# Patient Record
Sex: FEMALE | Race: ASIAN | NOT HISPANIC OR LATINO | Employment: OTHER | URBAN - METROPOLITAN AREA
[De-identification: names, ages, dates, MRNs, and addresses within clinical notes are randomized per-mention and may not be internally consistent; named-entity substitution may affect disease eponyms.]

---

## 2017-05-02 ENCOUNTER — HOSPITAL ENCOUNTER (OUTPATIENT)
Dept: LAB | Facility: MEDICAL CENTER | Age: 82
End: 2017-05-02
Attending: FAMILY MEDICINE
Payer: COMMERCIAL

## 2017-05-02 LAB
25(OH)D3 SERPL-MCNC: 20 NG/ML (ref 30–100)
ALBUMIN SERPL BCP-MCNC: 3.9 G/DL (ref 3.2–4.9)
ALBUMIN/GLOB SERPL: 1.3 G/DL
ALP SERPL-CCNC: 80 U/L (ref 30–99)
ALT SERPL-CCNC: 13 U/L (ref 2–50)
ANION GAP SERPL CALC-SCNC: 10 MMOL/L (ref 0–11.9)
AST SERPL-CCNC: 19 U/L (ref 12–45)
BASOPHILS # BLD AUTO: 1.4 % (ref 0–1.8)
BASOPHILS # BLD: 0.07 K/UL (ref 0–0.12)
BILIRUB SERPL-MCNC: 0.5 MG/DL (ref 0.1–1.5)
BUN SERPL-MCNC: 12 MG/DL (ref 8–22)
CALCIUM SERPL-MCNC: 9.1 MG/DL (ref 8.5–10.5)
CHLORIDE SERPL-SCNC: 108 MMOL/L (ref 96–112)
CHOLEST SERPL-MCNC: 206 MG/DL (ref 100–199)
CO2 SERPL-SCNC: 22 MMOL/L (ref 20–33)
CREAT SERPL-MCNC: 1.03 MG/DL (ref 0.5–1.4)
EOSINOPHIL # BLD AUTO: 0.31 K/UL (ref 0–0.51)
EOSINOPHIL NFR BLD: 6.2 % (ref 0–6.9)
ERYTHROCYTE [DISTWIDTH] IN BLOOD BY AUTOMATED COUNT: 42.9 FL (ref 35.9–50)
GFR SERPL CREATININE-BSD FRML MDRD: 51 ML/MIN/1.73 M 2
GLOBULIN SER CALC-MCNC: 3.1 G/DL (ref 1.9–3.5)
GLUCOSE SERPL-MCNC: 94 MG/DL (ref 65–99)
HCT VFR BLD AUTO: 41.7 % (ref 37–47)
HDLC SERPL-MCNC: 55 MG/DL
HGB BLD-MCNC: 13.4 G/DL (ref 12–16)
IMM GRANULOCYTES # BLD AUTO: 0.01 K/UL (ref 0–0.11)
IMM GRANULOCYTES NFR BLD AUTO: 0.2 % (ref 0–0.9)
LDLC SERPL CALC-MCNC: 119 MG/DL
LYMPHOCYTES # BLD AUTO: 1.56 K/UL (ref 1–4.8)
LYMPHOCYTES NFR BLD: 31.2 % (ref 22–41)
MCH RBC QN AUTO: 30.7 PG (ref 27–33)
MCHC RBC AUTO-ENTMCNC: 32.1 G/DL (ref 33.6–35)
MCV RBC AUTO: 95.4 FL (ref 81.4–97.8)
MONOCYTES # BLD AUTO: 0.35 K/UL (ref 0–0.85)
MONOCYTES NFR BLD AUTO: 7 % (ref 0–13.4)
NEUTROPHILS # BLD AUTO: 2.7 K/UL (ref 2–7.15)
NEUTROPHILS NFR BLD: 54 % (ref 44–72)
NRBC # BLD AUTO: 0 K/UL
NRBC BLD AUTO-RTO: 0 /100 WBC
PLATELET # BLD AUTO: 160 K/UL (ref 164–446)
PMV BLD AUTO: 11.8 FL (ref 9–12.9)
POTASSIUM SERPL-SCNC: 4.3 MMOL/L (ref 3.6–5.5)
PROT SERPL-MCNC: 7 G/DL (ref 6–8.2)
RBC # BLD AUTO: 4.37 M/UL (ref 4.2–5.4)
SODIUM SERPL-SCNC: 140 MMOL/L (ref 135–145)
TRIGL SERPL-MCNC: 159 MG/DL (ref 0–149)
TSH SERPL DL<=0.005 MIU/L-ACNC: 1.18 UIU/ML (ref 0.3–3.7)
WBC # BLD AUTO: 5 K/UL (ref 4.8–10.8)

## 2017-05-02 PROCEDURE — 82306 VITAMIN D 25 HYDROXY: CPT

## 2017-05-02 PROCEDURE — 85025 COMPLETE CBC W/AUTO DIFF WBC: CPT

## 2017-05-02 PROCEDURE — 84443 ASSAY THYROID STIM HORMONE: CPT

## 2017-05-02 PROCEDURE — 80061 LIPID PANEL: CPT

## 2017-05-02 PROCEDURE — 36415 COLL VENOUS BLD VENIPUNCTURE: CPT

## 2017-05-02 PROCEDURE — 80053 COMPREHEN METABOLIC PANEL: CPT

## 2017-05-04 ENCOUNTER — OFFICE VISIT (OUTPATIENT)
Dept: URGENT CARE | Facility: PHYSICIAN GROUP | Age: 82
End: 2017-05-04
Payer: COMMERCIAL

## 2017-05-04 ENCOUNTER — APPOINTMENT (OUTPATIENT)
Dept: RADIOLOGY | Facility: IMAGING CENTER | Age: 82
End: 2017-05-04
Attending: NURSE PRACTITIONER
Payer: COMMERCIAL

## 2017-05-04 VITALS
TEMPERATURE: 98.2 F | OXYGEN SATURATION: 97 % | DIASTOLIC BLOOD PRESSURE: 80 MMHG | HEIGHT: 62 IN | HEART RATE: 84 BPM | BODY MASS INDEX: 21.35 KG/M2 | SYSTOLIC BLOOD PRESSURE: 150 MMHG | WEIGHT: 116 LBS | RESPIRATION RATE: 16 BRPM

## 2017-05-04 DIAGNOSIS — M79.674 TOE PAIN, RIGHT: ICD-10-CM

## 2017-05-04 DIAGNOSIS — S92.501A: ICD-10-CM

## 2017-05-04 PROCEDURE — L3260 AMBULATORY SURGICAL BOOT EAC: HCPCS | Performed by: NURSE PRACTITIONER

## 2017-05-04 PROCEDURE — 1101F PT FALLS ASSESS-DOCD LE1/YR: CPT | Mod: 8P | Performed by: NURSE PRACTITIONER

## 2017-05-04 PROCEDURE — 73660 X-RAY EXAM OF TOE(S): CPT | Mod: TC,RT | Performed by: NURSE PRACTITIONER

## 2017-05-04 PROCEDURE — G8599 NO ASA/ANTIPLAT THER USE RNG: HCPCS | Performed by: NURSE PRACTITIONER

## 2017-05-04 PROCEDURE — G8432 DEP SCR NOT DOC, RNG: HCPCS | Performed by: NURSE PRACTITIONER

## 2017-05-04 PROCEDURE — 4040F PNEUMOC VAC/ADMIN/RCVD: CPT | Performed by: NURSE PRACTITIONER

## 2017-05-04 PROCEDURE — 99202 OFFICE O/P NEW SF 15 MIN: CPT | Performed by: NURSE PRACTITIONER

## 2017-05-04 PROCEDURE — 4004F PT TOBACCO SCREEN RCVD TLK: CPT | Mod: 8P | Performed by: NURSE PRACTITIONER

## 2017-05-04 PROCEDURE — G8420 CALC BMI NORM PARAMETERS: HCPCS | Performed by: NURSE PRACTITIONER

## 2017-05-04 RX ORDER — CEPHALEXIN 500 MG/1
500 CAPSULE ORAL 2 TIMES DAILY
Qty: 20 QUANTITY SUFFICIENT | Refills: 0 | Status: ON HOLD | OUTPATIENT
Start: 2017-05-04 | End: 2017-05-07

## 2017-05-04 ASSESSMENT — ENCOUNTER SYMPTOMS
TINGLING: 1
CHILLS: 0
ARTHRALGIAS: 1
FEVER: 0

## 2017-05-04 NOTE — MR AVS SNAPSHOT
"Sabrina Lobo   2017 6:15 PM   Office Visit   MRN: [id number]    Department:  [facility]   Dept Phone:  [phone]    Description:  Female : 1929   Provider:  CHERYLE Benavidez           Reason for Visit     Toe Injury R. foot middle toe pain, swelling, redness, bleeding X 3 days Pt. dropped a bottle of ensure on her foot.       Allergies as of 2017     No Known Allergies      You were diagnosed with     Toe pain, right   [298873]       Closed fracture of third toe of right foot, initial encounter   [447814]         Vital Signs     Blood Pressure Pulse Temperature Respirations Height Weight    150/80 mmHg 84 36.8 °C (98.2 °F) 16 1.575 m (5' 2\") 52.617 kg (116 lb)    Body Mass Index Oxygen Saturation Smoking Status             21.21 kg/m2 97% Never Smoker          Basic Information     Date Of Birth Sex Race Ethnicity Preferred Language    1929 Female  Non- English      Problem List              ICD-10-CM Priority Class Noted - Resolved    Lower urinary tract infectious disease N39.0   2012 - Present    UTI (urinary tract infection) N39.0 High  2012 - Present    Renal insufficiency N28.9 High  2012 - Present    HTN (hypertension) I10 Low  2012 - Present    Dyslipidemia E78.5 Low  2012 - Present    CAD (coronary artery disease) I25.10 Low  2012 - Present    Hypoxia R09.02 Medium  2012 - Present    Dizziness R42   2013 - Present    Near syncope R55   2013 - Present    UTI (urinary tract infection) N39.0   2013 - Present    Recurrent vertigo R42   2013 - Present    Vitamin D insufficiency E55.9   2013 - Present    Chest pain R07.9   3/9/2015 - Present    HTN (hypertension), malignant I10   3/9/2015 - Present    Obesity E66.9   3/9/2015 - Present    Vertigo R42   2015 - Present    Acute maxillary sinusitis J01.00   2015 - Present      Health Maintenance        Date Due Completion Dates   "    IMM DTaP/Tdap/Td Vaccine (1 - Tdap) 4/13/1948 ---    PAP SMEAR 4/13/1950 ---    MAMMOGRAM 4/13/1969 ---    COLONOSCOPY 4/13/1979 ---    IMM ZOSTER VACCINE 4/13/1989 ---    BONE DENSITY 4/13/1994 ---    IMM PNEUMOCOCCAL 65+ (ADULT) LOW/MEDIUM RISK SERIES (2 of 2 - PCV13) 9/1/2011 9/1/2010            Current Immunizations     Influenza TIV (IM) 10/13/2013, 11/16/2012    Pneumococcal polysaccharide vaccine (PPSV-23) 9/1/2010      Below and/or attached are the medications your provider expects you to take. Review all of your home medications and newly ordered medications with your provider and/or pharmacist. Follow medication instructions as directed by your provider and/or pharmacist. Please keep your medication list with you and share with your provider. Update the information when medications are discontinued, doses are changed, or new medications (including over-the-counter products) are added; and carry medication information at all times in the event of emergency situations     Allergies:  No Known Allergies          Medications  Valid as of: May 04, 2017 -  7:22 PM    Generic Name Brand Name Tablet Size Instructions for use    Ascorbic Acid   Take  by mouth.        Aspirin (Tab) aspirin 81 MG Take 1 Tab by mouth every morning.        Cephalexin (Cap) KEFLEX 500 MG Take 1 Cap by mouth 2 times a day.        Enalapril Maleate (Tab) VASOTEC 20 MG Take 20 mg by mouth every day.        Meclizine HCl (Tab) ANTIVERT 12.5 MG Take 1 Tab by mouth 3 times a day as needed for Dizziness.        Simvastatin (Tab) ZOCOR 20 MG Take 1 Tab by mouth every bedtime.        .                 Medicines prescribed today were sent to:     [facility] [address] [place] [address] [address] [address]    Phone: [phone] Fax: [phone]    Open 24 Hours?: No      Medication refill instructions:       If your prescription bottle indicates you have medication refills left, it is not necessary to  call your provider’s office. Please contact your pharmacy and they will refill your medication.    If your prescription bottle indicates you do not have any refills left, you may request refills at any time through one of the following ways: The online [employer] system (except Urgent Care), by calling your provider’s office, or by asking your pharmacy to contact your provider’s office with a refill request. Medication refills are processed only during regular business hours and may not be available until the next business day. Your provider may request additional information or to have a follow-up visit with you prior to refilling your medication.   *Please Note: Medication refills are assigned a new Rx number when refilled electronically. Your pharmacy may indicate that no refills were authorized even though a new prescription for the same medication is available at the pharmacy. Please request the medicine by name with the pharmacy before contacting your provider for a refill.        Your To Do List     Future Labs/Procedures Complete By Expires    DX-TOE(S) 2+ RIGHT  As directed 5/4/2018         [employer] Access Code: [email or web address]  Expires: 6/3/2017  7:22 PM    [employer]  A secure, online tool to manage your health information     [employer]’s [employer]® is a secure, online tool that connects you to your personalized health information from the privacy of your home -- day or night - making it very easy for you to manage your healthcare. Once the activation process is completed, you can even access your medical information using the [employer] matthew, which is available for free in the Apple Matthew store or Google Play store.     [employer] provides the following levels of access (as shown below):   My Chart Features   [facility] Primary [facility] Doctor [facility]  Specialists [facility]  Urgent  Care Non-[facility]  Primary Care  Doctor   Email your healthcare team securely and privately 24/7 X X X    Manage appointments: schedule your next  appointment; view details of past/upcoming appointments X      Request prescription refills. X      View recent personal medical records, including lab and immunizations X X X X   View health record, including health history, allergies, medications X X X X   Read reports about your outpatient visits, procedures, consult and ER notes X X X X   See your discharge summary, which is a recap of your hospital and/or ER visit that includes your diagnosis, lab results, and care plan. X X       How to register for [employer]:  1. Go to  [email or web address].  2. Click on the Sign Up Now box, which takes you to the New Member Sign Up page. You will need to provide the following information:  a. Enter your [employer] Access Code exactly as it appears at the top of this page. (You will not need to use this code after you’ve completed the sign-up process. If you do not sign up before the expiration date, you must request a new code.)   b. Enter your date of birth.   c. Enter your home email address.   d. Click Submit, and follow the next screen’s instructions.  3. Create a [employer] ID. This will be your [employer] login ID and cannot be changed, so think of one that is secure and easy to remember.  4. Create a [employer] password. You can change your password at any time.  5. Enter your Password Reset Question and Answer. This can be used at a later time if you forget your password.   6. Enter your e-mail address. This allows you to receive e-mail notifications when new information is available in [employer]Trek.  7. Click Sign Up. You can now view your health information.    For assistance activating your [employer] account, call [phone]

## 2017-05-05 NOTE — PROGRESS NOTES
"Subjective:      Sabrina Lobo is a 88 y.o. female who presents with Toe Injury            Toe Injury  This is a new problem. The current episode started in the past 7 days. The problem occurs constantly. The problem has been unchanged. Associated symptoms include arthralgias. Pertinent negatives include no chills or fever. Associated symptoms comments: Toe pain.. The symptoms are aggravated by bending. She has tried nothing for the symptoms.     Allergies, medications and history reviewed by me today    Review of Systems   Constitutional: Negative for fever and chills.   Musculoskeletal: Positive for joint pain and arthralgias.   Skin:        +abrasion   Neurological: Positive for tingling.          Objective:     /80 mmHg  Pulse 84  Temp(Src) 36.8 °C (98.2 °F)  Resp 16  Ht 1.575 m (5' 2\")  Wt 52.617 kg (116 lb)  BMI 21.21 kg/m2  SpO2 97%     Physical Exam   Constitutional: She is oriented to person, place, and time. She appears well-developed and well-nourished. No distress.   Musculoskeletal:        Right foot: There is tenderness, bony tenderness and swelling. There is no crepitus and no deformity.        Feet:    Neurological: She is oriented to person, place, and time.   Skin: Skin is warm and dry. There is erythema.   Abrasion to distal portion of toe               Assessment/Plan:     1. Toe pain, right  DX-TOE(S) 2+ RIGHT    cephALEXin (KEFLEX) 500 MG Cap   2. Closed fracture of third toe of right foot, initial encounter     mildly displaced fracture of tuft of third toe, right foot.  Tad taped and post op shoe to patient.  Keflex due to abrasion/fracture  Differential diagnosis, natural history, supportive care, and indications for immediate follow-up discussed at length.         "

## 2017-05-06 ENCOUNTER — APPOINTMENT (OUTPATIENT)
Dept: RADIOLOGY | Facility: MEDICAL CENTER | Age: 82
DRG: 305 | End: 2017-05-06
Attending: EMERGENCY MEDICINE
Payer: COMMERCIAL

## 2017-05-06 ENCOUNTER — RESOLUTE PROFESSIONAL BILLING HOSPITAL PROF FEE (OUTPATIENT)
Dept: HOSPITALIST | Facility: MEDICAL CENTER | Age: 82
End: 2017-05-06
Payer: COMMERCIAL

## 2017-05-06 ENCOUNTER — HOSPITAL ENCOUNTER (INPATIENT)
Facility: MEDICAL CENTER | Age: 82
LOS: 3 days | DRG: 305 | End: 2017-05-09
Attending: EMERGENCY MEDICINE | Admitting: HOSPITALIST
Payer: COMMERCIAL

## 2017-05-06 DIAGNOSIS — I10 ESSENTIAL HYPERTENSION: ICD-10-CM

## 2017-05-06 DIAGNOSIS — R07.2 PRECORDIAL PAIN: ICD-10-CM

## 2017-05-06 PROBLEM — I16.0 HYPERTENSIVE URGENCY: Status: ACTIVE | Noted: 2017-05-06

## 2017-05-06 LAB
ALBUMIN SERPL BCP-MCNC: 3.9 G/DL (ref 3.2–4.9)
ALBUMIN/GLOB SERPL: 1.3 G/DL
ALP SERPL-CCNC: 81 U/L (ref 30–99)
ALT SERPL-CCNC: 21 U/L (ref 2–50)
ANION GAP SERPL CALC-SCNC: 14 MMOL/L (ref 0–11.9)
APTT PPP: 23.4 SEC (ref 24.7–36)
AST SERPL-CCNC: 32 U/L (ref 12–45)
BASOPHILS # BLD AUTO: 0.7 % (ref 0–1.8)
BASOPHILS # BLD: 0.05 K/UL (ref 0–0.12)
BILIRUB SERPL-MCNC: 0.2 MG/DL (ref 0.1–1.5)
BNP SERPL-MCNC: 171 PG/ML (ref 0–100)
BUN SERPL-MCNC: 17 MG/DL (ref 8–22)
CALCIUM SERPL-MCNC: 9.1 MG/DL (ref 8.4–10.2)
CHLORIDE SERPL-SCNC: 105 MMOL/L (ref 96–112)
CO2 SERPL-SCNC: 21 MMOL/L (ref 20–33)
CREAT SERPL-MCNC: 0.93 MG/DL (ref 0.5–1.4)
EKG IMPRESSION: NORMAL
EOSINOPHIL # BLD AUTO: 0.46 K/UL (ref 0–0.51)
EOSINOPHIL NFR BLD: 6.2 % (ref 0–6.9)
ERYTHROCYTE [DISTWIDTH] IN BLOOD BY AUTOMATED COUNT: 39.5 FL (ref 35.9–50)
GFR SERPL CREATININE-BSD FRML MDRD: 57 ML/MIN/1.73 M 2
GLOBULIN SER CALC-MCNC: 2.9 G/DL (ref 1.9–3.5)
GLUCOSE SERPL-MCNC: 98 MG/DL (ref 65–99)
HCT VFR BLD AUTO: 40.2 % (ref 37–47)
HGB BLD-MCNC: 13.9 G/DL (ref 12–16)
IMM GRANULOCYTES # BLD AUTO: 0.01 K/UL (ref 0–0.11)
IMM GRANULOCYTES NFR BLD AUTO: 0.1 % (ref 0–0.9)
INR PPP: 0.87 (ref 0.87–1.13)
LIPASE SERPL-CCNC: 53 U/L (ref 7–58)
LYMPHOCYTES # BLD AUTO: 1.99 K/UL (ref 1–4.8)
LYMPHOCYTES NFR BLD: 26.7 % (ref 22–41)
MCH RBC QN AUTO: 31.2 PG (ref 27–33)
MCHC RBC AUTO-ENTMCNC: 34.6 G/DL (ref 33.6–35)
MCV RBC AUTO: 90.1 FL (ref 81.4–97.8)
MONOCYTES # BLD AUTO: 0.45 K/UL (ref 0–0.85)
MONOCYTES NFR BLD AUTO: 6 % (ref 0–13.4)
NEUTROPHILS # BLD AUTO: 4.48 K/UL (ref 2–7.15)
NEUTROPHILS NFR BLD: 60.3 % (ref 44–72)
NRBC # BLD AUTO: 0 K/UL
NRBC BLD AUTO-RTO: 0 /100 WBC
PLATELET # BLD AUTO: 203 K/UL (ref 164–446)
PMV BLD AUTO: 10.1 FL (ref 9–12.9)
POTASSIUM SERPL-SCNC: 3.7 MMOL/L (ref 3.6–5.5)
PROT SERPL-MCNC: 6.8 G/DL (ref 6–8.2)
PROTHROMBIN TIME: 11.6 SEC (ref 12–14.6)
RBC # BLD AUTO: 4.46 M/UL (ref 4.2–5.4)
SODIUM SERPL-SCNC: 140 MMOL/L (ref 135–145)
TROPONIN I SERPL-MCNC: 0.02 NG/ML (ref 0–0.04)
TROPONIN I SERPL-MCNC: <0.02 NG/ML (ref 0–0.04)
WBC # BLD AUTO: 7.4 K/UL (ref 4.8–10.8)

## 2017-05-06 PROCEDURE — 70450 CT HEAD/BRAIN W/O DYE: CPT

## 2017-05-06 PROCEDURE — 700105 HCHG RX REV CODE 258: Performed by: EMERGENCY MEDICINE

## 2017-05-06 PROCEDURE — 96360 HYDRATION IV INFUSION INIT: CPT

## 2017-05-06 PROCEDURE — 85610 PROTHROMBIN TIME: CPT

## 2017-05-06 PROCEDURE — 99291 CRITICAL CARE FIRST HOUR: CPT

## 2017-05-06 PROCEDURE — 700111 HCHG RX REV CODE 636 W/ 250 OVERRIDE (IP)

## 2017-05-06 PROCEDURE — 80053 COMPREHEN METABOLIC PANEL: CPT

## 2017-05-06 PROCEDURE — 36415 COLL VENOUS BLD VENIPUNCTURE: CPT

## 2017-05-06 PROCEDURE — 85025 COMPLETE CBC W/AUTO DIFF WBC: CPT

## 2017-05-06 PROCEDURE — 93005 ELECTROCARDIOGRAM TRACING: CPT | Performed by: EMERGENCY MEDICINE

## 2017-05-06 PROCEDURE — 84484 ASSAY OF TROPONIN QUANT: CPT

## 2017-05-06 PROCEDURE — 770022 HCHG ROOM/CARE - ICU (200)

## 2017-05-06 PROCEDURE — 85730 THROMBOPLASTIN TIME PARTIAL: CPT

## 2017-05-06 PROCEDURE — 83690 ASSAY OF LIPASE: CPT

## 2017-05-06 PROCEDURE — 700102 HCHG RX REV CODE 250 W/ 637 OVERRIDE(OP): Performed by: EMERGENCY MEDICINE

## 2017-05-06 PROCEDURE — A9270 NON-COVERED ITEM OR SERVICE: HCPCS | Performed by: EMERGENCY MEDICINE

## 2017-05-06 PROCEDURE — 99291 CRITICAL CARE FIRST HOUR: CPT | Mod: AI | Performed by: HOSPITALIST

## 2017-05-06 PROCEDURE — 83880 ASSAY OF NATRIURETIC PEPTIDE: CPT

## 2017-05-06 PROCEDURE — 700105 HCHG RX REV CODE 258: Performed by: HOSPITALIST

## 2017-05-06 PROCEDURE — 71010 DX-CHEST-PORTABLE (1 VIEW): CPT

## 2017-05-06 RX ORDER — BISACODYL 10 MG
10 SUPPOSITORY, RECTAL RECTAL
Status: DISCONTINUED | OUTPATIENT
Start: 2017-05-06 | End: 2017-05-09 | Stop reason: HOSPADM

## 2017-05-06 RX ORDER — ENALAPRILAT 1.25 MG/ML
1.25 INJECTION INTRAVENOUS EVERY 6 HOURS PRN
Status: DISCONTINUED | OUTPATIENT
Start: 2017-05-06 | End: 2017-05-07

## 2017-05-06 RX ORDER — ESMOLOL HYDROCHLORIDE 10 MG/ML
INJECTION, SOLUTION INTRAVENOUS
Status: COMPLETED
Start: 2017-05-06 | End: 2017-05-06

## 2017-05-06 RX ORDER — ONDANSETRON 2 MG/ML
4 INJECTION INTRAMUSCULAR; INTRAVENOUS EVERY 4 HOURS PRN
Status: DISCONTINUED | OUTPATIENT
Start: 2017-05-06 | End: 2017-05-09 | Stop reason: HOSPADM

## 2017-05-06 RX ORDER — SIMVASTATIN 20 MG
20 TABLET ORAL
Status: DISCONTINUED | OUTPATIENT
Start: 2017-05-06 | End: 2017-05-09 | Stop reason: HOSPADM

## 2017-05-06 RX ORDER — SODIUM CHLORIDE 9 MG/ML
INJECTION, SOLUTION INTRAVENOUS CONTINUOUS
Status: DISCONTINUED | OUTPATIENT
Start: 2017-05-06 | End: 2017-05-07

## 2017-05-06 RX ORDER — SODIUM CHLORIDE 9 MG/ML
INJECTION, SOLUTION INTRAVENOUS CONTINUOUS
Status: DISCONTINUED | OUTPATIENT
Start: 2017-05-06 | End: 2017-05-06

## 2017-05-06 RX ORDER — ENALAPRIL MALEATE 5 MG/1
20 TABLET ORAL DAILY
Status: DISCONTINUED | OUTPATIENT
Start: 2017-05-07 | End: 2017-05-09 | Stop reason: HOSPADM

## 2017-05-06 RX ORDER — POLYETHYLENE GLYCOL 3350 17 G/17G
1 POWDER, FOR SOLUTION ORAL
Status: DISCONTINUED | OUTPATIENT
Start: 2017-05-06 | End: 2017-05-09 | Stop reason: HOSPADM

## 2017-05-06 RX ORDER — ESMOLOL HYDROCHLORIDE 10 MG/ML
INJECTION, SOLUTION INTRAVENOUS CONTINUOUS
Status: DISCONTINUED | OUTPATIENT
Start: 2017-05-06 | End: 2017-05-08

## 2017-05-06 RX ORDER — AMOXICILLIN 250 MG
2 CAPSULE ORAL 2 TIMES DAILY
Status: DISCONTINUED | OUTPATIENT
Start: 2017-05-07 | End: 2017-05-09 | Stop reason: HOSPADM

## 2017-05-06 RX ORDER — ASPIRIN 81 MG/1
81 TABLET, CHEWABLE ORAL EVERY MORNING
Status: DISCONTINUED | OUTPATIENT
Start: 2017-05-07 | End: 2017-05-09 | Stop reason: HOSPADM

## 2017-05-06 RX ORDER — HEPARIN SODIUM 5000 [USP'U]/ML
5000 INJECTION, SOLUTION INTRAVENOUS; SUBCUTANEOUS EVERY 8 HOURS
Status: DISCONTINUED | OUTPATIENT
Start: 2017-05-07 | End: 2017-05-09 | Stop reason: HOSPADM

## 2017-05-06 RX ORDER — ONDANSETRON 4 MG/1
4 TABLET, ORALLY DISINTEGRATING ORAL EVERY 4 HOURS PRN
Status: DISCONTINUED | OUTPATIENT
Start: 2017-05-06 | End: 2017-05-09 | Stop reason: HOSPADM

## 2017-05-06 RX ORDER — ACETAMINOPHEN 325 MG/1
650 TABLET ORAL EVERY 6 HOURS PRN
Status: DISCONTINUED | OUTPATIENT
Start: 2017-05-06 | End: 2017-05-09 | Stop reason: HOSPADM

## 2017-05-06 RX ORDER — LABETALOL HYDROCHLORIDE 5 MG/ML
10 INJECTION, SOLUTION INTRAVENOUS EVERY 4 HOURS PRN
Status: DISCONTINUED | OUTPATIENT
Start: 2017-05-06 | End: 2017-05-07

## 2017-05-06 RX ADMIN — ESMOLOL HYDROCHLORIDE 50 MCG/KG/MIN: 10 INJECTION INTRAVENOUS at 23:02

## 2017-05-06 RX ADMIN — SODIUM CHLORIDE: 9 INJECTION, SOLUTION INTRAVENOUS at 21:10

## 2017-05-06 RX ADMIN — NITROGLYCERIN 0.5 INCH: 20 OINTMENT TOPICAL at 21:10

## 2017-05-06 RX ADMIN — NITROGLYCERIN 1.5 INCH: 20 OINTMENT TOPICAL at 21:45

## 2017-05-06 RX ADMIN — SODIUM CHLORIDE 1000 ML: 9 INJECTION, SOLUTION INTRAVENOUS at 23:02

## 2017-05-06 ASSESSMENT — PAIN SCALES - GENERAL
PAINLEVEL_OUTOF10: 0

## 2017-05-06 ASSESSMENT — LIFESTYLE VARIABLES
ALCOHOL_USE: NO
EVER_SMOKED: YES

## 2017-05-06 NOTE — IP AVS SNAPSHOT
5/9/2017    Sabrina Lobo  [address] [place] [address]    Dear Sabrina:    [facility] wants to ensure your discharge home is safe and you or your loved ones have had all of your questions answered regarding your care after you leave the hospital.    Below is a list of resources and contact information should you have any questions regarding your hospital stay, follow-up instructions, or active medical symptoms.    Questions or Concerns Regarding… Contact   Medical Questions Related to Your Discharge  (7 days a week, 8am-5pm) Contact a Nurse Care Coordinator   [phone]   Medical Questions Not Related to Your Discharge  (24 hours a day / 7 days a week)  Contact the Nurse Health Line   [phone]    Medications or Discharge Instructions Refer to your discharge packet   or contact your [facility] Primary Care Provider   [phone]   Follow-up Appointment(s) Schedule your appointment via [email or web address]   or contact Scheduling [phone]   Billing Review your statement via [email or web address]  or contact Billing [phone]   Medical Records Review your records via [email or web address]   or contact Medical Records [phone]     You may receive a telephone call within two days of discharge. This call is to make certain you understand your discharge instructions and have the opportunity to have any questions answered. You can also easily access your medical information, test results and upcoming appointments via the [email or web address] free online health management tool. You can learn more and sign up at [email or web address]. For assistance setting up your [email or web address] account, please call [phone].    Once again, we want to ensure your discharge home is safe and that you have a clear understanding of any next steps in your care. If you have any questions or concerns, please do not hesitate to contact us, we are here for you. Thank you for choosing [facility] for your healthcare needs.    Sincerely,    Your [facility] Healthcare Team

## 2017-05-06 NOTE — IP AVS SNAPSHOT
" Home Care Instructions                                                                                                                  Name:Sabrina Lobo  Medical Record Number:[id number]  CSN: [id number]    YOB: 1929   Age: 88 y.o.  Sex: female  HT:1.575 m (5' 2\") WT: 52 kg (114 lb 10.2 oz)          Admit Date: 5/6/2017     Discharge Date:   Today's Date: 5/9/2017  Attending Doctor:  Javier Reinoso*                  Allergies:  Review of patient's allergies indicates no known allergies.            Discharge Instructions       Discharge Instructions    Discharged to home by car with relative. Discharged via wheelchair, hospital escort: Yes.  Special equipment needed: Pt's own cane    Be sure to schedule a follow-up appointment with your primary care doctor or any specialists as instructed.     Discharge Plan:   Diet Plan: Discussed  Activity Level: Discussed  Confirmed Follow up Appointment: Patient to Call and Schedule Appointment  Confirmed Symptoms Management: Discussed  Medication Reconciliation Updated: Yes  Influenza Vaccine Indication: Not indicated: Previously immunized this influenza season and > 8 years of age    I understand that a diet low in cholesterol, fat, and sodium is recommended for good health. Unless I have been given specific instructions below for another diet, I accept this instruction as my diet prescription.   Other diet: Heart Healthy, low sodium    Special Instructions: None    · Is patient discharged on Warfarin / Coumadin?   No     · Is patient Post Blood Transfusion?  No    Depression / Suicide Risk    As you are discharged from this [facility] facility, it is important to learn how to keep safe from harming yourself.    Recognize the warning signs:  · Abrupt changes in personality, positive or negative- including increase in energy   · Giving away possessions  · Change in eating patterns- significant weight changes-  positive or negative  · Change in sleeping " patterns- unable to sleep or sleeping all the time   · Unwillingness or inability to communicate  · Depression  · Unusual sadness, discouragement and loneliness  · Talk of wanting to die  · Neglect of personal appearance   · Rebelliousness- reckless behavior  · Withdrawal from people/activities they love  · Confusion- inability to concentrate     If you or a loved one observes any of these behaviors or has concerns about self-harm, here's what you can do:  · Talk about it- your feelings and reasons for harming yourself  · Remove any means that you might use to hurt yourself (examples: pills, rope, extension cords, firearm)  · Get professional help from the community (Mental Health, Substance Abuse, psychological counseling)  · Do not be alone:Call your Safe Contact- someone whom you trust who will be there for you.  · Call your local CRISIS HOTLINE 025-8152 or 073-550-1281  · Call your local Children's Mobile Crisis Response Team [place] (748) 145-5089 or www.stickapps  · Call the toll free National Suicide Prevention Hotlines   · National Suicide Prevention Lifeline 319-146-HRYZ (5527)  · National Hope Line Network 800-SUICIDE (170-5794)           Discharge Medication Instructions:    Below are the medications your physician expects you to take upon discharge:    Review all your home medications and newly ordered medications with your doctor and/or pharmacist. Follow medication instructions as directed by your doctor and/or pharmacist.    Please keep your medication list with you and share with your physician.               Medication List      START taking these medications        Instructions    Morning Afternoon Evening Bedtime    amlodipine 5 MG Tabs   Last time this was given:  5 mg on 5/9/2017  8:44 AM   Commonly known as:  NORVASC        Take 1 Tab by mouth 2 Times a Day.   Dose:  5 mg                          CONTINUE taking these medications        Instructions    Morning Afternoon Evening  Bedtime    aspirin 81 MG tablet        Take 1 Tab by mouth every morning.   Dose:  81 mg                        simvastatin 20 MG Tabs   Last time this was given:  20 mg on 5/9/2017  8:44 AM   Commonly known as:  ZOCOR        Take 20 mg by mouth every day at 6 PM.   Dose:  20 mg                        VASOTEC 20 MG tablet   Last time this was given:  20 mg on 5/9/2017  8:44 AM   Generic drug:  enalapril        Take 20 mg by mouth every day.   Dose:  20 mg                        VITAMIN C PO        Take 1 Tab by mouth every day.   Dose:  1 Tab                          STOP taking these medications     cephALEXin 500 MG Caps   Commonly known as:  KEFLEX                    Where to Get Your Medications      You can get these medications from any pharmacy     Bring a paper prescription for each of these medications    - amlodipine 5 MG Tabs            Instructions           Diet / Nutrition:    Follow any diet instructions given to you by your doctor or the dietician, including how much salt (sodium) you are allowed each day.    If you are overweight, talk to your doctor about a weight reduction plan.    Activity:    Remain physically active following your doctor's instructions about exercise and activity.    Rest often.     Any time you become even a little tired or short of breath, SIT DOWN and rest.    Worsening Symptoms:    Report any of the following signs and symptoms to the doctor's office immediately:    *Pain of jaw, arm, or neck  *Chest pain not relieved by medication                               *Dizziness or loss of consciousness  *Difficulty breathing even when at rest   *More tired than usual                                       *Bleeding drainage or swelling of surgical site  *Swelling of feet, ankles, legs or stomach                 *Fever (>100ºF)  *Pink or blood tinged sputum  *Weight gain (3lbs/day or 5lbs /week)           *Shock from internal defibrillator (if applicable)  *Palpitations or  irregular heartbeats                *Cool and/or numb extremities    Stroke Awareness    Common Risk Factors for Stroke include:    Age  Atrial Fibrillation  Carotid Artery Stenosis  Diabetes Mellitus  Excessive alcohol consumption  High blood pressure  Overweight   Physical inactivity  Smoking    Warning signs and symptoms of a stroke include:    *Sudden numbness or weakness of the face, arm or leg (especially on one side of the body).  *Sudden confusion, trouble speaking or understanding.  *Sudden trouble seeing in one or both eyes.  *Sudden trouble walking, dizziness, loss of balance or coordination.Sudden severe headache with no known cause.    It is very important to get treatment quickly when a stroke occurs. If you experience any of the above warning signs, call 911 immediately.                   Disclaimer         Quit Smoking / Tobacco Use:    I understand the use of any tobacco products increases my chance of suffering from future heart disease or stroke and could cause other illnesses which may shorten my life. Quitting the use of tobacco products is the single most important thing I can do to improve my health. For further information on smoking / tobacco cessation call a Toll Free Quit Line at 1-737.913.5043 (*National Cancer [place]) or 1-313.331.7661 (American Lung Association) or you can access the web based program at www.lungusa.org.    [place] Tobacco Users Help Line:  (931) 363-9381       Toll Free: 1-210.753.6236  Quit Tobacco Program [facility] (591)394-5847    Crisis Hotline:    [place] Crisis Hotline:  5-204-XUFINUT or 1-600.793.7100    [place] Crisis Hotline:    1-759.817.8156 or 582-539-6897    Discharge Survey:   Thank you for choosing [facility]. We hope we did everything we could to make your hospital stay a pleasant one. You may be receiving a phone survey and we would appreciate your time and participation in answering the questions. Your input is very  valuable to us in our efforts to improve our service to our patients and their families.        My signature on this form indicates that:    1. I have reviewed and understand the above information.  2. My questions regarding this information have been answered to my satisfaction.  3. I have formulated a plan with my discharge nurse to obtain my prescribed medications for home.                  Disclaimer         __________________________________                     __________       ________                       Patient Signature                                                 Date                    Time

## 2017-05-06 NOTE — IP AVS SNAPSHOT
[employer] Access Code: [email or web address]  Expires: 6/3/2017  7:22 PM    [employer]  A secure, online tool to manage your health information     [employer]® is a secure, online tool that connects you to your personalized health information from the privacy of your home -- day or night - making it very easy for you to manage your healthcare. Once the activation process is completed, you can even access your medical information using the [employer] matthew, which is available for free in the Apple Matthew store or Google Play store.     [employer] provides the following levels of access (as shown below):   My Chart Features   [facility] Primary Care Doctor [facility]  Specialists [facility]  Urgent  Care Non-[facility]  Primary Care  Doctor   Email your healthcare team securely and privately 24/7 X X X X   Manage appointments: schedule your next appointment; view details of past/upcoming appointments X      Request prescription refills. X      View recent personal medical records, including lab and immunizations X X X X   View health record, including health history, allergies, medications X X X X   Read reports about your outpatient visits, procedures, consult and ER notes X X X X   See your discharge summary, which is a recap of your hospital and/or ER visit that includes your diagnosis, lab results, and care plan. X X       How to register for [employer]:  1. Go to  [email or web address].  2. Click on the Sign Up Now box, which takes you to the New Member Sign Up page. You will need to provide the following information:  a. Enter your [employer] Access Code exactly as it appears at the top of this page. (You will not need to use this code after you’ve completed the sign-up process. If you do not sign up before the expiration date, you must request a new code.)   b. Enter your date of birth.   c. Enter your home email address.   d. Click Submit, and follow the next screen’s instructions.  3. Create a [employer] ID. This will be your [employer]  login ID and cannot be changed, so think of one that is secure and easy to remember.  4. Create a Within3 password. You can change your password at any time.  5. Enter your Password Reset Question and Answer. This can be used at a later time if you forget your password.   6. Enter your e-mail address. This allows you to receive e-mail notifications when new information is available in Within[employer].  7. Click Sign Up. You can now view your health information.    For assistance activating your [employer] account, call [phone]

## 2017-05-06 NOTE — IP AVS SNAPSHOT
" <p align=\"LEFT\"><IMG SRC=\"//[email or web address]$/[email or web address]\" alt=\"Image\" WIDTH=\"50%\" HEIGHT=\"200\" BORDER=\"\"></p>                   Name:Sabrina Lobo  Medical Record Number:[id number]  CSN: [id number]    YOB: 1929   Age: 88 y.o.  Sex: female  HT:1.575 m (5' 2\") WT: 52 kg (114 lb 10.2 oz)          Admit Date: 5/6/2017     Discharge Date:   Today's Date: 5/9/2017  Attending Doctor:  Javier Reinoso*                  Allergies:  Review of patient's allergies indicates no known allergies.             Medication List      Take these Medications        Instructions    amlodipine 5 MG Tabs   Commonly known as:  NORVASC    Take 1 Tab by mouth 2 Times a Day.   Dose:  5 mg       aspirin 81 MG tablet    Take 1 Tab by mouth every morning.   Dose:  81 mg       simvastatin 20 MG Tabs   Commonly known as:  ZOCOR    Take 20 mg by mouth every day at 6 PM.   Dose:  20 mg       VASOTEC 20 MG tablet   Generic drug:  enalapril    Take 20 mg by mouth every day.   Dose:  20 mg       VITAMIN C PO    Take 1 Tab by mouth every day.   Dose:  1 Tab         "

## 2017-05-07 PROBLEM — S91.309A WOUND, OPEN, FOOT: Status: ACTIVE | Noted: 2017-05-07

## 2017-05-07 PROCEDURE — 93005 ELECTROCARDIOGRAM TRACING: CPT | Performed by: HOSPITALIST

## 2017-05-07 PROCEDURE — 700111 HCHG RX REV CODE 636 W/ 250 OVERRIDE (IP): Performed by: HOSPITALIST

## 2017-05-07 PROCEDURE — 700101 HCHG RX REV CODE 250: Performed by: HOSPITALIST

## 2017-05-07 PROCEDURE — 700111 HCHG RX REV CODE 636 W/ 250 OVERRIDE (IP)

## 2017-05-07 PROCEDURE — 700102 HCHG RX REV CODE 250 W/ 637 OVERRIDE(OP): Performed by: HOSPITALIST

## 2017-05-07 PROCEDURE — 93010 ELECTROCARDIOGRAM REPORT: CPT | Performed by: INTERNAL MEDICINE

## 2017-05-07 PROCEDURE — A9270 NON-COVERED ITEM OR SERVICE: HCPCS | Performed by: HOSPITALIST

## 2017-05-07 PROCEDURE — 99291 CRITICAL CARE FIRST HOUR: CPT | Performed by: HOSPITALIST

## 2017-05-07 PROCEDURE — 770022 HCHG ROOM/CARE - ICU (200)

## 2017-05-07 RX ORDER — BACITRACIN ZINC 500 [USP'U]/G
OINTMENT TOPICAL 2 TIMES DAILY
Status: DISCONTINUED | OUTPATIENT
Start: 2017-05-07 | End: 2017-05-07

## 2017-05-07 RX ORDER — LABETALOL HYDROCHLORIDE 5 MG/ML
INJECTION, SOLUTION INTRAVENOUS
Status: DISCONTINUED
Start: 2017-05-07 | End: 2017-05-07

## 2017-05-07 RX ORDER — AMLODIPINE BESYLATE 5 MG/1
5 TABLET ORAL
Status: DISCONTINUED | OUTPATIENT
Start: 2017-05-07 | End: 2017-05-08

## 2017-05-07 RX ORDER — HYDRALAZINE HYDROCHLORIDE 20 MG/ML
20 INJECTION INTRAMUSCULAR; INTRAVENOUS EVERY 4 HOURS PRN
Status: DISCONTINUED | OUTPATIENT
Start: 2017-05-07 | End: 2017-05-09 | Stop reason: HOSPADM

## 2017-05-07 RX ORDER — CEPHALEXIN 500 MG/1
500 CAPSULE ORAL 2 TIMES DAILY
Status: ON HOLD | COMMUNITY
Start: 2017-05-04 | End: 2017-05-09

## 2017-05-07 RX ORDER — ENALAPRILAT 1.25 MG/ML
2.5 INJECTION INTRAVENOUS EVERY 6 HOURS PRN
Status: DISCONTINUED | OUTPATIENT
Start: 2017-05-07 | End: 2017-05-09 | Stop reason: HOSPADM

## 2017-05-07 RX ORDER — SIMVASTATIN 20 MG
20 TABLET ORAL EVERY MORNING
COMMUNITY
End: 2018-04-26 | Stop reason: SDUPTHER

## 2017-05-07 RX ORDER — BACITRACIN ZINC 500 [USP'U]/G
OINTMENT TOPICAL DAILY
Status: DISCONTINUED | OUTPATIENT
Start: 2017-05-08 | End: 2017-05-09 | Stop reason: HOSPADM

## 2017-05-07 RX ADMIN — SENNOSIDES AND DOCUSATE SODIUM 2 TABLET: 8.6; 5 TABLET ORAL at 20:38

## 2017-05-07 RX ADMIN — SENNOSIDES AND DOCUSATE SODIUM 2 TABLET: 8.6; 5 TABLET ORAL at 08:01

## 2017-05-07 RX ADMIN — HEPARIN SODIUM 5000 UNITS: 5000 INJECTION, SOLUTION INTRAVENOUS; SUBCUTANEOUS at 22:00

## 2017-05-07 RX ADMIN — LABETALOL HYDROCHLORIDE 10 MG: 5 INJECTION, SOLUTION INTRAVENOUS at 17:47

## 2017-05-07 RX ADMIN — HYDRALAZINE HYDROCHLORIDE 20 MG: 20 INJECTION INTRAMUSCULAR; INTRAVENOUS at 22:43

## 2017-05-07 RX ADMIN — ASPIRIN 81 MG CHEWABLE TABLET 81 MG: 81 TABLET CHEWABLE at 08:01

## 2017-05-07 RX ADMIN — HEPARIN SODIUM 5000 UNITS: 5000 INJECTION, SOLUTION INTRAVENOUS; SUBCUTANEOUS at 13:33

## 2017-05-07 RX ADMIN — ESMOLOL HYDROCHLORIDE 150 G: 10 INJECTION INTRAVENOUS at 09:56

## 2017-05-07 RX ADMIN — ENALAPRIL MALEATE 20 MG: 5 TABLET ORAL at 08:01

## 2017-05-07 RX ADMIN — ESMOLOL HYDROCHLORIDE 200 MCG/KG/MIN: 10 INJECTION INTRAVENOUS at 06:09

## 2017-05-07 RX ADMIN — ONDANSETRON 4 MG: 2 INJECTION INTRAMUSCULAR; INTRAVENOUS at 23:25

## 2017-05-07 RX ADMIN — SIMVASTATIN 20 MG: 20 TABLET, FILM COATED ORAL at 08:01

## 2017-05-07 RX ADMIN — ESMOLOL HYDROCHLORIDE 200 MCG/KG/MIN: 10 INJECTION INTRAVENOUS at 13:32

## 2017-05-07 RX ADMIN — BACITRACIN ZINC: 500 OINTMENT TOPICAL at 09:00

## 2017-05-07 RX ADMIN — HEPARIN SODIUM 5000 UNITS: 5000 INJECTION, SOLUTION INTRAVENOUS; SUBCUTANEOUS at 06:09

## 2017-05-07 RX ADMIN — ENALAPRILAT 1.25 MG: 2.5 INJECTION INTRAVENOUS at 21:05

## 2017-05-07 RX ADMIN — AMLODIPINE BESYLATE 5 MG: 5 TABLET ORAL at 09:56

## 2017-05-07 ASSESSMENT — ENCOUNTER SYMPTOMS
SPUTUM PRODUCTION: 0
BLURRED VISION: 0
MEMORY LOSS: 0
DIZZINESS: 1
ABDOMINAL PAIN: 0
FEVER: 0
EYE REDNESS: 0
WEAKNESS: 0
FALLS: 0
SEIZURES: 0
LOSS OF CONSCIOUSNESS: 0
NAUSEA: 0
DIARRHEA: 0
STRIDOR: 0
NERVOUS/ANXIOUS: 0
EYE PAIN: 0
HALLUCINATIONS: 0
SHORTNESS OF BREATH: 0
COUGH: 0
ORTHOPNEA: 0
BLOOD IN STOOL: 0
NECK PAIN: 0
HEMOPTYSIS: 0
SPEECH CHANGE: 0
BACK PAIN: 0
VOMITING: 0
PALPITATIONS: 0
FOCAL WEAKNESS: 0
EYE DISCHARGE: 0
HEADACHES: 0
FLANK PAIN: 0

## 2017-05-07 ASSESSMENT — PAIN SCALES - GENERAL
PAINLEVEL_OUTOF10: 0

## 2017-05-07 ASSESSMENT — LIFESTYLE VARIABLES
DO YOU DRINK ALCOHOL: NO
SUBSTANCE_ABUSE: 0

## 2017-05-07 NOTE — ED PROVIDER NOTES
ED Provider Note    CHIEF COMPLAINT  Chief Complaint   Patient presents with   • Hypertension   • Chest Pressure   • Near Syncopal       HPI  Sabrina Lobo is a 88 y.o. female who presents to the Emergency Department with chest pain.  It is located anterior chest and feels like heaviness and has an intensity of 1 with some radiation to the arm.   There is no shortness of breath, no weakness, no nausea, no abdominal pain, no back pain, no jaw pain, no diaphoresis, no other associated symptoms other than dizziness which seems to be less intense than vertigo she has had in the past. She denies any headache. They became more concerned when her home blood pressure was elevated. She did take her enalapril this morning and then took an extra dose at 6:30 tonight..    REVIEW OF SYSTEMS   As above all other systems are negative.    PAST MEDICAL HISTORY   has a past medical history of Hypertension; Vertigo; and H/O bladder repair surgery.    FAMILY HISTORY  No family history on file.     SOCIAL HISTORY  Social History     Social History Main Topics   • Smoking status: Never Smoker    • Smokeless tobacco: Not on file   • Alcohol Use: No   • Drug Use: No   • Sexual Activity: Not on file       SURGICAL HISTORY  patient denies any surgical history    CURRENT MEDICATIONS  Reviewed.  See Encounter Summary.  Include   Current facility-administered medications:   •  [START ON 5/7/2017] aspirin (ASA) chewable tab 81 mg, 81 mg, Oral, QAM, Raphael Fernandez M.D.  •  [START ON 5/7/2017] enalapril (VASOTEC) tablet 20 mg, 20 mg, Oral, DAILY, Raphael Fernandez M.D.  •  simvastatin (ZOCOR) tablet 20 mg, 20 mg, Oral, QHS, Raphael Fernandez M.D.  •  labetalol (NORMODYNE,TRANDATE) injection 10 mg, 10 mg, Intravenous, Q4HRS PRN, Raphael Fernandez M.D.  •  ondansetron (ZOFRAN) syringe/vial injection 4 mg, 4 mg, Intravenous, Q4HRS PRN, Raphael Fernandez M.D.  •  ondansetron (ZOFRAN ODT) dispertab 4 mg, 4 mg, Oral, Q4HRS PRN, Raphael Fernandez  "M.D.  •  [START ON 5/7/2017] senna-docusate (PERICOLACE or SENOKOT S) 8.6-50 MG per tablet 2 Tab, 2 Tab, Oral, BID **AND** polyethylene glycol/lytes (MIRALAX) PACKET 1 Packet, 1 Packet, Oral, QDAY PRN **AND** magnesium hydroxide (MILK OF MAGNESIA) suspension 30 mL, 30 mL, Oral, QDAY PRN **AND** bisacodyl (DULCOLAX) suppository 10 mg, 10 mg, Rectal, QDAY PRN, Raphael Fernandez M.D.  •  acetaminophen (TYLENOL) tablet 650 mg, 650 mg, Oral, Q6HRS PRN, Raphael Fernandez M.D.  •  NS infusion, , Intravenous, Continuous, Raphael Fernandez M.D., Last Rate: 125 mL/hr at 05/06/17 2302, 1,000 mL at 05/06/17 2302  •  [START ON 5/7/2017] heparin injection 5,000 Units, 5,000 Units, Subcutaneous, Q8HRS, Raphael Fernandez M.D.  •  enalaprilat (VASOTEC) injection 1.25 mg, 1.25 mg, Intravenous, Q6HRS PRN, Raphael Fernandez M.D.  •  esmolol (BREVIBLOC) 2.5 g/250 mL NS infusion (PREMIX), , Intravenous, Continuous, Raphael Fernandez M.D., Last Rate: 16 mL/hr at 05/06/17 2302, 50 mcg/kg/min at 05/06/17 2302        ALLERGIES  No Known Allergies    PHYSICAL EXAM  VITAL SIGNS: BP   Pulse 67  Temp(Src) 36.5 °C (97.7 °F)  Resp 16  Ht 1.575 m (5' 2\")  Wt 52 kg (114 lb 10.2 oz)  BMI 20.96 kg/m2  SpO2 98%  Constitutional: Alert , able to answer questions  HENT: Nose is normal in appearance, external ears are normal,  moist mucous membranes  Eyes: Anicteric,  pupils are equal round and reactive, there is no conjunctival drainage or pallor   Neck: The trachea is midline, there is no obvious mass or meningeal signs  Cardiovascular: Good perfusion,  regular rate and rhythm without murmurs gallops or rubs  Thorax & Lungs: Respiratory rate and effort are normal. There is normal chest excursion with respiration.  No wheezes rhonchi or rales noted.  Abdomen: Abdomen is normal in appearance, no gross peritoneal signs  normal bowel sounds, no pain with cough  :   No CVA tenderness to palpation  Musculoskeletal: No deformities noted in all 4 " extremities.   Skin: Visualized skin is warm without rash.  Neurologic:  Cranial nerves II through XII are intact there is no focal abnormality noted.  Psychiatric: Normal mood and mentation    RADIOLOGY/PROCEDURES  Imaging Studies:    CT-HEAD W/O   Final Result      No acute intracranial abnormality. No change.               INTERPRETING LOCATION:  [address] [place] [address], MARLENE M[place]ENZ[place], [id number]      DX-CHEST-PORTABLE (1 VIEW)   Final Result      No acute cardiopulmonary abnormality.            Pertinent Labs   Results for orders placed or performed during the hospital encounter of 05/06/17   CBC with Differential   Result Value Ref Range    WBC 7.4 4.8 - 10.8 K/uL    RBC 4.46 4.20 - 5.40 M/uL    Hemoglobin 13.9 12.0 - 16.0 g/dL    Hematocrit 40.2 37.0 - 47.0 %    MCV 90.1 81.4 - 97.8 fL    MCH 31.2 27.0 - 33.0 pg    MCHC 34.6 33.6 - 35.0 g/dL    RDW 39.5 35.9 - 50.0 fL    Platelet Count 203 164 - 446 K/uL    MPV 10.1 9.0 - 12.9 fL    Neutrophils-Polys 60.30 44.00 - 72.00 %    Lymphocytes 26.70 22.00 - 41.00 %    Monocytes 6.00 0.00 - 13.40 %    Eosinophils 6.20 0.00 - 6.90 %    Basophils 0.70 0.00 - 1.80 %    Immature Granulocytes 0.10 0.00 - 0.90 %    Nucleated RBC 0.00 /100 WBC    Neutrophils (Absolute) 4.48 2.00 - 7.15 K/uL    Lymphs (Absolute) 1.99 1.00 - 4.80 K/uL    Monos (Absolute) 0.45 0.00 - 0.85 K/uL    Eos (Absolute) 0.46 0.00 - 0.51 K/uL    Baso (Absolute) 0.05 0.00 - 0.12 K/uL    Immature Granulocytes (abs) 0.01 0.00 - 0.11 K/uL    NRBC (Absolute) 0.00 K/uL   Complete Metabolic Panel (CMP)   Result Value Ref Range    Sodium 140 135 - 145 mmol/L    Potassium 3.7 3.6 - 5.5 mmol/L    Chloride 105 96 - 112 mmol/L    Co2 21 20 - 33 mmol/L    Anion Gap 14.0 (H) 0.0 - 11.9    Glucose 98 65 - 99 mg/dL    Bun 17 8 - 22 mg/dL    Creatinine 0.93 0.50 - 1.40 mg/dL    Calcium 9.1 8.4 - 10.2 mg/dL    AST(SGOT) 32 12 - 45 U/L    ALT(SGPT) 21 2 - 50 U/L    Alkaline Phosphatase 81 30 - 99 U/L    Total Bilirubin 0.2 0.1 - 1.5 mg/dL     Albumin 3.9 3.2 - 4.9 g/dL    Total Protein 6.8 6.0 - 8.2 g/dL    Globulin 2.9 1.9 - 3.5 g/dL    A-G Ratio 1.3 g/dL   Btype Natriuretic Peptide (BNP)   Result Value Ref Range    B Natriuretic Peptide 171 (H) 0 - 100 pg/mL   Prothrombin Time (PT/INR)   Result Value Ref Range    PT 11.6 (L) 12.0 - 14.6 sec    INR 0.87 0.87 - 1.13   APTT   Result Value Ref Range    APTT 23.4 (L) 24.7 - 36.0 sec   Lipase   Result Value Ref Range    Lipase 53 7 - 58 U/L   Troponin STAT   Result Value Ref Range    Troponin I <0.02 0.00 - 0.04 ng/mL   ESTIMATED GFR   Result Value Ref Range    GFR If African American >60 >60 mL/min/1.73 m 2    GFR If Non  57 (A) >60 mL/min/1.73 m 2   EKG (NOW)   Result Value Ref Range    Report       [facility] Emergency Dept.    Test Date:  2017  Pt Name:    EMILY HONG                 Department[facility]  MRN:        [id number]                      [facility]   Gender:     F                            Technician: LUIS EDUARDO  :        [id number]                   Requested By:ER TRIAGE PROTOCOL  Order #:    [id number]                    Reading MD:    Measurements  Intervals                                Axis  Rate:       76                           P:          36  FL:         235                          QRS:        25  QRSD:       83                           T:          22  QT:         384  QTc:        432    Interpretive Statements  Sinus rhythm  Prolonged FL interval  Probable left atrial enlargement  Compared to ECG 2015 09:54:42  Right-axis deviation no longer present         I interpreted this EKG myself.  This is a 12-lead study.  The rhythm issinus with a rate of 76.  There are no ST segment nor T wave abnormalities.  Interpretation: No ST segment elevation myocardial infarction.          COURSE & MEDICAL DECISION MAKING  Nursing notes and vital signs were reviewed. (See chart for details)  The patients nursing records were reviewed, history  was obtained from the patient and ;     The patient presents with chest pain, and the differential diagnosis includes but is not limited to  acute coronary syndrome, anxiety disorder, aortic dissection, esophageal rupture or spasm, gastroesophageal reflux disease, musculoskeletal chest pain, with her vertigo and marked hypertension this could also represent intracranial hemorrhage.        Initial orders in the Emergency Department included CBC, CMP, troponin BNP, PCXR, nitroglycerin for blood pressure management and chest pain CT head    ED testing reveals no intracranial hemorrhage, negative troponin, she is still markedly hypertensive despite 2 doses of pain as April and 2 inches of nitroglycerin paste. At this time she will need to be monitored in the hospital for rule out of acute coronary syndrome and hypertensive emergency. This has been discussed with the hospitalist would alternate her        FINAL IMPRESSION  1. Hypertensive emergency  2. Dizziness  DISPOSITION  Admit        Electronically signed by: Rosalia Espinoza, 5/6/2017 11:19 PM

## 2017-05-07 NOTE — DISCHARGE PLANNING
Patient discussed in morning rounds. Patient reportedly lives at home with an adult child and her discharge plan is to return home when medically able. No current SS needs noted.

## 2017-05-07 NOTE — CARE PLAN
Problem: Safety  Goal: Will remain free from injury  Intervention: Provide assistance with mobility  C/O dizzy, 1 person assist to BSC,       Problem: Infection  Goal: Will remain free from infection  Intervention: Assess signs and symptoms of infection  Third right toe, has a wound, dressing removed, scant drainage noted.

## 2017-05-07 NOTE — PROGRESS NOTES
Report received from NOC RN, POC discussed, walking rounds completed, pt awake in room, all orders, medications, and lines verified, no s/s distress, call light within reach and will continue to monitor.  Family at bedside.     0800:  Rounds with Dr Bennett.  Assisted to BSC.  Medicated with scheduled meds, breakfast tray provided.      1000:  Resting in bed.      1200:  Assessment completed.  Lunch tray provided, family at bedside.      1400:  Resting in bed.      1600:  assessment complete.  Family at bedside.     1800:  Dinner tray provided, Dr Davida Alarcon telephone order to wean off Esmolol and use PRN to decrease BP as needed.

## 2017-05-07 NOTE — PROGRESS NOTES
Patient admitted to room 318. Alert and oriented, family at BS. Denies pain, sob or nausea at this time. Oriented to room and use of call light, verbalized understanding. IVF started. POC discussed, verbalized understanding. Lines, monitor alarm, and drip verified and properly set up. Bed low and locked and call light within reach. Fall precaution in effect. Will monitor closely.

## 2017-05-07 NOTE — ED NOTES
Pt BIB family, c/o hypertension t/o day. Home reading 198/78, pt took dose of Enalapril 20 mg at 1830. Pt denies vision changes, headache. Pt c/o chest pressure. On cardiac monitor and VS monitoring, awaiting EP eval. ED tech at BS performing EKG. Call light within reach.

## 2017-05-07 NOTE — PROGRESS NOTES
Med rec updated and complete  Allergies reviewed  Called Bernadette (daughter) @ [phone], to verify all prescription medications.

## 2017-05-07 NOTE — PROGRESS NOTES
Hospital Medicine Progress Note, Adult, Complex               Author: Kurt Alarcon Date & Time created: 5/7/2017  8:30 AM     Interval History:  Pt admitted with HTN urgency    Seen in the ICU on esmolol drip    Review of Systems:  Review of Systems   Constitutional: Negative for fever and malaise/fatigue.   HENT: Negative for congestion, ear discharge and nosebleeds.    Eyes: Negative for blurred vision, pain, discharge and redness.   Respiratory: Negative for cough, hemoptysis, sputum production, shortness of breath and stridor.    Cardiovascular: Negative for chest pain, palpitations, orthopnea and leg swelling.   Gastrointestinal: Negative for nausea, vomiting, abdominal pain, diarrhea, blood in stool and melena.   Genitourinary: Negative for dysuria, hematuria and flank pain.   Musculoskeletal: Negative for back pain, joint pain, falls and neck pain.   Skin:        Wound rt 3rd  toe    Neurological: Positive for dizziness. Negative for speech change, focal weakness, seizures, loss of consciousness, weakness and headaches.   Psychiatric/Behavioral: Negative for hallucinations, memory loss and substance abuse. The patient is not nervous/anxious.    All other systems reviewed and are negative.      Physical Exam:  Physical Exam   Constitutional: She is oriented to person, place, and time. She appears well-developed and well-nourished.   HENT:   Head: Normocephalic and atraumatic.   Right Ear: External ear normal.   Left Ear: External ear normal.   Mouth/Throat: No oropharyngeal exudate.   Eyes: Conjunctivae are normal. Right eye exhibits no discharge. Left eye exhibits no discharge. No scleral icterus.   Neck: Neck supple. No JVD present. No tracheal deviation present.   Cardiovascular: Normal rate and regular rhythm.  Exam reveals no gallop and no friction rub.    No murmur heard.  Pulmonary/Chest: Effort normal and breath sounds normal. No stridor. No respiratory distress. She has no wheezes. She has no  rales. She exhibits no tenderness.   Abdominal: Soft. Bowel sounds are normal. She exhibits no distension and no mass. There is no tenderness. There is no rebound and no guarding.   Musculoskeletal: She exhibits no edema or tenderness.   Neurological: She is alert and oriented to person, place, and time. No cranial nerve deficit. She exhibits normal muscle tone.   Skin: Skin is warm and dry. She is not diaphoretic. No cyanosis. Nails show no clubbing.   Rt 3rd toe wound no surrounding erythema or drainage noted   Psychiatric: She has a normal mood and affect. Her behavior is normal. Thought content normal.   Nursing note and vitals reviewed.      Labs:        Invalid input(s): [id number]  Recent Labs      17   TROPONINI  <0.02  0.02   BNPBTYPENAT  171*   --      Recent Labs      17   SODIUM  140   POTASSIUM  3.7   CHLORIDE  105   CO2  21   BUN  17   CREATININE  0.93   CALCIUM  9.1     Recent Labs      17   ALTSGPT  21   ASTSGOT  32   ALKPHOSPHAT  81   TBILIRUBIN  0.2   LIPASE  53   GLUCOSE  98     Recent Labs      17   RBC  4.46   HEMOGLOBIN  13.9   HEMATOCRIT  40.2   PLATELETCT  203   PROTHROMBTM  11.6*   APTT  23.4*   INR  0.87     Recent Labs      17   WBC  7.4   NEUTSPOLYS  60.30   LYMPHOCYTES  26.70   MONOCYTES  6.00   EOSINOPHILS  6.20   BASOPHILS  0.70   ASTSGOT  32   ALTSGPT  21   ALKPHOSPHAT  81   TBILIRUBIN  0.2           Hemodynamics:  Temp (24hrs), Av.6 °C (97.9 °F), Min:36.5 °C (97.7 °F), Max:36.7 °C (98.1 °F)  Temperature: 36.7 °C (98.1 °F)  Pulse  Av.9  Min: 28  Max: 90Heart Rate (Monitored): 69  Blood Pressure :  (Unable to complete.  Possible instrument malfunction), NIBP: 158/64 mmHg     Respiratory:    Respiration: 18, Pulse Oximetry: 97 %           Fluids:    Intake/Output Summary (Last 24 hours) at 17 0830  Last data filed at 17 0600   Gross per 24 hour   Intake 775.97 ml   Output    550 ml    Net 225.97 ml     Weight: 52 kg (114 lb 10.2 oz)  GI/Nutrition:  Orders Placed This Encounter   Procedures   • Diet Order     Standing Status: Standing      Number of Occurrences: 1      Standing Expiration Date:      Order Specific Question:  Diet:     Answer:  2 Gram Sodium [7]     Medical Decision Making, by Problem:  Active Hospital Problems    Diagnosis   • *Hypertensive urgency [I16.0]  -continue esmolol dip wean off as tolerated  -continue enalapril, start amlodipine monitor Bp and adjust   • Dyslipidemia [E78.5]  Continue simvastatin   • Wound, open, foot [S91.309A]  Topical bacitracin wound care    Continue ICU monitoring until off drip    >Patient is critically ill.   >The patient is having :HTN urgency  >The vital organ system that is effected is the:CV  >If untreated there is a high chance of deterioration into: stroke death  >The critical care that I am providing today is: esmolol drip and BP med management  >The critical care that has been undertaken is medically complex.   >There has been no overlap in critical care time.   Critical care time not including procedures, no overlap: 32 minutes           Core Measures

## 2017-05-07 NOTE — WOUND TEAM
"[facility]  Inpatient Services  Initial Wound & Skin Care Evaluation    Admission Date:  5/6/2017   HPI, PMH, SH: Reviewed  Unit where seen by Wound Team: 3318/00    WOUND CONSULT RELATED TO:R 3rd toe    SUBJECTIVE:  \"Okay.\"    Self Report / Pain Level: no c/o pain      OBJECTIVE: on AMANDA bed, family @ bs  WOUND TYPE, LOCATION, CHARACTERISTICS (Pressure ulcers: location, stage, POA or date identified)  Wound Laceration Digit 3 Right Foot  Periwound Skin: Pink  Drainage : None       Tissue Type and %:    100% red  Wound Edges:    open    Odor:     None   Exposed structure(s):   No   Signs and Symptoms of Infection:none    Measurements: taken 5/7/17  Length (cm): 0  Width (cm): 0.8  Depth (cm): 1.3     Tracts/undermining:   None     INTERVENTIONS BY WOUND TEAM:cleaned wound with NS, dried. Applied bacitracin to wound, covered with small piece of non-adhesive foam, secured with hypafix  Dressing Options:  (bacitracin with non-adhesive foam and hypafix)    Interdisciplinary consultation:   With Nursing;With Patient / Family     EVALUATION: pt has small partial thickness wound to R 3rd toe from dropping a bottle on it. Bacitracin has been ordered by MD, cover drsg to keep toe from sticking to socks    Factors affecting wound healing: HTN  Goals:decreased wound size 1% each week    NURSING PLAN OF CARE ORDERS (X):    Dressing changes: See Dressing Maintenance orders:   Skin care: See Skin Care orders:   Rectal tube care: See Rectal Tube Care orders:   Other orders:    RSKIN: CURRENT (X) ORDERED (O)  Q shift Fermín:  X  Q shift pressure point assessments:  X  Pressure redistribution mattress        AMANDA    x  Bariatric AMANDA      Bariatric foam        Heel float boots       Heels floated on pillows      Barrier wipes      Barrier Cream      Barrier paste      Sacral silicone dressing      Padded O2 tubing      Anchorfast      Trach with Optifoam split foam       Waffle cushion      Rectal tube or BMS    "   Antifungal tx    Turn q 2 hours x  Up to chair  Ambulate   PT/OT     Dietician      PO  x   TF   TPN     PVN    NPO   # days   Other       WOUND TEAM PLAN OF CARE (X):   NPWT change 3 x week:        Dressing changes by wound team:       Follow up as needed:  x     Other (explain):    Anticipated discharge plans (X):  SNF:           Home Care:           Outpatient Wound Center:            Self Care:            Other:   tbd

## 2017-05-07 NOTE — ED NOTES
Pt reports chest pain has decreased. NOw 1/10. Slightly dizzy at this time. Remains hypertensive. ERP made aware.

## 2017-05-07 NOTE — ED NOTES
Brevibloc not available in ED. Per house supervisor pt to be transported to floor and it will be given then.

## 2017-05-07 NOTE — CARE PLAN
Problem: Safety  Goal: Will remain free from falls  Intervention: Implement fall precautions  Room/floor clear. Non skid socks on. Proper signs up. Bed alarm on, bed low and locked. Call light and belonging within reach. Hourly rounding in place to make sure needs are met.        Problem: Infection  Goal: Will remain free from infection  Intervention: Implement standard precautions and perform hand washing before and after patient contact  Room/floor clear. Non skid socks on. Proper signs up. Bed alarm on, bed low and locked. Call light and belonging within reach. Hourly rounding in place to make sure needs are met.        Problem: Venous Thromboembolism (VTW)/Deep Vein Thrombosis (DVT) Prevention:  Goal: Patient will participate in Venous Thrombosis (VTE)/Deep Vein Thrombosis (DVT)Prevention Measures  Intervention: Encourage patient to perform ankle flex, foot rotation, and knee flex exercises in addition to other prophylatic measures every hour while awake  Encouraged to perform flexion of the feet while in bed and awake, verbalized understanding.

## 2017-05-07 NOTE — H&P
"PRIMARY CARE:  Kristian Menard DO.    CODE STATUS:  Full, confirmed with the patient's daughter at the bedside.    CHIEF COMPLAINT:  \"Her blood pressure is going up, not going down.\"    HISTORY OF PRESENTING ILLNESS:  This is an 88-year-old [place] female who   comes in with elevated blood pressure.  She normally takes 20 mg of enalapril   daily.  Today, she took a second dose this evening around 6:30.  At home, her   blood pressure was 198/78 and this was concerning, so they decided to come to   the emergency room.  Nitro paste was started by the emergency room physician   and she had no improvement in her blood pressure actually going up to 257/100.    Despite this, she has not had any real complaints except for a little bit of   chest tightness without any adrienne chest pain.  She also has vertigo, but she   always has vertigo and is unchanged from normal.  The emergency room physician   was unable to get her blood pressures down so the hospitalist was consulted for admission.    REVIEW OF SYSTEMS:  Negative, the remainder is except as noted above.    PAST MEDICAL HISTORY:  Hypertension, hypercholesterolemia.    HOME MEDICATIONS:  Enalapril, simvastatin, aspirin, ascorbic acid and Keflex.    ALLERGIES:  No known drug allergies.    SOCIAL HISTORY:  No tobacco or alcohol.  She is accompanied by her daughter.    FAMILY HISTORY:  No significant major medical problems in the family that they   are aware of.    PHYSICAL EXAMINATION:  VITAL SIGNS:  Temperature 36.5, heart rate 79, respiratory rate 18, blood   pressure 275/100, oxygenation 97% on room air.  Weight is 52 kg.  GENERAL:  This is an elderly [place]ese female resting comfortably in bed in no   apparent hemodynamic distress.  HINT:  Pupils are equal, round and reactive to light.  There is no scleral   pallor or icterus.  Extraocular muscles are intact.  Mucous membranes are   moist.  NECK:  There is no cervical or supraclavicular lymphadenopathy.  Neck is   supple " without thyromegaly.  CHEST:  Lungs are clear to auscultation throughout without any crackles or   wheezes.  There is no crackles or wheezes.  Good effort and good air entry.  CARDIOVASCULAR:  She is regular rate and rhythm without any murmurs, rubs or   gallops.  Her heart sounds are somewhat hyper dynamic, however.  ABDOMEN:  Her belly is soft, contender and no distended.  Positive bowel   sounds.  No appreciable hepatospalenomegaly or mass.  EXTREMITIES:  3+ bounding pulses.  No cyanosis or clubbing or edema.  Range of   motion is intact.  Strength is 5/5 in lower extremities.  NEUROLOGICAL:  She is alert and oriented ax without any focal neurological   deficits.  Sensorium is grossly intact.  Cranial nerves II-XII are intact.    LABORATORY AND RADIOLOGY DATA:  White count 7.4, hemoglobin 13.9, platelet   count 203.  Sodium 140, potassium 3.7, chloride 105, bicarbonate 21, BUN is   17, creatinine 0.93.  The remainder of the chemistry is within normal limits.    Troponin is less than 0.01.  BOP is 171.  IN is 0.87.  Chest x-ray, I   personally reviewed her chest film, it shows no acute cardiopulmonary   processes, no widening of the mediastinum, aorta is notably visible with some   atherosclerosis and not widened.  There is a little bit of pulmonary   congestion at the hilum.  An electrocardiography, I personally reviewed her   EKG, it shows normal sinus rhythm with a ventricular rate of 76 and a   prolonged WV interval, good progression of the R waves through the   anterolateral leads with J-point elevation in VA, but no acute ST-T segment   changes.    MEDICAL DECISION MAKING:  This is an 88-year-old [place] female who comes in   with hypertensive urgency, worsening despite therapy.  An 88-year-old   critically ill female who will be admitted to the intensive care unit for   vasoactive hypertension, blood pressure control, who anticipate high level of   medical therapy.    ASSESSMENT AND PLAN:  1.  Hypertensive  urgency.  There is no demonstrable emergency yet; however,   her blood pressure has continued to worsen with most recently in 257/100.    Will start an esmolol drip for its rapid onset of action and rapid down-titration ability.  I   will follow her closely in the ICU setting.  Goal systolic will be less than   180 or so with careful not to down titrate too rapidly.  2.  Malignant essential hypertension, as above.  3.  Hypercholesterolemia, we will continue her statin.  4.  Prophylaxis, per ACT guidelines.  5.  Code Status:  Full, this is per her daughter at bedside today, I   personally confirmed this.    Critical care time exceeds 40 minutes, this includes no overlap with other   providers or separately billable procedures.       ____________________________________     MD RASHARD Cerda / KEL    DD:  05/06/2017 22:12:23  DT:  05/06/2017 23:09:56    D#:  [id number]  Job#:  [id number]

## 2017-05-08 LAB
ANION GAP SERPL CALC-SCNC: 8 MMOL/L (ref 0–11.9)
BUN SERPL-MCNC: 11 MG/DL (ref 8–22)
CALCIUM SERPL-MCNC: 8.8 MG/DL (ref 8.4–10.2)
CHLORIDE SERPL-SCNC: 107 MMOL/L (ref 96–112)
CO2 SERPL-SCNC: 21 MMOL/L (ref 20–33)
CREAT SERPL-MCNC: 0.95 MG/DL (ref 0.5–1.4)
EKG IMPRESSION: NORMAL
GFR SERPL CREATININE-BSD FRML MDRD: 56 ML/MIN/1.73 M 2
GLUCOSE SERPL-MCNC: 127 MG/DL (ref 65–99)
POTASSIUM SERPL-SCNC: 4 MMOL/L (ref 3.6–5.5)
SODIUM SERPL-SCNC: 136 MMOL/L (ref 135–145)
TROPONIN I SERPL-MCNC: 0.04 NG/ML (ref 0–0.04)

## 2017-05-08 PROCEDURE — A9270 NON-COVERED ITEM OR SERVICE: HCPCS | Performed by: HOSPITALIST

## 2017-05-08 PROCEDURE — 700111 HCHG RX REV CODE 636 W/ 250 OVERRIDE (IP): Performed by: HOSPITALIST

## 2017-05-08 PROCEDURE — 84484 ASSAY OF TROPONIN QUANT: CPT

## 2017-05-08 PROCEDURE — 700102 HCHG RX REV CODE 250 W/ 637 OVERRIDE(OP): Performed by: HOSPITALIST

## 2017-05-08 PROCEDURE — 99232 SBSQ HOSP IP/OBS MODERATE 35: CPT | Performed by: HOSPITALIST

## 2017-05-08 PROCEDURE — 770020 HCHG ROOM/CARE - TELE (206)

## 2017-05-08 PROCEDURE — 93005 ELECTROCARDIOGRAM TRACING: CPT | Performed by: HOSPITALIST

## 2017-05-08 PROCEDURE — 80048 BASIC METABOLIC PNL TOTAL CA: CPT

## 2017-05-08 PROCEDURE — 93010 ELECTROCARDIOGRAM REPORT: CPT | Performed by: INTERNAL MEDICINE

## 2017-05-08 RX ORDER — AMLODIPINE BESYLATE 5 MG/1
5 TABLET ORAL 2 TIMES DAILY
Status: DISCONTINUED | OUTPATIENT
Start: 2017-05-08 | End: 2017-05-09 | Stop reason: HOSPADM

## 2017-05-08 RX ORDER — OMEPRAZOLE 20 MG/1
20 CAPSULE, DELAYED RELEASE ORAL DAILY
Status: DISCONTINUED | OUTPATIENT
Start: 2017-05-08 | End: 2017-05-09 | Stop reason: HOSPADM

## 2017-05-08 RX ORDER — CALCIUM CARBONATE 500 MG/1
1000 TABLET, CHEWABLE ORAL 4 TIMES DAILY PRN
Status: DISCONTINUED | OUTPATIENT
Start: 2017-05-08 | End: 2017-05-09 | Stop reason: HOSPADM

## 2017-05-08 RX ADMIN — ASPIRIN 81 MG CHEWABLE TABLET 81 MG: 81 TABLET CHEWABLE at 08:21

## 2017-05-08 RX ADMIN — SENNOSIDES AND DOCUSATE SODIUM 2 TABLET: 8.6; 5 TABLET ORAL at 08:22

## 2017-05-08 RX ADMIN — HEPARIN SODIUM 5000 UNITS: 5000 INJECTION, SOLUTION INTRAVENOUS; SUBCUTANEOUS at 21:27

## 2017-05-08 RX ADMIN — AMLODIPINE BESYLATE 5 MG: 5 TABLET ORAL at 08:22

## 2017-05-08 RX ADMIN — HEPARIN SODIUM 5000 UNITS: 5000 INJECTION, SOLUTION INTRAVENOUS; SUBCUTANEOUS at 14:07

## 2017-05-08 RX ADMIN — SIMVASTATIN 20 MG: 20 TABLET, FILM COATED ORAL at 08:22

## 2017-05-08 RX ADMIN — OMEPRAZOLE 20 MG: 20 CAPSULE, DELAYED RELEASE ORAL at 20:00

## 2017-05-08 RX ADMIN — HEPARIN SODIUM 5000 UNITS: 5000 INJECTION, SOLUTION INTRAVENOUS; SUBCUTANEOUS at 05:28

## 2017-05-08 RX ADMIN — CALCIUM CARBONATE (ANTACID) CHEW TAB 500 MG 1000 MG: 500 CHEW TAB at 19:43

## 2017-05-08 RX ADMIN — HYDRALAZINE HYDROCHLORIDE 20 MG: 20 INJECTION INTRAMUSCULAR; INTRAVENOUS at 05:28

## 2017-05-08 RX ADMIN — HYDRALAZINE HYDROCHLORIDE 20 MG: 20 INJECTION INTRAMUSCULAR; INTRAVENOUS at 21:30

## 2017-05-08 RX ADMIN — ENALAPRIL MALEATE 20 MG: 5 TABLET ORAL at 08:22

## 2017-05-08 RX ADMIN — ONDANSETRON 4 MG: 2 INJECTION INTRAMUSCULAR; INTRAVENOUS at 06:42

## 2017-05-08 RX ADMIN — BACITRACIN ZINC: 500 OINTMENT TOPICAL at 08:29

## 2017-05-08 RX ADMIN — AMLODIPINE BESYLATE 5 MG: 5 TABLET ORAL at 20:00

## 2017-05-08 ASSESSMENT — PAIN SCALES - GENERAL
PAINLEVEL_OUTOF10: 0
PAINLEVEL_OUTOF10: 3
PAINLEVEL_OUTOF10: 0

## 2017-05-08 ASSESSMENT — ENCOUNTER SYMPTOMS
FOCAL WEAKNESS: 0
CHILLS: 0
ABDOMINAL PAIN: 0
NERVOUS/ANXIOUS: 1
NAUSEA: 0
EYE PAIN: 0
BLURRED VISION: 0
TREMORS: 1
SPUTUM PRODUCTION: 0
HEMOPTYSIS: 0
HALLUCINATIONS: 0
DIARRHEA: 0
FEVER: 0
BLOOD IN STOOL: 0
DIZZINESS: 1
COUGH: 0
SPEECH CHANGE: 0
EYE DISCHARGE: 0
FLANK PAIN: 0
NECK PAIN: 0
BACK PAIN: 0
WEAKNESS: 0
VOMITING: 0
FALLS: 0
HEADACHES: 0
MEMORY LOSS: 0
SEIZURES: 0
EYE REDNESS: 0
LOSS OF CONSCIOUSNESS: 0
SHORTNESS OF BREATH: 0
ORTHOPNEA: 0

## 2017-05-08 ASSESSMENT — LIFESTYLE VARIABLES: SUBSTANCE_ABUSE: 0

## 2017-05-08 NOTE — PROGRESS NOTES
0715 Report received from [facility], Pt assessed, repositioned in bed with assist for breakfast, son at bedside, POC reviewed. Pt seen by Dr Davida Peña, orders reviewed, Pt now TOF Pt, Pt has mild confusion, A/O X 3, denies pain.    0800 Lab at bedside to draw troponin and BMP per order Dr Davida Peña.

## 2017-05-08 NOTE — PROGRESS NOTES
Pt's /80, family at bedside, involved with Pt care and meals. Pt awake and alert, denies pain. Bed in low position, call light within reach.

## 2017-05-08 NOTE — PROGRESS NOTES
Hospital Medicine Progress Note, Adult, Complex               Author: Kurt Alarcon Date & Time created: 5/8/2017  3:39 PM     Interval History:  Pt admitted with HTN urgency    Weaned off esmolol drip, feels anxious received IV hydralazine  This morning for elevated BP  Had nausea last evening improved with zofran trop negative    Review of Systems:  Review of Systems   Constitutional: Negative for fever, chills and malaise/fatigue.   HENT: Negative for ear discharge and nosebleeds.    Eyes: Negative for blurred vision, pain, discharge and redness.   Respiratory: Negative for cough, hemoptysis, sputum production and shortness of breath.    Cardiovascular: Negative for chest pain, orthopnea and leg swelling.   Gastrointestinal: Negative for nausea, vomiting, abdominal pain, diarrhea, blood in stool and melena.   Genitourinary: Negative for dysuria, hematuria and flank pain.   Musculoskeletal: Negative for back pain, joint pain, falls and neck pain.   Skin:        Wound rt 3rd  toe    Neurological: Positive for dizziness and tremors. Negative for speech change, focal weakness, seizures, loss of consciousness, weakness and headaches.   Psychiatric/Behavioral: Negative for hallucinations, memory loss and substance abuse. The patient is nervous/anxious.    All other systems reviewed and are negative.      Physical Exam:  Physical Exam   Constitutional: She is oriented to person, place, and time. She appears well-developed and well-nourished.   HENT:   Head: Normocephalic and atraumatic.   Mouth/Throat: No oropharyngeal exudate.   Eyes: Right eye exhibits no discharge. Left eye exhibits no discharge. No scleral icterus.   Neck: Neck supple. No JVD present. No tracheal deviation present.   Cardiovascular: Normal rate and regular rhythm.  Exam reveals no gallop and no friction rub.    No murmur heard.  Pulmonary/Chest: Effort normal and breath sounds normal. No respiratory distress. She has no wheezes. She has no  rales. She exhibits no tenderness.   Abdominal: Soft. Bowel sounds are normal. She exhibits no distension. There is no tenderness.   Musculoskeletal: She exhibits no edema or tenderness.   Neurological: She is alert and oriented to person, place, and time. No cranial nerve deficit. She exhibits normal muscle tone.   Skin: Skin is warm and dry. She is not diaphoretic. No cyanosis. Nails show no clubbing.   Rt 3rd toe wound no surrounding erythema or drainage noted   Psychiatric: Her behavior is normal.   anxious   Nursing note and vitals reviewed.      Labs:        Invalid input(s): [id number]  Recent Labs      17   0811   TROPONINI  <0.02  0.02  0.04   BNPBTYPENAT  171*   --    --      Recent Labs      17   0811   SODIUM  140  136   POTASSIUM  3.7  4.0   CHLORIDE  105  107   CO2  21  21   BUN  17  11   CREATININE  0.93  0.95   CALCIUM  9.1  8.8     Recent Labs      17   0811   ALTSGPT  21   --    ASTSGOT  32   --    ALKPHOSPHAT  81   --    TBILIRUBIN  0.2   --    LIPASE  53   --    GLUCOSE  98  127*     Recent Labs      17   RBC  4.46   HEMOGLOBIN  13.9   HEMATOCRIT  40.2   PLATELETCT  203   PROTHROMBTM  11.6*   APTT  23.4*   INR  0.87     Recent Labs      17   WBC  7.4   NEUTSPOLYS  60.30   LYMPHOCYTES  26.70   MONOCYTES  6.00   EOSINOPHILS  6.20   BASOPHILS  0.70   ASTSGOT  32   ALTSGPT  21   ALKPHOSPHAT  81   TBILIRUBIN  0.2           Hemodynamics:  Temp (24hrs), Av.7 °C (98 °F), Min:36.2 °C (97.2 °F), Max:37.1 °C (98.7 °F)  Temperature: 36.8 °C (98.3 °F)  Pulse  Av.6  Min: 28  Max: 99Heart Rate (Monitored): 94  NIBP: 128/80 mmHg     Respiratory:    Respiration: (!) 26, Pulse Oximetry: 93 %        RLL Breath Sounds: Diminished, LLL Breath Sounds: Diminished  Fluids:    Intake/Output Summary (Last 24 hours) at 17 1539  Last data filed at 17 1500   Gross per 24 hour   Intake  851.13 ml   Output   1100 ml   Net -248.87 ml        GI/Nutrition:  Orders Placed This Encounter   Procedures   • Diet Order     Standing Status: Standing      Number of Occurrences: 1      Standing Expiration Date:      Order Specific Question:  Diet:     Answer:  2 Gram Sodium [7]     Medical Decision Making, by Problem:  Active Hospital Problems    Diagnosis   • *Hypertensive urgency [I16.0]  -weaned off esmolol drip  -continue enalapril, increase  amlodipine   -monitor Bp and adjust   • Dyslipidemia [E78.5]  Continue simvastatin   • Wound, open, foot [S91.309A]  Topical bacitracin wound care    Transfer out of ICU, if BP stable on po regimen likely d/c home  Plan of care reviewed with patient and son at bedside and  discussed with nursing staff           Labs reviewed, Medications reviewed and Radiology images reviewed  Tsang catheter: No Tsang      DVT Prophylaxis: Heparin

## 2017-05-08 NOTE — CARE PLAN
Problem: Safety  Goal: Will remain free from falls  Outcome: PROGRESSING AS EXPECTED  Call light within reach, family at bedside, bed alarm on, personal belongings within reach, treaded slipper socks on pt, family involved in care.    Problem: Knowledge Deficit  Goal: Knowledge of disease process/condition, treatment plan, diagnostic tests, and medications will improve  Outcome: PROGRESSING AS EXPECTED  Family and Pt updated on POC, diagnosis, treatment and medications.     Problem: Skin Integrity  Goal: Risk for impaired skin integrity will decrease  Intervention: Assess and monitor skin integrity, appearance and/or temperature  Fermín assessment Q shift, skin intact, dressing maintenance to wound on 3rd right toe

## 2017-05-09 VITALS
OXYGEN SATURATION: 96 % | WEIGHT: 114.64 LBS | RESPIRATION RATE: 16 BRPM | HEART RATE: 74 BPM | TEMPERATURE: 98.6 F | HEIGHT: 62 IN | BODY MASS INDEX: 21.1 KG/M2

## 2017-05-09 LAB — EKG IMPRESSION: NORMAL

## 2017-05-09 PROCEDURE — 700102 HCHG RX REV CODE 250 W/ 637 OVERRIDE(OP): Performed by: HOSPITALIST

## 2017-05-09 PROCEDURE — A9270 NON-COVERED ITEM OR SERVICE: HCPCS | Performed by: HOSPITALIST

## 2017-05-09 PROCEDURE — 700111 HCHG RX REV CODE 636 W/ 250 OVERRIDE (IP): Performed by: HOSPITALIST

## 2017-05-09 PROCEDURE — 99239 HOSP IP/OBS DSCHRG MGMT >30: CPT | Performed by: HOSPITALIST

## 2017-05-09 RX ORDER — AMLODIPINE BESYLATE 5 MG/1
5 TABLET ORAL 2 TIMES DAILY
Qty: 30 TAB | Refills: 0 | Status: SHIPPED | OUTPATIENT
Start: 2017-05-09 | End: 2017-05-09

## 2017-05-09 RX ORDER — AMLODIPINE BESYLATE 5 MG/1
5 TABLET ORAL 2 TIMES DAILY
Qty: 30 TAB | Refills: 0 | Status: SHIPPED | OUTPATIENT
Start: 2017-05-09 | End: 2017-07-02

## 2017-05-09 RX ADMIN — HEPARIN SODIUM 5000 UNITS: 5000 INJECTION, SOLUTION INTRAVENOUS; SUBCUTANEOUS at 06:07

## 2017-05-09 RX ADMIN — SIMVASTATIN 20 MG: 20 TABLET, FILM COATED ORAL at 08:44

## 2017-05-09 RX ADMIN — ENALAPRIL MALEATE 20 MG: 5 TABLET ORAL at 08:44

## 2017-05-09 RX ADMIN — OMEPRAZOLE 20 MG: 20 CAPSULE, DELAYED RELEASE ORAL at 08:44

## 2017-05-09 RX ADMIN — AMLODIPINE BESYLATE 5 MG: 5 TABLET ORAL at 08:44

## 2017-05-09 RX ADMIN — ASPIRIN 81 MG CHEWABLE TABLET 81 MG: 81 TABLET CHEWABLE at 08:44

## 2017-05-09 RX ADMIN — BACITRACIN ZINC: 500 OINTMENT TOPICAL at 08:52

## 2017-05-09 ASSESSMENT — PAIN SCALES - GENERAL
PAINLEVEL_OUTOF10: 2
PAINLEVEL_OUTOF10: 0

## 2017-05-09 NOTE — CARE PLAN
Problem: Communication  Goal: The ability to communicate needs accurately and effectively will improve  Intervention: Educate patient and significant other/support system about the plan of care, procedures, treatments, medications and allow for questions  Pt VU of POC, using call light appropriately, family at bedside.       Problem: Discharge Barriers/Planning  Goal: Patient’s continuum of care needs will be met  Intervention: Assess potential discharge barriers on admission and throughout hospital stay  Discussed possible discharge with Pt and Grandson. Reviewed Pt will be weak and requiring monitoring and assistance to transition to home. Pt lives with daughter and family members.

## 2017-05-09 NOTE — PROGRESS NOTES
Pt discharged to home with son and Grandson, Pt and family VU of all discharge instructions including medications, diet and s/s to return to the ER. Pt assisted out with WC , all personal belongings and scrip with family.

## 2017-05-09 NOTE — DISCHARGE INSTRUCTIONS
Discharge Instructions    Discharged to home by car with relative. Discharged via wheelchair, hospital escort: Yes.  Special equipment needed: Pt's own cane    Be sure to schedule a follow-up appointment with your primary care doctor or any specialists as instructed.     Discharge Plan:   Diet Plan: Discussed  Activity Level: Discussed  Confirmed Follow up Appointment: Patient to Call and Schedule Appointment  Confirmed Symptoms Management: Discussed  Medication Reconciliation Updated: Yes  Influenza Vaccine Indication: Not indicated: Previously immunized this influenza season and > 8 years of age    I understand that a diet low in cholesterol, fat, and sodium is recommended for good health. Unless I have been given specific instructions below for another diet, I accept this instruction as my diet prescription.   Other diet: Heart Healthy, low sodium    Special Instructions: None    · Is patient discharged on Warfarin / Coumadin?   No     · Is patient Post Blood Transfusion?  No    Depression / Suicide Risk    As you are discharged from this [facility] facility, it is important to learn how to keep safe from harming yourself.    Recognize the warning signs:  · Abrupt changes in personality, positive or negative- including increase in energy   · Giving away possessions  · Change in eating patterns- significant weight changes-  positive or negative  · Change in sleeping patterns- unable to sleep or sleeping all the time   · Unwillingness or inability to communicate  · Depression  · Unusual sadness, discouragement and loneliness  · Talk of wanting to die  · Neglect of personal appearance   · Rebelliousness- reckless behavior  · Withdrawal from people/activities they love  · Confusion- inability to concentrate     If you or a loved one observes any of these behaviors or has concerns about self-harm, here's what you can do:  · Talk about it- your feelings and reasons for harming yourself  · Remove any means that you  might use to hurt yourself (examples: pills, rope, extension cords, firearm)  · Get professional help from the community (Mental Health, Substance Abuse, psychological counseling)  · Do not be alone:Call your Safe Contact- someone whom you trust who will be there for you.  · Call your local CRISIS HOTLINE 154-3257 or 946-429-8440  · Call your local Children's Mobile Crisis Response Team [place] (193) 866-5249 or www.Dogster  · Call the toll free National Suicide Prevention Hotlines   · National Suicide Prevention Lifeline 644-740-TOHB (5174)  · National Hope Line Network 800-SUICIDE (390-6571)

## 2017-05-09 NOTE — PROGRESS NOTES
Patient continues to complain of a left sided chest and back pain despite administration of Tums. Patient has an increase in ectopy. STAT EKG ordered, no ST segment elevation or depression noted. RN will continue to monitor closely.

## 2017-05-09 NOTE — PROGRESS NOTES
0700 Report received from NOC, orders and medications reviewed. Pt resting calmly in no distress. RR E/U.   0800 Full nursing assessment per chart, BP stable. Pt awake and alert eating breakfast, tolerating well, call light within reach.  0845 Medications completed.Will continue to monitor.

## 2017-05-09 NOTE — DISCHARGE PLANNING
Medical Social Work    Referral: Pt discussed at IDT rounds this AM.    Intervention: Per flowsheet, pt lives with adult daughter and expects to d.c home.  Pt probable d.c home today.  No SS needs identified nor any requests for CARRIE Lopez during rounds.      Plan: CARRIE will provide pt with IMM paperwork.      Care Transition Team Assessment    Information Source  Orientation : Disoriented to Time  Information Given By: Other (Comments) (Bernadette-daughter )    Readmission Evaluation  Is this a readmission?: No    Elopement Risk  Legal Hold: No  Ambulatory or Self Mobile in Wheelchair: Yes  Disoriented: No  Psychiatric Symptoms: None  History of Wandering: No  Elopement this Admit: No  Vocalizing Wanting to Leave: No  Displays Behaviors, Body Language Wanting to Leave: No-Not at Risk for Elopement  Elopement Risk: Not at Risk for Elopement    Interdisciplinary Discharge Planning  Does Admitting Nurse Feel This Could be a Complex Discharge?:  (unknown at this time)  Primary Care Physician: Dr. Barahona  Lives with - Patient's Self Care Capacity: Adult Children  Patient or legal guardian wants to designate a caregiver (see row info): Yes  Caregiver name: Bernadette  Caregiver relationship to patient: daughter   Caregiver contact info: [phone]  Support Systems: Family Member(s), Friends / Neighbors  Housing / Facility: 2 Story House  Do You Take your Prescribed Medications Regularly: Yes  Able to Return to Previous ADL's: Yes  Mobility Issues: No  Prior Services: None  Patient Expects to be Discharged to:: Home  Assistance Needed: Unknown at this Time  Durable Medical Equipment: Other - Specify (cane, motorized scooter )    Discharge Preparedness  What is your plan after discharge?: Home with help  What are your discharge supports?: Child              Vision / Hearing Impairment  Vision Impairment : Yes  Right Eye Vision: Wears Glasses  Left Eye Vision: Wears Glasses  Hearing Impairment : Yes  Hearing Impairment: Both Ears, Hearing  Device Not Available    Values / Beliefs / Concerns  Values / Beliefs Concerns : No  Special Hospitalization Concerns: none    Advance Directive  Advance Directive?: None    Domestic Abuse  Have you ever been the victim of abuse or violence?: No    Psychological Assessment  History of Substance Abuse: None         Anticipated Discharge Information  Anticipated discharge disposition: Home

## 2017-05-09 NOTE — PROGRESS NOTES
Bedside report received. Assumed care of patient. Patient complaining of pain in her chest related to heart burn, page out to MD for orders. Patient is resting in bed with no other complaints at this time. All needs are currently met. All safety precautions in place. Will continue to monitor.

## 2017-05-09 NOTE — PROGRESS NOTES
Pt ambulated with FWW to sink, Pt fatigues easily, denies dizziness or SOB. Sitting up in chair now. Grandson at bedside. VU Pt to be discharged home.

## 2017-05-10 NOTE — DISCHARGE SUMMARY
DATE OF ADMISSION:  05/06/2017.    DATE OF DISCHARGE:  05/09/2017.    DISCHARGE DIAGNOSES:  1.  Hypertensive urgency.  2.  Chronic hypertension.  3.  Dyslipidemia.  4.  Open wound on the foot.    IMAGING STUDIES:  1.  CT of the head on 05/06/2017, findings, no acute intracranial   abnormalities.  2.  Portable chest x-ray on 05/06/2017, findings, no acute cardiopulmonary   abnormalities.    LABORATORY DATA:  Lab on discharge, white count 7.4, hemoglobin 13.9, and   platelet count 203.  Sodium 136, potassium 4.0, BUN is 11, and creatinine   0.95.  LFTs are within normal limits.  Lipase is 53.  Troponin I is less than   0.02 x2 occasions, 0.04 on one occasion, BNP is 171.  INR is 0.87.    HOSPITAL COURSE:  Briefly, this is an 88-year-old female who presents for   evaluation to the emergency room on 05/06/2017.  At that time, her complaint   was that her blood pressure was high.  She is normally maintained on 20 mg of   enalapril daily.  She had taken a second dose on the day of presentation as   her blood pressure was 198/78.    In the emergency room, the patient was found to have a peak blood pressure of   257/100.  She was started on a nitro paste; however, this was ineffective, and   she was subsequently started on an esmolol drip and admitted to the ICU.    In the ICU, the patient was started on amlodipine 5 mg p.o. b.i.d. and   continued on her enalapril.  On this regimen, we were able to titrate her off   of the esmolol drip and she maintained pressures systolically with high in the   160s, to low _____ 120s.  She remained asymptomatic.  She was observed for a   period of 48 hours.    At this point in time, we will be discharging the patient home on her   enalapril 20 mg and amlodipine 5 b.i.d.  She is to follow with her primary   care physician in the next week.    DISCHARGE MEDICATIONS:  1.  Amlodipine 5 mg p.o. b.i.d.  2.  Aspirin 81 mg p.o. daily.  3.  Simvastatin 20 mg p.o. at bedtime.  4.  Enalapril 20 mg  p.o. daily.  5.  Bacitracin.  6.  Vitamin C supplement.    FOLLOWUP:  As noted, patient needs to follow with her primary care physician   in the next 1-2 weeks.  I have asked the family to keep a blood pressure log   so that Dr. Gil may have that available to him and hopefully he may have   sufficient information to titrate her medications if needed.  She is to come   back to the emergency room if her pressures are over 200 or if she develops   any symptoms, I have reviewed this in detail with her on the day of discharge.    DISCHARGE TIME:  35 minutes, the majority of that time was spent with the   patient on the day of discharge, reviewing discharge instructions.       ____________________________________     DO MEENA Holland / KEL    DD:  05/09/2017 14:28:01  DT:  05/10/2017 06:43:12    D#:  [id number]  Job#:  [id number]    cc: JHON GIL DO

## 2017-07-02 ENCOUNTER — APPOINTMENT (OUTPATIENT)
Dept: RADIOLOGY | Facility: MEDICAL CENTER | Age: 82
DRG: 683 | End: 2017-07-02
Attending: EMERGENCY MEDICINE
Payer: COMMERCIAL

## 2017-07-02 ENCOUNTER — HOSPITAL ENCOUNTER (INPATIENT)
Facility: MEDICAL CENTER | Age: 82
LOS: 1 days | DRG: 683 | End: 2017-07-03
Attending: EMERGENCY MEDICINE | Admitting: INTERNAL MEDICINE
Payer: COMMERCIAL

## 2017-07-02 ENCOUNTER — RESOLUTE PROFESSIONAL BILLING HOSPITAL PROF FEE (OUTPATIENT)
Dept: HOSPITALIST | Facility: MEDICAL CENTER | Age: 82
End: 2017-07-02
Payer: COMMERCIAL

## 2017-07-02 ENCOUNTER — APPOINTMENT (OUTPATIENT)
Dept: RADIOLOGY | Facility: MEDICAL CENTER | Age: 82
DRG: 683 | End: 2017-07-02
Attending: INTERNAL MEDICINE
Payer: COMMERCIAL

## 2017-07-02 DIAGNOSIS — R53.1 WEAKNESS: ICD-10-CM

## 2017-07-02 DIAGNOSIS — R00.8 VENTRICULAR BIGEMINY SEEN ON CARDIAC MONITOR: ICD-10-CM

## 2017-07-02 DIAGNOSIS — R55 SYNCOPE, UNSPECIFIED SYNCOPE TYPE: ICD-10-CM

## 2017-07-02 LAB
ALBUMIN SERPL BCP-MCNC: 3.5 G/DL (ref 3.2–4.9)
ALBUMIN/GLOB SERPL: 1.4 G/DL
ALP SERPL-CCNC: 69 U/L (ref 30–99)
ALT SERPL-CCNC: 17 U/L (ref 2–50)
ANION GAP SERPL CALC-SCNC: 9 MMOL/L (ref 0–11.9)
APPEARANCE UR: CLEAR
APTT PPP: 22.8 SEC (ref 24.7–36)
AST SERPL-CCNC: 25 U/L (ref 12–45)
BASOPHILS # BLD AUTO: 1.1 % (ref 0–1.8)
BASOPHILS # BLD: 0.1 K/UL (ref 0–0.12)
BILIRUB SERPL-MCNC: 0.5 MG/DL (ref 0.1–1.5)
BILIRUB UR QL STRIP.AUTO: NEGATIVE
BNP SERPL-MCNC: 194 PG/ML (ref 0–100)
BUN SERPL-MCNC: 20 MG/DL (ref 8–22)
CALCIUM SERPL-MCNC: 8.9 MG/DL (ref 8.4–10.2)
CHLORIDE SERPL-SCNC: 107 MMOL/L (ref 96–112)
CO2 SERPL-SCNC: 24 MMOL/L (ref 20–33)
COLOR UR: YELLOW
CREAT SERPL-MCNC: 1.22 MG/DL (ref 0.5–1.4)
CULTURE IF INDICATED INDCX: NO UA CULTURE
EOSINOPHIL # BLD AUTO: 0.39 K/UL (ref 0–0.51)
EOSINOPHIL NFR BLD: 4.2 % (ref 0–6.9)
ERYTHROCYTE [DISTWIDTH] IN BLOOD BY AUTOMATED COUNT: 41.6 FL (ref 35.9–50)
GFR SERPL CREATININE-BSD FRML MDRD: 42 ML/MIN/1.73 M 2
GLOBULIN SER CALC-MCNC: 2.5 G/DL (ref 1.9–3.5)
GLUCOSE BLD-MCNC: 94 MG/DL (ref 65–99)
GLUCOSE SERPL-MCNC: 100 MG/DL (ref 65–99)
GLUCOSE UR STRIP.AUTO-MCNC: NEGATIVE MG/DL
HCT VFR BLD AUTO: 38.5 % (ref 37–47)
HGB BLD-MCNC: 13 G/DL (ref 12–16)
IMM GRANULOCYTES # BLD AUTO: 0.04 K/UL (ref 0–0.11)
IMM GRANULOCYTES NFR BLD AUTO: 0.4 % (ref 0–0.9)
INR PPP: 0.96 (ref 0.87–1.13)
KETONES UR STRIP.AUTO-MCNC: NEGATIVE MG/DL
LACTATE BLD-SCNC: 2.64 MMOL/L (ref 0.5–2)
LACTATE BLD-SCNC: 2.64 MMOL/L (ref 0.5–2)
LEUKOCYTE ESTERASE UR QL STRIP.AUTO: NEGATIVE
LIPASE SERPL-CCNC: 45 U/L (ref 7–58)
LYMPHOCYTES # BLD AUTO: 3.89 K/UL (ref 1–4.8)
LYMPHOCYTES NFR BLD: 41.6 % (ref 22–41)
MAGNESIUM SERPL-MCNC: 2.3 MG/DL (ref 1.5–2.5)
MCH RBC QN AUTO: 31.3 PG (ref 27–33)
MCHC RBC AUTO-ENTMCNC: 33.8 G/DL (ref 33.6–35)
MCV RBC AUTO: 92.8 FL (ref 81.4–97.8)
MICRO URNS: NORMAL
MONOCYTES # BLD AUTO: 0.66 K/UL (ref 0–0.85)
MONOCYTES NFR BLD AUTO: 7.1 % (ref 0–13.4)
NEUTROPHILS # BLD AUTO: 4.27 K/UL (ref 2–7.15)
NEUTROPHILS NFR BLD: 45.6 % (ref 44–72)
NITRITE UR QL STRIP.AUTO: NEGATIVE
NRBC # BLD AUTO: 0 K/UL
NRBC BLD AUTO-RTO: 0 /100 WBC
PH UR STRIP.AUTO: 7 [PH]
PHOSPHATE SERPL-MCNC: 3.7 MG/DL (ref 2.5–4.5)
PLATELET # BLD AUTO: 156 K/UL (ref 164–446)
PMV BLD AUTO: 11.3 FL (ref 9–12.9)
POTASSIUM SERPL-SCNC: 3.8 MMOL/L (ref 3.6–5.5)
PROT SERPL-MCNC: 6 G/DL (ref 6–8.2)
PROT UR QL STRIP: NEGATIVE MG/DL
PROTHROMBIN TIME: 12.6 SEC (ref 12–14.6)
RBC # BLD AUTO: 4.15 M/UL (ref 4.2–5.4)
RBC UR QL AUTO: NEGATIVE
SODIUM SERPL-SCNC: 140 MMOL/L (ref 135–145)
SP GR UR STRIP.AUTO: 1.01
SPECIMEN DRAWN FROM PATIENT: ABNORMAL
SPECIMEN DRAWN FROM PATIENT: ABNORMAL
T4 FREE SERPL-MCNC: 0.98 NG/DL (ref 0.58–1.64)
TROPONIN I SERPL-MCNC: <0.02 NG/ML (ref 0–0.04)
TROPONIN I SERPL-MCNC: <0.02 NG/ML (ref 0–0.04)
TSH SERPL DL<=0.005 MIU/L-ACNC: 3.03 UIU/ML (ref 0.35–5.5)
WBC # BLD AUTO: 9.4 K/UL (ref 4.8–10.8)

## 2017-07-02 PROCEDURE — 700101 HCHG RX REV CODE 250: Performed by: INTERNAL MEDICINE

## 2017-07-02 PROCEDURE — 85025 COMPLETE CBC W/AUTO DIFF WBC: CPT

## 2017-07-02 PROCEDURE — A9270 NON-COVERED ITEM OR SERVICE: HCPCS | Performed by: INTERNAL MEDICINE

## 2017-07-02 PROCEDURE — 700111 HCHG RX REV CODE 636 W/ 250 OVERRIDE (IP): Performed by: INTERNAL MEDICINE

## 2017-07-02 PROCEDURE — 36415 COLL VENOUS BLD VENIPUNCTURE: CPT

## 2017-07-02 PROCEDURE — 83735 ASSAY OF MAGNESIUM: CPT

## 2017-07-02 PROCEDURE — 81003 URINALYSIS AUTO W/O SCOPE: CPT

## 2017-07-02 PROCEDURE — 94760 N-INVAS EAR/PLS OXIMETRY 1: CPT

## 2017-07-02 PROCEDURE — 93005 ELECTROCARDIOGRAM TRACING: CPT | Performed by: EMERGENCY MEDICINE

## 2017-07-02 PROCEDURE — 99223 1ST HOSP IP/OBS HIGH 75: CPT | Performed by: INTERNAL MEDICINE

## 2017-07-02 PROCEDURE — 770020 HCHG ROOM/CARE - TELE (206)

## 2017-07-02 PROCEDURE — 83880 ASSAY OF NATRIURETIC PEPTIDE: CPT

## 2017-07-02 PROCEDURE — 85610 PROTHROMBIN TIME: CPT

## 2017-07-02 PROCEDURE — 80053 COMPREHEN METABOLIC PANEL: CPT

## 2017-07-02 PROCEDURE — 84443 ASSAY THYROID STIM HORMONE: CPT

## 2017-07-02 PROCEDURE — 99285 EMERGENCY DEPT VISIT HI MDM: CPT

## 2017-07-02 PROCEDURE — 84484 ASSAY OF TROPONIN QUANT: CPT | Mod: 91

## 2017-07-02 PROCEDURE — 84439 ASSAY OF FREE THYROXINE: CPT

## 2017-07-02 PROCEDURE — 700102 HCHG RX REV CODE 250 W/ 637 OVERRIDE(OP): Performed by: INTERNAL MEDICINE

## 2017-07-02 PROCEDURE — 82962 GLUCOSE BLOOD TEST: CPT

## 2017-07-02 PROCEDURE — 83605 ASSAY OF LACTIC ACID: CPT

## 2017-07-02 PROCEDURE — 85730 THROMBOPLASTIN TIME PARTIAL: CPT

## 2017-07-02 PROCEDURE — 70450 CT HEAD/BRAIN W/O DYE: CPT

## 2017-07-02 PROCEDURE — 71010 DX-CHEST-PORTABLE (1 VIEW): CPT

## 2017-07-02 PROCEDURE — 70551 MRI BRAIN STEM W/O DYE: CPT

## 2017-07-02 PROCEDURE — 87040 BLOOD CULTURE FOR BACTERIA: CPT

## 2017-07-02 PROCEDURE — 700105 HCHG RX REV CODE 258: Performed by: EMERGENCY MEDICINE

## 2017-07-02 PROCEDURE — 84100 ASSAY OF PHOSPHORUS: CPT

## 2017-07-02 PROCEDURE — 83690 ASSAY OF LIPASE: CPT

## 2017-07-02 RX ORDER — AMOXICILLIN 250 MG
2 CAPSULE ORAL 2 TIMES DAILY
Status: DISCONTINUED | OUTPATIENT
Start: 2017-07-02 | End: 2017-07-03 | Stop reason: HOSPADM

## 2017-07-02 RX ORDER — ENALAPRIL MALEATE 5 MG/1
20 TABLET ORAL EVERY MORNING
Status: DISCONTINUED | OUTPATIENT
Start: 2017-07-02 | End: 2017-07-02

## 2017-07-02 RX ORDER — POLYETHYLENE GLYCOL 3350 17 G/17G
1 POWDER, FOR SOLUTION ORAL
Status: DISCONTINUED | OUTPATIENT
Start: 2017-07-02 | End: 2017-07-03 | Stop reason: HOSPADM

## 2017-07-02 RX ORDER — BISACODYL 10 MG
10 SUPPOSITORY, RECTAL RECTAL
Status: DISCONTINUED | OUTPATIENT
Start: 2017-07-02 | End: 2017-07-03 | Stop reason: HOSPADM

## 2017-07-02 RX ORDER — HEPARIN SODIUM 5000 [USP'U]/ML
5000 INJECTION, SOLUTION INTRAVENOUS; SUBCUTANEOUS EVERY 8 HOURS
Status: DISCONTINUED | OUTPATIENT
Start: 2017-07-02 | End: 2017-07-03 | Stop reason: HOSPADM

## 2017-07-02 RX ORDER — SIMVASTATIN 20 MG
20 TABLET ORAL EVERY MORNING
Status: DISCONTINUED | OUTPATIENT
Start: 2017-07-03 | End: 2017-07-03 | Stop reason: HOSPADM

## 2017-07-02 RX ORDER — HYDRALAZINE HYDROCHLORIDE 25 MG/1
25 TABLET, FILM COATED ORAL EVERY 8 HOURS
Status: DISCONTINUED | OUTPATIENT
Start: 2017-07-02 | End: 2017-07-03

## 2017-07-02 RX ORDER — SODIUM CHLORIDE 9 MG/ML
INJECTION, SOLUTION INTRAVENOUS CONTINUOUS
Status: DISCONTINUED | OUTPATIENT
Start: 2017-07-02 | End: 2017-07-02

## 2017-07-02 RX ORDER — ACETAMINOPHEN 325 MG/1
650 TABLET ORAL EVERY 6 HOURS PRN
Status: DISCONTINUED | OUTPATIENT
Start: 2017-07-02 | End: 2017-07-03 | Stop reason: HOSPADM

## 2017-07-02 RX ORDER — LABETALOL HYDROCHLORIDE 5 MG/ML
20 INJECTION, SOLUTION INTRAVENOUS EVERY 4 HOURS PRN
Status: DISCONTINUED | OUTPATIENT
Start: 2017-07-02 | End: 2017-07-03 | Stop reason: HOSPADM

## 2017-07-02 RX ORDER — SODIUM CHLORIDE AND POTASSIUM CHLORIDE 150; 900 MG/100ML; MG/100ML
INJECTION, SOLUTION INTRAVENOUS CONTINUOUS
Status: DISCONTINUED | OUTPATIENT
Start: 2017-07-02 | End: 2017-07-03 | Stop reason: HOSPADM

## 2017-07-02 RX ADMIN — SODIUM CHLORIDE 1000 ML: 9 INJECTION, SOLUTION INTRAVENOUS at 14:28

## 2017-07-02 RX ADMIN — HEPARIN SODIUM 5000 UNITS: 5000 INJECTION, SOLUTION INTRAVENOUS; SUBCUTANEOUS at 22:31

## 2017-07-02 RX ADMIN — HYDRALAZINE HYDROCHLORIDE 25 MG: 25 TABLET ORAL at 22:31

## 2017-07-02 RX ADMIN — HYDRALAZINE HYDROCHLORIDE 25 MG: 25 TABLET ORAL at 18:06

## 2017-07-02 RX ADMIN — POTASSIUM CHLORIDE AND SODIUM CHLORIDE: 900; 150 INJECTION, SOLUTION INTRAVENOUS at 18:06

## 2017-07-02 ASSESSMENT — LIFESTYLE VARIABLES
ALCOHOL_USE: NO
EVER_SMOKED: NEVER

## 2017-07-02 ASSESSMENT — PAIN SCALES - GENERAL: PAINLEVEL_OUTOF10: 0

## 2017-07-02 NOTE — IP AVS SNAPSHOT
" Home Care Instructions                                                                                                                  Name:Sabrina Lobo  Medical Record Number:[id number]  CSN: [id number]    YOB: 1929   Age: 88 y.o.  Sex: female  HT:1.575 m (5' 2\") WT: 54 kg (119 lb 0.8 oz)          Admit Date: 7/2/2017     Discharge Date:   Today's Date: 7/3/2017  Attending Doctor:  Vance Laurent M.D.                  Allergies:  Review of patient's allergies indicates no known allergies.            Discharge Instructions       Discharge Instructions    Discharged to home by car with relative. Discharged via wheelchair, hospital escort: Yes.  Special equipment needed: Not Applicable    Be sure to schedule a follow-up appointment with your primary care doctor or any specialists as instructed.     Discharge Plan:   Diet Plan: Discussed  Activity Level: Discussed  Confirmed Follow up Appointment: Patient to Call and Schedule Appointment  Confirmed Symptoms Management: Discussed  Medication Reconciliation Updated: Yes  Influenza Vaccine Indication: Not indicated: Previously immunized this influenza season and > 8 years of age    I understand that a diet low in cholesterol, fat, and sodium is recommended for good health. Unless I have been given specific instructions below for another diet, I accept this instruction as my diet prescription.   Other diet: None    Special Instructions: None    · Is patient discharged on Warfarin / Coumadin?   No     · Is patient Post Blood Transfusion?  No    Depression / Suicide Risk    As you are discharged from this [facility] facility, it is important to learn how to keep safe from harming yourself.    Recognize the warning signs:  · Abrupt changes in personality, positive or negative- including increase in energy   · Giving away possessions  · Change in eating patterns- significant weight changes-  positive or negative  · Change in sleeping patterns- unable to sleep or " sleeping all the time   · Unwillingness or inability to communicate  · Depression  · Unusual sadness, discouragement and loneliness  · Talk of wanting to die  · Neglect of personal appearance   · Rebelliousness- reckless behavior  · Withdrawal from people/activities they love  · Confusion- inability to concentrate     If you or a loved one observes any of these behaviors or has concerns about self-harm, here's what you can do:  · Talk about it- your feelings and reasons for harming yourself  · Remove any means that you might use to hurt yourself (examples: pills, rope, extension cords, firearm)  · Get professional help from the community (Mental Health, Substance Abuse, psychological counseling)  · Do not be alone:Call your Safe Contact- someone whom you trust who will be there for you.  · Call your local CRISIS HOTLINE 739-3135 or 370-760-3181  · Call your local Children's Mobile Crisis Response Team [place] (736) 905-0132 or www.MommyCoach  · Call the toll free National Suicide Prevention Hotlines   · National Suicide Prevention Lifeline 005-979-DEVK (7315)  · [employer] Hope Line Network 800-SUICIDE (894-7025)    Syncope  Syncope is a medical term for fainting or passing out. This means you lose consciousness and drop to the ground. People are generally unconscious for less than 5 minutes. You may have some muscle twitches for up to 15 seconds before waking up and returning to normal. Syncope occurs more often in older adults, but it can happen to anyone. While most causes of syncope are not dangerous, syncope can be a sign of a serious medical problem. It is important to seek medical care.   CAUSES   Syncope is caused by a sudden drop in blood flow to the brain. The specific cause is often not determined. Factors that can bring on syncope include:  · Taking medicines that lower blood pressure.  · Sudden changes in posture, such as standing up quickly.  · Taking more medicine than  prescribed.  · Standing in one place for too long.  · Seizure disorders.  · Dehydration and excessive exposure to heat.  · Low blood sugar (hypoglycemia).  · Straining to have a bowel movement.  · Heart disease, irregular heartbeat, or other circulatory problems.  · Fear, emotional distress, seeing blood, or severe pain.  SYMPTOMS   Right before fainting, you may:  · Feel dizzy or light-headed.  · Feel nauseous.  · See all white or all black in your field of vision.  · Have cold, clammy skin.  DIAGNOSIS   Your health care provider will ask about your symptoms, perform a physical exam, and perform an electrocardiogram (ECG) to record the electrical activity of your heart. Your health care provider may also perform other heart or blood tests to determine the cause of your syncope which may include:  · Transthoracic echocardiogram (TTE). During echocardiography, sound waves are used to evaluate how blood flows through your heart.  · Transesophageal echocardiogram (KEV).  · Cardiac monitoring. This allows your health care provider to monitor your heart rate and rhythm in real time.  · Holter monitor. This is a portable device that records your heartbeat and can help diagnose heart arrhythmias. It allows your health care provider to track your heart activity for several days, if needed.  · Stress tests by exercise or by giving medicine that makes the heart beat faster.  TREATMENT   In most cases, no treatment is needed. Depending on the cause of your syncope, your health care provider may recommend changing or stopping some of your medicines.  HOME CARE INSTRUCTIONS  · Have someone stay with you until you feel stable.  · Do not drive, use machinery, or play sports until your health care provider says it is okay.  · Keep all follow-up appointments as directed by your health care provider.  · Lie down right away if you start feeling like you might faint. Breathe deeply and steadily. Wait until all the symptoms have  passed.  · Drink enough fluids to keep your urine clear or pale yellow.  · If you are taking blood pressure or heart medicine, get up slowly and take several minutes to sit and then stand. This can reduce dizziness.  SEEK IMMEDIATE MEDICAL CARE IF:   · You have a severe headache.  · You have unusual pain in the chest, abdomen, or back.  · You are bleeding from your mouth or rectum, or you have black or tarry stool.  · You have an irregular or very fast heartbeat.  · You have pain with breathing.  · You have repeated fainting or seizure-like jerking during an episode.  · You faint when sitting or lying down.  · You have confusion.  · You have trouble walking.  · You have severe weakness.  · You have vision problems.  If you fainted, call your local emergency services (911 in [place] Do not drive yourself to the hospital.      This information is not intended to replace advice given to you by your health care provider. Make sure you discuss any questions you have with your health care provider.     Document Released: 12/18/2006 Document Revised: 05/03/2016 Document Reviewed: 02/15/2013  [employer] Interactive Patient Education ©2016 [employer]    Hypertension  Hypertension, commonly called high blood pressure, is when the force of blood pumping through your arteries is too strong. Your arteries are the blood vessels that carry blood from your heart throughout your body. A blood pressure reading consists of a higher number over a lower number, such as 110/72. The higher number (systolic) is the pressure inside your arteries when your heart pumps. The lower number (diastolic) is the pressure inside your arteries when your heart relaxes. Ideally you want your blood pressure below 120/80.  Hypertension forces your heart to work harder to pump blood. Your arteries may become narrow or stiff. Having untreated or uncontrolled hypertension can cause heart attack, stroke, kidney disease, and other problems.  RISK FACTORS  Some  risk factors for high blood pressure are controllable. Others are not.   Risk factors you cannot control include:   · Race. You may be at higher risk if you are .  · Age. Risk increases with age.  · Gender. Men are at higher risk than women before age 45 years. After age 65, women are at higher risk than men.  Risk factors you can control include:  · Not getting enough exercise or physical activity.  · Being overweight.  · Getting too much fat, sugar, calories, or salt in your diet.  · Drinking too much alcohol.  SIGNS AND SYMPTOMS  Hypertension does not usually cause signs or symptoms. Extremely high blood pressure (hypertensive crisis) may cause headache, anxiety, shortness of breath, and nosebleed.  DIAGNOSIS  To check if you have hypertension, your health care provider will measure your blood pressure while you are seated, with your arm held at the level of your heart. It should be measured at least twice using the same arm. Certain conditions can cause a difference in blood pressure between your right and left arms. A blood pressure reading that is higher than normal on one occasion does not mean that you need treatment. If it is not clear whether you have high blood pressure, you may be asked to return on a different day to have your blood pressure checked again. Or, you may be asked to monitor your blood pressure at home for 1 or more weeks.  TREATMENT  Treating high blood pressure includes making lifestyle changes and possibly taking medicine. Living a healthy lifestyle can help lower high blood pressure. You may need to change some of your habits.  Lifestyle changes may include:  · Following the DASH diet. This diet is high in fruits, vegetables, and whole grains. It is low in salt, red meat, and added sugars.  · Keep your sodium intake below 2,300 mg per day.  · Getting at least 30-45 minutes of aerobic exercise at least 4 times per week.  · Losing weight if necessary.  · Not  smoking.  · Limiting alcoholic beverages.  · Learning ways to reduce stress.  Your health care provider may prescribe medicine if lifestyle changes are not enough to get your blood pressure under control, and if one of the following is true:  · You are 18-59 years of age and your systolic blood pressure is above 140.  · You are 60 years of age or older, and your systolic blood pressure is above 150.  · Your diastolic blood pressure is above 90.  · You have diabetes, and your systolic blood pressure is over 140 or your diastolic blood pressure is over 90.  · You have kidney disease and your blood pressure is above 140/90.  · You have heart disease and your blood pressure is above 140/90.  Your personal target blood pressure may vary depending on your medical conditions, your age, and other factors.  HOME CARE INSTRUCTIONS  · Have your blood pressure rechecked as directed by your health care provider.    · Take medicines only as directed by your health care provider. Follow the directions carefully. Blood pressure medicines must be taken as prescribed. The medicine does not work as well when you skip doses. Skipping doses also puts you at risk for problems.  · Do not smoke.    · Monitor your blood pressure at home as directed by your health care provider.   SEEK MEDICAL CARE IF:   · You think you are having a reaction to medicines taken.  · You have recurrent headaches or feel dizzy.  · You have swelling in your ankles.  · You have trouble with your vision.  SEEK IMMEDIATE MEDICAL CARE IF:  · You develop a severe headache or confusion.  · You have unusual weakness, numbness, or feel faint.  · You have severe chest or abdominal pain.  · You vomit repeatedly.  · You have trouble breathing.  MAKE SURE YOU:   · Understand these instructions.  · Will watch your condition.  · Will get help right away if you are not doing well or get worse.     This information is not intended to replace advice given to you by your health  care provider. Make sure you discuss any questions you have with your health care provider.     Document Released: 12/18/2006 Document Revised: 05/03/2016 Document Reviewed: 10/10/2014  "[employer] Interactive Patient Education ©2016 "[employer] Inc.      Follow-up Information     1. Follow up with Kristian Barahona D.O.. Schedule an appointment as soon as possible for a visit in 1 week.    Specialty:  Family Medicine    Why:  JOHN  HAS LEFT A MESSAGE WITH YOUR PCP REGARDING THE NEED FOR A FOLLOW UP APPOINTMENT. PLEASE CONTACT THEM DIRECTLY IF YOU DO NOT HEAR FROM THEM WITH IN 1 DAY. THANK YOU    Contact information    [address]  [address]  [place] [address]  [phone]           Discharge Medication Instructions:    Below are the medications your physician expects you to take upon discharge:    Review all your home medications and newly ordered medications with your doctor and/or pharmacist. Follow medication instructions as directed by your doctor and/or pharmacist.    Please keep your medication list with you and share with your physician.               Medication List      START taking these medications        Instructions    Morning Afternoon Evening Bedtime    amlodipine 10 MG Tabs   Last time this was given:  10 mg on 7/3/2017 11:39 AM   Commonly known as:  NORVASC        Take 1 Tab by mouth every day.   Dose:  10 mg                          CONTINUE taking these medications        Instructions    Morning Afternoon Evening Bedtime    aspirin 81 MG tablet        Take 1 Tab by mouth every morning.   Dose:  81 mg                        simvastatin 20 MG Tabs   Commonly known as:  ZOCOR        Take 20 mg by mouth every morning.   Dose:  20 mg                        VASOTEC 20 MG tablet   Generic drug:  enalapril        Take 20 mg by mouth every morning.   Dose:  20 mg                             Where to Get Your Medications      These medications were sent to [facility] [place] [address] [place] [address]     Phone:  [phone]    - amlodipine 10 MG Tabs            Instructions           Diet / Nutrition:    Follow any diet instructions given to you by your doctor or the dietician, including how much salt (sodium) you are allowed each day.    If you are overweight, talk to your doctor about a weight reduction plan.    Activity:    Remain physically active following your doctor's instructions about exercise and activity.    Rest often.     Any time you become even a little tired or short of breath, SIT DOWN and rest.    Worsening Symptoms:    Report any of the following signs and symptoms to the doctor's office immediately:    *Pain of jaw, arm, or neck  *Chest pain not relieved by medication                               *Dizziness or loss of consciousness  *Difficulty breathing even when at rest   *More tired than usual                                       *Bleeding drainage or swelling of surgical site  *Swelling of feet, ankles, legs or stomach                 *Fever (>100ºF)  *Pink or blood tinged sputum  *Weight gain (3lbs/day or 5lbs /week)           *Shock from internal defibrillator (if applicable)  *Palpitations or irregular heartbeats                *Cool and/or numb extremities    Stroke Awareness    Common Risk Factors for Stroke include:    Age  Atrial Fibrillation  Carotid Artery Stenosis  Diabetes Mellitus  Excessive alcohol consumption  High blood pressure  Overweight   Physical inactivity  Smoking    Warning signs and symptoms of a stroke include:    *Sudden numbness or weakness of the face, arm or leg (especially on one side of the body).  *Sudden confusion, trouble speaking or understanding.  *Sudden trouble seeing in one or both eyes.  *Sudden trouble walking, dizziness, loss of balance or coordination.Sudden severe headache with no known cause.    It is very important to get treatment quickly when a stroke occurs. If you experience any of  the above warning signs, call 911 immediately.                   Disclaimer         Quit Smoking / Tobacco Use:    I understand the use of any tobacco products increases my chance of suffering from future heart disease or stroke and could cause other illnesses which may shorten my life. Quitting the use of tobacco products is the single most important thing I can do to improve my health. For further information on smoking / tobacco cessation call a Toll Free Quit Line at 1-683.470.5965 (*National [employer] [place]) or 1-925.935.3196 (American Lung Association) or you can access the web based program at www.lungusa.org.    [place] Tobacco Users Help Line:  (939) 972-6048       Toll Free: 1-820.910.9793  Quit Tobacco Program [facility] (023)121-2789    Crisis Hotline:    [place] Crisis Hotline:  8-926-QHHZCQE or 1-725.657.6324    [place] Crisis Hotline:    1-351.262.5416 or 910-135-1932    Discharge Survey:   Thank you for choosing [facility]. We hope we did everything we could to make your hospital stay a pleasant one. You may be receiving a phone survey and we would appreciate your time and participation in answering the questions. Your input is very valuable to us in our efforts to improve our service to our patients and their families.        My signature on this form indicates that:    1. I have reviewed and understand the above information.  2. My questions regarding this information have been answered to my satisfaction.  3. I have formulated a plan with my discharge nurse to obtain my prescribed medications for home.                  Disclaimer         __________________________________                     __________       ________                       Patient Signature                                                 Date                    Time

## 2017-07-02 NOTE — ED PROVIDER NOTES
ED Provider Note    Addendum: The patient's had bigeminy by the ambulance. She's also had ventricular trigeminy here on the cardiac monitors.

## 2017-07-02 NOTE — ED PROVIDER NOTES
ED Provider Note    CHIEF COMPLAINT  Chief Complaint   Patient presents with   • Loss of Consciousness       HPI  Sabrina Lobo is a 88 y.o. female who presents acid out. History comes from the patient but also her son. The son states that the family was gardening all morning. Including the patient. She was doing fine and he went to [facility] and it was very hot in the plant section. They were outside. She was standing up on the checkout line when she told her son that she felt like she is going pass out. She then passed out. He was able to catch her later on the ground. She vomited when she got to the ground. States that she was unconscious for a minute or less. Then slowly woke up. She came in by ambulance. Initially she was confused and very weak in general. I was called away from other patients to see her. However she was able to answer simple questions and move her arms and legs for me. She denied other complaints at all to stay like she was given a past out just before she did. She does not complain of any pain.  She had ventricular bigeminy initially by [facility]. But was not hypotensive.    REVIEW OF SYSTEMS  CONSTITUTIONAL:  Denies fever, chills, positive generalized weakness and passing out today  EYES:  Denies photophobia or discharge   ENT:  Denies sore throat, nose or ear pain  CARDIOVASCULAR:  Denies chest pain, palpitations or swelling  RESPIRATORY:  Denies cough or shortness of breath or difficulty breathing  GI:  Denies abdominal pain, or diarrhea. Vomited once after passing out per son  MUSCULOSKELETAL: Positive generalized weakness but no joint swelling or back pain  SKIN:  No rash  ALLERGIC: No itchy rashes.  NEUROLOGIC:  Denies headache, focal weakness or numbness  ENDOCRINE:  Denies polyuria or polydypsia  PSYCHIATRIC:  Denies depression    PAST MEDICAL HISTORY  Past Medical History   Diagnosis Date   • Hypertension    • Vertigo    • H/O bladder repair surgery      not sure per patient daughter    •  H/O: hysterectomy        FAMILY HISTORY  No family history on file.    SOCIAL HISTORY   reports that she has never smoked. She does not have any smokeless tobacco history on file. She reports that she does not drink alcohol or use illicit drugs.    SURGICAL HISTORY  No past surgical history on file.    CURRENT MEDICATIONS  Home Medications     Reviewed by Jenifer Mckeon (Pharmacy Tech) on 07/02/17 at 1436  Med List Status: Complete    Medication Last Dose Status    aspirin 81 MG tablet 7/2/2017 Active    enalapril (VASOTEC) 20 MG tablet 7/2/2017 Active    simvastatin (ZOCOR) 20 MG Tab 7/2/2017 Active                ALLERGIES  No Known Allergies    PHYSICAL EXAM  VITAL SIGNS:    /60 mmHg  Pulse 83  Temp(Src) 36.6 °C (97.8 °F)  Resp 25  SpO2 96%      Constitutional: Patient is awake but sleepy. Answers questions appropriately but slowly. Ill-looking female   HENT: Normocephalic, Atraumatic,  Bilateral external ears normal, Oropharynx pink dry with no exudates, Nose patent.   Eyes: PERRLA, EOMI, Sclera and conjunctiva clear, No discharge.   Neck:  Supple no nuchal rigidity, no thyromegaly or mass. Non-tender  Lymphatic: No supraclavicular,  Cardiovascular: Heart is regular rate and rhythm. Positive murmur  Thorax & Lungs: Chest is symmetrical, with good breath sounds. No wheezing or crackles. No respiratory distress,   Abdomen:  Soft, No tenderness no hepatosplenomegaly there is no guarding or rebound, No masses, No pulsatile masses.   Skin: Warm, Dry, no petechia, purpura or rash.   Back: Non tender with palpation, No CVA tenderness.   Extremities: No edema.  Non tender.   Musculoskeletal: Good range of motion wrists, elbows, shoulders, hips, knees, ankles pulses 2+ radially and femorally. No gross deformities noted.   Neurologic: Alert & oriented to person, place, and time.  Strength is 4 over 5 , bicep triceps, quadriceps, plantar flexion and extension. She has weak but symmetrical. She can hold  her arms up against gravity. Finger to nose is intact. She'll lift her legs up symmetrically but very weakly. Sensory is intact to light touch face, arms and legs. DTRs are symmetrical biceps brachioradialis, patella   Psychiatric: Cooperative friendly. Slow to answer questions appropriately    EKG  1412, normal sinus rhythm, rate 70, multiple ventricular premature complexes, first-degree AV block, , axis QRS 50. Otherwise very similar to an EKG of 5/80/2017.    LABS:  Lab Results   Component Value Date    WBC 9.4 07/02/2017    HEMOGLOBIN 13.0 07/02/2017    HEMATOCRIT 38.5 07/02/2017    PLATELETCT 156* 07/02/2017    SODIUM 140 07/02/2017    POTASSIUM 3.8 07/02/2017    CHLORIDE 107 07/02/2017    CO2 24 07/02/2017    BUN 20 07/02/2017    GLUCOSE 100* 07/02/2017    CHOLSTRLTOT 206* 05/02/2017    CHOLSTRLTOT 185 05/24/2011    LDLCHOL 96 05/24/2011    * 05/02/2017    ALTSGPT 17 07/02/2017    ASTSGOT 25 07/02/2017    INR 0.96 07/02/2017    HBA1C 5.8* 03/09/2015           RADIOLOGY/PROCEDURES  CT-HEAD W/O   Final Result      1.  No acute intracranial process.      2.  Atrophy and small vessel ischemic change.      DX-CHEST-PORTABLE (1 VIEW)   Final Result      No acute cardiac or pulmonary abnormality is noted.            COURSE & MEDICAL DECISION MAKING  Pertinent Labs & Imaging studies reviewed. (See chart for details)  Frontal diagnosis includes but not limited to myocardial infarction, stroke, heat stroke, anemia, likely imbalance, medication side effects, dehydration, neurologic abnormalities, infectious causes including urinary tract infections or pneumonia.    1615. Recheck. The patient is resting much more comfortably.    She was in a good state of health until today she started doing a lot of outdoor yard work. He then went as a family to a local nursery and was standing in line at the nursery she had a sudden syncopal episode. Fortunately the son caught her before she hit the ground. She was  unconscious for a minute or less and at a very slow transition back to being awake and alert. She vomited once. Paramedics picked her up and she had bigeminy initially. When she arrived here she was slow to answer questions but appropriate. She was globally weak but no unilateral lateralizing signs. I do not feel that she had a neurologic stroke specifically but certainly could not rule out cardiac or other abnormalities. We've worked her up including blood work CAT scan of her brain showed no bleed and chest x-ray was normal. More likely this is going to be some type of heat illness but this is still somewhat unclear and elderly lady on high blood pressure medications. [facility] the hospital for further care and evaluation.    Critical care time 35 minutes. Including initial evaluation. As called away from other patients to see the patient. Discussed the case once the family arrived with the son and then reexamined the patient multiple times. Also [facility] Reviewed labs and CAT scans x-rays. Patient has a real risk of decompensation or death.    FINAL IMPRESSION  1. Syncope  2. Weakness  3. Heat illness   4. Critical care time there to 5 minutes.  PLAN  1. Admission [facility]  2. Continue evaluation. We will also continue IV fluids.    Electronically signed by: Gabo Jose, 7/2/2017 4:11 PM

## 2017-07-02 NOTE — IP AVS SNAPSHOT
" <p align=\"LEFT\"><IMG SRC=\"//EMRWB/blob$/[email or web address]\" alt=\"Image\" WIDTH=\"50%\" HEIGHT=\"200\" BORDER=\"\"></p>                   Name:Sabrina Lobo  Medical Record Number:[id number]  CSN: [id number]    YOB: 1929   Age: 88 y.o.  Sex: female  HT:1.575 m (5' 2\") WT: 54 kg (119 lb 0.8 oz)          Admit Date: 7/2/2017     Discharge Date:   Today's Date: 7/3/2017  Attending Doctor:  Vance Laurent M.D.                  Allergies:  Review of patient's allergies indicates no known allergies.          Follow-up Information     1. Follow up with Kristian Barahona D.O.. Schedule an appointment as soon as possible for a visit in 1 week.    Specialty:  Family Medicine    Why:  JOHN  HAS LEFT A MESSAGE WITH YOUR PCP REGARDING THE NEED FOR A FOLLOW UP APPOINTMENT. PLEASE CONTACT THEM DIRECTLY IF YOU DO NOT HEAR FROM THEM WITH IN 1 DAY. THANK YOU    Contact information    [address]  [address]  [place] [address]  [phone]           Medication List      Take these Medications        Instructions    amlodipine 10 MG Tabs   Commonly known as:  NORVASC    Take 1 Tab by mouth every day.   Dose:  10 mg       aspirin 81 MG tablet    Take 1 Tab by mouth every morning.   Dose:  81 mg       simvastatin 20 MG Tabs   Commonly known as:  ZOCOR    Take 20 mg by mouth every morning.   Dose:  20 mg       VASOTEC 20 MG tablet   Generic drug:  enalapril    Take 20 mg by mouth every morning.   Dose:  20 mg         "

## 2017-07-02 NOTE — IP AVS SNAPSHOT
Magink display technologies Access Code: [email or web address]  Expires: 8/2/2017 12:49 PM    Magink display technologies  A secure, online tool to manage your health information     [employer]’s Magink display technologies® is a secure, online tool that connects you to your personalized health information from the privacy of your home -- day or night - making it very easy for you to manage your healthcare. Once the activation process is completed, you can even access your medical information using the Magink display technologies matthew, which is available for free in the Apple Matthew store or Google Play store.     Magink display technologies provides the following levels of access (as shown below):   My Chart Features   [facility] Primary Care Doctor [facility]  Specialists [facility]  Urgent  Care Non-[facility]  Primary Care  Doctor   Email your healthcare team securely and privately 24/7 X X X X   Manage appointments: schedule your next appointment; view details of past/upcoming appointments X      Request prescription refills. X      View recent personal medical records, including lab and immunizations X X X X   View health record, including health history, allergies, medications X X X X   Read reports about your outpatient visits, procedures, consult and ER notes X X X X   See your discharge summary, which is a recap of your hospital and/or ER visit that includes your diagnosis, lab results, and care plan. X X       How to register for Magink display technologies:  1. Go to  [email or web address].  2. Click on the Sign Up Now box, which takes you to the New Member Sign Up page. You will need to provide the following information:  a. Enter your Magink display technologies Access Code exactly as it appears at the top of this page. (You will not need to use this code after you’ve completed the sign-up process. If you do not sign up before the expiration date, you must request a new code.)   b. Enter your date of birth.   c. Enter your home email address.   d. Click Submit, and follow the next screen’s instructions.  3. Create a Magink display technologies ID. This will be your Magink display technologies  login ID and cannot be changed, so think of one that is secure and easy to remember.  4. Create a [employer]et password. You can change your password at any time.  5. Enter your Password Reset Question and Answer. This can be used at a later time if you forget your password.   6. Enter your e-mail address. This allows you to receive e-mail notifications when new information is available in [employer].  7. Click Sign Up. You can now view your health information.    For assistance activating your [employer] account, call [phone]

## 2017-07-02 NOTE — IP AVS SNAPSHOT
7/3/2017    Sabrina Lobo  [address]  [place] [address]    Dear Sabrina:    [facility] wants to ensure your discharge home is safe and you or your loved ones have had all of your questions answered regarding your care after you leave the hospital.    Below is a list of resources and contact information should you have any questions regarding your hospital stay, follow-up instructions, or active medical symptoms.    Questions or Concerns Regarding… Contact   Medical Questions Related to Your Discharge  (7 days a week, 8am-5pm) Contact a Nurse Care Coordinator   [phone]   Medical Questions Not Related to Your Discharge  (24 hours a day / 7 days a week)  Contact the Nurse Health Line   [phone]    Medications or Discharge Instructions Refer to your discharge packet   or contact your [facility] Primary Care Provider   [phone]   Follow-up Appointment(s) Schedule your appointment via INMAN   or contact Scheduling [phone]   Billing Review your statement via INMAN  or contact Billing [phone]   Medical Records Review your records via INMAN   or contact Medical Records [phone]     You may receive a telephone call within two days of discharge. This call is to make certain you understand your discharge instructions and have the opportunity to have any questions answered. You can also easily access your medical information, test results and upcoming appointments via the INMAN free online health management tool. You can learn more and sign up at [employer]/INMAN. For assistance setting up your INMAN account, please call [phone].    Once again, we want to ensure your discharge home is safe and that you have a clear understanding of any next steps in your care. If you have any questions or concerns, please do not hesitate to contact us, we are here for you. Thank you for choosing [facility] for your healthcare needs.    Sincerely,    Your [facility] Healthcare Team

## 2017-07-02 NOTE — ED NOTES
[facility] patient seems lethargic, follows some commands states she does not understand. Falls back to sleep as soon as she is not stimulated.

## 2017-07-03 ENCOUNTER — APPOINTMENT (OUTPATIENT)
Dept: RADIOLOGY | Facility: MEDICAL CENTER | Age: 82
DRG: 683 | End: 2017-07-03
Attending: INTERNAL MEDICINE
Payer: COMMERCIAL

## 2017-07-03 ENCOUNTER — PATIENT OUTREACH (OUTPATIENT)
Dept: HEALTH INFORMATION MANAGEMENT | Facility: OTHER | Age: 82
End: 2017-07-03

## 2017-07-03 VITALS
RESPIRATION RATE: 18 BRPM | SYSTOLIC BLOOD PRESSURE: 174 MMHG | OXYGEN SATURATION: 96 % | WEIGHT: 119.05 LBS | TEMPERATURE: 98.1 F | DIASTOLIC BLOOD PRESSURE: 66 MMHG | BODY MASS INDEX: 21.91 KG/M2 | HEART RATE: 91 BPM | HEIGHT: 62 IN

## 2017-07-03 PROBLEM — R52 GENERALIZED PAIN: Status: ACTIVE | Noted: 2017-07-03

## 2017-07-03 PROBLEM — E87.20 LACTIC ACIDOSIS: Status: ACTIVE | Noted: 2017-07-03

## 2017-07-03 PROBLEM — N17.9 AKI (ACUTE KIDNEY INJURY) (HCC): Status: ACTIVE | Noted: 2017-07-03

## 2017-07-03 LAB
ALBUMIN SERPL BCP-MCNC: 3.3 G/DL (ref 3.2–4.9)
ALBUMIN/GLOB SERPL: 1.2 G/DL
ALP SERPL-CCNC: 72 U/L (ref 30–99)
ALT SERPL-CCNC: 16 U/L (ref 2–50)
ANION GAP SERPL CALC-SCNC: 8 MMOL/L (ref 0–11.9)
ANION GAP SERPL CALC-SCNC: 9 MMOL/L (ref 0–11.9)
AST SERPL-CCNC: 24 U/L (ref 12–45)
BILIRUB SERPL-MCNC: 0.6 MG/DL (ref 0.1–1.5)
BUN SERPL-MCNC: 12 MG/DL (ref 8–22)
BUN SERPL-MCNC: 16 MG/DL (ref 8–22)
CALCIUM SERPL-MCNC: 8.6 MG/DL (ref 8.4–10.2)
CALCIUM SERPL-MCNC: 8.9 MG/DL (ref 8.4–10.2)
CHLORIDE SERPL-SCNC: 110 MMOL/L (ref 96–112)
CHLORIDE SERPL-SCNC: 111 MMOL/L (ref 96–112)
CK SERPL-CCNC: 64 U/L (ref 0–154)
CO2 SERPL-SCNC: 20 MMOL/L (ref 20–33)
CO2 SERPL-SCNC: 23 MMOL/L (ref 20–33)
CREAT SERPL-MCNC: 1.07 MG/DL (ref 0.5–1.4)
CREAT SERPL-MCNC: 1.09 MG/DL (ref 0.5–1.4)
EKG IMPRESSION: NORMAL
GFR SERPL CREATININE-BSD FRML MDRD: 47 ML/MIN/1.73 M 2
GFR SERPL CREATININE-BSD FRML MDRD: 48 ML/MIN/1.73 M 2
GLOBULIN SER CALC-MCNC: 2.8 G/DL (ref 1.9–3.5)
GLUCOSE SERPL-MCNC: 121 MG/DL (ref 65–99)
GLUCOSE SERPL-MCNC: 145 MG/DL (ref 65–99)
LACTATE BLD-SCNC: 1.58 MMOL/L (ref 0.5–2)
LACTATE BLD-SCNC: 2.98 MMOL/L (ref 0.5–2)
LV EJECT FRACT  99904: 60
LV EJECT FRACT MOD 2C 99903: 50
LV EJECT FRACT MOD 4C 99902: 74.5
LV EJECT FRACT MOD BP 99901: 64.2
POTASSIUM SERPL-SCNC: 4.1 MMOL/L (ref 3.6–5.5)
POTASSIUM SERPL-SCNC: 4.3 MMOL/L (ref 3.6–5.5)
PROT SERPL-MCNC: 6.1 G/DL (ref 6–8.2)
SODIUM SERPL-SCNC: 140 MMOL/L (ref 135–145)
SODIUM SERPL-SCNC: 141 MMOL/L (ref 135–145)
SPECIMEN DRAWN FROM PATIENT: ABNORMAL
SPECIMEN DRAWN FROM PATIENT: NORMAL
TROPONIN I SERPL-MCNC: <0.02 NG/ML (ref 0–0.04)

## 2017-07-03 PROCEDURE — 99239 HOSP IP/OBS DSCHRG MGMT >30: CPT | Performed by: INTERNAL MEDICINE

## 2017-07-03 PROCEDURE — 700102 HCHG RX REV CODE 250 W/ 637 OVERRIDE(OP): Performed by: INTERNAL MEDICINE

## 2017-07-03 PROCEDURE — 700111 HCHG RX REV CODE 636 W/ 250 OVERRIDE (IP): Performed by: INTERNAL MEDICINE

## 2017-07-03 PROCEDURE — 700101 HCHG RX REV CODE 250: Performed by: INTERNAL MEDICINE

## 2017-07-03 PROCEDURE — 80053 COMPREHEN METABOLIC PANEL: CPT

## 2017-07-03 PROCEDURE — 80048 BASIC METABOLIC PNL TOTAL CA: CPT

## 2017-07-03 PROCEDURE — 93306 TTE W/DOPPLER COMPLETE: CPT

## 2017-07-03 PROCEDURE — A9270 NON-COVERED ITEM OR SERVICE: HCPCS | Performed by: INTERNAL MEDICINE

## 2017-07-03 PROCEDURE — 84484 ASSAY OF TROPONIN QUANT: CPT

## 2017-07-03 PROCEDURE — 82550 ASSAY OF CK (CPK): CPT

## 2017-07-03 PROCEDURE — A9502 TC99M TETROFOSMIN: HCPCS

## 2017-07-03 PROCEDURE — 93306 TTE W/DOPPLER COMPLETE: CPT | Mod: 26 | Performed by: INTERNAL MEDICINE

## 2017-07-03 PROCEDURE — 700111 HCHG RX REV CODE 636 W/ 250 OVERRIDE (IP)

## 2017-07-03 PROCEDURE — 83605 ASSAY OF LACTIC ACID: CPT

## 2017-07-03 PROCEDURE — 93880 EXTRACRANIAL BILAT STUDY: CPT

## 2017-07-03 RX ORDER — AMLODIPINE BESYLATE 5 MG/1
10 TABLET ORAL
Status: DISCONTINUED | OUTPATIENT
Start: 2017-07-03 | End: 2017-07-03 | Stop reason: HOSPADM

## 2017-07-03 RX ORDER — REGADENOSON 0.08 MG/ML
INJECTION, SOLUTION INTRAVENOUS
Status: COMPLETED
Start: 2017-07-03 | End: 2017-07-03

## 2017-07-03 RX ORDER — AMLODIPINE BESYLATE 10 MG/1
10 TABLET ORAL DAILY
Qty: 30 TAB | Refills: 0 | Status: SHIPPED | OUTPATIENT
Start: 2017-07-03 | End: 2018-04-26

## 2017-07-03 RX ORDER — CYCLOBENZAPRINE HCL 10 MG
10 TABLET ORAL 3 TIMES DAILY PRN
Status: DISCONTINUED | OUTPATIENT
Start: 2017-07-03 | End: 2017-07-03 | Stop reason: HOSPADM

## 2017-07-03 RX ADMIN — REGADENOSON 0.4 MG: 0.08 INJECTION, SOLUTION INTRAVENOUS at 09:26

## 2017-07-03 RX ADMIN — ASPIRIN 81 MG: 81 TABLET, COATED ORAL at 11:39

## 2017-07-03 RX ADMIN — HEPARIN SODIUM 5000 UNITS: 5000 INJECTION, SOLUTION INTRAVENOUS; SUBCUTANEOUS at 05:18

## 2017-07-03 RX ADMIN — HYDRALAZINE HYDROCHLORIDE 25 MG: 25 TABLET ORAL at 05:18

## 2017-07-03 RX ADMIN — POTASSIUM CHLORIDE AND SODIUM CHLORIDE: 900; 150 INJECTION, SOLUTION INTRAVENOUS at 03:43

## 2017-07-03 RX ADMIN — AMLODIPINE BESYLATE 10 MG: 5 TABLET ORAL at 11:39

## 2017-07-03 ASSESSMENT — PAIN SCALES - GENERAL: PAINLEVEL_OUTOF10: 0

## 2017-07-03 ASSESSMENT — LIFESTYLE VARIABLES: EVER_SMOKED: NEVER

## 2017-07-03 NOTE — PROGRESS NOTES
Gave bedside report to NOC nurse.  Discussed POC.  NOC shift RN/CNA to recheck BP after hydralazine administration at 1806.  NOC RN to f/up w/pneumonia vaccine.  Pt resting in bed, semi-fowlers, no s/s of acute distress, safety precautions in place.

## 2017-07-03 NOTE — PROGRESS NOTES
Tele Note  SR   HR 60s-80s  (.20/.08/.40)  Occasional couplets and bigeminy, frequent PVCs and trigeminy

## 2017-07-03 NOTE — PROGRESS NOTES
Report received. Plan of care reviewed with pt and family member. Pt did report some dizziness while being assisted to the [facility]. Assessment completed and medications administered. Family member (son) informed that he would not be able to stay the night in pt's room. Dr. Rodriguez made aware of elevated LA. Pt currently sitting up in bed watching TV and eating, no signs of distress.

## 2017-07-03 NOTE — PROGRESS NOTES
Medicated per MAR. Declines needs. Lab in room with pt family at bedside. Aware of pending discharge.

## 2017-07-03 NOTE — PROGRESS NOTES
Pt's tele summary: sinus rhythm/1st degree HB w/no ectopy.      HR 83      LA interval 0.22  QRS complex 0.08  QT interval 0.38

## 2017-07-03 NOTE — DISCHARGE INSTRUCTIONS
Discharge Instructions    Discharged to home by car with relative. Discharged via wheelchair, hospital escort: Yes.  Special equipment needed: Not Applicable    Be sure to schedule a follow-up appointment with your primary care doctor or any specialists as instructed.     Discharge Plan:   Diet Plan: Discussed  Activity Level: Discussed  Confirmed Follow up Appointment: Patient to Call and Schedule Appointment  Confirmed Symptoms Management: Discussed  Medication Reconciliation Updated: Yes  Influenza Vaccine Indication: Not indicated: Previously immunized this influenza season and > 8 years of age    I understand that a diet low in cholesterol, fat, and sodium is recommended for good health. Unless I have been given specific instructions below for another diet, I accept this instruction as my diet prescription.   Other diet: None    Special Instructions: None    · Is patient discharged on Warfarin / Coumadin?   No     · Is patient Post Blood Transfusion?  No    Depression / Suicide Risk    As you are discharged from this [facility] facility, it is important to learn how to keep safe from harming yourself.    Recognize the warning signs:  · Abrupt changes in personality, positive or negative- including increase in energy   · Giving away possessions  · Change in eating patterns- significant weight changes-  positive or negative  · Change in sleeping patterns- unable to sleep or sleeping all the time   · Unwillingness or inability to communicate  · Depression  · Unusual sadness, discouragement and loneliness  · Talk of wanting to die  · Neglect of personal appearance   · Rebelliousness- reckless behavior  · Withdrawal from people/activities they love  · Confusion- inability to concentrate     If you or a loved one observes any of these behaviors or has concerns about self-harm, here's what you can do:  · Talk about it- your feelings and reasons for harming yourself  · Remove any means that you might use to hurt  yourself (examples: pills, rope, extension cords, firearm)  · Get professional help from the community (Mental Health, Substance Abuse, psychological counseling)  · Do not be alone:Call your Safe Contact- someone whom you trust who will be there for you.  · Call your local CRISIS HOTLINE 258-6020 or 660-968-0177  · Call your local Children's Mobile Crisis Response Team [place] (945) 830-2070 or www.Bellabox  · Call the toll free National Suicide Prevention Hotlines   · National Suicide Prevention Lifeline 310-498-EDME (2897)  · [employer] Line Network 800-SUICIDE (792-2307)    Syncope  Syncope is a medical term for fainting or passing out. This means you lose consciousness and drop to the ground. People are generally unconscious for less than 5 minutes. You may have some muscle twitches for up to 15 seconds before waking up and returning to normal. Syncope occurs more often in older adults, but it can happen to anyone. While most causes of syncope are not dangerous, syncope can be a sign of a serious medical problem. It is important to seek medical care.   CAUSES   Syncope is caused by a sudden drop in blood flow to the brain. The specific cause is often not determined. Factors that can bring on syncope include:  · Taking medicines that lower blood pressure.  · Sudden changes in posture, such as standing up quickly.  · Taking more medicine than prescribed.  · Standing in one place for too long.  · Seizure disorders.  · Dehydration and excessive exposure to heat.  · Low blood sugar (hypoglycemia).  · Straining to have a bowel movement.  · Heart disease, irregular heartbeat, or other circulatory problems.  · Fear, emotional distress, seeing blood, or severe pain.  SYMPTOMS   Right before fainting, you may:  · Feel dizzy or light-headed.  · Feel nauseous.  · See all white or all black in your field of vision.  · Have cold, clammy skin.  DIAGNOSIS   Your health care provider will ask about your  symptoms, perform a physical exam, and perform an electrocardiogram (ECG) to record the electrical activity of your heart. Your health care provider may also perform other heart or blood tests to determine the cause of your syncope which may include:  · Transthoracic echocardiogram (TTE). During echocardiography, sound waves are used to evaluate how blood flows through your heart.  · Transesophageal echocardiogram (KEV).  · Cardiac monitoring. This allows your health care provider to monitor your heart rate and rhythm in real time.  · Holter monitor. This is a portable device that records your heartbeat and can help diagnose heart arrhythmias. It allows your health care provider to track your heart activity for several days, if needed.  · Stress tests by exercise or by giving medicine that makes the heart beat faster.  TREATMENT   In most cases, no treatment is needed. Depending on the cause of your syncope, your health care provider may recommend changing or stopping some of your medicines.  HOME CARE INSTRUCTIONS  · Have someone stay with you until you feel stable.  · Do not drive, use machinery, or play sports until your health care provider says it is okay.  · Keep all follow-up appointments as directed by your health care provider.  · Lie down right away if you start feeling like you might faint. Breathe deeply and steadily. Wait until all the symptoms have passed.  · Drink enough fluids to keep your urine clear or pale yellow.  · If you are taking blood pressure or heart medicine, get up slowly and take several minutes to sit and then stand. This can reduce dizziness.  SEEK IMMEDIATE MEDICAL CARE IF:   · You have a severe headache.  · You have unusual pain in the chest, abdomen, or back.  · You are bleeding from your mouth or rectum, or you have black or tarry stool.  · You have an irregular or very fast heartbeat.  · You have pain with breathing.  · You have repeated fainting or seizure-like jerking during an  episode.  · You faint when sitting or lying down.  · You have confusion.  · You have trouble walking.  · You have severe weakness.  · You have vision problems.  If you fainted, call your local emergency services (911 in [place] Do not drive yourself to the hospital.      This information is not intended to replace advice given to you by your health care provider. Make sure you discuss any questions you have with your health care provider.     Document Released: 12/18/2006 Document Revised: 05/03/2016 Document Reviewed: 02/15/2013  [employer] Interactive Patient Education ©2016 [employer]    Hypertension  Hypertension, commonly called high blood pressure, is when the force of blood pumping through your arteries is too strong. Your arteries are the blood vessels that carry blood from your heart throughout your body. A blood pressure reading consists of a higher number over a lower number, such as 110/72. The higher number (systolic) is the pressure inside your arteries when your heart pumps. The lower number (diastolic) is the pressure inside your arteries when your heart relaxes. Ideally you want your blood pressure below 120/80.  Hypertension forces your heart to work harder to pump blood. Your arteries may become narrow or stiff. Having untreated or uncontrolled hypertension can cause heart attack, stroke, kidney disease, and other problems.  RISK FACTORS  Some risk factors for high blood pressure are controllable. Others are not.   Risk factors you cannot control include:   · Race. You may be at higher risk if you are .  · Age. Risk increases with age.  · Gender. Men are at higher risk than women before age 45 years. After age 65, women are at higher risk than men.  Risk factors you can control include:  · Not getting enough exercise or physical activity.  · Being overweight.  · Getting too much fat, sugar, calories, or salt in your diet.  · Drinking too much alcohol.  SIGNS AND  SYMPTOMS  Hypertension does not usually cause signs or symptoms. Extremely high blood pressure (hypertensive crisis) may cause headache, anxiety, shortness of breath, and nosebleed.  DIAGNOSIS  To check if you have hypertension, your health care provider will measure your blood pressure while you are seated, with your arm held at the level of your heart. It should be measured at least twice using the same arm. Certain conditions can cause a difference in blood pressure between your right and left arms. A blood pressure reading that is higher than normal on one occasion does not mean that you need treatment. If it is not clear whether you have high blood pressure, you may be asked to return on a different day to have your blood pressure checked again. Or, you may be asked to monitor your blood pressure at home for 1 or more weeks.  TREATMENT  Treating high blood pressure includes making lifestyle changes and possibly taking medicine. Living a healthy lifestyle can help lower high blood pressure. You may need to change some of your habits.  Lifestyle changes may include:  · Following the DASH diet. This diet is high in fruits, vegetables, and whole grains. It is low in salt, red meat, and added sugars.  · Keep your sodium intake below 2,300 mg per day.  · Getting at least 30-45 minutes of aerobic exercise at least 4 times per week.  · Losing weight if necessary.  · Not smoking.  · Limiting alcoholic beverages.  · Learning ways to reduce stress.  Your health care provider may prescribe medicine if lifestyle changes are not enough to get your blood pressure under control, and if one of the following is true:  · You are 18-59 years of age and your systolic blood pressure is above 140.  · You are 60 years of age or older, and your systolic blood pressure is above 150.  · Your diastolic blood pressure is above 90.  · You have diabetes, and your systolic blood pressure is over 140 or your diastolic blood pressure is over  90.  · You have kidney disease and your blood pressure is above 140/90.  · You have heart disease and your blood pressure is above 140/90.  Your personal target blood pressure may vary depending on your medical conditions, your age, and other factors.  HOME CARE INSTRUCTIONS  · Have your blood pressure rechecked as directed by your health care provider.    · Take medicines only as directed by your health care provider. Follow the directions carefully. Blood pressure medicines must be taken as prescribed. The medicine does not work as well when you skip doses. Skipping doses also puts you at risk for problems.  · Do not smoke.    · Monitor your blood pressure at home as directed by your health care provider.   SEEK MEDICAL CARE IF:   · You think you are having a reaction to medicines taken.  · You have recurrent headaches or feel dizzy.  · You have swelling in your ankles.  · You have trouble with your vision.  SEEK IMMEDIATE MEDICAL CARE IF:  · You develop a severe headache or confusion.  · You have unusual weakness, numbness, or feel faint.  · You have severe chest or abdominal pain.  · You vomit repeatedly.  · You have trouble breathing.  MAKE SURE YOU:   · Understand these instructions.  · Will watch your condition.  · Will get help right away if you are not doing well or get worse.     This information is not intended to replace advice given to you by your health care provider. Make sure you discuss any questions you have with your health care provider.     Document Released: 12/18/2006 Document Revised: 05/03/2016 Document Reviewed: 10/10/2014  [employer] Interactive Patient Education ©2016 [employer]

## 2017-07-03 NOTE — PROGRESS NOTES
Nursing care plan includes knowledge deficit, potential for discomfort, potential for compromised cardiac output. POC includes teaching, comfort measures and reassurance, and access to code cart, cardiology stand by and availability of rapid response team. Pt verbalizes good understanding of benefits and risks of pharmacological cardiac stress test. Informed consent obtained. Lexiscan given, pt developed the following symptoms SOB. VS stable, major symptoms resolved. To waiting room, caffeinated fluids and/or snacks given, awaiting second scan. Nursing goals met.

## 2017-07-03 NOTE — ASSESSMENT & PLAN NOTE
From dehydration? Vs vasovagal  Monitor on tele for possible arrhythmia related  Pending MRI, carotid, echo

## 2017-07-03 NOTE — PROGRESS NOTES
Received bedside report from Dalia ALLISON. Assumed care of pt who is resting in bed, RR even/unlabored. Fall protocol in place. CLIP. Will continue to monitor.

## 2017-07-03 NOTE — PROGRESS NOTES
Pharmacy was incorrect, spoke with [facility] on [facility] and had them switch prescription to their store. Verified information with [facility].

## 2017-07-03 NOTE — PROGRESS NOTES
Tele Note    Rhythm sinus rhythm  Rate 60-80  IN 0.22  QRS 0.06  QT 0.36  Ectopy frequent pvc, trig

## 2017-07-03 NOTE — CARE PLAN
Problem: Safety  Goal: Will remain free from injury  Outcome: PROGRESSING AS EXPECTED  Pt experiencing some dizziness, admitted for syncope. Pt has been instructed to call for assistance and demonstrates ability to do so. Bed alarm is on and pt is regularly rounded on to assess for needs.    Problem: Knowledge Deficit  Goal: Knowledge of disease process/condition, treatment plan, diagnostic tests, and medications will improve  Outcome: PROGRESSING AS EXPECTED  Pt verbalizes understanding that they will undergo a CST to evaluate cardiac functioning. They are to abstain from caffeine and will be NPO status after midnight as preparation. Pt understands that labs will be drawn to monitor for an increase in troponin.

## 2017-07-03 NOTE — PROGRESS NOTES
Discharge instructions provided, education on new medication. Son verbalized understanding. Corrected pharmacy mix up. PIV removed. NA escorted pt down to car with all belongings.

## 2017-07-03 NOTE — H&P
CHIEF COMPLAINT: (Syncope)    HPI: This is a very pleasant 80-year-old [place] female with past medical history of hypertension and dyslipidemia who was working in her garden this morning she then went with her son to pick out some plants from [employer] and while in the gardening section while waiting in line to check out she had a syncopal episode she told him that she felt dizzy just before it happened and then she collapsed into his arms and was lowered onto the ground. She had an ensure this morning before going to the store, she denies being recently ill. She's had no recent fever or chills or dysuria. She denies any chest pain or shortness of breath. She denies current dizziness she denies headache. Following her episode she was very lethargic and mentally slow for a while however now she appears to be back to her baseline she has no focal weakness. Denies palpitations    PMH: HTN, Hypercholesterolemia< ? CHF vs arrhythmia    MEDS: 81 mg aspirin 20 mg and enalapril, 20 mg simvastatin    PSH: Remote ovarian surgery    FMH: Mother had heart disease, father lived to [facility]    SOCIAL HISTORY: Lives with her daughter has 3 other children in [place]. Also one in [place] one in [place] and one in [place]. She had one child who is . She does not smoke, she does not drink    CODE STATUS: Full code    ROS: No diarrhea no constipation,  otherwise 12 point review of systems are covered and pertinent positives and negatives are noted in the HPI    EXAM:     VITALS: Afebrile pulse 82 respiratory rate 22 blood pressure 192/62 saturating 95% on room air    GENERAL: Petite elderly  female alert, oriented years, nontoxic, in no distress    HEENT: Normocephalic atraumatic, pupils small but reactive, extraocular movements intact, oropharynx clear and moist    NECK: Supple, unable to auscultate carotids as she will not follow my command to hold her breath. No JVD    LUNGS: Normal effort and excursion, excellent breath  sounds in all fields, mild kyphosis    HEART: Regularly irregular (monitor shows trigeminy) no appreciable murmur , rate okay    ABDOMEN: Somewhat firm but nontender positive bowel sounds    EXTREMITIES: No clubbing cyanosis or edema her skin tents on her hands and feet    NEURO: Cranial nerves II through XII grossly intact she seems hard of hearing no focal weakness    PSYCH: Normal mood and affect    LABS/ RAD:  CT shows atrophy, chest x-ray is clear    A/P:    1. Syncope probably secondary to dehydration it seems she has a chronic history of arrhythmia but we will monitor on telemetry to make sure there is nothing more significant. We'll rule out stroke and acute coronary syndrome.    2. Hypertensive urgency we will treat with scheduled hydralazine for now I'm going to hold the Vasotec due to some mild renal insufficiency she can also have when necessary labetalol.    3. Probable dehydration we will hydrate cautiously    Patient is admitted w/ dehydration, JEOVANNY, altered mentation and inadequate history to rule out other causes, she requires IVF and medical workup to rule out other causes of her episode, her evaluation and treatment is expected to take more than 2 midnights so she is appropriate for inpatient stay.

## 2017-07-07 LAB
BACTERIA BLD CULT: NORMAL
BACTERIA BLD CULT: NORMAL
SIGNIFICANT IND 70042: NORMAL
SIGNIFICANT IND 70042: NORMAL
SITE SITE: NORMAL
SITE SITE: NORMAL
SOURCE SOURCE: NORMAL
SOURCE SOURCE: NORMAL

## 2018-04-26 ENCOUNTER — OFFICE VISIT (OUTPATIENT)
Dept: MEDICAL GROUP | Facility: MEDICAL CENTER | Age: 83
End: 2018-04-26
Payer: MEDICARE

## 2018-04-26 VITALS
SYSTOLIC BLOOD PRESSURE: 138 MMHG | BODY MASS INDEX: 21.71 KG/M2 | HEIGHT: 62 IN | HEART RATE: 74 BPM | WEIGHT: 118 LBS | DIASTOLIC BLOOD PRESSURE: 82 MMHG | TEMPERATURE: 98 F | OXYGEN SATURATION: 94 %

## 2018-04-26 DIAGNOSIS — Z13.820 OSTEOPOROSIS SCREENING: ICD-10-CM

## 2018-04-26 DIAGNOSIS — E78.5 DYSLIPIDEMIA: ICD-10-CM

## 2018-04-26 DIAGNOSIS — Z00.00 HEALTH CARE MAINTENANCE: ICD-10-CM

## 2018-04-26 DIAGNOSIS — I10 ESSENTIAL HYPERTENSION: ICD-10-CM

## 2018-04-26 DIAGNOSIS — E55.9 HYPOVITAMINOSIS D: ICD-10-CM

## 2018-04-26 DIAGNOSIS — I25.10 CORONARY ARTERY DISEASE INVOLVING NATIVE CORONARY ARTERY OF NATIVE HEART WITHOUT ANGINA PECTORIS: ICD-10-CM

## 2018-04-26 DIAGNOSIS — Z76.89 ENCOUNTER TO ESTABLISH CARE: ICD-10-CM

## 2018-04-26 DIAGNOSIS — Z78.0 ASYMPTOMATIC POSTMENOPAUSAL STATE: ICD-10-CM

## 2018-04-26 PROCEDURE — 99204 OFFICE O/P NEW MOD 45 MIN: CPT | Performed by: INTERNAL MEDICINE

## 2018-04-26 RX ORDER — SIMVASTATIN 20 MG
20 TABLET ORAL EVERY MORNING
Qty: 90 TAB | Refills: 1 | Status: SHIPPED | OUTPATIENT
Start: 2018-04-26 | End: 2018-06-05 | Stop reason: SDUPTHER

## 2018-04-26 RX ORDER — AMLODIPINE BESYLATE 10 MG/1
10 TABLET ORAL DAILY
Qty: 90 TAB | Refills: 0 | Status: SHIPPED | OUTPATIENT
Start: 2018-04-26 | End: 2018-06-05 | Stop reason: SDUPTHER

## 2018-04-26 RX ORDER — ENALAPRIL MALEATE 20 MG/1
20 TABLET ORAL EVERY MORNING
Qty: 90 TAB | Refills: 0 | Status: SHIPPED | OUTPATIENT
Start: 2018-04-26 | End: 2018-06-05 | Stop reason: SDUPTHER

## 2018-04-26 ASSESSMENT — PATIENT HEALTH QUESTIONNAIRE - PHQ9: CLINICAL INTERPRETATION OF PHQ2 SCORE: 0

## 2018-04-26 NOTE — PROGRESS NOTES
CHIEF COMPLAINT  Chief Complaint   Patient presents with   • Establish Care     NP   • Hip Pain     x 1 week   • Allergic Rhinitis     sensonal Allergies     HPI  Patient is a 89 y.o. female patient who presents today for the following     HYPERTENSION, CAD  Meds: Enalapril, 20 mg daily, amlodipine 10 mg daily,  ASA 81 mg QD;taking as prescribed.   Measuring BP at home: yes, has been < 150/90.  Denies:  -  headaches, vision problems, tinnitus.                 -  chest pain/pressure, palpitations, irregular heart beats, exertional, dyspnea, peripheral edema.      - medication side effect: unusual fatigue, slow heartbeat, foot/leg swelling, cough.  Low salt diet: Y  Diet: Healthy  Exercise: limited  BMI: Body mass index is 21.58 kg/m².  FH of HTN:  Unknown    DYSLIPIDEMIA  The patient is on simvastatin 20 mg, taking daily, as prescribed. No myalgias, muscle cramps or pain.   Diet /Exercise:  As above  BMI: 21  FH: unknown    Hypovitaminosis D  The patient had low vitamin D level.  Vitamin D supplement: multivitamins, OTC.    Reviewed PMH, PSH, FH, SH, ALL, HCM/IMM  Reviewed MEDS    Patient Active Problem List    Diagnosis Date Noted   • Essential hypertension 11/20/2012     Priority: Low   • Dyslipidemia 11/20/2012     Priority: Low   • Coronary artery disease involving native coronary artery of native heart without angina pectoris 11/20/2012     Priority: Low   • Health care maintenance 04/26/2018   • JEOVANNY (acute kidney injury) (CMS-[facility]) 07/03/2017   • Lactic acidosis 07/03/2017   • Generalized pain 07/03/2017   • Syncope 07/02/2017   • Wound, open, foot 05/07/2017   • Hypertensive urgency 05/06/2017   • Vitamin D insufficiency 12/20/2013     CURRENT MEDICATIONS  Current Outpatient Prescriptions   Medication Sig Dispense Refill   • Multiple Vitamins-Minerals (CENTRUM ADULTS PO) Take  by mouth.     • simvastatin (ZOCOR) 20 MG Tab Take 1 Tab by mouth every morning. 90 Tab 1   • enalapril (VASOTEC) 20 MG tablet Take 1  "Tab by mouth every morning. 90 Tab 0   • amLODIPine (NORVASC) 10 MG Tab Take 1 Tab by mouth every day. 90 Tab 0   • aspirin 81 MG tablet Take 1 Tab by mouth every morning. 100 Each 0     No current facility-administered medications for this visit.      ALLERGIES  Allergies: Patient has no known allergies.  PAST MEDICAL HISTORY  Past Medical History:   Diagnosis Date   • H/O bladder repair surgery     not sure per patient daughter    • H/O: hysterectomy    • Hypertension    • Vertigo      SURGICAL HISTORY  She  has a past surgical history that includes hysterectomy radical.  SOCIAL HISTORY  Social History   Substance Use Topics   • Smoking status: Never Smoker   • Smokeless tobacco: Never Used   • Alcohol use No     Social History     Social History Narrative   • No narrative on file     FAMILY HISTORY  History reviewed. No pertinent family history.  Family Status   Relation Status   • Mother    • Father    • Sister    • Brother      ROS   Constitutional: Negative for fever, chills.  HENT: Negative for congestion, sore throat.  Eyes: Negative for blurred vision.   Respiratory: Negative for cough, shortness of breath.  Cardiovascular: Negative for chest pain, palpitations. And per HPI.  Gastrointestinal: Negative for heartburn, nausea, abdominal pain.   Genitourinary: Negative for dysuria.  Musculoskeletal: Negative for significant myalgias, back pain and joint pain.   Skin: Negative for rash and itching.   Neuro: Negative for dizziness, weakness and headaches.   Endo/Heme/Allergies: Does not bruise/bleed easily.   Psychiatric/Behavioral: Negative for depression, anxiety    PHYSICAL EXAM   Blood pressure (!) 180/70, pulse 74, temperature 36.7 °C (98 °F), height 1.575 m (5' 2\"), weight 53.5 kg (118 lb), SpO2 94 %. Body mass index is 21.58 kg/m².   At d/c: 138/82  General:  NAD, well appearing  HEENT:   NC/AT, PERRLA, EOMI, TMs are clear. Oropharyngeal mucosa is pink,  without lesions;  " no cervical / supraclavicular  lymphadenopathy, no thyromegaly.    Cardiovascular: RRR.   No m/r/g. No carotid bruits .      Lungs:   CTAB, no w/r/r, no respiratory distress.  Abdomen: Soft, NT/ND + BS; no suprapubic tenderness; no masses or hepatosplenomegaly.  Extremities:  2+ DP and radial pulses bilaterally.  No c/c/e.   Skin:  Warm, dry.  No erythema. No rash.   Neurologic: Alert & oriented x 3. CN II-XII grossly intact. Brachioradialis / knee DTR are 2/4, symmetric. Strength and sensation grossly intact.  No focal deficits.  Psychiatric:  Affect normal, mood normal, judgment normal.    LABS     Labs are reviewed and discussed with a patient  Lab Results   Component Value Date/Time    CHOLSTRLTOT 206 (H) 05/02/2017 08:19 AM     (H) 05/02/2017 08:19 AM    HDL 55 05/02/2017 08:19 AM    TRIGLYCERIDE 159 (H) 05/02/2017 08:19 AM       Lab Results   Component Value Date/Time    SODIUM 141 07/03/2017 11:43 AM    POTASSIUM 4.3 07/03/2017 11:43 AM    CHLORIDE 110 07/03/2017 11:43 AM    CO2 23 07/03/2017 11:43 AM    GLUCOSE 121 (H) 07/03/2017 11:43 AM    BUN 12 07/03/2017 11:43 AM    CREATININE 1.07 07/03/2017 11:43 AM    CREATININE 0.9 01/15/2009 06:34 AM     Lab Results   Component Value Date/Time    ALKPHOSPHAT 72 07/03/2017 11:43 AM    ASTSGOT 24 07/03/2017 11:43 AM    ALTSGPT 16 07/03/2017 11:43 AM    TBILIRUBIN 0.6 07/03/2017 11:43 AM      Lab Results   Component Value Date/Time    HBA1C 5.8 (H) 03/09/2015 04:00 AM    HBA1C 5.6 02/15/2007 09:27 AM     No results found for: TSH  Lab Results   Component Value Date/Time    FREET4 0.98 07/02/2017 02:16 PM    FREET4 1.01 12/13/2013 09:20 AM       Lab Results   Component Value Date/Time    WBC 9.4 07/02/2017 02:16 PM    RBC 4.15 (L) 07/02/2017 02:16 PM    HEMOGLOBIN 13.0 07/02/2017 02:16 PM    HEMATOCRIT 38.5 07/02/2017 02:16 PM    MCV 92.8 07/02/2017 02:16 PM    MCH 31.3 07/02/2017 02:16 PM    MCHC 33.8 07/02/2017 02:16 PM    MPV 11.3 07/02/2017 02:16 PM     NEUTSPOLYS 45.60 07/02/2017 02:16 PM    LYMPHOCYTES 41.60 (H) 07/02/2017 02:16 PM    MONOCYTES 7.10 07/02/2017 02:16 PM    EOSINOPHILS 4.20 07/02/2017 02:16 PM    BASOPHILS 1.10 07/02/2017 02:16 PM      Ref. Range 5/2/2017    25-Hydroxy   Vitamin D 25 : 30 - 100 ng/mL 20 (L)     IMAGING     None    ASSESMENT AND PLAN        1. Essential hypertension  Blood pressure at arrival to the office was high, the patient did not take medication home.   - After she arrived she took enalapril, and blood pressure after some time, went down    - enalapril (VASOTEC) 20 MG tablet; Take 1 Tab by mouth every morning.  Dispense: 90 Tab; Refill: 0  - amLODIPine (NORVASC) 10 MG Tab; Take 1 Tab by mouth every day.  Dispense: 90 Tab; Refill: 0  - COMP METABOLIC PANEL; Future    2. Coronary artery disease involving native coronary artery of native heart without angina pectoris  Asymptomatic, continue current treatment  - COMP METABOLIC PANEL; Future    3. Dyslipidemia  Pending labs, continue current treatment  - simvastatin (ZOCOR) 20 MG Tab; Take 1 Tab by mouth every morning.  Dispense: 90 Tab; Refill: 1  - COMP METABOLIC PANEL; Future  - LIPID PROFILE; Future    4. Hypovitaminosis D  Pending labs, continue current supplement  - VITAMIN D,25 HYDROXY; Future    5. Health care maintenance  Pending records    6. Encounter to establish care  Reviewed PMH, PSH, FH, SH, ALL, MEDS, HCM/IMM.   Advised healthy habits, diet, exercise.  Release form for old records: signed    Counseling:   - Smoking:  Nonsmoker    Followup: Return in about 4 weeks (around 5/24/2018) for Short.    All questions are answered.    Please note that this dictation was created using voice recognition software, and that there might be errors of dayna and possibly content.

## 2018-05-30 ENCOUNTER — HOSPITAL ENCOUNTER (OUTPATIENT)
Dept: LAB | Facility: MEDICAL CENTER | Age: 83
End: 2018-05-30
Attending: INTERNAL MEDICINE
Payer: MEDICARE

## 2018-05-30 DIAGNOSIS — I25.10 CORONARY ARTERY DISEASE INVOLVING NATIVE CORONARY ARTERY OF NATIVE HEART WITHOUT ANGINA PECTORIS: ICD-10-CM

## 2018-05-30 DIAGNOSIS — E55.9 HYPOVITAMINOSIS D: ICD-10-CM

## 2018-05-30 DIAGNOSIS — E78.5 DYSLIPIDEMIA: ICD-10-CM

## 2018-05-30 DIAGNOSIS — I10 ESSENTIAL HYPERTENSION: ICD-10-CM

## 2018-05-30 LAB
25(OH)D3 SERPL-MCNC: 26 NG/ML (ref 30–100)
ALBUMIN SERPL BCP-MCNC: 4.2 G/DL (ref 3.2–4.9)
ALBUMIN/GLOB SERPL: 1.4 G/DL
ALP SERPL-CCNC: 75 U/L (ref 30–99)
ALT SERPL-CCNC: 19 U/L (ref 2–50)
ANION GAP SERPL CALC-SCNC: 9 MMOL/L (ref 0–11.9)
AST SERPL-CCNC: 24 U/L (ref 12–45)
BILIRUB SERPL-MCNC: 0.5 MG/DL (ref 0.1–1.5)
BUN SERPL-MCNC: 17 MG/DL (ref 8–22)
CALCIUM SERPL-MCNC: 9.1 MG/DL (ref 8.5–10.5)
CHLORIDE SERPL-SCNC: 106 MMOL/L (ref 96–112)
CHOLEST SERPL-MCNC: 218 MG/DL (ref 100–199)
CO2 SERPL-SCNC: 25 MMOL/L (ref 20–33)
CREAT SERPL-MCNC: 0.96 MG/DL (ref 0.5–1.4)
GLOBULIN SER CALC-MCNC: 3.1 G/DL (ref 1.9–3.5)
GLUCOSE SERPL-MCNC: 84 MG/DL (ref 65–99)
HDLC SERPL-MCNC: 70 MG/DL
LDLC SERPL CALC-MCNC: 122 MG/DL
POTASSIUM SERPL-SCNC: 4.2 MMOL/L (ref 3.6–5.5)
PROT SERPL-MCNC: 7.3 G/DL (ref 6–8.2)
SODIUM SERPL-SCNC: 140 MMOL/L (ref 135–145)
TRIGL SERPL-MCNC: 131 MG/DL (ref 0–149)

## 2018-05-30 PROCEDURE — 82306 VITAMIN D 25 HYDROXY: CPT

## 2018-05-30 PROCEDURE — 36415 COLL VENOUS BLD VENIPUNCTURE: CPT

## 2018-05-30 PROCEDURE — 80053 COMPREHEN METABOLIC PANEL: CPT

## 2018-05-30 PROCEDURE — 80061 LIPID PANEL: CPT

## 2018-06-01 ENCOUNTER — HOSPITAL ENCOUNTER (OUTPATIENT)
Dept: RADIOLOGY | Facility: MEDICAL CENTER | Age: 83
End: 2018-06-01
Attending: INTERNAL MEDICINE
Payer: MEDICARE

## 2018-06-01 DIAGNOSIS — Z13.820 OSTEOPOROSIS SCREENING: ICD-10-CM

## 2018-06-01 DIAGNOSIS — Z78.0 ASYMPTOMATIC POSTMENOPAUSAL STATE: ICD-10-CM

## 2018-06-01 PROCEDURE — 77080 DXA BONE DENSITY AXIAL: CPT

## 2018-06-05 ENCOUNTER — OFFICE VISIT (OUTPATIENT)
Dept: MEDICAL GROUP | Facility: MEDICAL CENTER | Age: 83
End: 2018-06-05
Payer: MEDICARE

## 2018-06-05 VITALS
SYSTOLIC BLOOD PRESSURE: 160 MMHG | DIASTOLIC BLOOD PRESSURE: 60 MMHG | BODY MASS INDEX: 21.3 KG/M2 | OXYGEN SATURATION: 98 % | TEMPERATURE: 98.3 F | HEIGHT: 62 IN | WEIGHT: 115.74 LBS | HEART RATE: 70 BPM

## 2018-06-05 DIAGNOSIS — I25.10 CORONARY ARTERY DISEASE INVOLVING NATIVE CORONARY ARTERY OF NATIVE HEART WITHOUT ANGINA PECTORIS: ICD-10-CM

## 2018-06-05 DIAGNOSIS — I10 ESSENTIAL HYPERTENSION: ICD-10-CM

## 2018-06-05 DIAGNOSIS — E55.9 HYPOVITAMINOSIS D: ICD-10-CM

## 2018-06-05 DIAGNOSIS — N18.30 CKD (CHRONIC KIDNEY DISEASE), STAGE III (HCC): ICD-10-CM

## 2018-06-05 DIAGNOSIS — E78.5 DYSLIPIDEMIA: ICD-10-CM

## 2018-06-05 DIAGNOSIS — Z00.00 HEALTH CARE MAINTENANCE: ICD-10-CM

## 2018-06-05 DIAGNOSIS — M81.8 OTHER OSTEOPOROSIS, UNSPECIFIED PATHOLOGICAL FRACTURE PRESENCE: ICD-10-CM

## 2018-06-05 DIAGNOSIS — J30.1 SEASONAL ALLERGIC RHINITIS DUE TO POLLEN: ICD-10-CM

## 2018-06-05 PROBLEM — R52 GENERALIZED PAIN: Status: RESOLVED | Noted: 2017-07-03 | Resolved: 2018-06-05

## 2018-06-05 PROBLEM — I16.0 HYPERTENSIVE URGENCY: Status: RESOLVED | Noted: 2017-05-06 | Resolved: 2018-06-05

## 2018-06-05 PROBLEM — E87.20 LACTIC ACIDOSIS: Status: RESOLVED | Noted: 2017-07-03 | Resolved: 2018-06-05

## 2018-06-05 PROBLEM — N17.9 AKI (ACUTE KIDNEY INJURY) (HCC): Status: RESOLVED | Noted: 2017-07-03 | Resolved: 2018-06-05

## 2018-06-05 PROBLEM — R55 SYNCOPE: Status: RESOLVED | Noted: 2017-07-02 | Resolved: 2018-06-05

## 2018-06-05 PROBLEM — S91.309A WOUND, OPEN, FOOT: Status: RESOLVED | Noted: 2017-05-07 | Resolved: 2018-06-05

## 2018-06-05 PROCEDURE — 99214 OFFICE O/P EST MOD 30 MIN: CPT | Performed by: INTERNAL MEDICINE

## 2018-06-05 RX ORDER — ERGOCALCIFEROL 1.25 MG/1
50000 CAPSULE ORAL
Qty: 12 CAP | Refills: 0 | Status: SHIPPED | OUTPATIENT
Start: 2018-06-05 | End: 2018-10-10 | Stop reason: SDUPTHER

## 2018-06-05 RX ORDER — FLUTICASONE PROPIONATE 50 MCG
1 SPRAY, SUSPENSION (ML) NASAL DAILY
Qty: 16 G | Refills: 5 | Status: SHIPPED | OUTPATIENT
Start: 2018-06-05 | End: 2020-05-19

## 2018-06-05 RX ORDER — FEXOFENADINE HCL 180 MG/1
180 TABLET ORAL DAILY
Qty: 90 TAB | Refills: 3 | Status: SHIPPED | OUTPATIENT
Start: 2018-06-05 | End: 2019-08-07

## 2018-06-05 RX ORDER — ENALAPRIL MALEATE 20 MG/1
20 TABLET ORAL EVERY MORNING
Qty: 90 TAB | Refills: 3 | Status: SHIPPED | OUTPATIENT
Start: 2018-06-05 | End: 2018-10-10 | Stop reason: SDUPTHER

## 2018-06-05 RX ORDER — ALENDRONATE SODIUM 35 MG/1
35 TABLET ORAL
Qty: 12 TAB | Refills: 1 | Status: SHIPPED | OUTPATIENT
Start: 2018-06-05 | End: 2018-10-10 | Stop reason: SDUPTHER

## 2018-06-05 RX ORDER — AMLODIPINE BESYLATE 10 MG/1
10 TABLET ORAL DAILY
Qty: 90 TAB | Refills: 3 | Status: SHIPPED | OUTPATIENT
Start: 2018-06-05 | End: 2018-10-10 | Stop reason: SDUPTHER

## 2018-06-05 RX ORDER — SIMVASTATIN 20 MG
20 TABLET ORAL EVERY MORNING
Qty: 90 TAB | Refills: 3 | Status: SHIPPED | OUTPATIENT
Start: 2018-06-05 | End: 2018-10-10 | Stop reason: SDUPTHER

## 2018-06-05 NOTE — PROGRESS NOTES
CHIEF COMPLAINT  Chief Complaint   Patient presents with   • Follow-Up     labs, bone scan     HPI  Patient is a 89 y.o. female patient who presents today for the following     HYPERTENSION, CAD, CKD  Stable and controlled.   No change in medications.    Meds: Enalapril, 20 mg daily, amlodipine 10 mg daily,  ASA 81 mg QD;taking as prescribed.   Measuring BP at home: yes, has been < 150/90.  Denies:  -  headaches, vision problems, tinnitus.                 -  chest pain/pressure, palpitations, irregular heart beats, exertional, dyspnea, peripheral edema.                 - medication side effect: unusual fatigue, slow heartbeat, foot/leg swelling, cough.  Low salt diet: Y  Diet: Healthy  Exercise: limited  BMI: Body mass index is 21 kg/m².  Renal panel showed stable CKD stage III, with normal Cr.  FH of HTN:  Unknown     DYSLIPIDEMIA  Stable and controlled.     The patient is on simvastatin 20 mg, taking daily, as prescribed. No myalgias, muscle cramps or pain.   Diet /Exercise:  As above  BMI: 21  FH: unknown     Hypovitaminosis D  The patient had low vitamin D level.  Vitamin D supplement: multivitamins, OTC.    Seasonal allergies  The patient has history of seasonal allergies, used Allegra and Flonase, needs refill stable and controlled.    Osteoporosis  Found on screening DEXA scan from 6/1/2018.   She has low vitamin D level.   Exercise is limited.   Non-smoker.   BMI 21.   Family history of osteoporosis: Unknown.    Reviewed PMH, PSH, FH, SH, ALL, HCM/IMM, no changes  Reviewed MEDS, no changes    Patient Active Problem List    Diagnosis Date Noted   • Essential hypertension 11/20/2012     Priority: Low   • Dyslipidemia 11/20/2012     Priority: Low   • Coronary artery disease involving native coronary artery of native heart without angina pectoris 11/20/2012     Priority: Low   • Health care maintenance 04/26/2018   • Vitamin D insufficiency 12/20/2013     CURRENT MEDICATIONS  Current Outpatient Prescriptions    Medication Sig Dispense Refill   • vitamin D, Ergocalciferol, (DRISDOL) 18698 units Cap capsule Take 1 Cap by mouth every 7 days. 12 Cap 0   • enalapril (VASOTEC) 20 MG tablet Take 1 Tab by mouth every morning. 90 Tab 3   • fexofenadine (ALLEGRA) 180 MG tablet Take 1 Tab by mouth every day. 90 Tab 3   • fluticasone (FLONASE) 50 MCG/ACT nasal spray Spray 1 Spray in nose every day. 16 g 5   • amLODIPine (NORVASC) 10 MG Tab Take 1 Tab by mouth every day. 90 Tab 3   • simvastatin (ZOCOR) 20 MG Tab Take 1 Tab by mouth every morning. 90 Tab 3   • alendronate (FOSAMAX) 35 MG tablet Take 1 Tab by mouth every 7 days. 12 Tab 1   • Multiple Vitamins-Minerals (CENTRUM ADULTS PO) Take  by mouth.     • aspirin 81 MG tablet Take 1 Tab by mouth every morning. 100 Each 0     No current facility-administered medications for this visit.      ALLERGIES  Allergies: Patient has no known allergies.  PAST MEDICAL HISTORY  Past Medical History:   Diagnosis Date   • H/O bladder repair surgery     not sure per patient daughter    • H/O: hysterectomy    • Hypertension    • Vertigo      SURGICAL HISTORY  She  has a past surgical history that includes hysterectomy radical.  SOCIAL HISTORY  Social History   Substance Use Topics   • Smoking status: Never Smoker   • Smokeless tobacco: Never Used   • Alcohol use No     Social History     Social History Narrative   • No narrative on file     FAMILY HISTORY  History reviewed. No pertinent family history.  Family Status   Relation Status   • Mother    • Father    • Sister    • Brother      ROS   Constitutional: Negative for fever, chills.  HENT: Negative for congestion, sore throat.  Eyes: Negative for blurred vision.   Respiratory: Negative for cough, shortness of breath.  Cardiovascular: Negative for chest pain, palpitations. And per HPI.  Gastrointestinal: Negative for heartburn, nausea, abdominal pain.   Genitourinary: Negative for dysuria. And per  "HPI.  Musculoskeletal: Negative for significant myalgias, back pain and joint pain.   Skin: Negative for rash and itching.   Neuro: Negative for dizziness, weakness and headaches.   Endo/Heme/Allergies: Does not bruise/bleed easily.   Psychiatric/Behavioral: Negative for depression, anxiety    PHYSICAL EXAM   Blood pressure 160/60, pulse 70, temperature 36.8 °C (98.3 °F), height 1.575 m (5' 2\"), weight 52.5 kg (115 lb 11.9 oz), SpO2 98 %. Body mass index is 21.17 kg/m².  General:  NAD, elderly lady.  HEENT:   NC/AT, PERRLA, EOMI, TMs are clear. Oropharyngeal mucosa is pink,  without lesions;  no cervical / supraclavicular  lymphadenopathy, no thyromegaly.    Cardiovascular: RRR.   No m/r/g. No carotid bruits .      Lungs:   CTAB, no w/r/r, no respiratory distress.  Abdomen: Soft, NT/ND + BS; no suprapubic tenderness; no masses or hepatosplenomegaly.  Extremities:  2+ DP and radial pulses bilaterally.  No c/c/e.   Skin:  Warm, dry.  No erythema. No rash.   Neurologic: Alert & oriented x 3. No focal deficits.  Psychiatric:  Affect normal, mood normal, judgment normal.    LABS     Labs are reviewed and discussed with a patient  Lab Results   Component Value Date/Time    CHOLSTRLTOT 218 (H) 05/30/2018 07:46 AM     (H) 05/30/2018 07:46 AM    HDL 70 05/30/2018 07:46 AM    TRIGLYCERIDE 131 05/30/2018 07:46 AM       Lab Results   Component Value Date/Time    SODIUM 140 05/30/2018 07:46 AM    POTASSIUM 4.2 05/30/2018 07:46 AM    CHLORIDE 106 05/30/2018 07:46 AM    CO2 25 05/30/2018 07:46 AM    GLUCOSE 84 05/30/2018 07:46 AM    BUN 17 05/30/2018 07:46 AM    CREATININE 0.96 05/30/2018 07:46 AM    CREATININE 0.9 01/15/2009 06:34 AM     Lab Results   Component Value Date/Time    ALKPHOSPHAT 75 05/30/2018 07:46 AM    ASTSGOT 24 05/30/2018 07:46 AM    ALTSGPT 19 05/30/2018 07:46 AM    TBILIRUBIN 0.5 05/30/2018 07:46 AM      Lab Results   Component Value Date/Time    HBA1C 5.8 (H) 03/09/2015 04:00 AM    HBA1C 5.6 02/15/2007 " 09:27 AM     No results found for: TSH  Lab Results   Component Value Date/Time    FREET4 0.98 07/02/2017 02:16 PM    FREET4 1.01 12/13/2013 09:20 AM     Lab Results   Component Value Date/Time    WBC 9.4 07/02/2017 02:16 PM    RBC 4.15 (L) 07/02/2017 02:16 PM    HEMOGLOBIN 13.0 07/02/2017 02:16 PM    HEMATOCRIT 38.5 07/02/2017 02:16 PM    MCV 92.8 07/02/2017 02:16 PM    MCH 31.3 07/02/2017 02:16 PM    MCHC 33.8 07/02/2017 02:16 PM    MPV 11.3 07/02/2017 02:16 PM    NEUTSPOLYS 45.60 07/02/2017 02:16 PM    LYMPHOCYTES 41.60 (H) 07/02/2017 02:16 PM    MONOCYTES 7.10 07/02/2017 02:16 PM    EOSINOPHILS 4.20 07/02/2017 02:16 PM    BASOPHILS 1.10 07/02/2017 02:16 PM      IMAGING     None    ASSESMENT AND PLAN        1. Essential hypertension  Controlled, continue current treatment and monitoring blood pressure at home  - COMP METABOLIC PANEL; Future  - enalapril (VASOTEC) 20 MG tablet; Take 1 Tab by mouth every morning.  Dispense: 90 Tab; Refill: 3  - amLODIPine (NORVASC) 10 MG Tab; Take 1 Tab by mouth every day.  Dispense: 90 Tab; Refill: 3    2. Coronary artery disease involving native coronary artery of native heart without angina pectoris  Asymptomatic, continue current treatment    3. CKD (chronic kidney disease), stage III  Stable, advised to continue good fluid intake and avoid NSAIDs.  - COMP METABOLIC PANEL; Future    4. Dyslipidemia  Controlled, continue current treatment  - simvastatin (ZOCOR) 20 MG Tab; Take 1 Tab by mouth every morning.  Dispense: 90 Tab; Refill: 3    5. Hypovitaminosis D  She is given prescription dose of vitamin D  - VITAMIN D,25 HYDROXY; Future  - vitamin D, Ergocalciferol, (DRISDOL) 71563 units Cap capsule; Take 1 Cap by mouth every 7 days.  Dispense: 12 Cap; Refill: 0    6. Seasonal allergic rhinitis due to pollen  Stable and controlled, continue current treatment  - fexofenadine (ALLEGRA) 180 MG tablet; Take 1 Tab by mouth every day.  Dispense: 90 Tab; Refill: 3  - fluticasone (FLONASE)  50 MCG/ACT nasal spray; Spray 1 Spray in nose every day.  Dispense: 16 g; Refill: 5    7. Other osteoporosis, unspecified pathological fracture presence  Start:  - alendronate (FOSAMAX) 35 MG tablet; Take 1 Tab by mouth every 7 days.  Dispense: 12 Tab; Refill: 1  Advised:  Take the medicine once per week.  Take first thing in the morning with a large glass of water.   Remain upright, and wait 30 - 45 minutes before consuming any other food, drink, medication or supplements.   You may sit or stand or walk but should not recline during this time.     8. Health care maintenance  Discussed immunizations    Counseling:   - Smoking:  Nonsmoker    Followup: Return in about 3 months (around 9/5/2018) for Short.    All questions are answered.    Please note that this dictation was created using voice recognition software, and that there might be errors of dayna and possibly content.

## 2018-10-03 ENCOUNTER — HOSPITAL ENCOUNTER (OUTPATIENT)
Dept: LAB | Facility: MEDICAL CENTER | Age: 83
End: 2018-10-03
Attending: INTERNAL MEDICINE
Payer: MEDICARE

## 2018-10-03 DIAGNOSIS — N18.30 CKD (CHRONIC KIDNEY DISEASE), STAGE III (HCC): ICD-10-CM

## 2018-10-03 DIAGNOSIS — I10 ESSENTIAL HYPERTENSION: ICD-10-CM

## 2018-10-03 DIAGNOSIS — E55.9 HYPOVITAMINOSIS D: ICD-10-CM

## 2018-10-03 LAB
25(OH)D3 SERPL-MCNC: 55 NG/ML (ref 30–100)
ALBUMIN SERPL BCP-MCNC: 3.9 G/DL (ref 3.2–4.9)
ALBUMIN/GLOB SERPL: 1.1 G/DL
ALP SERPL-CCNC: 70 U/L (ref 30–99)
ALT SERPL-CCNC: 16 U/L (ref 2–50)
ANION GAP SERPL CALC-SCNC: 8 MMOL/L (ref 0–11.9)
AST SERPL-CCNC: 27 U/L (ref 12–45)
BILIRUB SERPL-MCNC: 0.7 MG/DL (ref 0.1–1.5)
BUN SERPL-MCNC: 22 MG/DL (ref 8–22)
CALCIUM SERPL-MCNC: 9.6 MG/DL (ref 8.5–10.5)
CHLORIDE SERPL-SCNC: 107 MMOL/L (ref 96–112)
CO2 SERPL-SCNC: 26 MMOL/L (ref 20–33)
CREAT SERPL-MCNC: 1.08 MG/DL (ref 0.5–1.4)
FASTING STATUS PATIENT QL REPORTED: NORMAL
GLOBULIN SER CALC-MCNC: 3.4 G/DL (ref 1.9–3.5)
GLUCOSE SERPL-MCNC: 99 MG/DL (ref 65–99)
POTASSIUM SERPL-SCNC: 4.3 MMOL/L (ref 3.6–5.5)
PROT SERPL-MCNC: 7.3 G/DL (ref 6–8.2)
SODIUM SERPL-SCNC: 141 MMOL/L (ref 135–145)

## 2018-10-03 PROCEDURE — 82306 VITAMIN D 25 HYDROXY: CPT

## 2018-10-03 PROCEDURE — 36415 COLL VENOUS BLD VENIPUNCTURE: CPT

## 2018-10-03 PROCEDURE — 80053 COMPREHEN METABOLIC PANEL: CPT

## 2018-10-09 ENCOUNTER — OFFICE VISIT (OUTPATIENT)
Dept: MEDICAL GROUP | Facility: MEDICAL CENTER | Age: 83
End: 2018-10-09
Payer: MEDICARE

## 2018-10-09 VITALS
HEIGHT: 62 IN | BODY MASS INDEX: 21.62 KG/M2 | SYSTOLIC BLOOD PRESSURE: 124 MMHG | TEMPERATURE: 98.8 F | OXYGEN SATURATION: 97 % | WEIGHT: 117.5 LBS | DIASTOLIC BLOOD PRESSURE: 60 MMHG | HEART RATE: 78 BPM

## 2018-10-09 DIAGNOSIS — H81.10 BENIGN PAROXYSMAL POSITIONAL VERTIGO, UNSPECIFIED LATERALITY: ICD-10-CM

## 2018-10-09 DIAGNOSIS — I10 ESSENTIAL HYPERTENSION: ICD-10-CM

## 2018-10-09 DIAGNOSIS — Z00.00 HEALTH CARE MAINTENANCE: ICD-10-CM

## 2018-10-09 DIAGNOSIS — M81.8 OTHER OSTEOPOROSIS WITHOUT CURRENT PATHOLOGICAL FRACTURE: ICD-10-CM

## 2018-10-09 DIAGNOSIS — Z00.00 MEDICARE ANNUAL WELLNESS VISIT, INITIAL: ICD-10-CM

## 2018-10-09 DIAGNOSIS — I25.10 CORONARY ARTERY DISEASE INVOLVING NATIVE CORONARY ARTERY OF NATIVE HEART WITHOUT ANGINA PECTORIS: ICD-10-CM

## 2018-10-09 DIAGNOSIS — J30.2 SEASONAL ALLERGIES: ICD-10-CM

## 2018-10-09 DIAGNOSIS — Z23 NEED FOR INFLUENZA VACCINATION: ICD-10-CM

## 2018-10-09 DIAGNOSIS — E78.5 DYSLIPIDEMIA: ICD-10-CM

## 2018-10-09 DIAGNOSIS — N18.30 CKD (CHRONIC KIDNEY DISEASE), STAGE III (HCC): ICD-10-CM

## 2018-10-09 PROCEDURE — G0008 ADMIN INFLUENZA VIRUS VAC: HCPCS | Performed by: INTERNAL MEDICINE

## 2018-10-09 PROCEDURE — 90662 IIV NO PRSV INCREASED AG IM: CPT | Performed by: INTERNAL MEDICINE

## 2018-10-09 PROCEDURE — G0439 PPPS, SUBSEQ VISIT: HCPCS | Performed by: INTERNAL MEDICINE

## 2018-10-09 PROCEDURE — 99214 OFFICE O/P EST MOD 30 MIN: CPT | Mod: 25 | Performed by: INTERNAL MEDICINE

## 2018-10-09 ASSESSMENT — PATIENT HEALTH QUESTIONNAIRE - PHQ9: CLINICAL INTERPRETATION OF PHQ2 SCORE: 0

## 2018-10-09 ASSESSMENT — ACTIVITIES OF DAILY LIVING (ADL): BATHING_REQUIRES_ASSISTANCE: 0

## 2018-10-09 NOTE — PROGRESS NOTES
CC:   had concerns including Results and Medication Refill (allegra) and Health Risk Assessment Exam    HPI:  Sabrina is a 89 y.o. here for Health Risk Assessment and Results and Medication Refill (allegra)    HYPERTENSION, CAD, CKD  Stable and controlled.   No change in medications.     Meds: Enalapril, 20 mg daily, amlodipine 10 mg daily,  ASA 81 mg QD;taking as prescribed.   Measuring BP at home: yes, has been < 150/90.  Denies:  -  headaches, vision problems, tinnitus.                 -  chest pain/pressure, palpitations, irregular heart beats, exertional, dyspnea, peripheral edema.                 - medication side effect: unusual fatigue, slow heartbeat, foot/leg swelling, cough.  Low salt diet: Y  Diet: Healthy  Exercise: limited  BMI: 21  Renal panel showed stable CKD stage III, with normal Cr.  FH of HTN: Unknown  Panel showed stable CKD stage III.     DYSLIPIDEMIA  Stable and controlled.      The patient is on simvastatin 20 mg, taking daily, as prescribed. No myalgias, muscle cramps or pain.   Diet /Exercise/BMI:  As above  FH: unknown     Hypovitaminosis D  The patient had low vitamin D level.  Vitamin D supplement: multivitamins, OTC.     Seasonal allergies  The patient has history of seasonal allergies, used Allegra and Flonase, needs refill stable and controlled.     Osteoporosis  Found on screening DEXA scan from 6/1/2018.   She has low vitamin D level.   Exercise is limited.   Non-smoker.   BMI 21.   Family history of osteoporosis: Unknown.  On alendronate, 35 mg every week.    Dizziness  Complains of intermittent dizziness in the last 3-4 weeks, when turning head.  No falls.     Denies:  -Hearing/balance problems  -Headaches  -Numbness, weakness, tingling.  PCP: Ash Whiting M.D.  Other Care Team Members: PCP  Patient Care Team:  Ash Whiting M.D. as PCP - General (Family Medicine)    Patient Active Problem List    Diagnosis Date Noted   • Essential hypertension 11/20/2012      Priority: Low   • Dyslipidemia 11/20/2012     Priority: Low   • Coronary artery disease involving native coronary artery of native heart without angina pectoris 11/20/2012     Priority: Low   • Seasonal allergies 10/09/2018   • Other osteoporosis without current pathological fracture 10/09/2018   • CKD (chronic kidney disease), stage III ([facility]) 10/09/2018   • Health care maintenance 04/26/2018     Current Outpatient Prescriptions   Medication Sig Dispense Refill   • vitamin D, Ergocalciferol, (DRISDOL) 65562 units Cap capsule Take 1 Cap by mouth every 7 days. 12 Cap 0   • enalapril (VASOTEC) 20 MG tablet Take 1 Tab by mouth every morning. 90 Tab 3   • fexofenadine (ALLEGRA) 180 MG tablet Take 1 Tab by mouth every day. 90 Tab 3   • fluticasone (FLONASE) 50 MCG/ACT nasal spray Spray 1 Spray in nose every day. 16 g 5   • amLODIPine (NORVASC) 10 MG Tab Take 1 Tab by mouth every day. 90 Tab 3   • simvastatin (ZOCOR) 20 MG Tab Take 1 Tab by mouth every morning. 90 Tab 3   • alendronate (FOSAMAX) 35 MG tablet Take 1 Tab by mouth every 7 days. 12 Tab 1   • Multiple Vitamins-Minerals (CENTRUM ADULTS PO) Take  by mouth.     • aspirin 81 MG tablet Take 1 Tab by mouth every morning. 100 Each 0     No current facility-administered medications for this visit.       Current supplements: as above.  Chronic narcotic pain medicines: no  Allergies: Patient has no known allergies.    Screening:  Depression Screening  Little interest or pleasure in doing things?  0 - not at all  Feeling down, depressed , or hopeless? 0 - not at all  Patient Health Questionnaire Score: 0     Screening for Cognitive Impairment  Three Minute Recall (leader, season, table) 3/3    Jero clock face with all 12 numbers and set the hands to show 10 past 11.       Cognitive concerns identified deferred for follow up unless specifically addressed in assessment and plan.    Fall Risk Assessment  Has the patient had two or more falls in the last year or any fall with  injury in the last year?  No    Safety Assessment  Throw rugs on floor.  Yes  Handrails on all stairs.  Yes  Good lighting in all hallways.  Yes  Difficulty hearing.  Yes  Patient counseled about all safety risks that were identified.    Functional Assessment ADLs  Are there any barriers preventing you from cooking for yourself or meeting nutritional needs?  No.    Are there any barriers preventing you from driving safely or obtaining transportation?  Yes.    Are there any barriers preventing you from using a telephone or calling for help?  No.    Are there any barriers preventing you from shopping?  No.    Are there any barriers preventing you from taking care of your own finances?  No.    Are there any barriers preventing you from managing your medications?  No.    Are there any barriers preventing you from showering, bathing or dressing yourself?  No.    Are you currently engaging in any exercise or physical activity?  No.     What is your perception of your health?  Fair.    Health Maintenance Summary                IMM DTaP/Tdap/Td Vaccine Overdue 4/13/1948     IMM ZOSTER VACCINES Overdue 4/13/1979     IMM PNEUMOCOCCAL 65+ (ADULT) LOW/MEDIUM RISK SERIES Overdue 9/1/2011      Done 9/1/2010 Imm Admin: Pneumococcal polysaccharide vaccine (PPSV-23)    BONE DENSITY Next Due 6/1/2023      Done 6/1/2018 DS-BONE DENSITY STUDY (DEXA)        Patient Care Team:  Ash Whiting M.D. as PCP - General (Family Medicine)    Allergies: Patient has no known allergies.  Current Outpatient Prescriptions Ordered in [facility]   Medication Sig Dispense Refill   • vitamin D, Ergocalciferol, (DRISDOL) 28467 units Cap capsule Take 1 Cap by mouth every 7 days. 12 Cap 0   • enalapril (VASOTEC) 20 MG tablet Take 1 Tab by mouth every morning. 90 Tab 3   • fexofenadine (ALLEGRA) 180 MG tablet Take 1 Tab by mouth every day. 90 Tab 3   • fluticasone (FLONASE) 50 MCG/ACT nasal spray Spray 1 Spray in nose every day. 16 g 5   • amLODIPine  "(NORVASC) 10 MG Tab Take 1 Tab by mouth every day. 90 Tab 3   • simvastatin (ZOCOR) 20 MG Tab Take 1 Tab by mouth every morning. 90 Tab 3   • alendronate (FOSAMAX) 35 MG tablet Take 1 Tab by mouth every 7 days. 12 Tab 1   • Multiple Vitamins-Minerals (CENTRUM ADULTS PO) Take  by mouth.     • aspirin 81 MG tablet Take 1 Tab by mouth every morning. 100 Each 0     No current [facility]-ordered facility-administered medications on file.      Past Medical History:   Diagnosis Date   • H/O bladder repair surgery     not sure per patient daughter    • H/O: hysterectomy    • Hypertension    • Vertigo      Past Surgical History:   Procedure Laterality Date   • HYSTERECTOMY RADICAL       Social History   Substance Use Topics   • Smoking status: Never Smoker   • Smokeless tobacco: Never Used   • Alcohol use No     Family History   Problem Relation Age of Onset   • Heart Disease Neg Hx    • Hypertension Neg Hx    • Hyperlipidemia Neg Hx    • Psychiatry Neg Hx    • Diabetes Neg Hx      ROS:    Ostomy or other tubes or amputations: no  Chronic oxygen use no  : Denies incontinence.  Gait: Uses no assistive device   Hearing fair.    Dentition fair  Constitutional: Negative for fever, chills/sweats   Respiratory: Negative for shortness of breath, RICHARD  Cardiovascular: Negative for chest pain or pressure  GI/: Negative for diarrhea/constipation / urinary difficulty  All other systems reviewed and are negative.     Exam: Blood pressure 124/60, pulse 78, temperature 37.1 °C (98.8 °F), temperature source Temporal, height 1.575 m (5' 2\"), weight 53.3 kg (117 lb 8.1 oz), SpO2 97 %, not currently breastfeeding. Body mass index is 21.49 kg/m².  Alert, oriented in no acute distress.  Eye contact is good, speech goal directed, affect calm  HEENT: conjunctiva non-injected, sclera non-icteric.  Pinna normal. No concerning lesions.   Oral mucous membranes pink and moist with no lesions.  Neck supple with no cervical lymphadenopathy  Lungs: clear to " auscultation bilaterally with good excursion.  CV: regular rate and rhythm.  Abdomen No CVAT  Ext: no edema.     Labs  Reviewed and discussed  Lab Results   Component Value Date/Time    CHOLSTRLTOT 218 (H) 05/30/2018 07:46 AM     (H) 05/30/2018 07:46 AM    HDL 70 05/30/2018 07:46 AM    TRIGLYCERIDE 131 05/30/2018 07:46 AM       Lab Results   Component Value Date/Time    SODIUM 141 10/03/2018 09:12 AM    POTASSIUM 4.3 10/03/2018 09:12 AM    CHLORIDE 107 10/03/2018 09:12 AM    CO2 26 10/03/2018 09:12 AM    GLUCOSE 99 10/03/2018 09:12 AM    BUN 22 10/03/2018 09:12 AM    CREATININE 1.08 10/03/2018 09:12 AM    CREATININE 0.9 01/15/2009 06:34 AM     Lab Results   Component Value Date/Time    ALKPHOSPHAT 70 10/03/2018 09:12 AM    ASTSGOT 27 10/03/2018 09:12 AM    ALTSGPT 16 10/03/2018 09:12 AM    TBILIRUBIN 0.7 10/03/2018 09:12 AM      Lab Results   Component Value Date/Time    HBA1C 5.8 (H) 03/09/2015 04:00 AM    HBA1C 5.6 02/15/2007 09:27 AM     No results found for: TSH  Lab Results   Component Value Date/Time    FREET4 0.98 07/02/2017 02:16 PM    FREET4 1.01 12/13/2013 09:20 AM     Lab Results   Component Value Date/Time    WBC 9.4 07/02/2017 02:16 PM    RBC 4.15 (L) 07/02/2017 02:16 PM    HEMOGLOBIN 13.0 07/02/2017 02:16 PM    HEMATOCRIT 38.5 07/02/2017 02:16 PM    MCV 92.8 07/02/2017 02:16 PM    MCH 31.3 07/02/2017 02:16 PM    MCHC 33.8 07/02/2017 02:16 PM    MPV 11.3 07/02/2017 02:16 PM    NEUTSPOLYS 45.60 07/02/2017 02:16 PM    LYMPHOCYTES 41.60 (H) 07/02/2017 02:16 PM    MONOCYTES 7.10 07/02/2017 02:16 PM    EOSINOPHILS 4.20 07/02/2017 02:16 PM    BASOPHILS 1.10 07/02/2017 02:16 PM      Assessment and Plan    1. Medicare annual wellness visit, initial  - Initial Wellness Visit - Includes PPPS ()    2. Health care maintenance  Advised immunizations X2.  - Initial Wellness Visit - Includes PPPS ()    3.  Need for influenza vaccination  - INFLUENZA VACCINE, HIGH DOSE (65+ ONLY)  - Initial Wellness  Visit - Includes PPPS ()  Information was provided to the patient regarding the vaccine, including side effects. Vaccine was given by my medical assistant under my supervision.    4. Essential hypertension  Controlled, continue current treatment and monitoring blood pressure at home  - COMP METABOLIC PANEL; Future  - Initial Wellness Visit - Includes PPPS ()    5. Coronary artery disease involving native coronary artery of native heart without angina pectoris  Asymptomatic, continue current treatment  - Initial Wellness Visit - Includes PPPS ()    6. CKD (chronic kidney disease), stage III (HCC)  Stable, advised to avoid NSAIDs, have good fluid intake.  - COMP METABOLIC PANEL; Future  - Initial Wellness Visit - Includes PPPS ()    7. Dyslipidemia  Controlled, continue current treatment  - COMP METABOLIC PANEL; Future  - LIPID PROFILE; Future  - Initial Wellness Visit - Includes PPPS ()    8. Seasonal allergies  Stable, controlled, continue current treatment  - Initial Wellness Visit - Includes PPPS ()    9. Other osteoporosis without current pathological fracture  Reviewed the DEXA scan, continue current treatment  - Initial Wellness Visit - Includes PPPS ()    10. Benign paroxysmal positional vertigo, unspecified laterality  Discussed about safety.    - REFERRAL TO PHYSICAL THERAPY Reason for Therapy: Eval/Treat/Report  - Initial Wellness Visit - Includes PPPS ()    Health Care Screening recommendations reviewed with patient today:    - Depression: no issues   - ADL/IADL: independent   - Feeding/nutrition: advised healthy diet.    - Hearing: No significant issues    Referrals for PT/OT/Nutrition counseling/Behavioral Health/Smoking cessation as per orders.  Discussion today about general wellness and lifestyle habits:    · Prevent falls and reduce trip hazards;   · Have a working fire alarm and carbon monoxide detector;   · Engage in regular physical activity and social  activities.    Next office visit is due in 3 months

## 2018-10-10 DIAGNOSIS — E78.5 DYSLIPIDEMIA: ICD-10-CM

## 2018-10-10 DIAGNOSIS — M81.8 OTHER OSTEOPOROSIS, UNSPECIFIED PATHOLOGICAL FRACTURE PRESENCE: ICD-10-CM

## 2018-10-10 DIAGNOSIS — I10 ESSENTIAL HYPERTENSION: ICD-10-CM

## 2018-10-10 DIAGNOSIS — E55.9 HYPOVITAMINOSIS D: ICD-10-CM

## 2018-10-10 RX ORDER — AMLODIPINE BESYLATE 10 MG/1
10 TABLET ORAL DAILY
Qty: 90 TAB | Refills: 3 | Status: SHIPPED | OUTPATIENT
Start: 2018-10-10 | End: 2019-04-01 | Stop reason: SDUPTHER

## 2018-10-10 RX ORDER — ALENDRONATE SODIUM 35 MG/1
35 TABLET ORAL
Qty: 12 TAB | Refills: 1 | Status: SHIPPED | OUTPATIENT
Start: 2018-10-10 | End: 2019-04-01 | Stop reason: SDUPTHER

## 2018-10-10 RX ORDER — SIMVASTATIN 20 MG
20 TABLET ORAL EVERY MORNING
Qty: 90 TAB | Refills: 3 | Status: SHIPPED | OUTPATIENT
Start: 2018-10-10 | End: 2019-04-01 | Stop reason: SDUPTHER

## 2018-10-10 RX ORDER — ERGOCALCIFEROL 1.25 MG/1
50000 CAPSULE ORAL
Qty: 12 CAP | Refills: 0 | Status: SHIPPED | OUTPATIENT
Start: 2018-10-10 | End: 2019-04-01 | Stop reason: SDUPTHER

## 2018-10-10 RX ORDER — ENALAPRIL MALEATE 20 MG/1
20 TABLET ORAL EVERY MORNING
Qty: 90 TAB | Refills: 3 | Status: SHIPPED | OUTPATIENT
Start: 2018-10-10 | End: 2019-04-01 | Stop reason: SDUPTHER

## 2018-10-10 NOTE — TELEPHONE ENCOUNTER
Was the patient seen in the last year in this department? Yes    Does patient have an active prescription for medications requested? No     Received Request Via: Pharmacy         Patient is switch pharmacy to [facility]. Please send new prescriptions.     Thank you    Jose Walsh  Medical Assistant

## 2018-10-28 ENCOUNTER — APPOINTMENT (OUTPATIENT)
Dept: RADIOLOGY | Facility: MEDICAL CENTER | Age: 83
End: 2018-10-28
Attending: EMERGENCY MEDICINE
Payer: MEDICARE

## 2018-10-28 ENCOUNTER — HOSPITAL ENCOUNTER (OUTPATIENT)
Facility: MEDICAL CENTER | Age: 83
End: 2018-10-29
Attending: EMERGENCY MEDICINE | Admitting: HOSPITALIST
Payer: MEDICARE

## 2018-10-28 LAB
BASOPHILS # BLD AUTO: 1 % (ref 0–1.8)
BASOPHILS # BLD: 0.09 K/UL (ref 0–0.12)
EKG IMPRESSION: NORMAL
EOSINOPHIL # BLD AUTO: 0.24 K/UL (ref 0–0.51)
EOSINOPHIL NFR BLD: 2.7 % (ref 0–6.9)
ERYTHROCYTE [DISTWIDTH] IN BLOOD BY AUTOMATED COUNT: 43.5 FL (ref 35.9–50)
HCT VFR BLD AUTO: 46.7 % (ref 37–47)
HGB BLD-MCNC: 15.6 G/DL (ref 12–16)
IMM GRANULOCYTES # BLD AUTO: 0.03 K/UL (ref 0–0.11)
IMM GRANULOCYTES NFR BLD AUTO: 0.3 % (ref 0–0.9)
LYMPHOCYTES # BLD AUTO: 2.64 K/UL (ref 1–4.8)
LYMPHOCYTES NFR BLD: 30.1 % (ref 22–41)
MCH RBC QN AUTO: 31.6 PG (ref 27–33)
MCHC RBC AUTO-ENTMCNC: 33.4 G/DL (ref 33.6–35)
MCV RBC AUTO: 94.7 FL (ref 81.4–97.8)
MONOCYTES # BLD AUTO: 0.53 K/UL (ref 0–0.85)
MONOCYTES NFR BLD AUTO: 6.1 % (ref 0–13.4)
NEUTROPHILS # BLD AUTO: 5.23 K/UL (ref 2–7.15)
NEUTROPHILS NFR BLD: 59.8 % (ref 44–72)
NRBC # BLD AUTO: 0 K/UL
NRBC BLD-RTO: 0 /100 WBC
PLATELET # BLD AUTO: 216 K/UL (ref 164–446)
PMV BLD AUTO: 10 FL (ref 9–12.9)
RBC # BLD AUTO: 4.93 M/UL (ref 4.2–5.4)
WBC # BLD AUTO: 8.8 K/UL (ref 4.8–10.8)

## 2018-10-28 PROCEDURE — 84484 ASSAY OF TROPONIN QUANT: CPT

## 2018-10-28 PROCEDURE — 83880 ASSAY OF NATRIURETIC PEPTIDE: CPT

## 2018-10-28 PROCEDURE — 93005 ELECTROCARDIOGRAM TRACING: CPT

## 2018-10-28 PROCEDURE — 85610 PROTHROMBIN TIME: CPT

## 2018-10-28 PROCEDURE — 80053 COMPREHEN METABOLIC PANEL: CPT

## 2018-10-28 PROCEDURE — 85730 THROMBOPLASTIN TIME PARTIAL: CPT

## 2018-10-28 PROCEDURE — 93005 ELECTROCARDIOGRAM TRACING: CPT | Performed by: EMERGENCY MEDICINE

## 2018-10-28 PROCEDURE — 83690 ASSAY OF LIPASE: CPT

## 2018-10-28 PROCEDURE — 85025 COMPLETE CBC W/AUTO DIFF WBC: CPT

## 2018-10-28 PROCEDURE — 84443 ASSAY THYROID STIM HORMONE: CPT

## 2018-10-28 PROCEDURE — 99285 EMERGENCY DEPT VISIT HI MDM: CPT

## 2018-10-28 PROCEDURE — 71045 X-RAY EXAM CHEST 1 VIEW: CPT

## 2018-10-28 ASSESSMENT — LIFESTYLE VARIABLES: DO YOU DRINK ALCOHOL: NO

## 2018-10-29 ENCOUNTER — PATIENT OUTREACH (OUTPATIENT)
Dept: HEALTH INFORMATION MANAGEMENT | Facility: OTHER | Age: 83
End: 2018-10-29

## 2018-10-29 ENCOUNTER — APPOINTMENT (OUTPATIENT)
Dept: CARDIOLOGY | Facility: MEDICAL CENTER | Age: 83
End: 2018-10-29
Attending: HOSPITALIST
Payer: MEDICARE

## 2018-10-29 ENCOUNTER — APPOINTMENT (OUTPATIENT)
Dept: RADIOLOGY | Facility: MEDICAL CENTER | Age: 83
End: 2018-10-29
Attending: HOSPITALIST
Payer: MEDICARE

## 2018-10-29 VITALS
WEIGHT: 130.51 LBS | HEART RATE: 74 BPM | RESPIRATION RATE: 16 BRPM | DIASTOLIC BLOOD PRESSURE: 67 MMHG | HEIGHT: 62 IN | TEMPERATURE: 97.8 F | BODY MASS INDEX: 24.02 KG/M2 | OXYGEN SATURATION: 95 % | SYSTOLIC BLOOD PRESSURE: 150 MMHG

## 2018-10-29 PROBLEM — R79.9 ELEVATED BUN: Status: RESOLVED | Noted: 2018-10-29 | Resolved: 2018-10-29

## 2018-10-29 PROBLEM — R79.9 ELEVATED BUN: Status: ACTIVE | Noted: 2018-10-29

## 2018-10-29 PROBLEM — R55 SYNCOPE: Status: RESOLVED | Noted: 2018-10-29 | Resolved: 2018-10-29

## 2018-10-29 PROBLEM — R55 SYNCOPE: Status: ACTIVE | Noted: 2018-10-29

## 2018-10-29 LAB
ALBUMIN SERPL BCP-MCNC: 4 G/DL (ref 3.2–4.9)
ALBUMIN SERPL BCP-MCNC: 4.5 G/DL (ref 3.2–4.9)
ALBUMIN/GLOB SERPL: 1.3 G/DL
ALBUMIN/GLOB SERPL: 1.3 G/DL
ALP SERPL-CCNC: 68 U/L (ref 30–99)
ALP SERPL-CCNC: 73 U/L (ref 30–99)
ALT SERPL-CCNC: 24 U/L (ref 2–50)
ALT SERPL-CCNC: 31 U/L (ref 2–50)
ANION GAP SERPL CALC-SCNC: 12 MMOL/L (ref 0–11.9)
ANION GAP SERPL CALC-SCNC: 9 MMOL/L (ref 0–11.9)
APPEARANCE UR: CLEAR
APTT PPP: 20 SEC (ref 24.7–36)
AST SERPL-CCNC: 27 U/L (ref 12–45)
AST SERPL-CCNC: 42 U/L (ref 12–45)
BASOPHILS # BLD AUTO: 1.2 % (ref 0–1.8)
BASOPHILS # BLD: 0.09 K/UL (ref 0–0.12)
BILIRUB SERPL-MCNC: 0.6 MG/DL (ref 0.1–1.5)
BILIRUB SERPL-MCNC: 0.7 MG/DL (ref 0.1–1.5)
BILIRUB UR QL STRIP.AUTO: NEGATIVE
BNP SERPL-MCNC: 38 PG/ML (ref 0–100)
BNP SERPL-MCNC: 53 PG/ML (ref 0–100)
BUN SERPL-MCNC: 18 MG/DL (ref 8–22)
BUN SERPL-MCNC: 24 MG/DL (ref 8–22)
CALCIUM SERPL-MCNC: 10 MG/DL (ref 8.5–10.5)
CALCIUM SERPL-MCNC: 9.4 MG/DL (ref 8.5–10.5)
CHLORIDE SERPL-SCNC: 104 MMOL/L (ref 96–112)
CHLORIDE SERPL-SCNC: 110 MMOL/L (ref 96–112)
CHOLEST SERPL-MCNC: 198 MG/DL (ref 100–199)
CO2 SERPL-SCNC: 21 MMOL/L (ref 20–33)
CO2 SERPL-SCNC: 22 MMOL/L (ref 20–33)
COLOR UR: YELLOW
CREAT SERPL-MCNC: 0.94 MG/DL (ref 0.5–1.4)
CREAT SERPL-MCNC: 1.4 MG/DL (ref 0.5–1.4)
EOSINOPHIL # BLD AUTO: 0.32 K/UL (ref 0–0.51)
EOSINOPHIL NFR BLD: 4.4 % (ref 0–6.9)
ERYTHROCYTE [DISTWIDTH] IN BLOOD BY AUTOMATED COUNT: 43.4 FL (ref 35.9–50)
EST. AVERAGE GLUCOSE BLD GHB EST-MCNC: 117 MG/DL
GLOBULIN SER CALC-MCNC: 3 G/DL (ref 1.9–3.5)
GLOBULIN SER CALC-MCNC: 3.5 G/DL (ref 1.9–3.5)
GLUCOSE SERPL-MCNC: 119 MG/DL (ref 65–99)
GLUCOSE SERPL-MCNC: 97 MG/DL (ref 65–99)
GLUCOSE UR STRIP.AUTO-MCNC: NEGATIVE MG/DL
HBA1C MFR BLD: 5.7 % (ref 0–5.6)
HCT VFR BLD AUTO: 40.9 % (ref 37–47)
HDLC SERPL-MCNC: 51 MG/DL
HGB BLD-MCNC: 13.6 G/DL (ref 12–16)
IMM GRANULOCYTES # BLD AUTO: 0.04 K/UL (ref 0–0.11)
IMM GRANULOCYTES NFR BLD AUTO: 0.5 % (ref 0–0.9)
INR PPP: 0.97 (ref 0.87–1.13)
KETONES UR STRIP.AUTO-MCNC: NEGATIVE MG/DL
LDLC SERPL CALC-MCNC: 121 MG/DL
LEUKOCYTE ESTERASE UR QL STRIP.AUTO: NEGATIVE
LIPASE SERPL-CCNC: 40 U/L (ref 11–82)
LV EJECT FRACT  99904: 65
LV EJECT FRACT MOD 2C 99903: 72.51
LV EJECT FRACT MOD 4C 99902: 71.21
LV EJECT FRACT MOD BP 99901: 72.63
LYMPHOCYTES # BLD AUTO: 2.18 K/UL (ref 1–4.8)
LYMPHOCYTES NFR BLD: 29.7 % (ref 22–41)
MCH RBC QN AUTO: 31.4 PG (ref 27–33)
MCHC RBC AUTO-ENTMCNC: 33.3 G/DL (ref 33.6–35)
MCV RBC AUTO: 94.5 FL (ref 81.4–97.8)
MICRO URNS: NORMAL
MONOCYTES # BLD AUTO: 0.38 K/UL (ref 0–0.85)
MONOCYTES NFR BLD AUTO: 5.2 % (ref 0–13.4)
NEUTROPHILS # BLD AUTO: 4.32 K/UL (ref 2–7.15)
NEUTROPHILS NFR BLD: 59 % (ref 44–72)
NITRITE UR QL STRIP.AUTO: NEGATIVE
NRBC # BLD AUTO: 0 K/UL
NRBC BLD-RTO: 0 /100 WBC
PH UR STRIP.AUTO: 7.5 [PH]
PLATELET # BLD AUTO: 183 K/UL (ref 164–446)
PMV BLD AUTO: 9.7 FL (ref 9–12.9)
POTASSIUM SERPL-SCNC: 4.1 MMOL/L (ref 3.6–5.5)
POTASSIUM SERPL-SCNC: 5 MMOL/L (ref 3.6–5.5)
PROT SERPL-MCNC: 7 G/DL (ref 6–8.2)
PROT SERPL-MCNC: 8 G/DL (ref 6–8.2)
PROT UR QL STRIP: NEGATIVE MG/DL
PROTHROMBIN TIME: 12.9 SEC (ref 12–14.6)
RBC # BLD AUTO: 4.33 M/UL (ref 4.2–5.4)
RBC UR QL AUTO: NEGATIVE
SODIUM SERPL-SCNC: 138 MMOL/L (ref 135–145)
SODIUM SERPL-SCNC: 140 MMOL/L (ref 135–145)
SP GR UR STRIP.AUTO: 1.01
TRIGL SERPL-MCNC: 129 MG/DL (ref 0–149)
TROPONIN I SERPL-MCNC: <0.01 NG/ML (ref 0–0.04)
TROPONIN I SERPL-MCNC: <0.01 NG/ML (ref 0–0.04)
TSH SERPL DL<=0.005 MIU/L-ACNC: 5.23 UIU/ML (ref 0.38–5.33)
UROBILINOGEN UR STRIP.AUTO-MCNC: 0.2 MG/DL
WBC # BLD AUTO: 7.3 K/UL (ref 4.8–10.8)

## 2018-10-29 PROCEDURE — 700111 HCHG RX REV CODE 636 W/ 250 OVERRIDE (IP): Performed by: HOSPITALIST

## 2018-10-29 PROCEDURE — 80061 LIPID PANEL: CPT

## 2018-10-29 PROCEDURE — 700105 HCHG RX REV CODE 258: Performed by: EMERGENCY MEDICINE

## 2018-10-29 PROCEDURE — 80053 COMPREHEN METABOLIC PANEL: CPT

## 2018-10-29 PROCEDURE — 93306 TTE W/DOPPLER COMPLETE: CPT | Mod: 26 | Performed by: INTERNAL MEDICINE

## 2018-10-29 PROCEDURE — 700105 HCHG RX REV CODE 258: Performed by: HOSPITALIST

## 2018-10-29 PROCEDURE — G0378 HOSPITAL OBSERVATION PER HR: HCPCS

## 2018-10-29 PROCEDURE — 99220 PR INITIAL OBSERVATION CARE,LEVL III: CPT | Performed by: HOSPITALIST

## 2018-10-29 PROCEDURE — A9270 NON-COVERED ITEM OR SERVICE: HCPCS | Performed by: HOSPITALIST

## 2018-10-29 PROCEDURE — 97165 OT EVAL LOW COMPLEX 30 MIN: CPT

## 2018-10-29 PROCEDURE — 93306 TTE W/DOPPLER COMPLETE: CPT

## 2018-10-29 PROCEDURE — 85025 COMPLETE CBC W/AUTO DIFF WBC: CPT

## 2018-10-29 PROCEDURE — 83880 ASSAY OF NATRIURETIC PEPTIDE: CPT

## 2018-10-29 PROCEDURE — 70450 CT HEAD/BRAIN W/O DYE: CPT

## 2018-10-29 PROCEDURE — 84484 ASSAY OF TROPONIN QUANT: CPT

## 2018-10-29 PROCEDURE — G8979 MOBILITY GOAL STATUS: HCPCS | Mod: CI

## 2018-10-29 PROCEDURE — 83036 HEMOGLOBIN GLYCOSYLATED A1C: CPT

## 2018-10-29 PROCEDURE — 700102 HCHG RX REV CODE 250 W/ 637 OVERRIDE(OP): Performed by: HOSPITALIST

## 2018-10-29 PROCEDURE — 96372 THER/PROPH/DIAG INJ SC/IM: CPT

## 2018-10-29 PROCEDURE — G8978 MOBILITY CURRENT STATUS: HCPCS | Mod: CJ

## 2018-10-29 PROCEDURE — G8988 SELF CARE GOAL STATUS: HCPCS | Mod: CI

## 2018-10-29 PROCEDURE — G8987 SELF CARE CURRENT STATUS: HCPCS | Mod: CJ

## 2018-10-29 PROCEDURE — 99236 HOSP IP/OBS SAME DATE HI 85: CPT | Performed by: HOSPITALIST

## 2018-10-29 PROCEDURE — 81003 URINALYSIS AUTO W/O SCOPE: CPT

## 2018-10-29 PROCEDURE — 97161 PT EVAL LOW COMPLEX 20 MIN: CPT

## 2018-10-29 PROCEDURE — 93880 EXTRACRANIAL BILAT STUDY: CPT

## 2018-10-29 RX ORDER — ALENDRONATE SODIUM 10 MG/1
35 TABLET ORAL
Status: DISCONTINUED | OUTPATIENT
Start: 2018-11-04 | End: 2018-10-29 | Stop reason: HOSPADM

## 2018-10-29 RX ORDER — ENALAPRIL MALEATE 10 MG/1
20 TABLET ORAL EVERY MORNING
Status: DISCONTINUED | OUTPATIENT
Start: 2018-10-29 | End: 2018-10-29 | Stop reason: HOSPADM

## 2018-10-29 RX ORDER — AMOXICILLIN 250 MG
2 CAPSULE ORAL 2 TIMES DAILY
Status: DISCONTINUED | OUTPATIENT
Start: 2018-10-29 | End: 2018-10-29 | Stop reason: HOSPADM

## 2018-10-29 RX ORDER — BISACODYL 10 MG
10 SUPPOSITORY, RECTAL RECTAL
Status: DISCONTINUED | OUTPATIENT
Start: 2018-10-29 | End: 2018-10-29 | Stop reason: HOSPADM

## 2018-10-29 RX ORDER — AMLODIPINE BESYLATE 10 MG/1
10 TABLET ORAL DAILY
Status: DISCONTINUED | OUTPATIENT
Start: 2018-10-29 | End: 2018-10-29 | Stop reason: HOSPADM

## 2018-10-29 RX ORDER — ONDANSETRON 2 MG/ML
4 INJECTION INTRAMUSCULAR; INTRAVENOUS EVERY 4 HOURS PRN
Status: DISCONTINUED | OUTPATIENT
Start: 2018-10-29 | End: 2018-10-29 | Stop reason: HOSPADM

## 2018-10-29 RX ORDER — HEPARIN SODIUM 5000 [USP'U]/ML
5000 INJECTION, SOLUTION INTRAVENOUS; SUBCUTANEOUS EVERY 8 HOURS
Status: DISCONTINUED | OUTPATIENT
Start: 2018-10-29 | End: 2018-10-29 | Stop reason: HOSPADM

## 2018-10-29 RX ORDER — SODIUM CHLORIDE 9 MG/ML
500 INJECTION, SOLUTION INTRAVENOUS ONCE
Status: COMPLETED | OUTPATIENT
Start: 2018-10-29 | End: 2018-10-29

## 2018-10-29 RX ORDER — ONDANSETRON 4 MG/1
4 TABLET, ORALLY DISINTEGRATING ORAL EVERY 4 HOURS PRN
Status: DISCONTINUED | OUTPATIENT
Start: 2018-10-29 | End: 2018-10-29 | Stop reason: HOSPADM

## 2018-10-29 RX ORDER — POLYETHYLENE GLYCOL 3350 17 G/17G
1 POWDER, FOR SOLUTION ORAL
Status: DISCONTINUED | OUTPATIENT
Start: 2018-10-29 | End: 2018-10-29 | Stop reason: HOSPADM

## 2018-10-29 RX ORDER — SIMVASTATIN 20 MG
20 TABLET ORAL EVERY MORNING
Status: DISCONTINUED | OUTPATIENT
Start: 2018-10-29 | End: 2018-10-29 | Stop reason: HOSPADM

## 2018-10-29 RX ORDER — SODIUM CHLORIDE 9 MG/ML
INJECTION, SOLUTION INTRAVENOUS CONTINUOUS
Status: DISCONTINUED | OUTPATIENT
Start: 2018-10-29 | End: 2018-10-29

## 2018-10-29 RX ADMIN — ASPIRIN 81 MG: 81 TABLET, DELAYED RELEASE ORAL at 05:56

## 2018-10-29 RX ADMIN — SODIUM CHLORIDE: 9 INJECTION, SOLUTION INTRAVENOUS at 04:13

## 2018-10-29 RX ADMIN — ENALAPRIL MALEATE 20 MG: 10 TABLET ORAL at 08:40

## 2018-10-29 RX ADMIN — SIMVASTATIN 20 MG: 20 TABLET, FILM COATED ORAL at 05:58

## 2018-10-29 RX ADMIN — STANDARDIZED SENNA CONCENTRATE AND DOCUSATE SODIUM 2 TABLET: 8.6; 5 TABLET, FILM COATED ORAL at 05:58

## 2018-10-29 RX ADMIN — SODIUM CHLORIDE 500 ML: 9 INJECTION, SOLUTION INTRAVENOUS at 00:44

## 2018-10-29 RX ADMIN — HEPARIN SODIUM 5000 UNITS: 5000 INJECTION, SOLUTION INTRAVENOUS; SUBCUTANEOUS at 05:57

## 2018-10-29 RX ADMIN — AMLODIPINE BESYLATE 10 MG: 10 TABLET ORAL at 08:39

## 2018-10-29 ASSESSMENT — COGNITIVE AND FUNCTIONAL STATUS - GENERAL
MOVING FROM LYING ON BACK TO SITTING ON SIDE OF FLAT BED: A LITTLE
TOILETING: A LITTLE
SUGGESTED CMS G CODE MODIFIER MOBILITY: CK
DRESSING REGULAR LOWER BODY CLOTHING: A LOT
STANDING UP FROM CHAIR USING ARMS: A LITTLE
HELP NEEDED FOR BATHING: A LITTLE
DAILY ACTIVITIY SCORE: 20
SUGGESTED CMS G CODE MODIFIER DAILY ACTIVITY: CJ
CLIMB 3 TO 5 STEPS WITH RAILING: A LOT
MOVING TO AND FROM BED TO CHAIR: A LITTLE
MOBILITY SCORE: 17
WALKING IN HOSPITAL ROOM: A LITTLE
TURNING FROM BACK TO SIDE WHILE IN FLAT BAD: A LITTLE

## 2018-10-29 ASSESSMENT — GAIT ASSESSMENTS
DEVIATION: BRADYKINETIC;SHUFFLED GAIT
DISTANCE (FEET): 10
GAIT LEVEL OF ASSIST: CONTACT GUARD ASSIST
ASSISTIVE DEVICE: FRONT WHEEL WALKER

## 2018-10-29 ASSESSMENT — PATIENT HEALTH QUESTIONNAIRE - PHQ9
SUM OF ALL RESPONSES TO PHQ9 QUESTIONS 1 AND 2: 0
1. LITTLE INTEREST OR PLEASURE IN DOING THINGS: NOT AT ALL
2. FEELING DOWN, DEPRESSED, IRRITABLE, OR HOPELESS: NOT AT ALL

## 2018-10-29 ASSESSMENT — ENCOUNTER SYMPTOMS
FOCAL WEAKNESS: 0
WEAKNESS: 0
COUGH: 0
FEVER: 0
DIZZINESS: 0
HEMOPTYSIS: 0
DEPRESSION: 0
FLANK PAIN: 0
HEARTBURN: 0
SHORTNESS OF BREATH: 0
SPEECH CHANGE: 0
HALLUCINATIONS: 0
SENSORY CHANGE: 0
CHILLS: 0
VOMITING: 0
PALPITATIONS: 1
DOUBLE VISION: 0
BLURRED VISION: 0
MYALGIAS: 0
BRUISES/BLEEDS EASILY: 0
ABDOMINAL PAIN: 0
LOSS OF CONSCIOUSNESS: 1
NAUSEA: 0
EYE DISCHARGE: 0

## 2018-10-29 ASSESSMENT — COPD QUESTIONNAIRES
DURING THE PAST 4 WEEKS HOW MUCH DID YOU FEEL SHORT OF BREATH: NONE/LITTLE OF THE TIME
DO YOU EVER COUGH UP ANY MUCUS OR PHLEGM?: NO/ONLY WITH OCCASIONAL COLDS OR INFECTIONS
HAVE YOU SMOKED AT LEAST 100 CIGARETTES IN YOUR ENTIRE LIFE: NO/DON'T KNOW
COPD SCREENING SCORE: 2
IN THE PAST 12 MONTHS DO YOU DO LESS THAN YOU USED TO BECAUSE OF YOUR BREATHING PROBLEMS: DISAGREE/UNSURE

## 2018-10-29 ASSESSMENT — PAIN SCALES - GENERAL: PAINLEVEL_OUTOF10: 0

## 2018-10-29 ASSESSMENT — ACTIVITIES OF DAILY LIVING (ADL): TOILETING: INDEPENDENT

## 2018-10-29 ASSESSMENT — LIFESTYLE VARIABLES
SUBSTANCE_ABUSE: 0
ALCOHOL_USE: NO
EVER_SMOKED: NEVER

## 2018-10-29 NOTE — THERAPY
"Physical Therapy Evaluation completed.   Bed Mobility:  Supine to Sit: Contact Guard Assist  Transfers: Sit to Stand: Contact Guard Assist  Gait: Level Of Assist: Contact Guard Assist with Front-Wheel Walker       Plan of Care: Will benefit from Physical Therapy 2 times per week and Plan to complete next treatment by Friday 11/2  Discharge Recommendations: Equipment: Will Continue to Assess for Equipment Needs. Post-acute therapy Discharge to home with outpatient or home health for additional skilled therapy services.    Pt is an 89 year old female admitted to the hospital for syncopal episode, chest pain and weakness. Pt and pt's daughter provided the following history. Pt was independent with household distance ambulation with SPC. Pt used scooter for community distance mobility. Pt does live with family but they are not there 24/7 tot assist with care. At time of initial evaluation, pt required CGA to minimal assist for all mobility tasks. Did attempt to MMT,however, pt unable to follow multi step cues due to possible language barrier. Strength does appear WNL's for mobility tasks. Pt performed supine to sit and sit to stand with CGA. Extra time and efforts required. Pt only agreeable to ambulate short distance in room with FWW. Pt with extremely slow jey, shuffled gait pattern. Pt with short step and stride length, shuffled gait pattern. Per daughter, pt with low endurance at baseline. Pt would benefit from skilled PT intervention while in the acute care setting to address the listed deficits and improve mobility prior to DC home. If family able to assist with care upon DC, pt is clear for DC home. Discussed use of 4WW at home and having home health therapies work with pt to improve balance, gait and overall endurance    See \"Rehab Therapy-Acute\" Patient Summary Report for complete documentation.     "

## 2018-10-29 NOTE — THERAPY
"Occupational Therapy Evaluation completed.   Functional Status:  Min A for supine>sit; CGA for sit>Stand; CGA for toilet transfer; SPV for toileting; CGA for standing grooming; SBA For sit>supine  Plan of Care: Will benefit from Occupational Therapy 2 times per week  Discharge Recommendations:  Equipment: Will Continue to Assess for Equipment Needs. Post-acute therapy Discharge to home with outpatient or home health for additional skilled therapy services.    See \"Rehab Therapy-Acute\" Patient Summary Report for complete documentation.    OT eval completed on 88 YO F admitted with syncope. Pt limited due to poor safety awareness, decreased activity tolerance, standing tolerance and insight into current deficits. Pt required constant VC's for safety with FWW during func amb to bathroom. Pt lives with adult children and is alone for part of the day as they work. Pt reports they live in a 2SA however pt primarily stays on first floor. Pt reports I with BADLs at [facility]. Pt would benefit from continued acute OT services for strengthening and increasing independence with BADLs. Pt would benefit from HH/outpatient therapy services upon d/c for continued strengthening.   "

## 2018-10-29 NOTE — H&P
Hospital Medicine History & Physical Note    Date of Service  10/29/2018    Primary Care Physician  Ash Whiting M.D.    Consultants  none    Code Status  full    Chief Complaint  syncope    History of Presenting Illness  89 y.o. female who presented 10/28/2018 with syncopal episode.  [place] them play poker.  She states she started to feel dizzy and then she developed loss of consciousness.  She did not have any head trauma.  She was unconscious for about a minute.  There was no shaking no bowel or bladder incontinence.  She states that she has been dealing with chest pain and shortness of breath with associated palpitations yesterday.  She does take medications for hypertension high cholesterol.  She has no known alleviating or exacerbating factors.    Review of Systems  Review of Systems   Constitutional: Negative for chills and fever.   HENT: Negative for congestion, hearing loss and tinnitus.    Eyes: Negative for blurred vision, double vision and discharge.   Respiratory: Negative for cough, hemoptysis and shortness of breath.    Cardiovascular: Positive for chest pain and palpitations. Negative for leg swelling.   Gastrointestinal: Negative for abdominal pain, heartburn, nausea and vomiting.   Genitourinary: Negative for dysuria and flank pain.   Musculoskeletal: Negative for joint pain and myalgias.   Skin: Negative for rash.   Neurological: Positive for loss of consciousness. Negative for dizziness, sensory change, speech change, focal weakness and weakness.   Endo/Heme/Allergies: Negative for environmental allergies. Does not bruise/bleed easily.   Psychiatric/Behavioral: Negative for depression, hallucinations and substance abuse.       Past Medical History   has a past medical history of H/O bladder repair surgery; H/O: hysterectomy; Hypertension; and Vertigo.    Surgical History   has a past surgical history that includes hysterectomy  radical.     Family History  Reviewed and noncontributory     Social History   reports that she has never smoked. She has never used smokeless tobacco. She reports that she does not drink alcohol or use drugs.    Allergies  No Known Allergies    Medications  Prior to Admission Medications   Prescriptions Last Dose Informant Patient Reported? Taking?   Multiple Vitamins-Minerals (CENTRUM ADULTS PO) Taking  Yes No   Sig: Take  by mouth.   alendronate (FOSAMAX) 35 MG tablet   No No   Sig: Take 1 Tab by mouth every 7 days.   amLODIPine (NORVASC) 10 MG Tab   No No   Sig: Take 1 Tab by mouth every day.   aspirin 81 MG tablet Taking at am Patient No No   Sig: Take 1 Tab by mouth every morning.   enalapril (VASOTEC) 20 MG tablet   No No   Sig: Take 1 Tab by mouth every morning.   fexofenadine (ALLEGRA) 180 MG tablet Taking  No No   Sig: Take 1 Tab by mouth every day.   fluticasone (FLONASE) 50 MCG/ACT nasal spray Taking  No No   Sig: Spray 1 Spray in nose every day.   simvastatin (ZOCOR) 20 MG Tab   No No   Sig: Take 1 Tab by mouth every morning.   vitamin D, Ergocalciferol, (DRISDOL) 77912 units Cap capsule   No No   Sig: Take 1 Cap by mouth every 7 days.      Facility-Administered Medications: None       Physical Exam  Temp:  [36.3 °C (97.3 °F)] 36.3 °C (97.3 °F)  Pulse:  [70-77] 70  Resp:  [18-30] 19  BP: (147)/(59) 147/59    Physical Exam   Constitutional: She is oriented to person, place, and time. She appears well-developed and well-nourished. No distress.   HENT:   Head: Normocephalic and atraumatic.   Dry oral mucosa   Eyes: Pupils are equal, round, and reactive to light. Conjunctivae and EOM are normal.   Neck: Normal range of motion. Neck supple. No JVD present.   Cardiovascular: Normal rate, regular rhythm, normal heart sounds and intact distal pulses.    No murmur heard.  Pulmonary/Chest: Effort normal and breath sounds normal. No respiratory distress. She has no wheezes.   Abdominal: Soft. Bowel sounds are  normal. She exhibits no distension. There is no tenderness.   Musculoskeletal: Normal range of motion. She exhibits no edema.   Neurological: She is alert and oriented to person, place, and time. She exhibits normal muscle tone.   Skin: Skin is warm and dry.   Psychiatric: She has a normal mood and affect. Her behavior is normal. Judgment and thought content normal.   Nursing note and vitals reviewed.      Laboratory:  Recent Labs      10/28/18   2336   WBC  8.8   RBC  4.93   HEMOGLOBIN  15.6   HEMATOCRIT  46.7   MCV  94.7   MCH  31.6   MCHC  33.4*   RDW  43.5   PLATELETCT  216   MPV  10.0     Recent Labs      10/28/18   2336   SODIUM  138   POTASSIUM  5.0   CHLORIDE  104   CO2  22   GLUCOSE  119*   BUN  24*   CREATININE  1.40   CALCIUM  10.0     Recent Labs      10/28/18   2336   ALTSGPT  31   ASTSGOT  42   ALKPHOSPHAT  73   TBILIRUBIN  0.6   LIPASE  40   GLUCOSE  119*     Recent Labs      10/28/18   2336   APTT  20.0*   INR  0.97     Recent Labs      10/28/18   2336   BNPBTYPENAT  38         Recent Labs      10/28/18   2336   TROPONINI  <0.01       Urinalysis:    No results found     Imaging:  CT-HEAD W/O   Final Result         1.  No acute intracranial abnormality is identified, there are nonspecific white matter changes, commonly associated with small vessel ischemic disease.  Associated mild cerebral atrophy is noted.   2.  Atherosclerosis.      DX-CHEST-LIMITED (1 VIEW)   Final Result         1.  No acute cardiopulmonary disease.   2.  Left basilar atelectasis   3.  Atherosclerosis      EC-ECHOCARDIOGRAM COMPLETE W/O CONT    (Results Pending)   US-CAROTID DOPPLER BILAT    (Results Pending)         Assessment/Plan:  I anticipate this patient is appropriate for observation status at this time.    * Syncope   Assessment & Plan    Patient with syncopal episode, seems more orthostatic vs cardiac in etiology  Will continue cardiac monitoring   Check orthostatics; will give fluids as patient seems dehydrated  Check  echocardiogram, carotids   No evidence of seizure like activity           Elevated BUN   Assessment & Plan    IVF and repeat labs        Coronary artery disease involving native coronary artery of native heart without angina pectoris- (present on admission)   Assessment & Plan    Hx of, on ASA consider BB therapy        Dyslipidemia- (present on admission)   Assessment & Plan    Resume statin  Recheck lipid panel        Essential hypertension- (present on admission)   Assessment & Plan    Resume home anti hypertensives            VTE prophylaxis: heparin

## 2018-10-29 NOTE — PROGRESS NOTES
Received report from ESTEFANY Mcneill in ER. Patient transported to CDU by W/C on telemetry. Patient put in room 206. VS taken and stable. Patient denies pain.

## 2018-10-29 NOTE — ASSESSMENT & PLAN NOTE
Patient with syncopal episode, seems more orthostatic vs cardiac in etiology  Will continue cardiac monitoring   Check orthostatics; will give fluids as patient seems dehydrated  Check echocardiogram, carotids   No evidence of seizure like activity

## 2018-10-29 NOTE — ED TRIAGE NOTES
Chief Complaint   Patient presents with   • Syncope   • Chest Pain     Brought in by [facility].  Per ems, pt had [place].  Pt states that she was standing and then blacked out waking up on the floor.  States has mild chest pain.  Denies sob, n/v.  Pt here for above complaint.  Connected to cardiac monitor, BP cuff, and continuous pulse ox upon arrival.  Pt in gown, call light in reach, bed in lowest position.  Chart up for ERP.

## 2018-10-29 NOTE — PROGRESS NOTES
Walk patient with a walker and did good. Urine sample send to Lab. Tolerates her breakfast. Family member at bedside.

## 2018-10-29 NOTE — ED NOTES
Orthostatic vital, full assist  Supine 141 48 hr 73   Sitting 140 56 hr 70  Standing (full assist) 125 68 hr 91

## 2018-10-29 NOTE — ED PROVIDER NOTES
ED Provider Note    CHIEF COMPLAINT  Chief Complaint   Patient presents with   • Syncope   • Chest Pain       HPI  Sabrina Lobo is a 89 y.o. female who presents to the emergency department via EMS for syncopal event.  According to the family and EMS [place] she states she was watching them play poker and according to family she said that she started to feel weak and dizzy they try to help her to a chair and on the way down she lost consciousness she did not fall or have any trauma and they slowly lower to the ground.  Son states patient was unconscious for about a minute.  EMS received a call where there within 5 minutes and the patient was awake.  She tells me she cannot remember anything prior to the syncopal event just that [place] and was otherwise in her normal state of health before this happened.  Currently she denies any chest pain shortness of breath single-sided weakness numbness or tingling any new or worsening vision changes or speech difficulty.  She is awake and oriented.  She takes medication for blood pressure hypercholesterolemia and baby aspirin daily.  She states her biggest complaint right now is that she is thirsty and generally feeling weak    REVIEW OF SYSTEMS  Positives as above. Pertinent negatives include chest pain shortness of breath abdominal pain nausea vomiting fevers chills speech difficulty vision changes one-sided weakness  All other review of systems are negative    PAST MEDICAL HISTORY   has a past medical history of H/O bladder repair surgery; H/O: hysterectomy; Hypertension; and Vertigo.    SOCIAL HISTORY  Social History     Social History Main Topics   • Smoking status: Never Smoker   • Smokeless tobacco: Never Used   • Alcohol use No   • Drug use: No   • Sexual activity: Not on file       SURGICAL HISTORY   has a past surgical history that includes hysterectomy radical.    CURRENT MEDICATIONS  Home Medications   "  **Home medications have not yet been reviewed for this encounter**         ALLERGIES  No Known Allergies    PHYSICAL EXAM  VITAL SIGNS: /59   Pulse 77   Temp 36.3 °C (97.3 °F)   Resp 18   Ht 1.575 m (5' 2\")   Wt 53.1 kg (117 lb)   SpO2 98%   BMI 21.40 kg/m²    Pulse ox interpretation: I interpret this pulse ox as normal.  Constitutional: Alert in no apparent distress.  HENT: Normocephalic atraumatic, mildly dry MM  Eyes: PER, Conjunctiva normal, Non-icteric.   Neck: Normal range of motion, No tenderness, Supple, No stridor.    Cardiovascular: Regular rate and rhythm, no murmurs.   Thorax & Lungs: Normal breath sounds, No respiratory distress, No wheezing, No chest tenderness.   Abdomen: Bowel sounds normal, Soft, No tenderness, No pulsatile masses. No peritoneal signs.  Skin: Warm, Dry, No erythema, No rash.   Back: No bony tenderness, No CVA tenderness.   Extremities: Intact distal pulses, No edema, No tenderness, No cyanosis  Musculoskeletal: Good range of motion in all major joints. No tenderness to palpation or major deformities noted.   Neurologic: Alert and oriented x3, Symmetric smile, eyes shut tight bilaterally, forehead wrinkles bilaterally, sensation intact to light touch bilateral face, tongue midline, head turn and shoulder shrug with full strength. Hearing intact grossly bilaterally. 5/5  strength bilaterally, 5/5 tricep and bicep strength bilaterally. Sensation intact to light touch r, m, u, axillary nerves bilaterally. 5/5 strength quadricep, plantarflexion/dorsiflexion/extensor hallicus longus bilaterally. Sensation intact to light touch bilateral lower extremities in all nerve distributions.        DIFFERENTIAL DIAGNOSIS AND WORK UP PLAN    This is a 89 y.o. female who presents with syncopal event for approximately a minute preceded by dizziness and fatigue, she has a normal neurologic exam and this time does appear mildly dehydrated, her EKG is showing sinus rhythm EMS report " had a questionable A. fib on their arrival on the monitor but there are 12-lead at the bedside is only showing sinus rhythm I have no rhythm strip that indicates an arrhythmia.  Possible arrhythmia leading to syncope although she is in sinus rhythm now versus electrolyte abnormality versus infection, she has a nonfocal neurologic examination but a TIA is still on the differential.  Will perform laboratory analysis CT scan of the head chest x-ray urinalysis and reassess while the patient is observed on the cardiac monitor    DIAGNOSTIC STUDIES / PROCEDURES    EKG  12- Lead EKG; interpreted by myself - Ever  Normal sinus rhythm with a rate of 70 bpm.   Normal axis.  First degree AVB  No ST elevation or depression, or abnormal T wave inversion   No widening of QRS complex   Good R wave progression   No diagnostic Q waves.   Unchanged from prior EKG   Clinical Impression: Normal sinus rhythm rate of 70 with a first-degree AV block unchanged from prior      LABS  Pertinent Lab Findings  CBC within normal limits, CMP with mildly elevated BUN concerning for mild dehydration otherwise normal coags troponin BNP lipase all negative  Still pending urinalysis      RADIOLOGY  CT-HEAD W/O   Final Result         1.  No acute intracranial abnormality is identified, there are nonspecific white matter changes, commonly associated with small vessel ischemic disease.  Associated mild cerebral atrophy is noted.   2.  Atherosclerosis.      DX-CHEST-LIMITED (1 VIEW)   Final Result         1.  No acute cardiopulmonary disease.   2.  Left basilar atelectasis   3.  Atherosclerosis        The radiologist's interpretation of all radiological studies have been reviewed by me.      COURSE & MEDICAL DECISION MAKING  Pertinent Labs & Imaging studies reviewed. (See chart for details)    12:42 AM  Orthostatic blood pressure and heart rate are positive indicative of possible dehydration as the cause of her syncope, when she went from sitting to  "standing her heart rate jumped from 70-91 in light of her orthostatic syncope should be given 500 cc of fluids and likely admitted for observations due to her age    12:55 AM  Spoke with the family at the bedside the patient states she is starting to feel little better after she is receiving IV fluids and requesting oral fluids.  With her elevated BUN and positive orthostatics concerning for dehydration leading to her syncope.  She will be admitted for observation overnight and rehydration    1:11 AM  Spoke w Dr Gómez for admission for syncope and orthostatic hypotension in the setting of her advanced age he is excepted the patient    /54 Pulse 68   Temp 36.3 °C (97.3 °F)   Resp 19   Ht 1.575 m (5' 2\")   Wt 53.1 kg (117 lb)   SpO2 96%   BMI 21.40 kg/m²       DISPOSITION:  Patient will be admitted to Dr Gómez in stable condition.        FINAL IMPRESSION  1. Syncope  2. Orthostatic syncope  3. JEOVANNY         Electronically signed by: Lien Curtis, 10/28/2018 11:51 PM    This dictation has been created using voice recognition software and/or scribes. The accuracy of the dictation is limited by the abilities of the software and the expertise of the scribes. I expect there may be some errors of grammar and possibly content. I made every attempt to manually correct the errors within my dictation. However, errors related to voice recognition software and/or scribes may still exist and should be interpreted within the appropriate context.    "

## 2018-10-29 NOTE — DISCHARGE INSTRUCTIONS
Discharge Instructions    Discharged to home by car with relative. Discharged via wheelchair, hospital escort: Yes.  Special equipment needed: Not Applicable    Be sure to schedule a follow-up appointment with your primary care doctor or any specialists as instructed.     Discharge Plan:   Diet Plan: Discussed  Activity Level: Discussed  Confirmed Follow up Appointment: Patient to Call and Schedule Appointment  Confirmed Symptoms Management: Discussed  Medication Reconciliation Updated: Yes  Pneumococcal Vaccine Administered/Refused: Not given - Patient refused pneumococcal vaccine  Influenza Vaccine Indication: Not indicated: Previously immunized this influenza season and > 8 years of age    I understand that a diet low in cholesterol, fat, and sodium is recommended for good health. Unless I have been given specific instructions below for another diet, I accept this instruction as my diet prescription.   Other diet: Regular Diet    Special Instructions: None    · Is patient discharged on Warfarin / Coumadin?   No     Depression / Suicide Risk    As you are discharged from this [facility] facility, it is important to learn how to keep safe from harming yourself.    Recognize the warning signs:  · Abrupt changes in personality, positive or negative- including increase in energy   · Giving away possessions  · Change in eating patterns- significant weight changes-  positive or negative  · Change in sleeping patterns- unable to sleep or sleeping all the time   · Unwillingness or inability to communicate  · Depression  · Unusual sadness, discouragement and loneliness  · Talk of wanting to die  · Neglect of personal appearance   · Rebelliousness- reckless behavior  · Withdrawal from people/activities they love  · Confusion- inability to concentrate     If you or a loved one observes any of these behaviors or has concerns about self-harm, here's what you can do:  · Talk about it- your feelings and reasons for harming  yourself  · Remove any means that you might use to hurt yourself (examples: pills, rope, extension cords, firearm)  · Get professional help from the community (Mental Health, Substance Abuse, psychological counseling)  · Do not be alone:Call your Safe Contact- someone whom you trust who will be there for you.  · Call your local CRISIS HOTLINE 675-5602 or 035-980-0338  · Call your local Children's Mobile Crisis Response Team [place] (095) 531-0408 or www.Health: [place]  · Call the toll free National Suicide Prevention Hotlines   · National Suicide Prevention Lifeline 713-249-KSVH (1218)  · National Hope Line Network 800-SUICIDE (526-6864)

## 2018-10-29 NOTE — DISCHARGE SUMMARY
Hospital Medicine Discharge Note     Patient ID:  Sabrina Lobo  [id number]  89 y.o.female  4/13/1929    Admit Date:  10/28/2018       Discharge Date:   10/29/2018    Primary Care Provider: Ash Whiting M.D.    Admitting Physician: Alla Gómez M.D.  Discharging Physician: Tim Quintero M.D.    Chief Complaint: Syncope     Discharge Diagnoses:   Principal Problem:    Syncope-no further episodes of syncope, likely vasovagal event or dehydration  Active Problems:    Essential hypertension-controlled follow-up with PCP    Dyslipidemia-continue outpatient management follow-up with PCP    Coronary artery disease involving native coronary artery of native heart without angina [id number] with PCP and cardiology as needed    Elevated BUN-improved with IV fluids      Chronic Medical Problems:  Past Medical History:   Diagnosis Date   • H/O bladder repair surgery     not sure per patient daughter    • H/O: hysterectomy    • Hypertension    • Vertigo        Code Status: Full Code    Hospital Summary:       Please refer to H&P by Alla Gómez M.D. on 10/28/2018 for full details.      In brief, Sabrina Lobo is a 89 y.o. female who was admitted 10/28/2018 for syncope.  Patient has known history of bladder repair surgery, hysterectomy, hypertension and previous vertigo.  Patient was at [facility] with her friends and family watching poker when she started to feel dizzy and developed loss of consciousness.  Patient was transported to [facility] for further workup.  Patient was admitted to CDU for further workup and monitoring.  Patient was placed on telemetry monitoring with no acute cardiac events noted.  EKG on admission shows sinus rhythm with first-degree AV block however no acute ST changes or T wave abnormalities are noted.  Patient was provided with antiemetics and analgesics as needed.  Patient was noted to be slightly dehydrated and gentle IV fluid rehydration was initiated.  Chest x-ray was  obtained which shows no acute cardiopulmonary disease.  CBC was essentially benign and noncontributory.  CMP showed slightly elevated glucose and elevated BUN which did resolve with IV fluid hydration.  BNP was within normal limits.  Lipid panel was obtained.  Troponins remain negative x2.  TSH within normal limits.  Urinalysis negative for infection.  CT head completed for syncopal episode with loss of consciousness which showed no acute intracranial abnormality.  Echocardiogram was completed which showed no significant change since previous echocardiogram on 7/13/17.  Ultrasound carotid arteries showed less than 50% and internal carotid artery stenosis.  Patient was evaluated by PT/OT therapies and found to have no needs if she was to be discharged with family, with continuing of outpatient physical therapy.   On exam today patient states she feels significantly better, after fluid administration.  Patient states she is wants to go home at this time.  Patient's vital signs are stable at this time.  Patient's lungs are clear bilaterally on auscultation.  Patient maintaining oxygen saturation on room air.  Patient is up ambulating with front wheel walker as this is her baseline.  Patient states she will continue to drink water on discharge and stay well-hydrated at home.     Therefore, she is discharged in  and stable condition with close outpatient follow-up.    Consultants:      None    Studies:    Imaging/ Testing:      US-CAROTID DOPPLER BILAT   Final Result   CONCLUSIONS   Right carotid.    Plaque of the bifurcation extending into the internal carotid. Velocities    are consistent with < 50% stenosis of the internal carotid artery.      Left carotid.    Plaque of the bifurcation extending into the internal carotid. Velocities    are consistent with < 50% stenosis of the internal carotid artery.      The proximal subclavian was unable to be visualized, likely proximal    subclavian stenosis.   EC-ECHOCARDIOGRAM  COMPLETE W/O CONT   Final Result   Echocardiography Laboratory    CONCLUSIONS  Compared to the images of the study done 7/3/17 - there has been no   significant change.  Normal left ventricular systolic function.   No evidence of valvular abnormality based on Doppler evaluation.    CT-HEAD W/O   Final Result         1.  No acute intracranial abnormality is identified, there are nonspecific white matter changes, commonly associated with small vessel ischemic disease.  Associated mild cerebral atrophy is noted.   2.  Atherosclerosis.      DX-CHEST-LIMITED (1 VIEW)   Final Result         1.  No acute cardiopulmonary disease.   2.  Left basilar atelectasis   3.  Atherosclerosis            Laboratory:   Recent Labs      10/28/18   2336  10/29/18   0855   WBC  8.8  7.3   RBC  4.93  4.33   HEMOGLOBIN  15.6  13.6   HEMATOCRIT  46.7  40.9   MCV  94.7  94.5   MCH  31.6  31.4   MCHC  33.4*  33.3*   RDW  43.5  43.4   PLATELETCT  216  183   MPV  10.0  9.7       Recent Labs      10/28/18   2336  10/29/18   0855   SODIUM  138  140   POTASSIUM  5.0  4.1   CHLORIDE  104  110   CO2  22  21   GLUCOSE  119*  97   BUN  24*  18   CREATININE  1.40  0.94   CALCIUM  10.0  9.4       Recent Labs      10/28/18   2336  10/29/18   0855   ALTSGPT  31  24   ASTSGOT  42  27   ALKPHOSPHAT  73  68   TBILIRUBIN  0.6  0.7   LIPASE  40   --    GLUCOSE  119*  97        Recent Labs      10/28/18   2336  10/29/18   0340   BNPBTYPENAT  38  53       Recent Labs      10/29/18   0855   TRIGLYCERIDE  129   HDL  51   LDL  121*       Recent Labs      10/28/18   2336  10/29/18   0340   TROPONINI  <0.01  <0.01       Recent Labs      10/28/18   2336   TSHULTRASEN  5.230       Results     Procedure Component Value Units Date/Time    URINALYSIS CULTURE, IF INDICATED [[id number]] Collected:  10/29/18 1000    Order Status:  Completed Specimen:  Urine from Urine, Clean Catch Updated:  10/29/18 1046     Color Yellow     Character Clear     Specific Gravity 1.006     Ph 7.5      Glucose Negative mg/dL      Ketones Negative mg/dL      Protein Negative mg/dL      Bilirubin Negative     Urobilinogen, Urine 0.2     Nitrite Negative     Leukocyte Esterase Negative     Occult Blood Negative     Micro Urine Req see below     Comment: Microscopic examination not performed when specimen is clear  and chemically negative for protein, blood, leukocyte esterase  and nitrite.                 Procedures/Surgeries:        None     Disposition:    Discharge home    Condition:  Stable    Instructions:   Activity: As tolerated.  Diet: As tolerated, increase fluids   Followup: With PCP   Instructions:  -Please ensure you are drinking adequate amounts of liquids   -Given instructions to return to the ER if any new or worsening symptoms, worsening condition, or failure to improve  -Call PCP for followup  -No smoking, no alcohol, no caffeine  -Encourage risk factor reduction with tobacco and alcohol abstinence, diet changes, weight loss, and exercise.     Follow-Up  Future Appointments  Date Time Provider Department Center   10/30/2018 3:00 PM [facility]   11/5/2018 2:30 PM Serina Ortega, PT, DPT [address] [place] [address]   11/8/2018 3:30 PM Serina Ortega, PT, DPT [facility] [place] [address]   11/12/2018 3:00 PM Serina Ortega, PT, DPT [facility] [place] [address]   11/15/2018 4:00 PM Serina Ortega, PT, DPT [facility] [place] [address]   11/19/2018 2:00 PM Serina Ortega, PT, DPT [facility] [place] [address]   11/26/2018 3:00 PM Serina Ortega, PT, DPT [facility] [place] [address]   11/29/2018 3:30 PM Serina Ortega, PT, DPT [facility] [place] [address]   1/16/2019 11:00 AM [facility]       Discharge Medications:             Medication List      CONTINUE taking these medications      Instructions   alendronate 35 MG tablet  Commonly known as:  FOSAMAX   Take 1 Tab by mouth every 7 days.  Dose:  35 mg     amLODIPine 10 MG Tabs  Commonly known as:  NORVASC   Take 1 Tab by mouth every day.  Dose:  10  mg     aspirin 81 MG tablet   Take 1 Tab by mouth every morning.  Dose:  81 mg     CENTRUM ADULTS PO   Take  by mouth.     enalapril 20 MG tablet  Commonly known as:  VASOTEC   Take 1 Tab by mouth every morning.  Dose:  20 mg     fexofenadine 180 MG tablet  Commonly known as:  ALLEGRA   Take 1 Tab by mouth every day.  Dose:  180 mg     fluticasone 50 MCG/ACT nasal spray  Commonly known as:  FLONASE   Spray 1 Spray in nose every day.  Dose:  1 Spray     simvastatin 20 MG Tabs  Commonly known as:  ZOCOR   Take 1 Tab by mouth every morning.  Dose:  20 mg     vitamin D (Ergocalciferol) 65118 units Caps capsule  Commonly known as:  DRISDOL   Take 1 Cap by mouth every 7 days.  Dose:  79399 Units              Please CC the above physicians    SUKHDEV Preciado  10/29/2018  1:55 PM

## 2018-10-29 NOTE — PROGRESS NOTES
Assume patient care. Discuss plan of care, for Echo carotid study this morning. Fall precaution in placed. Update her Care plan board.

## 2018-10-29 NOTE — ED NOTES
Pt and family updated on POC.   Pt provided with juice. No other needs at this time. Will continue to monitor.

## 2018-11-16 ASSESSMENT — ENCOUNTER SYMPTOMS: GENERAL WELL-BEING: FAIR

## 2019-01-09 ENCOUNTER — TELEPHONE (OUTPATIENT)
Dept: MEDICAL GROUP | Facility: MEDICAL CENTER | Age: 84
End: 2019-01-09

## 2019-01-09 NOTE — TELEPHONE ENCOUNTER
ESTABLISHED PATIENT PRE-VISIT PLANNING     Patient was NOT contacted to complete PVP.     Note: Patient will not be contacted if there is no indication to call.     1.  Reviewed notes from the last few office visits within the medical group: Yes  10/09/2018  2.  If any orders were placed at last visit or intended to be done for this visit (i.e. 6 mos follow-up), do we have Results/Consult Notes?        •  Labs - Labs were not ordered at last office visit.   Note: If patient appointment is for lab review and patient did not complete labs, check with provider if OK to reschedule patient until labs completed.       •  Imaging - Imaging was not ordered at last office visit.       •  Referrals - No referrals were ordered at last office visit.    3. Is this appointment scheduled as a Hospital Follow-Up? No    4.  Immunizations were updated in [facility] using WebIZ?: Yes       •  Web Iz Recommendations: PREVNAR (PCV13) , TDAP and SHINGRIX (Shingles)    5.  Patient is due for the following Health Maintenance Topics:   Health Maintenance Due   Topic Date Due   • IMM DTaP/Tdap/Td Vaccine (1 - Tdap) 04/13/1948   • IMM ZOSTER VACCINES (1 of 2) 04/13/1979   • IMM PNEUMOCOCCAL 65+ (ADULT) LOW/MEDIUM RISK SERIES (2 of 2 - PCV13) 09/01/2011       6. Orders for overdue Health Maintenance topics pended in Pre-Charting? NO    7.  AHA (MDX) form printed for Provider? YES    8.  Patient was NOT informed to arrive 15 min prior to their scheduled appointment and bring in their medication bottles.

## 2019-03-29 ENCOUNTER — APPOINTMENT (OUTPATIENT)
Dept: URGENT CARE | Facility: CLINIC | Age: 84
End: 2019-03-29
Payer: MEDICARE

## 2019-03-29 ENCOUNTER — OFFICE VISIT (OUTPATIENT)
Dept: URGENT CARE | Facility: CLINIC | Age: 84
End: 2019-03-29
Payer: MEDICARE

## 2019-03-29 VITALS
HEART RATE: 86 BPM | TEMPERATURE: 97.8 F | RESPIRATION RATE: 14 BRPM | HEIGHT: 62 IN | SYSTOLIC BLOOD PRESSURE: 138 MMHG | OXYGEN SATURATION: 97 % | DIASTOLIC BLOOD PRESSURE: 78 MMHG | WEIGHT: 112.2 LBS | BODY MASS INDEX: 20.65 KG/M2

## 2019-03-29 DIAGNOSIS — R63.0 DECREASED APPETITE: ICD-10-CM

## 2019-03-29 DIAGNOSIS — R53.83 FATIGUE, UNSPECIFIED TYPE: ICD-10-CM

## 2019-03-29 DIAGNOSIS — M54.50 ACUTE BILATERAL LOW BACK PAIN WITHOUT SCIATICA: ICD-10-CM

## 2019-03-29 LAB
APPEARANCE UR: CLEAR
BILIRUB UR STRIP-MCNC: NEGATIVE MG/DL
COLOR UR AUTO: YELLOW
GLUCOSE UR STRIP.AUTO-MCNC: NEGATIVE MG/DL
KETONES UR STRIP.AUTO-MCNC: NEGATIVE MG/DL
LEUKOCYTE ESTERASE UR QL STRIP.AUTO: NEGATIVE
NITRITE UR QL STRIP.AUTO: NEGATIVE
PH UR STRIP.AUTO: 7 [PH] (ref 5–8)
PROT UR QL STRIP: NEGATIVE MG/DL
RBC UR QL AUTO: NEGATIVE
SP GR UR STRIP.AUTO: 1.01
UROBILINOGEN UR STRIP-MCNC: 0.2 MG/DL

## 2019-03-29 PROCEDURE — 99214 OFFICE O/P EST MOD 30 MIN: CPT | Mod: 25 | Performed by: NURSE PRACTITIONER

## 2019-03-29 PROCEDURE — 81002 URINALYSIS NONAUTO W/O SCOPE: CPT | Performed by: NURSE PRACTITIONER

## 2019-03-29 ASSESSMENT — ENCOUNTER SYMPTOMS
COUGH: 0
ABDOMINAL PAIN: 0
LOSS OF CONSCIOUSNESS: 0
BACK PAIN: 1
FEVER: 0
VOMITING: 0
DIZZINESS: 0
SEIZURES: 0
NAUSEA: 0
CONSTIPATION: 0
WEIGHT LOSS: 1
FALLS: 0
CHILLS: 0
PALPITATIONS: 0
DIARRHEA: 0
TINGLING: 0
HEADACHES: 0

## 2019-03-29 ASSESSMENT — LIFESTYLE VARIABLES: SUBSTANCE_ABUSE: 0

## 2019-03-29 NOTE — PROGRESS NOTES
"Subjective:      Sabrina Lobo is a 89 y.o. female who presents with Fever (loss of apetite, lower back pain, having issues sleeping)    Reviewed past medical, surgical and family history with patient. Reviewed prescription and OTC medications with patient in electronic health record today.     No Known Allergies        BIB son and son-in-law     HPI This is a new problem.  Sabrina is a very pleasant 88 y/o female with c/o : past week + fever ( subjective)  with weakness. Loss of appetite. Eating only a few bites of every meal. Fever has stopped but appetite is still less than normal. Low back pain. Feeling tired all the time. occ she feels dizzy.  Feels week.  Normal BM everyday. \" I don't feel hungry\". Treatment tried: none.  No other aggravating or alleviating factors.  Has not been taking all of her prescribed medications as they are ordered.  \" I need to get some refills on them and I don't like them all\".       Review of Systems   Constitutional: Positive for malaise/fatigue and weight loss (clothes are fitting \" normal\" ). Negative for chills and fever.   HENT: Negative for congestion.    Respiratory: Negative for cough.    Cardiovascular: Negative for chest pain and palpitations.   Gastrointestinal: Negative for abdominal pain, constipation, diarrhea, nausea and vomiting.   Genitourinary: Negative for dysuria and urgency.   Musculoskeletal: Positive for back pain (low back). Negative for falls.   Neurological: Negative for dizziness, tingling, seizures, loss of consciousness and headaches.   Psychiatric/Behavioral: Negative for substance abuse.          Objective:     /78 (BP Location: Left arm, Patient Position: Sitting)   Pulse 86   Temp 36.6 °C (97.8 °F)   Resp 14   Ht 1.575 m (5' 2\")   Wt 52.6 kg (116 lb)   SpO2 97%   BMI 21.22 kg/m²      Physical Exam   Constitutional: She is oriented to person, place, and time. Vital signs are normal. She appears well-developed and well-nourished. She is " cooperative.  Non-toxic appearance. She does not have a sickly appearance. No distress.   No significant weight loss since her last office visit with PCP.      HENT:   Head: Normocephalic.   Eyes: Pupils are equal, round, and reactive to light.   Neck: Normal range of motion. Neck supple.   Cardiovascular: Normal rate, regular rhythm and normal pulses.    Pulmonary/Chest: Effort normal and breath sounds normal.   Speaks in clear sentences without difficulty   Abdominal: Soft. Normal appearance and bowel sounds are normal. There is no tenderness. There is no rigidity, no rebound, no guarding and no CVA tenderness.   Musculoskeletal: Normal range of motion.        Lumbar back: Normal. She exhibits normal range of motion, no tenderness, no bony tenderness, no swelling, no edema, no deformity, no pain, no spasm and normal pulse.        Back:    Lymphadenopathy:        Head (right side): No submental, no submandibular and no tonsillar adenopathy present.        Head (left side): No submental, no submandibular and no tonsillar adenopathy present.     She has no cervical adenopathy.        Right: No supraclavicular adenopathy present.        Left: No supraclavicular adenopathy present.   Neurological: She is alert and oriented to person, place, and time. She has normal strength. Gait normal.   Skin: Skin is warm.   Psychiatric: She has a normal mood and affect. Her speech is normal and behavior is normal. Thought content normal.   Nursing note and vitals reviewed.       UA:   Component Value Ref Range & Units Status   POC Color yellow  Negative Final   POC Appearance clear  Negative Final   POC Leukocyte Esterase negative  Negative Final   POC Nitrites negative  Negative Final   POC Urobiligen 0.2  Negative (0.2) mg/dL Final   POC Protein negative  Negative mg/dL Final   POC Urine PH 7.0  5.0 - 8.0 Final   POC Blood negative  Negative Final   POC Specific Gravity 1.015  <1.005 - >1.030 Final   POC Ketones negative   Negative mg/dL Final   POC Bilirubin negative  Negative mg/dL Final   POC Glucose negative  Negative mg/dL Final   Last Resulted Time   Fri Mar 29, 2019  1:41 PM                Assessment/Plan:     1. Acute bilateral low back pain without sciatica  POCT Urinalysis   2. Fatigue, unspecified type     3. Decreased appetite         Please schedule FU with PCP.  No evidence of acute bacterial or viral infection today.  Patient is nontoxic and in no distress today.  OTC  analgesic of choice. Follow manufactures dosing and safety precautions.   OTC analgesic creams/ patches for low back pain. Dosage and directions per .   Warm pack prn pain to low back.  Do not place heat over analgesic creams or patches due to risk of skin burns patient and her family verbalized understanding.  Ice packs as needed discomfort.  Keep well-hydrated

## 2019-04-01 ENCOUNTER — TELEPHONE (OUTPATIENT)
Dept: MEDICAL GROUP | Facility: MEDICAL CENTER | Age: 84
End: 2019-04-01

## 2019-04-01 DIAGNOSIS — E55.9 HYPOVITAMINOSIS D: ICD-10-CM

## 2019-04-01 DIAGNOSIS — E78.5 DYSLIPIDEMIA: ICD-10-CM

## 2019-04-01 DIAGNOSIS — M81.8 OTHER OSTEOPOROSIS, UNSPECIFIED PATHOLOGICAL FRACTURE PRESENCE: ICD-10-CM

## 2019-04-01 DIAGNOSIS — I10 ESSENTIAL HYPERTENSION: ICD-10-CM

## 2019-04-01 RX ORDER — ENALAPRIL MALEATE 20 MG/1
20 TABLET ORAL EVERY MORNING
Qty: 30 TAB | Refills: 0 | Status: SHIPPED | OUTPATIENT
Start: 2019-04-01 | End: 2019-08-07

## 2019-04-01 RX ORDER — ALENDRONATE SODIUM 35 MG/1
35 TABLET ORAL
Qty: 12 TAB | Refills: 1 | Status: SHIPPED | OUTPATIENT
Start: 2019-04-01 | End: 2019-08-07

## 2019-04-01 RX ORDER — SIMVASTATIN 20 MG
20 TABLET ORAL EVERY MORNING
Qty: 30 TAB | Refills: 0 | Status: SHIPPED | OUTPATIENT
Start: 2019-04-01 | End: 2019-08-07

## 2019-04-01 RX ORDER — ERGOCALCIFEROL 1.25 MG/1
50000 CAPSULE ORAL
Qty: 4 CAP | Refills: 0 | Status: SHIPPED | OUTPATIENT
Start: 2019-04-01 | End: 2019-04-02

## 2019-04-01 RX ORDER — AMLODIPINE BESYLATE 10 MG/1
10 TABLET ORAL DAILY
Qty: 30 TAB | Refills: 0 | Status: SHIPPED | OUTPATIENT
Start: 2019-04-01 | End: 2019-08-07

## 2019-04-01 NOTE — TELEPHONE ENCOUNTER
----- Message from Carol nAn Garner sent at 3/29/2019  2:41 PM PDT -----  1. Caller Name: Sabrina Lobo                            2. Call Back Number: [phone] (home)     3. Patient PCP: Dr. manuel      4. What is the patient requesting: Pt made [place] for 4/2, but is in need of all refills.   alendronate (FOSAMAX) 35 MG tablet  amLODIPine (NORVASC) 10 MG Tab   enalapril (VASOTEC) 20 MG tablet  simvastatin (ZOCOR) 20 MG Tab  vitamin D, Ergocalciferol, (DRISDOL) 14391 units Cap capsule    5. Does patient need immediate contact: no

## 2019-04-01 NOTE — TELEPHONE ENCOUNTER
ESTABLISHED PATIENT PRE-VISIT PLANNING     Patient was NOT contacted to complete PVP.     Note: Patient will not be contacted if there is no indication to call.     1.  Reviewed notes from the last few office visits within the medical group: Yes    2.  If any orders were placed at last visit or intended to be done for this visit (i.e. 6 mos follow-up), do we have Results/Consult Notes?        •  Labs - Similar labs ordered by PCP have been completed for different providers 10/29/19   Note: If patient appointment is for lab review and patient did not complete labs, check with provider if OK to reschedule patient until labs completed.       •  Imaging - Imaging was not ordered at last office visit.       •  Referrals - Referral ordered, patient has NOT been seen.    3. Is this appointment scheduled as a Hospital Follow-Up? No    4.  Immunizations were updated in Epic using WebIZ?: Epic matches WebIZ       •  Web Iz Recommendations: TDAP, VARICELLA (Chicken Pox)  and SHINGRIX (Shingles)    5.  Patient is due for the following Health Maintenance Topics:   Health Maintenance Due   Topic Date Due   • IMM DTaP/Tdap/Td Vaccine (1 - Tdap) 04/13/1948   • IMM ZOSTER VACCINES (1 of 2) 04/13/1979   • IMM PNEUMOCOCCAL 65+ (ADULT) LOW/MEDIUM RISK SERIES (2 of 2 - PCV13) 09/01/2011     6. Orders for overdue Health Maintenance topics pended in Pre-Charting? NO    7.  AHA (MDX) form printed for Provider? YES    8.  Patient was NOT informed to arrive 15 min prior to their scheduled appointment and bring in their medication bottles.

## 2019-04-02 ENCOUNTER — OFFICE VISIT (OUTPATIENT)
Dept: MEDICAL GROUP | Facility: MEDICAL CENTER | Age: 84
End: 2019-04-02
Payer: MEDICARE

## 2019-04-02 ENCOUNTER — HOSPITAL ENCOUNTER (OUTPATIENT)
Dept: RADIOLOGY | Facility: MEDICAL CENTER | Age: 84
End: 2019-04-02
Attending: INTERNAL MEDICINE
Payer: MEDICARE

## 2019-04-02 VITALS
TEMPERATURE: 98.2 F | OXYGEN SATURATION: 91 % | BODY MASS INDEX: 20.81 KG/M2 | HEIGHT: 62 IN | WEIGHT: 113.1 LBS | SYSTOLIC BLOOD PRESSURE: 142 MMHG | DIASTOLIC BLOOD PRESSURE: 62 MMHG | HEART RATE: 80 BPM

## 2019-04-02 DIAGNOSIS — Z00.00 HEALTH CARE MAINTENANCE: ICD-10-CM

## 2019-04-02 DIAGNOSIS — R53.83 FATIGUE, UNSPECIFIED TYPE: ICD-10-CM

## 2019-04-02 DIAGNOSIS — I25.10 CORONARY ARTERY DISEASE INVOLVING NATIVE CORONARY ARTERY OF NATIVE HEART WITHOUT ANGINA PECTORIS: ICD-10-CM

## 2019-04-02 DIAGNOSIS — E78.5 DYSLIPIDEMIA: ICD-10-CM

## 2019-04-02 DIAGNOSIS — R63.0 ANOREXIA: ICD-10-CM

## 2019-04-02 DIAGNOSIS — N18.30 CKD (CHRONIC KIDNEY DISEASE), STAGE III (HCC): ICD-10-CM

## 2019-04-02 DIAGNOSIS — I10 ESSENTIAL HYPERTENSION: ICD-10-CM

## 2019-04-02 DIAGNOSIS — M54.50 LUMBOSACRAL PAIN: ICD-10-CM

## 2019-04-02 DIAGNOSIS — R73.01 IFG (IMPAIRED FASTING GLUCOSE): ICD-10-CM

## 2019-04-02 PROCEDURE — 99214 OFFICE O/P EST MOD 30 MIN: CPT | Performed by: INTERNAL MEDICINE

## 2019-04-02 PROCEDURE — 72100 X-RAY EXAM L-S SPINE 2/3 VWS: CPT

## 2019-04-02 ASSESSMENT — PATIENT HEALTH QUESTIONNAIRE - PHQ9: CLINICAL INTERPRETATION OF PHQ2 SCORE: 0

## 2019-04-02 NOTE — PROGRESS NOTES
CHIEF COMPLAINT  Chief Complaint   Patient presents with   • Back Pain     HPI  Patient is a 89 y.o. female patient who presents today for the following     HYPERTENSION, CAD, CKD stage III  Stable and controlled.   No change in medications.     Meds: Enalapril, 20 mg daily, amlodipine 10 mg daily,  ASA 81 mg QD;taking as prescribed.   Measuring BP at home: yes, has been < 150/90.  Denies:  -  headaches, vision problems, tinnitus.                 -  chest pain/pressure, palpitations, irregular heart beats, exertional, dyspnea, peripheral edema.                 - medication side effect: unusual fatigue, slow heartbeat, foot/leg swelling, cough.  Low salt diet: Y  Diet: Healthy  Exercise: limited  BMI: Body mass index is 21 kg/m².  Renal panel showed stable CKD stage III, with normal Cr.  FH of HTN:  Unknown     DYSLIPIDEMIA  Stable and controlled.      The patient is on simvastatin 20 mg, taking daily, as prescribed. No myalgias, muscle cramps or pain.   Diet /Exercise:  As above  BMI: 21  FH: unknown     IFG  The patient had elevated FBG and A1c at 5.7.  No polydipsia, polyphagia, polyuria.  No abdominal pain, weight loss, fatigue.  FH of DM: unknown    Lower back pain, anorexia, fatigue  - Onset: remote   - worse in the last few weeks  - Triger: no trauma  - located in: lower back  - intensity:  mild to moderate  - quality:  dull   - radiation:  no  - alleviating factors are:  Rest, tylenol  -  exacerbating factors are:  activity  - accompanied:    - no numbness, weakness, tingling, fever, chills   - anorexia, fatigue  - course: no change  - imaging: today, DDD  - treatment: as above    No reported history of:  - chronic immune suppression, alcoholism, IV drug abuse, indwelling catheter, diabetes.    - No history of recent spinal surgery or injection.    Denies:  - numbness/saddle anesthesia.  - bowel/bladder changes, fever.   - trauma  - nausea/vomiting  - chest pain, shortness of breath, abdominal pain.      Was  seen in urgent care on 3/29/19, note was reviewed with the following:  · Urinalysis was negative  · Advised OTC topical cream/patch, heat packs  · May have a trial of ice packs    Reviewed PMH, PSH, FH, SH, ALL, HCM/IMM, no changes  Reviewed MEDS, no changes    Patient Active Problem List    Diagnosis Date Noted   • Essential hypertension 11/20/2012     Priority: Low   • Dyslipidemia 11/20/2012     Priority: Low   • Coronary artery disease involving native coronary artery of native heart without angina pectoris 11/20/2012     Priority: Low   • Seasonal allergies 10/09/2018   • Other osteoporosis without current pathological fracture 10/09/2018   • CKD (chronic kidney disease), stage III ([facility]) 10/09/2018   • Health care maintenance 04/26/2018     CURRENT MEDICATIONS  Current Outpatient Prescriptions   Medication Sig Dispense Refill   • alendronate (FOSAMAX) 35 MG tablet Take 1 Tab by mouth every 7 days. 12 Tab 1   • amLODIPine (NORVASC) 10 MG Tab Take 1 Tab by mouth every day. 30 Tab 0   • enalapril (VASOTEC) 20 MG tablet Take 1 Tab by mouth every morning. 30 Tab 0   • simvastatin (ZOCOR) 20 MG Tab Take 1 Tab by mouth every morning. 30 Tab 0   • fexofenadine (ALLEGRA) 180 MG tablet Take 1 Tab by mouth every day. 90 Tab 3   • fluticasone (FLONASE) 50 MCG/ACT nasal spray Spray 1 Spray in nose every day. 16 g 5   • Multiple Vitamins-Minerals (CENTRUM ADULTS PO) Take  by mouth.     • aspirin 81 MG tablet Take 1 Tab by mouth every morning. 100 Each 0     No current facility-administered medications for this visit.      ALLERGIES  Allergies: Patient has no known allergies.  PAST MEDICAL HISTORY  Past Medical History:   Diagnosis Date   • H/O bladder repair surgery     not sure per patient daughter    • H/O: hysterectomy    • Hypertension    • Vertigo      SURGICAL HISTORY  She  has a past surgical history that includes hysterectomy radical.  SOCIAL HISTORY  Social History   Substance Use Topics   • Smoking status: Never  "Smoker   • Smokeless tobacco: Never Used   • Alcohol use No     Social History     Social History Narrative   • No narrative on file     FAMILY HISTORY  Family History   Problem Relation Age of Onset   • Heart Disease Neg Hx    • Hypertension Neg Hx    • Hyperlipidemia Neg Hx    • Psychiatry Neg Hx    • Diabetes Neg Hx      Family Status   Relation Status   • Mo    • Fa    • Sis    • Bro    • Neg Hx (Not Specified)     ROS   Constitutional: Negative for fever, chills. And per HPI.  HENT: Negative for congestion.  Eyes: Negative for blurred vision.   Respiratory: Negative for shortness of breath.  Cardiovascular: Negative for  palpitations.   Gastrointestinal: Negative for heartburn, abdominal pain.   Musculoskeletal: as above.  Skin: Negative for rash.   Neuro: Negative for dizziness, headaches.   Endo/Heme/Allergies: Does not bruise/bleed easily.   Psychiatric/Behavioral: Negative for depression.    PHYSICAL EXAM   Blood pressure 142/62, pulse 80, temperature 36.8 °C (98.2 °F), temperature source Temporal, height 1.575 m (5' 2\"), weight 51.3 kg (113 lb 1.5 oz), SpO2 91 %, not currently breastfeeding. Body mass index is 20.69 kg/m².  General:  NAD, well appearing  HEENT:   NC/AT, PERRLA, EOMI, TMs are clear. Oropharyngeal mucosa is pink,  without lesions;  no cervical / supraclavicular  lymphadenopathy, no thyromegaly.    Cardiovascular: RRR.   No m/r/g. No carotid bruits .      Lungs:   CTAB, no w/r/r, no respiratory distress.  Abdomen: Soft, NT/ND + BS;  hepatosplenomegaly.  Extremities:  2+ DP and radial pulses bilaterally.  No c/c/e.   Skin:  Warm, dry.  No erythema. No rash.   Neurologic: Alert & oriented x 3. CN II-XII grossly intact.  No focal deficits.  Psychiatric:  Affect normal, mood normal, judgment normal.  MS TTP over the lower lumbar muscles.    LABS     Labs are reviewed and discussed with a patient  Lab Results   Component Value Date/Time    CHOLSTRLTOT 198 10/29/2018 " 08:55 AM     (H) 10/29/2018 08:55 AM    HDL 51 10/29/2018 08:55 AM    TRIGLYCERIDE 129 10/29/2018 08:55 AM       Lab Results   Component Value Date/Time    SODIUM 140 10/29/2018 08:55 AM    POTASSIUM 4.1 10/29/2018 08:55 AM    CHLORIDE 110 10/29/2018 08:55 AM    CO2 21 10/29/2018 08:55 AM    GLUCOSE 97 10/29/2018 08:55 AM    BUN 18 10/29/2018 08:55 AM    CREATININE 0.94 10/29/2018 08:55 AM    CREATININE 0.9 01/15/2009 06:34 AM     Lab Results   Component Value Date/Time    ALKPHOSPHAT 68 10/29/2018 08:55 AM    ASTSGOT 27 10/29/2018 08:55 AM    ALTSGPT 24 10/29/2018 08:55 AM    TBILIRUBIN 0.7 10/29/2018 08:55 AM      Lab Results   Component Value Date/Time    HBA1C 5.7 (H) 10/29/2018 03:40 AM    HBA1C 5.8 (H) 03/09/2015 04:00 AM    HBA1C 5.6 02/15/2007 09:27 AM     No results found for: TSH  Lab Results   Component Value Date/Time    FREET4 0.98 07/02/2017 02:16 PM    FREET4 1.01 12/13/2013 09:20 AM       Lab Results   Component Value Date/Time    WBC 7.3 10/29/2018 08:55 AM    RBC 4.33 10/29/2018 08:55 AM    HEMOGLOBIN 13.6 10/29/2018 08:55 AM    HEMATOCRIT 40.9 10/29/2018 08:55 AM    MCV 94.5 10/29/2018 08:55 AM    MCH 31.4 10/29/2018 08:55 AM    MCHC 33.3 (L) 10/29/2018 08:55 AM    MPV 9.7 10/29/2018 08:55 AM    NEUTSPOLYS 59.00 10/29/2018 08:55 AM    LYMPHOCYTES 29.70 10/29/2018 08:55 AM    MONOCYTES 5.20 10/29/2018 08:55 AM    EOSINOPHILS 4.40 10/29/2018 08:55 AM    BASOPHILS 1.20 10/29/2018 08:55 AM      IMAGING     X-ray of lumbar spine by my interpretation and consistent with radiology:  1.  Levoscoliotic curvature.  2.  Mild multilevel degenerative disc disease.  3.  Moderate to severe facet arthropathy in the lower lumbar spine.    ASSESMENT AND PLAN        1. Essential hypertension  Controlled, continue current treatment  - Comp Metabolic Panel; Future    2. Coronary artery disease involving native coronary artery of native heart without angina pectoris  Asymptomatic, continue current  treatment    3. CKD (chronic kidney disease), stage III ([facility])  Pending labs, advised good fluid intake, avoid NSAIDs  - Comp Metabolic Panel; Future    4. Dyslipidemia  Pending labs, continue current treatment  - Comp Metabolic Panel; Future  - Lipid Profile; Future    5. IFG (impaired fasting glucose)  Mild.  Advised low-calorie diet, exercise as much as possible    6. Lumbosacral pain  Advised to continue activity as tolerated.  May use Tylenol up to 3000 mg a day.  - DX-LUMBAR SPINE-2 OR 3 VIEWS; Future   -Came back showing a DDD    7. Anorexia  She is given prescription for Ensure, 1 can/day    8. Fatigue, unspecified type  Pending labs  - Comp Metabolic Panel; Future  - TSH; Future  - CBC WITH DIFFERENTIAL; Future    9. Health care maintenance  Advised immunizations X2    Counseling:   - Smoking:  Nonsmoker    Followup: Return in about 1 week (around 4/9/2019), or if symptoms worsen or fail to improve.    All questions are answered.    Please note that this dictation was created using voice recognition software, and that there might be errors of dayna and possibly content.

## 2019-04-04 ENCOUNTER — HOSPITAL ENCOUNTER (OUTPATIENT)
Dept: LAB | Facility: MEDICAL CENTER | Age: 84
End: 2019-04-04
Attending: INTERNAL MEDICINE
Payer: MEDICARE

## 2019-04-04 DIAGNOSIS — E78.5 DYSLIPIDEMIA: ICD-10-CM

## 2019-04-04 DIAGNOSIS — I10 ESSENTIAL HYPERTENSION: ICD-10-CM

## 2019-04-04 DIAGNOSIS — R53.83 FATIGUE, UNSPECIFIED TYPE: ICD-10-CM

## 2019-04-04 DIAGNOSIS — N18.30 CKD (CHRONIC KIDNEY DISEASE), STAGE III (HCC): ICD-10-CM

## 2019-04-04 DIAGNOSIS — R73.01 IFG (IMPAIRED FASTING GLUCOSE): ICD-10-CM

## 2019-04-04 LAB
ALBUMIN SERPL BCP-MCNC: 4.4 G/DL (ref 3.2–4.9)
ALBUMIN/GLOB SERPL: 1.6 G/DL
ALP SERPL-CCNC: 74 U/L (ref 30–99)
ALT SERPL-CCNC: 13 U/L (ref 2–50)
ANION GAP SERPL CALC-SCNC: 7 MMOL/L (ref 0–11.9)
AST SERPL-CCNC: 23 U/L (ref 12–45)
BASOPHILS # BLD AUTO: 0.8 % (ref 0–1.8)
BASOPHILS # BLD: 0.05 K/UL (ref 0–0.12)
BILIRUB SERPL-MCNC: 0.6 MG/DL (ref 0.1–1.5)
BUN SERPL-MCNC: 13 MG/DL (ref 8–22)
CALCIUM SERPL-MCNC: 9.2 MG/DL (ref 8.5–10.5)
CHLORIDE SERPL-SCNC: 108 MMOL/L (ref 96–112)
CHOLEST SERPL-MCNC: 168 MG/DL (ref 100–199)
CO2 SERPL-SCNC: 29 MMOL/L (ref 20–33)
CREAT SERPL-MCNC: 1 MG/DL (ref 0.5–1.4)
EOSINOPHIL # BLD AUTO: 0.32 K/UL (ref 0–0.51)
EOSINOPHIL NFR BLD: 5 % (ref 0–6.9)
ERYTHROCYTE [DISTWIDTH] IN BLOOD BY AUTOMATED COUNT: 43.7 FL (ref 35.9–50)
EST. AVERAGE GLUCOSE BLD GHB EST-MCNC: 126 MG/DL
FASTING STATUS PATIENT QL REPORTED: NORMAL
GLOBULIN SER CALC-MCNC: 2.8 G/DL (ref 1.9–3.5)
GLUCOSE SERPL-MCNC: 96 MG/DL (ref 65–99)
HBA1C MFR BLD: 6 % (ref 0–5.6)
HCT VFR BLD AUTO: 43.9 % (ref 37–47)
HDLC SERPL-MCNC: 57 MG/DL
HGB BLD-MCNC: 14.2 G/DL (ref 12–16)
IMM GRANULOCYTES # BLD AUTO: 0.01 K/UL (ref 0–0.11)
IMM GRANULOCYTES NFR BLD AUTO: 0.2 % (ref 0–0.9)
LDLC SERPL CALC-MCNC: 96 MG/DL
LYMPHOCYTES # BLD AUTO: 1.54 K/UL (ref 1–4.8)
LYMPHOCYTES NFR BLD: 24.1 % (ref 22–41)
MCH RBC QN AUTO: 30.8 PG (ref 27–33)
MCHC RBC AUTO-ENTMCNC: 32.3 G/DL (ref 33.6–35)
MCV RBC AUTO: 95.2 FL (ref 81.4–97.8)
MONOCYTES # BLD AUTO: 0.45 K/UL (ref 0–0.85)
MONOCYTES NFR BLD AUTO: 7.1 % (ref 0–13.4)
NEUTROPHILS # BLD AUTO: 4.01 K/UL (ref 2–7.15)
NEUTROPHILS NFR BLD: 62.8 % (ref 44–72)
NRBC # BLD AUTO: 0 K/UL
NRBC BLD-RTO: 0 /100 WBC
PLATELET # BLD AUTO: 128 K/UL (ref 164–446)
PMV BLD AUTO: 12.1 FL (ref 9–12.9)
POTASSIUM SERPL-SCNC: 4.3 MMOL/L (ref 3.6–5.5)
PROT SERPL-MCNC: 7.2 G/DL (ref 6–8.2)
RBC # BLD AUTO: 4.61 M/UL (ref 4.2–5.4)
SODIUM SERPL-SCNC: 144 MMOL/L (ref 135–145)
TRIGL SERPL-MCNC: 76 MG/DL (ref 0–149)
TSH SERPL DL<=0.005 MIU/L-ACNC: 1.6 UIU/ML (ref 0.38–5.33)
WBC # BLD AUTO: 6.4 K/UL (ref 4.8–10.8)

## 2019-04-04 PROCEDURE — 36415 COLL VENOUS BLD VENIPUNCTURE: CPT

## 2019-04-04 PROCEDURE — 80061 LIPID PANEL: CPT

## 2019-04-04 PROCEDURE — 80053 COMPREHEN METABOLIC PANEL: CPT

## 2019-04-04 PROCEDURE — 85025 COMPLETE CBC W/AUTO DIFF WBC: CPT

## 2019-04-04 PROCEDURE — 84443 ASSAY THYROID STIM HORMONE: CPT

## 2019-04-04 PROCEDURE — 83036 HEMOGLOBIN GLYCOSYLATED A1C: CPT

## 2019-04-08 ENCOUNTER — TELEPHONE (OUTPATIENT)
Dept: MEDICAL GROUP | Facility: MEDICAL CENTER | Age: 84
End: 2019-04-08

## 2019-04-08 NOTE — TELEPHONE ENCOUNTER
ESTABLISHED PATIENT PRE-VISIT PLANNING     Patient was NOT contacted to complete PVP.     Note: Patient will not be contacted if there is no indication to call.     1.  Reviewed notes from the last few office visits within the medical group: Yes    2.  If any orders were placed at last visit or intended to be done for this visit (i.e. 6 mos follow-up), do we have Results/Consult Notes?        •  Labs - Labs ordered, completed on 4/4/19 and results are in chart.   Note: If patient appointment is for lab review and patient did not complete labs, check with provider if OK to reschedule patient until labs completed.       •  Imaging - Imaging ordered, completed and results are in chart.       •  Referrals - Referral ordered, patient has NOT been seen.    3. Is this appointment scheduled as a Hospital Follow-Up? No    4.  Immunizations were updated in Epic using WebIZ?: Epic matches WebIZ       •  Web Iz Recommendations: PREVNAR (PCV13) , TDAP, VARICELLA (Chicken Pox)  and SHINGRIX (Shingles)    5.  Patient is due for the following Health Maintenance Topics:   Health Maintenance Due   Topic Date Due   • IMM DTaP/Tdap/Td Vaccine (1 - Tdap) 04/13/1948   • IMM ZOSTER VACCINES (1 of 2) 04/13/1979   • IMM PNEUMOCOCCAL 65+ (ADULT) LOW/MEDIUM RISK SERIES (2 of 2 - PCV13) 09/01/2011     6. Orders for overdue Health Maintenance topics pended in Pre-Charting? NO    7.  AHA (MDX) form printed for Provider? No, already completed    8.  Patient was NOT informed to arrive 15 min prior to their scheduled appointment and bring in their medication bottles.

## 2019-06-13 ENCOUNTER — APPOINTMENT (OUTPATIENT)
Dept: RADIOLOGY | Facility: MEDICAL CENTER | Age: 84
End: 2019-06-13
Attending: EMERGENCY MEDICINE
Payer: MEDICARE

## 2019-06-13 ENCOUNTER — HOSPITAL ENCOUNTER (OUTPATIENT)
Facility: MEDICAL CENTER | Age: 84
End: 2019-06-14
Attending: EMERGENCY MEDICINE | Admitting: HOSPITALIST
Payer: MEDICARE

## 2019-06-13 DIAGNOSIS — M54.41 ACUTE BILATERAL LOW BACK PAIN WITH BILATERAL SCIATICA: ICD-10-CM

## 2019-06-13 DIAGNOSIS — M54.42 ACUTE BILATERAL LOW BACK PAIN WITH BILATERAL SCIATICA: ICD-10-CM

## 2019-06-13 PROBLEM — D64.9 ANEMIA: Status: ACTIVE | Noted: 2019-06-13

## 2019-06-13 PROBLEM — R26.2 INABILITY TO WALK: Status: ACTIVE | Noted: 2019-06-13

## 2019-06-13 PROBLEM — M54.9 INTRACTABLE BACK PAIN: Status: ACTIVE | Noted: 2019-06-13

## 2019-06-13 LAB
ALBUMIN SERPL BCP-MCNC: 3.8 G/DL (ref 3.2–4.9)
ALBUMIN/GLOB SERPL: 1.1 G/DL
ALP SERPL-CCNC: 70 U/L (ref 30–99)
ALT SERPL-CCNC: 11 U/L (ref 2–50)
ANION GAP SERPL CALC-SCNC: 14 MMOL/L (ref 0–11.9)
APPEARANCE UR: CLEAR
AST SERPL-CCNC: 19 U/L (ref 12–45)
BASOPHILS # BLD AUTO: 0.9 % (ref 0–1.8)
BASOPHILS # BLD: 0.09 K/UL (ref 0–0.12)
BILIRUB SERPL-MCNC: 0.5 MG/DL (ref 0.1–1.5)
BILIRUB UR QL STRIP.AUTO: NEGATIVE
BUN SERPL-MCNC: 17 MG/DL (ref 8–22)
CALCIUM SERPL-MCNC: 9 MG/DL (ref 8.5–10.5)
CHLORIDE SERPL-SCNC: 102 MMOL/L (ref 96–112)
CO2 SERPL-SCNC: 23 MMOL/L (ref 20–33)
COLOR UR: YELLOW
CREAT SERPL-MCNC: 0.98 MG/DL (ref 0.5–1.4)
EOSINOPHIL # BLD AUTO: 0.06 K/UL (ref 0–0.51)
EOSINOPHIL NFR BLD: 0.6 % (ref 0–6.9)
ERYTHROCYTE [DISTWIDTH] IN BLOOD BY AUTOMATED COUNT: 42.5 FL (ref 35.9–50)
GLOBULIN SER CALC-MCNC: 3.4 G/DL (ref 1.9–3.5)
GLUCOSE SERPL-MCNC: 104 MG/DL (ref 65–99)
GLUCOSE UR STRIP.AUTO-MCNC: NEGATIVE MG/DL
HCT VFR BLD AUTO: 35.4 % (ref 37–47)
HGB BLD-MCNC: 11.4 G/DL (ref 12–16)
IMM GRANULOCYTES # BLD AUTO: 0.03 K/UL (ref 0–0.11)
IMM GRANULOCYTES NFR BLD AUTO: 0.3 % (ref 0–0.9)
KETONES UR STRIP.AUTO-MCNC: NEGATIVE MG/DL
LEUKOCYTE ESTERASE UR QL STRIP.AUTO: NEGATIVE
LYMPHOCYTES # BLD AUTO: 1.35 K/UL (ref 1–4.8)
LYMPHOCYTES NFR BLD: 12.8 % (ref 22–41)
MCH RBC QN AUTO: 30.2 PG (ref 27–33)
MCHC RBC AUTO-ENTMCNC: 32.2 G/DL (ref 33.6–35)
MCV RBC AUTO: 93.7 FL (ref 81.4–97.8)
MICRO URNS: NORMAL
MONOCYTES # BLD AUTO: 0.65 K/UL (ref 0–0.85)
MONOCYTES NFR BLD AUTO: 6.2 % (ref 0–13.4)
NEUTROPHILS # BLD AUTO: 8.37 K/UL (ref 2–7.15)
NEUTROPHILS NFR BLD: 79.2 % (ref 44–72)
NITRITE UR QL STRIP.AUTO: NEGATIVE
NRBC # BLD AUTO: 0 K/UL
NRBC BLD-RTO: 0 /100 WBC
PH UR STRIP.AUTO: 8 [PH]
PLATELET # BLD AUTO: 237 K/UL (ref 164–446)
PMV BLD AUTO: 9.8 FL (ref 9–12.9)
POTASSIUM SERPL-SCNC: 4.1 MMOL/L (ref 3.6–5.5)
PROT SERPL-MCNC: 7.2 G/DL (ref 6–8.2)
PROT UR QL STRIP: NEGATIVE MG/DL
RBC # BLD AUTO: 3.78 M/UL (ref 4.2–5.4)
RBC UR QL AUTO: NEGATIVE
SODIUM SERPL-SCNC: 139 MMOL/L (ref 135–145)
SP GR UR STRIP.AUTO: 1.01
UROBILINOGEN UR STRIP.AUTO-MCNC: 0.2 MG/DL
WBC # BLD AUTO: 10.6 K/UL (ref 4.8–10.8)

## 2019-06-13 PROCEDURE — 96375 TX/PRO/DX INJ NEW DRUG ADDON: CPT

## 2019-06-13 PROCEDURE — 81003 URINALYSIS AUTO W/O SCOPE: CPT

## 2019-06-13 PROCEDURE — 99220 PR INITIAL OBSERVATION CARE,LEVL III: CPT | Performed by: HOSPITALIST

## 2019-06-13 PROCEDURE — 700102 HCHG RX REV CODE 250 W/ 637 OVERRIDE(OP): Performed by: HOSPITALIST

## 2019-06-13 PROCEDURE — 72131 CT LUMBAR SPINE W/O DYE: CPT

## 2019-06-13 PROCEDURE — 51701 INSERT BLADDER CATHETER: CPT

## 2019-06-13 PROCEDURE — 700111 HCHG RX REV CODE 636 W/ 250 OVERRIDE (IP): Performed by: EMERGENCY MEDICINE

## 2019-06-13 PROCEDURE — 94760 N-INVAS EAR/PLS OXIMETRY 1: CPT

## 2019-06-13 PROCEDURE — 85025 COMPLETE CBC W/AUTO DIFF WBC: CPT

## 2019-06-13 PROCEDURE — G0378 HOSPITAL OBSERVATION PER HR: HCPCS

## 2019-06-13 PROCEDURE — 96372 THER/PROPH/DIAG INJ SC/IM: CPT

## 2019-06-13 PROCEDURE — 74176 CT ABD & PELVIS W/O CONTRAST: CPT

## 2019-06-13 PROCEDURE — 80053 COMPREHEN METABOLIC PANEL: CPT

## 2019-06-13 PROCEDURE — 700111 HCHG RX REV CODE 636 W/ 250 OVERRIDE (IP): Performed by: HOSPITALIST

## 2019-06-13 PROCEDURE — 96374 THER/PROPH/DIAG INJ IV PUSH: CPT

## 2019-06-13 PROCEDURE — A9270 NON-COVERED ITEM OR SERVICE: HCPCS | Performed by: HOSPITALIST

## 2019-06-13 PROCEDURE — 99285 EMERGENCY DEPT VISIT HI MDM: CPT

## 2019-06-13 RX ORDER — ONDANSETRON 2 MG/ML
4 INJECTION INTRAMUSCULAR; INTRAVENOUS ONCE
Status: COMPLETED | OUTPATIENT
Start: 2019-06-13 | End: 2019-06-13

## 2019-06-13 RX ORDER — AMLODIPINE BESYLATE 10 MG/1
10 TABLET ORAL DAILY
Status: DISCONTINUED | OUTPATIENT
Start: 2019-06-13 | End: 2019-06-14 | Stop reason: HOSPADM

## 2019-06-13 RX ORDER — POLYETHYLENE GLYCOL 3350 17 G/17G
1 POWDER, FOR SOLUTION ORAL
Status: DISCONTINUED | OUTPATIENT
Start: 2019-06-13 | End: 2019-06-14 | Stop reason: HOSPADM

## 2019-06-13 RX ORDER — SIMVASTATIN 20 MG
20 TABLET ORAL EVERY MORNING
Status: DISCONTINUED | OUTPATIENT
Start: 2019-06-13 | End: 2019-06-14 | Stop reason: HOSPADM

## 2019-06-13 RX ORDER — OXYCODONE HYDROCHLORIDE 5 MG/1
5 TABLET ORAL
Status: DISCONTINUED | OUTPATIENT
Start: 2019-06-13 | End: 2019-06-14 | Stop reason: HOSPADM

## 2019-06-13 RX ORDER — AMOXICILLIN 250 MG
2 CAPSULE ORAL 2 TIMES DAILY
Status: DISCONTINUED | OUTPATIENT
Start: 2019-06-13 | End: 2019-06-14 | Stop reason: HOSPADM

## 2019-06-13 RX ORDER — BISACODYL 10 MG
10 SUPPOSITORY, RECTAL RECTAL
Status: DISCONTINUED | OUTPATIENT
Start: 2019-06-13 | End: 2019-06-14 | Stop reason: HOSPADM

## 2019-06-13 RX ORDER — ENALAPRIL MALEATE 10 MG/1
20 TABLET ORAL EVERY MORNING
Status: DISCONTINUED | OUTPATIENT
Start: 2019-06-13 | End: 2019-06-14 | Stop reason: HOSPADM

## 2019-06-13 RX ORDER — OXYCODONE HYDROCHLORIDE 10 MG/1
10 TABLET ORAL
Status: DISCONTINUED | OUTPATIENT
Start: 2019-06-13 | End: 2019-06-14 | Stop reason: HOSPADM

## 2019-06-13 RX ORDER — MORPHINE SULFATE 4 MG/ML
4 INJECTION, SOLUTION INTRAMUSCULAR; INTRAVENOUS
Status: DISCONTINUED | OUTPATIENT
Start: 2019-06-13 | End: 2019-06-14

## 2019-06-13 RX ORDER — ASCORBIC ACID 500 MG
500 TABLET ORAL DAILY
COMMUNITY
End: 2022-02-15

## 2019-06-13 RX ORDER — HEPARIN SODIUM 5000 [USP'U]/ML
5000 INJECTION, SOLUTION INTRAVENOUS; SUBCUTANEOUS EVERY 8 HOURS
Status: DISCONTINUED | OUTPATIENT
Start: 2019-06-13 | End: 2019-06-14 | Stop reason: HOSPADM

## 2019-06-13 RX ADMIN — ONDANSETRON 4 MG: 2 INJECTION INTRAMUSCULAR; INTRAVENOUS at 10:03

## 2019-06-13 RX ADMIN — HEPARIN SODIUM 5000 UNITS: 5000 INJECTION, SOLUTION INTRAVENOUS; SUBCUTANEOUS at 13:40

## 2019-06-13 RX ADMIN — ASPIRIN 81 MG: 81 TABLET, DELAYED RELEASE ORAL at 13:37

## 2019-06-13 RX ADMIN — HEPARIN SODIUM 5000 UNITS: 5000 INJECTION, SOLUTION INTRAVENOUS; SUBCUTANEOUS at 20:48

## 2019-06-13 RX ADMIN — SENNOSIDES,DOCUSATE SODIUM 2 TABLET: 8.6; 5 TABLET, FILM COATED ORAL at 13:37

## 2019-06-13 RX ADMIN — SIMVASTATIN 20 MG: 20 TABLET, FILM COATED ORAL at 13:38

## 2019-06-13 RX ADMIN — ENALAPRIL MALEATE 20 MG: 10 TABLET ORAL at 13:38

## 2019-06-13 RX ADMIN — FENTANYL CITRATE 25 MCG: 50 INJECTION INTRAMUSCULAR; INTRAVENOUS at 10:03

## 2019-06-13 RX ADMIN — OXYCODONE HYDROCHLORIDE 10 MG: 10 TABLET ORAL at 16:10

## 2019-06-13 RX ADMIN — AMLODIPINE BESYLATE 10 MG: 10 TABLET ORAL at 13:37

## 2019-06-13 ASSESSMENT — ENCOUNTER SYMPTOMS
SHORTNESS OF BREATH: 0
HEMOPTYSIS: 0
BRUISES/BLEEDS EASILY: 0
CHILLS: 0
PALPITATIONS: 0
BACK PAIN: 1
HALLUCINATIONS: 0
COUGH: 0
FEVER: 0
MYALGIAS: 0
DIZZINESS: 0
WEAKNESS: 1
EYE DISCHARGE: 0
SENSORY CHANGE: 0
NAUSEA: 0
BLURRED VISION: 0
DEPRESSION: 0
HEARTBURN: 0
FLANK PAIN: 0
VOMITING: 0
DOUBLE VISION: 0
SPEECH CHANGE: 0
ABDOMINAL PAIN: 0
FOCAL WEAKNESS: 0

## 2019-06-13 ASSESSMENT — LIFESTYLE VARIABLES: SUBSTANCE_ABUSE: 0

## 2019-06-13 ASSESSMENT — PAIN SCALES - WONG BAKER: WONGBAKER_NUMERICALRESPONSE: HURTS JUST A LITTLE BIT

## 2019-06-13 NOTE — H&P
Hospital Medicine History & Physical Note    Date of Service  6/13/2019    Primary Care Physician  Ash Whiting M.D.    Consultants  none    Code Status  full    Chief Complaint  Back pain, inability to walk     History of Presenting Illness  89 y.o. female who presented 6/13/2019 with Past medical history of hyperlipidemia, hypertension, coronary artery disease who presents with severe back pain and inability to walk.  This patient woke up this morning with acute constant back pain.  Lower back radiates down bilateral legs.  No fever no chills no sweats.  No recent trauma or fall.  She has inability to walk since the acute onset of pain.  No known alleviating or exacerbating factors otherwise to her symptoms.  She will be admitted to the hospital for physical therapy evaluation and MRI with pain control.    Review of Systems  Review of Systems   Constitutional: Negative for chills and fever.   HENT: Negative for congestion, hearing loss and tinnitus.    Eyes: Negative for blurred vision, double vision and discharge.   Respiratory: Negative for cough, hemoptysis and shortness of breath.    Cardiovascular: Negative for chest pain, palpitations and leg swelling.   Gastrointestinal: Negative for abdominal pain, heartburn, nausea and vomiting.   Genitourinary: Negative for dysuria and flank pain.   Musculoskeletal: Positive for back pain and joint pain. Negative for myalgias.   Skin: Negative for rash.   Neurological: Positive for weakness. Negative for dizziness, sensory change, speech change and focal weakness.   Endo/Heme/Allergies: Negative for environmental allergies. Does not bruise/bleed easily.   Psychiatric/Behavioral: Negative for depression, hallucinations and substance abuse.       Past Medical History   has a past medical history of H/O bladder repair surgery; H/O: hysterectomy; Hypertension; and Vertigo.    Surgical History   has a past surgical history that includes hysterectomy radical.      Family History  Reviewed and not pertinent     Social History   reports that she has never smoked. She has never used smokeless tobacco. She reports that she does not drink alcohol or use drugs.    Allergies  No Known Allergies    Medications  Prior to Admission Medications   Prescriptions Last Dose Informant Patient Reported? Taking?   Multiple Vitamins-Minerals (CENTRUM ADULTS PO) 6/12/2019 at Unknown time  Yes No   Sig: Take  by mouth.   alendronate (FOSAMAX) 35 MG tablet 6/12/2019 at Unknown time  No No   Sig: Take 1 Tab by mouth every 7 days.   amLODIPine (NORVASC) 10 MG Tab 6/12/2019 at Unknown time  No No   Sig: Take 1 Tab by mouth every day.   aspirin 81 MG tablet 6/12/2019 at Unknown time Patient No No   Sig: Take 1 Tab by mouth every morning.   enalapril (VASOTEC) 20 MG tablet 6/12/2019 at Unknown time  No No   Sig: Take 1 Tab by mouth every morning.   fexofenadine (ALLEGRA) 180 MG tablet 6/12/2019 at Unknown time  No No   Sig: Take 1 Tab by mouth every day.   fluticasone (FLONASE) 50 MCG/ACT nasal spray 6/12/2019 at Unknown time  No No   Sig: Spray 1 Spray in nose every day.   simvastatin (ZOCOR) 20 MG Tab 6/12/2019 at Unknown time  No No   Sig: Take 1 Tab by mouth every morning.      Facility-Administered Medications: None       Physical Exam  Temp:  [37.7 °C (99.9 °F)] 37.7 °C (99.9 °F)  Pulse:  [71-88] 75  Resp:  [17-19] 17  BP: (140)/(57) 140/57  SpO2:  [97 %-98 %] 97 %    Physical Exam   Constitutional: She is oriented to person, place, and time. She appears well-developed and well-nourished. She appears distressed.   HENT:   Head: Normocephalic and atraumatic.   Eyes: Pupils are equal, round, and reactive to light. Conjunctivae and EOM are normal.   Neck: Normal range of motion. Neck supple. No JVD present.   Cardiovascular: Normal rate, regular rhythm, normal heart sounds and intact distal pulses.    No murmur heard.  Pulmonary/Chest: Effort normal and breath sounds normal. No respiratory  distress. She has no wheezes.   Abdominal: Soft. Bowel sounds are normal. She exhibits no distension. There is no tenderness.   Musculoskeletal: Normal range of motion. She exhibits edema.   Severe lumbar back pain with palpation, paraspinal   Neurological: She is alert and oriented to person, place, and time. She exhibits normal muscle tone.   No saddle anesthesia or lower extremity sensory changes, normal strength    Skin: Skin is warm and dry.   Psychiatric: She has a normal mood and affect. Her behavior is normal. Judgment and thought content normal.   Nursing note and vitals reviewed.      Laboratory:  Recent Labs      06/13/19   0951   WBC  10.6   RBC  3.78*   HEMOGLOBIN  11.4*   HEMATOCRIT  35.4*   MCV  93.7   MCH  30.2   MCHC  32.2*   RDW  42.5   PLATELETCT  237   MPV  9.8     Recent Labs      06/13/19   0951   SODIUM  139   POTASSIUM  4.1   CHLORIDE  102   CO2  23   GLUCOSE  104*   BUN  17   CREATININE  0.98   CALCIUM  9.0     Recent Labs      06/13/19   0951   ALTSGPT  11   ASTSGOT  19   ALKPHOSPHAT  70   TBILIRUBIN  0.5   GLUCOSE  104*                 No results for input(s): TROPONINI in the last 72 hours.    Urinalysis:    Recent Labs      06/13/19   1008   SPECGRAVITY  1.013   GLUCOSEUR  Negative   KETONES  Negative   NITRITE  Negative   LEUKESTERAS  Negative        Imaging:  CT-LSPINE W/O PLUS RECONS   Final Result      1.  There is a levoconvex scoliosis with no spondylolisthesis.   2.  There is multilevel degenerative disc disease and arthropathy with bilateral neural foraminal narrowing from L3-4 through L5-S1 level but no central canal stenosis.   3.  There is no acute fracture.      CT-RENAL COLIC EVALUATION(A/P W/O)   Final Result      1.  There is no evidence of nephrolithiasis, urolithiasis, or hydronephrosis.   2.  There is no acute inflammatory process.   3.  There is no bowel obstruction.   4.  There are small probable hepatic cysts.   5.  There is extensive atherosclerosis.      MR-LUMBAR  SPINE-W/O    (Results Pending)         Assessment/Plan:  I anticipate this patient is appropriate for observation status at this time.    * Inability to walk   Assessment & Plan    Due to intractable back oain, needs pt/ot eval and likely placement        Intractable back pain   Assessment & Plan    Intractable back pain with radiculopathy and inabilty to walk   Cont with IV pain control   Pt/ ot eval   CT L spine with DJD and foraminal narrowing no central canal stenosis   Check MRI      Anemia   Assessment & Plan    Mild, no evidence of acute bleeding cont to monitor      Dyslipidemia- (present on admission)   Assessment & Plan    Resume home statin therapy      Essential hypertension- (present on admission)   Assessment & Plan    Resume home vasotec and amlodipine          VTE prophylaxis: heparin

## 2019-06-13 NOTE — ED NOTES
Pt comfort checked, no needs at this time, pt waiting for bed assignment, pt and son updated on POC, encouraged to make needs know

## 2019-06-13 NOTE — ASSESSMENT & PLAN NOTE
-PT OT recommend 24/7 supervision and SNF placement  -family insistent they will take her home and care for her. Agreeable to Home Health.

## 2019-06-13 NOTE — ASSESSMENT & PLAN NOTE
Intractable back pain with radiculopathy and inabilty to walk   Cont with IV pain control   Pt/ ot eval   CT L spine with DJD and foraminal narrowing no central canal stenosis   Check MRI

## 2019-06-13 NOTE — ED NOTES
Med Rec completed per patient and son at bedside  Allergies reviewed  No ORAL antibiotics in last 14 days

## 2019-06-13 NOTE — ED PROVIDER NOTES
ED Provider Note    Scribed for Francisco Nguyen M.D. by Lela Aviles. 6/13/2019  9:22 AM    Primary care provider: Ash Whiting M.D.  Means of arrival: EMS  History obtained from: Patient, Family Member  History limited by: None    CHIEF COMPLAINT  •  Back Pain    HPI  Sabrina Lboo is a 89 y.o. female who presents to the Emergency Department for a chief complaint of acute, constant back pain onset today. She has a history of chronic back pain. Recent XR lumbar spine dated 04/02/19 indicated mild multilevel degenerative disc disease. She began to experience exacerbated diffuse, low back pain this morning. No trauma, injury or falls prior to the onset of her symptoms.  Back pain radiates to her bilateral lower extremities. She denies treating her symptoms with any medications. Negative for bowel or bladder incontinence, groin numbness, lower extremity numbness. She presents to the ED with a temperature of 99.9 °F. Recently, she has experienced seasonal allergies and complains of fatigue and generalized weakness. Otherwise, she denies any recent illness. Negative fevers, chills, cough, dysuria or foul smelling urine. Family member denies a recent history of UTI.      REVIEW OF SYSTEMS  Pertinent positives include diffuse low back pain, bilateral lower extremity pain, fatigue, generalized weakness and seasonal allergies.   Pertinent negatives include no trauma, injury, bowel or bladder incontinence, groin numbness, lower extremity numbness, fevers, chills, cough, dysuria or foul smelling urine.  All other systems reviewed and negative. See HPI for further details.      PAST MEDICAL HISTORY   The patient has a past medical history of seasonal allergies, chronic low back pain, H/O bladder repair surgery; H/O: hysterectomy; Hypertension; and Vertigo. No recent history of UTI.      SURGICAL HISTORY   The patient has a past surgical history that includes hysterectomy radical.      SOCIAL HISTORY  Social History    Substance Use Topics   • Smoking status: Never Smoker   • Smokeless tobacco: Never Used   • Alcohol use No      History   Drug Use No       FAMILY HISTORY  Family History   Problem Relation Age of Onset   • Heart Disease Neg Hx    • Hypertension Neg Hx    • Hyperlipidemia Neg Hx    • Psychiatry Neg Hx    • Diabetes Neg Hx        CURRENT MEDICATIONS  Home Medications     Reviewed by Jenifer Dick (Pharmacy Tech) on 06/13/19 at 1135  Med List Status: Complete   Medication Last Dose Status   alendronate (FOSAMAX) 35 MG tablet 6/11/2019 Active   amLODIPine (NORVASC) 10 MG Tab 6/12/2019 Active   ascorbic acid (ASCORBIC ACID) 500 MG Tab 6/12/2019 Active   aspirin 81 MG tablet 6/11/2019 Active   enalapril (VASOTEC) 20 MG tablet 6/12/2019 Active   fexofenadine (ALLEGRA) 180 MG tablet 6/12/2019 Active   fluticasone (FLONASE) 50 MCG/ACT nasal spray 6/12/2019 Active   multivitamin (THERAGRAN) Tab 6/12/2019 Active   simvastatin (ZOCOR) 20 MG Tab 6/12/2019 Active                ALLERGIES  None      PHYSICAL EXAM  VITAL SIGNS: /57   Pulse 88   Temp 37.7 °C (99.9 °F) (Temporal)   Resp 19   Ht 1.524 m (5')   Wt 50 kg (110 lb 3.7 oz)   BMI 21.53 kg/m²     Constitutional: Well developed, Well nourished, Mild distress, Non-toxic appearance.   HENT: Normocephalic, Atraumatic, Bilateral external ears normal, Oropharynx dry, No oral exudates.   Eyes: PERRLA, EOMI, Conjunctiva normal, No discharge.   Neck: No tenderness, Supple, No stridor.   Lymphatic: No lymphadenopathy noted.   Cardiovascular: Normal heart rate, Normal rhythm.   Thorax & Lungs: Clear to auscultation bilaterally, No respiratory distress, No wheezing, No crackles.   Abdomen: Soft, No tenderness, No masses, No pulsatile masses.   Skin: Warm, Dry, No erythema, No rash.   Extremities: 2+ pulses. No edema. No cyanosis.   Musculoskeletal: Diffuse bilateral CVA , paraspinal and gluteal tenderness. Good muscle strength. No major deformities noted.   Intact distal pulses  Neurologic: Awake, alert. Moves all extremities spontaneously.  Psychiatric: Affect normal, Judgment normal, Mood normal.         LABS  Labs Reviewed   CBC WITH DIFFERENTIAL - Abnormal; Notable for the following:        Result Value    RBC 3.78 (*)     Hemoglobin 11.4 (*)     Hematocrit 35.4 (*)     MCHC 32.2 (*)     Neutrophils-Polys 79.20 (*)     Lymphocytes 12.80 (*)     Neutrophils (Absolute) 8.37 (*)     All other components within normal limits   COMP METABOLIC PANEL - Abnormal; Notable for the following:     Anion Gap 14.0 (*)     Glucose 104 (*)     All other components within normal limits   ESTIMATED GFR - Abnormal; Notable for the following:     GFR If Non  53 (*)     All other components within normal limits   URINALYSIS,CULTURE IF INDICATED     All labs reviewed by me.      RADIOLOGY  CT-LSPINE W/O PLUS RECONS   Final Result      1.  There is a levoconvex scoliosis with no spondylolisthesis.   2.  There is multilevel degenerative disc disease and arthropathy with bilateral neural foraminal narrowing from L3-4 through L5-S1 level but no central canal stenosis.   3.  There is no acute fracture.      CT-RENAL COLIC EVALUATION(A/P W/O)   Final Result      1.  There is no evidence of nephrolithiasis, urolithiasis, or hydronephrosis.   2.  There is no acute inflammatory process.   3.  There is no bowel obstruction.   4.  There are small probable hepatic cysts.   5.  There is extensive atherosclerosis.      MR-LUMBAR SPINE-W/O    (Results Pending)     The radiologist's interpretation of all radiological studies have been reviewed by me.      COURSE & MEDICAL DECISION MAKING  Pertinent Labs & Imaging studies reviewed. (See chart for details)    Differential Diagnoses include but are not limited to: acute chronic back pain, UTI, pyelonephritis or nephrolithiasis.     9:22 AM Obtained and reviewed patient's electronic medical records which indicate a past medical history of  hypertension, coronary artery disease and chronic kidney disease. The patient visited this facility in April of 2019 for back pain XR lumbar spine on 04/02/19 shows degenerative disc disease.     9:25 AM Patient seen and examined at bedside. Patient presents for back pain. Plan for admission as her vital signs and presentation are somewhat concerning for infection and would like to rule out sepsis.    Initial orders in the Emergency Department included CT lumbar spine, CT renal colic and laboratory testing.  Initial treatment in the Emergency Department included Fentanyl IV 25-50 mcg IV and Zofran 4 mg IV.     11:18 AM Spoke with Dr. Gómez, Hospitalist, regarding the patient's presenting symptoms and diagnostic results. They will consult and admit the patient for further evaluation and care.       Decision Making:  Acute on chronic back pain, no obvious etiology but the patient is having difficulty walking due to the pain.  Believe the patient is to be coming into the hospital for further evaluation treatment.  Discussed the case with the hospitalist for admission the hospital.      DISPOSITION  Patient will be admitted to Dr. Gómez, Hospitalist, in stable condition.      DIAGNOSIS  1. Acute bilateral low back pain with bilateral sciatica               Lela HILL (Scribe), am scribing for, and in the presence of, Francisco Nguyen M.D.    Electronically signed by: Lela Aviles (Scribe), 6/13/2019    Francisco HILL M.D. personally performed the services described in this documentation, as scribed by Lela Aviles in my presence, and it is both accurate and complete. C.    The note accurately reflects work and decisions made by me.  Francisco Nguyen  6/13/2019  3:38 PM

## 2019-06-13 NOTE — DISCHARGE PLANNING
Care Transition Team Assessment    Patient does not have an advance directive, booklet given.  Support is her son, Prabhjot ([phone]) and he will be her ride home upon discharge.      Patient uses a walker at home for mobility but tends to get fatigued after a short distance (+/- 10 feet).  Able to do ADL's with stand by assist.  l    Scripts may be called into the [facility] on [address].    Discharge needs:  1.  Perhaps a script for a wheelchair   2. Home health with PT/OT for a short time      Information Source  Information Given By: Other (Comments) (Son)  Informant's Name:  (Prabhjot Lobo)  Who is responsible for making decisions for patient? : Adult child    Elopement Risk  Legal Hold: No    Interdisciplinary Discharge Planning  Does Admitting Nurse Feel This Could be a Complex Discharge?: No  Primary Care Physician:  (Kiana Whiting MD)  Lives with - Patient's Self Care Capacity: Adult Children  Support Systems: Children  Housing / Facility: [address]  Do You Take your Prescribed Medications Regularly: Yes  Able to Return to Previous ADL's: Future Time w/Therapy  Patient Expects to be Discharged to:: Home  Assistance Needed: Unknown at this Time    Discharge Preparedness  What is your plan after discharge?: Home with help  What are your discharge supports?: Child  Prior Functional Level: Uses Walker  Difficulity with ADLs: None  Difficulity with IADLs: None    Functional Assesment  Prior Functional Level: Uses Walker    Finances  Financial Barriers to Discharge: No  Prescription Coverage: Yes    Advance Directive  Advance Directive?: None  Advance Directive offered?: AD Booklet given    Domestic Abuse  Have you ever been the victim of abuse or violence?: No    Anticipated Discharge Information  Anticipated discharge disposition: Home  Discharge Address:  ([address] [place] [address])  Discharge Contact Phone Number:  ([phone])

## 2019-06-13 NOTE — PROGRESS NOTES
Patient brought to unit via transport team after report received from ER RN.  Pt transferred from [facility] to [facility] bed using slideboard.  Oriented to room, instructed on proper use of call light, son at bedside throughout.  Strip alarm on, patient AAOx4, has not ambulated since admission.  Native Tagalog speaking, does know English, son helps to translate what pt doesn't fully understand CNA happens to be fluent as well.   hooked to patient as she is baseline room air, currently on 1.5L with O2 sat of 98

## 2019-06-14 ENCOUNTER — APPOINTMENT (OUTPATIENT)
Dept: RADIOLOGY | Facility: MEDICAL CENTER | Age: 84
End: 2019-06-14
Attending: HOSPITALIST
Payer: MEDICARE

## 2019-06-14 ENCOUNTER — PATIENT OUTREACH (OUTPATIENT)
Dept: HEALTH INFORMATION MANAGEMENT | Facility: OTHER | Age: 84
End: 2019-06-14

## 2019-06-14 ENCOUNTER — HOME HEALTH ADMISSION (OUTPATIENT)
Dept: HOME HEALTH SERVICES | Facility: HOME HEALTHCARE | Age: 84
End: 2019-06-14
Payer: MEDICARE

## 2019-06-14 VITALS
HEART RATE: 58 BPM | DIASTOLIC BLOOD PRESSURE: 57 MMHG | RESPIRATION RATE: 16 BRPM | WEIGHT: 111.77 LBS | OXYGEN SATURATION: 91 % | SYSTOLIC BLOOD PRESSURE: 116 MMHG | HEIGHT: 60 IN | TEMPERATURE: 97.7 F | BODY MASS INDEX: 21.94 KG/M2

## 2019-06-14 PROBLEM — Z78.9 FULL CODE STATUS: Status: ACTIVE | Noted: 2019-06-14

## 2019-06-14 PROBLEM — R54 FRAILTY SYNDROME IN GERIATRIC PATIENT: Status: ACTIVE | Noted: 2019-06-14

## 2019-06-14 LAB
ALBUMIN SERPL BCP-MCNC: 3.3 G/DL (ref 3.2–4.9)
ALBUMIN/GLOB SERPL: 1.1 G/DL
ALP SERPL-CCNC: 59 U/L (ref 30–99)
ALT SERPL-CCNC: 8 U/L (ref 2–50)
ANION GAP SERPL CALC-SCNC: 7 MMOL/L (ref 0–11.9)
AST SERPL-CCNC: 14 U/L (ref 12–45)
BASOPHILS # BLD AUTO: 0.9 % (ref 0–1.8)
BASOPHILS # BLD: 0.07 K/UL (ref 0–0.12)
BILIRUB SERPL-MCNC: 0.7 MG/DL (ref 0.1–1.5)
BUN SERPL-MCNC: 19 MG/DL (ref 8–22)
CALCIUM SERPL-MCNC: 8.6 MG/DL (ref 8.5–10.5)
CHLORIDE SERPL-SCNC: 104 MMOL/L (ref 96–112)
CO2 SERPL-SCNC: 23 MMOL/L (ref 20–33)
CREAT SERPL-MCNC: 1.15 MG/DL (ref 0.5–1.4)
EOSINOPHIL # BLD AUTO: 0.06 K/UL (ref 0–0.51)
EOSINOPHIL NFR BLD: 0.7 % (ref 0–6.9)
ERYTHROCYTE [DISTWIDTH] IN BLOOD BY AUTOMATED COUNT: 42 FL (ref 35.9–50)
GLOBULIN SER CALC-MCNC: 3.1 G/DL (ref 1.9–3.5)
GLUCOSE SERPL-MCNC: 119 MG/DL (ref 65–99)
HCT VFR BLD AUTO: 32.7 % (ref 37–47)
HGB BLD-MCNC: 10.7 G/DL (ref 12–16)
IMM GRANULOCYTES # BLD AUTO: 0.02 K/UL (ref 0–0.11)
IMM GRANULOCYTES NFR BLD AUTO: 0.2 % (ref 0–0.9)
LYMPHOCYTES # BLD AUTO: 2.05 K/UL (ref 1–4.8)
LYMPHOCYTES NFR BLD: 25.5 % (ref 22–41)
MCH RBC QN AUTO: 30.9 PG (ref 27–33)
MCHC RBC AUTO-ENTMCNC: 32.7 G/DL (ref 33.6–35)
MCV RBC AUTO: 94.5 FL (ref 81.4–97.8)
MONOCYTES # BLD AUTO: 0.77 K/UL (ref 0–0.85)
MONOCYTES NFR BLD AUTO: 9.6 % (ref 0–13.4)
NEUTROPHILS # BLD AUTO: 5.08 K/UL (ref 2–7.15)
NEUTROPHILS NFR BLD: 63.1 % (ref 44–72)
NRBC # BLD AUTO: 0 K/UL
NRBC BLD-RTO: 0 /100 WBC
PLATELET # BLD AUTO: 225 K/UL (ref 164–446)
PMV BLD AUTO: 10.2 FL (ref 9–12.9)
POTASSIUM SERPL-SCNC: 4.3 MMOL/L (ref 3.6–5.5)
PROT SERPL-MCNC: 6.4 G/DL (ref 6–8.2)
RBC # BLD AUTO: 3.46 M/UL (ref 4.2–5.4)
SODIUM SERPL-SCNC: 134 MMOL/L (ref 135–145)
WBC # BLD AUTO: 8.1 K/UL (ref 4.8–10.8)

## 2019-06-14 PROCEDURE — 96372 THER/PROPH/DIAG INJ SC/IM: CPT

## 2019-06-14 PROCEDURE — A9270 NON-COVERED ITEM OR SERVICE: HCPCS | Performed by: HOSPITALIST

## 2019-06-14 PROCEDURE — 96376 TX/PRO/DX INJ SAME DRUG ADON: CPT

## 2019-06-14 PROCEDURE — G0378 HOSPITAL OBSERVATION PER HR: HCPCS

## 2019-06-14 PROCEDURE — 36415 COLL VENOUS BLD VENIPUNCTURE: CPT

## 2019-06-14 PROCEDURE — 700102 HCHG RX REV CODE 250 W/ 637 OVERRIDE(OP): Performed by: HOSPITALIST

## 2019-06-14 PROCEDURE — 97162 PT EVAL MOD COMPLEX 30 MIN: CPT

## 2019-06-14 PROCEDURE — 302135 SEQUENTIAL COMPRESSION MACHINE: Performed by: INTERNAL MEDICINE

## 2019-06-14 PROCEDURE — 85025 COMPLETE CBC W/AUTO DIFF WBC: CPT

## 2019-06-14 PROCEDURE — 99217 PR OBSERVATION CARE DISCHARGE: CPT | Performed by: INTERNAL MEDICINE

## 2019-06-14 PROCEDURE — 97166 OT EVAL MOD COMPLEX 45 MIN: CPT

## 2019-06-14 PROCEDURE — 700111 HCHG RX REV CODE 636 W/ 250 OVERRIDE (IP): Performed by: HOSPITALIST

## 2019-06-14 PROCEDURE — 72148 MRI LUMBAR SPINE W/O DYE: CPT

## 2019-06-14 PROCEDURE — 80053 COMPREHEN METABOLIC PANEL: CPT

## 2019-06-14 RX ORDER — AMOXICILLIN 250 MG
2 CAPSULE ORAL 2 TIMES DAILY
Qty: 30 TAB | Refills: 0 | Status: SHIPPED | OUTPATIENT
Start: 2019-06-14 | End: 2022-02-15

## 2019-06-14 RX ORDER — OXYCODONE HYDROCHLORIDE 5 MG/1
5-10 TABLET ORAL EVERY 6 HOURS PRN
Qty: 20 TAB | Refills: 0 | Status: SHIPPED | OUTPATIENT
Start: 2019-06-14 | End: 2019-06-19

## 2019-06-14 RX ORDER — ACETAMINOPHEN 500 MG
500 TABLET ORAL EVERY 6 HOURS
Qty: 30 TAB | Refills: 0 | Status: SHIPPED | OUTPATIENT
Start: 2019-06-14 | End: 2019-08-07

## 2019-06-14 RX ORDER — OXYCODONE HYDROCHLORIDE 5 MG/1
5 TABLET ORAL EVERY 4 HOURS PRN
Qty: 20 TAB | Refills: 0 | Status: SHIPPED | OUTPATIENT
Start: 2019-06-14 | End: 2019-06-19

## 2019-06-14 RX ORDER — ACETAMINOPHEN 500 MG
500 TABLET ORAL EVERY 6 HOURS
Status: DISCONTINUED | OUTPATIENT
Start: 2019-06-14 | End: 2019-06-14 | Stop reason: HOSPADM

## 2019-06-14 RX ADMIN — HEPARIN SODIUM 5000 UNITS: 5000 INJECTION, SOLUTION INTRAVENOUS; SUBCUTANEOUS at 04:13

## 2019-06-14 RX ADMIN — AMLODIPINE BESYLATE 10 MG: 10 TABLET ORAL at 08:56

## 2019-06-14 RX ADMIN — OXYCODONE HYDROCHLORIDE 10 MG: 10 TABLET ORAL at 04:14

## 2019-06-14 RX ADMIN — ASPIRIN 81 MG: 81 TABLET, DELAYED RELEASE ORAL at 04:14

## 2019-06-14 RX ADMIN — SIMVASTATIN 20 MG: 20 TABLET, FILM COATED ORAL at 04:14

## 2019-06-14 RX ADMIN — ENALAPRIL MALEATE 20 MG: 10 TABLET ORAL at 08:56

## 2019-06-14 RX ADMIN — SENNOSIDES,DOCUSATE SODIUM 2 TABLET: 8.6; 5 TABLET, FILM COATED ORAL at 04:15

## 2019-06-14 ASSESSMENT — COGNITIVE AND FUNCTIONAL STATUS - GENERAL
SUGGESTED CMS G CODE MODIFIER MOBILITY: CL
CLIMB 3 TO 5 STEPS WITH RAILING: A LOT
HELP NEEDED FOR BATHING: A LOT
SUGGESTED CMS G CODE MODIFIER DAILY ACTIVITY: CK
WALKING IN HOSPITAL ROOM: A LOT
STANDING UP FROM CHAIR USING ARMS: A LOT
MOBILITY SCORE: 10
DAILY ACTIVITIY SCORE: 15
EATING MEALS: A LITTLE
TOILETING: A LOT
MOVING FROM LYING ON BACK TO SITTING ON SIDE OF FLAT BED: UNABLE
TURNING FROM BACK TO SIDE WHILE IN FLAT BAD: A LOT
DRESSING REGULAR LOWER BODY CLOTHING: A LOT
MOVING TO AND FROM BED TO CHAIR: UNABLE
DRESSING REGULAR UPPER BODY CLOTHING: A LITTLE
PERSONAL GROOMING: A LITTLE

## 2019-06-14 ASSESSMENT — ACTIVITIES OF DAILY LIVING (ADL): TOILETING: INDEPENDENT

## 2019-06-14 ASSESSMENT — GAIT ASSESSMENTS: GAIT LEVEL OF ASSIST: UNABLE TO PARTICIPATE

## 2019-06-14 NOTE — DISCHARGE PLANNING
Anticipated Discharge Disposition: Home with H/H    Action: Discussed patient's needs during IDT rounds.  APN Rishi stated the family wants to take patient home with she is medically clear, and they would like [place] [facility]  Faxed Choice to [facility]    Barriers to Discharge: medical clearance/Home Health acceptance    Plan: follow up with [facility] for H/H acceptance

## 2019-06-14 NOTE — THERAPY
"Occupational Therapy Evaluation completed.   Functional Status:  Pt presents to skilled OT services following back pain and inability to walk, testing negative so far for acute changes. Pt performed bed mobility with mod a, STS eob mod a of 2, sidestepped attempted but unable to do so, seated UB ADLs w/ supervision, declined to attempt LB ADls. Pt w/ c/o dizziness with sitting eob, vitals monitored throughout the session and stable. At times, hard to decipher if it's cognition deficits versus true physical deficits. Pt will require 24/7 direct caregiving at this time, recommend post acute placement prior to d/c home unless family is comfortable with providing 24/7. Will follow while in house and remain available for family training as needed.   Plan of Care: Will benefit from Occupational Therapy 2 times per week  Discharge Recommendations:  Equipment: Will Continue to Assess for Equipment Needs. Post-acute therapy Recommend inpatient transitional care services for continued occupational therapy services.       See \"Rehab Therapy-Acute\" Patient Summary Report for complete documentation.    "

## 2019-06-14 NOTE — DISCHARGE SUMMARY
Discharge Summary    CHIEF COMPLAINT ON ADMISSION  Chief Complaint   Patient presents with   • Back Pain       Reason for Admission  Back pain     Admission Date  6/13/2019    Discharge Date  06/14/19    CODE STATUS  Full Code    HPI & HOSPITAL COURSE  This is a 89-year-old female with PMH significant for HLD, HTN, CAD, CKD 3 and anemia who presented 6/13/2019 with back pain and inability to ambulate.  She woke up day of presentation with acute back pain with radiation down both her legs.  She reports no recent traumas or falls.  CT L-spine showed no acute fractures and multilevel degenerative disc disease with bilateral neural foraminal narrowing from L3-L4 through L5-S1 with no central canal stenosis. FU MRI showed no acute findings.     CODE STATUS discussion with patient and family today patient is to remain full code    OT recommends patient requires 24/7 supervision.  Family wants to take patient home with home health.     Recommend scheduled Tylenol for pain.  I will give her prescription for limited amount of oxycodone for her acute pain until she can FU with per PCP. Instructed family to hold the narcotics if she becomes increasingly sleepy or seems off from her baseline. She has been taking oxycodone 10mg tablets while hospitalized without any issues.     She was seen and examined prior to DC. She is very eager to go as is the family. She appears in no acute distress and reports her pain is controlled.     In prescribing controlled substances to this patient, I certify that I have obtained and reviewed the medical history of Sabrina Lobo. I have also made a good susan effort to obtain applicable records from other providers who have treated the patient and records did not demonstrate any increased risk of substance abuse that would prevent me from prescribing controlled substances.     I have conducted a physical exam and documented it. I have reviewed Ms. Lobo’s prescription history as maintained by  the [place] Prescription Monitoring Program.     I have assessed the patient’s risk for abuse, dependency, and addiction using the validated Opioid Risk Tool available at https://www.mdcalc.com/ndssqd-lvwa-zxhu-ort-narcotic-abuse.     Given the above, I believe the benefits of controlled substance therapy outweigh the risks. The reasons for prescribing controlled substances include non-narcotic, oral analgesic alternatives have been inadequate for pain control. Accordingly, I have discussed the risk and benefits, treatment plan, and alternative therapies with the patient.     Therefore, she is discharged in fair and stable condition to home with organized home healthcare and close outpatient follow-up.    FOLLOW UP ITEMS POST DISCHARGE  -As discussed above  -FU with PCP    DISCHARGE DIAGNOSES  Principal Problem:    Inability to walk POA: Yes  Active Problems:    Intractable back pain POA: Yes    Essential hypertension POA: Yes    Dyslipidemia POA: Yes    Anemia POA: Yes    Full code status POA: Yes    Frailty syndrome in geriatric patient POA: Yes  Resolved Problems:    * No resolved hospital problems. *      FOLLOW UP  Ash Whiting M.D.  [address]  [address]  [facility] [id number] [phone]    In 1 week  F/u appointment after hospitalization      MEDICATIONS ON DISCHARGE     Medication List      START taking these medications      Instructions   acetaminophen 500 MG Tabs  Commonly known as:  TYLENOL   Take 1 Tab by mouth every 6 hours.  Dose:  500 mg     * oxyCODONE immediate-release 5 MG Tabs  Commonly known as:  ROXICODONE   Take 1-2 Tabs by mouth every 6 hours as needed for up to 5 days.  Dose:  5-10 mg     * oxyCODONE immediate-release 5 MG Tabs  Commonly known as:  ROXICODONE   Take 1 Tab by mouth every four hours as needed for Moderate Pain or Severe Pain for up to 5 days.  Dose:  5 mg     senna-docusate 8.6-50 MG Tabs  Commonly known as:  PERICOLACE or SENOKOT S   Take 2 Tabs by mouth 2  Times a Day.  Dose:  2 Tab        * This list has 2 medication(s) that are the same as other medications prescribed for you. Read the directions carefully, and ask your doctor or other care provider to review them with you.            CONTINUE taking these medications      Instructions   alendronate 35 MG tablet  Commonly known as:  FOSAMAX   Take 1 Tab by mouth every 7 days.  Dose:  35 mg     amLODIPine 10 MG Tabs  Commonly known as:  NORVASC   Take 1 Tab by mouth every day.  Dose:  10 mg     ascorbic acid 500 MG Tabs  Commonly known as:  ascorbic acid   Take 500 mg by mouth every day.  Dose:  500 mg     aspirin 81 MG tablet   Take 1 Tab by mouth every morning.  Dose:  81 mg     enalapril 20 MG tablet  Commonly known as:  VASOTEC   Doctor's comments:  Need labs and office visit for refill  Take 1 Tab by mouth every morning.  Dose:  20 mg     fexofenadine 180 MG tablet  Commonly known as:  ALLEGRA   Take 1 Tab by mouth every day.  Dose:  180 mg     fluticasone 50 MCG/ACT nasal spray  Commonly known as:  FLONASE   Spray 1 Spray in nose every day.  Dose:  1 Spray     multivitamin Tabs   Take 1 Tab by mouth every day.  Dose:  1 Tab     simvastatin 20 MG Tabs  Commonly known as:  ZOCOR   Take 1 Tab by mouth every morning.  Dose:  20 mg            Allergies  No Known Allergies    DIET  Orders Placed This Encounter   Procedures   • Diet Order Regular     Standing Status:   Standing     Number of Occurrences:   1     Order Specific Question:   Diet:     Answer:   Regular [1]       ACTIVITY  As tolerated.  Weight bearing as tolerated    CONSULTATIONS  NONE    PROCEDURES  NONE    LABORATORY  Lab Results   Component Value Date    SODIUM 134 (L) 06/14/2019    POTASSIUM 4.3 06/14/2019    CHLORIDE 104 06/14/2019    CO2 23 06/14/2019    GLUCOSE 119 (H) 06/14/2019    BUN 19 06/14/2019    CREATININE 1.15 06/14/2019    CREATININE 0.9 01/15/2009        Lab Results   Component Value Date    WBC 8.1 06/14/2019    HEMOGLOBIN 10.7 (L)  06/14/2019    HEMATOCRIT 32.7 (L) 06/14/2019    PLATELETCT 225 06/14/2019      BLESSING Guzman.

## 2019-06-14 NOTE — PROGRESS NOTES
Received report from NOC RN, patient awake and alert. Patient able to sit up in bed and eat breakfast. Patient unable to assist to [facility] for MRI, slide board used. Family at bedside. Patient off unit with transport.

## 2019-06-14 NOTE — ASSESSMENT & PLAN NOTE
-with acute back pain with decreased mobility  -CODE STATUS discussion with patient and family today patient is to remain full code  -OT recommends patient requires 24/7 supervision.  Family wants to take patient home with home health  -Encourage p.o. Intake -3 times daily supplements  -Pharmacologic and nonpharmacologic pain interventions.  Use narcotics sparingly.  I have scheduled Tylenol around-the-clock  -Fall precautions

## 2019-06-14 NOTE — CARE PLAN
Problem: Safety  Goal: Will remain free from falls  Outcome: PROGRESSING AS EXPECTED  Bed in locked and lowest position. Bed alarm on. Nonskid socks in place. Educated on call bell use. Pt verbalized understanding. Will continue to monitor.     Problem: Knowledge Deficit  Goal: Knowledge of disease process/condition, treatment plan, diagnostic tests, and medications will improve  Outcome: PROGRESSING AS EXPECTED  Pt and family updated on plan of care and medications. Pt verbalized understanding. Will continue to monitor.

## 2019-06-14 NOTE — FACE TO FACE
Face to Face Supporting Documentation - Home Health    The encounter with this patient was in whole or in part the primary reason for home health admission.    Date of encounter:   Patient:                    MRN:                       YOB: 2019  Sabrina Lobo  [id number]  4/13/1929     Home health to see patient for:  Skilled Nursing care for assessment, interventions & education, Physical Therapy evaluation and treatment and Occupational therapy evaluation and treatment    Skilled need for:  Recent Deterioration of Health Status BLE weakness and back pain and Medication Management BP meds, pain meds    Skilled nursing interventions to include:  Comment: Assessments and Medication Management    Homebound status evidenced by:  Need the aid of supportive devices such as crutches, canes, wheelchairs or walkers or Needs the assistance of another person in order to leave the home. Leaving home requires a considerable and taxing effort. There is a normal inability to leave the home.    Community Physician to provide follow up care: Ash Whiting M.D.     Optional Interventions? No      I certify the face to face encounter for this home health care referral meets the CMS requirements and the encounter/clinical assessment with the patient was, in whole, or in part, for the medical condition(s) listed above, which is the primary reason for home health care. Based on my clinical findings: the service(s) are medically necessary, support the need for home health care, and the homebound criteria are met.  I certify that this patient has had a face to face encounter by myself.  SUKHDEV Guzman - NPI: [id number]

## 2019-06-14 NOTE — PROGRESS NOTES
2 RN skin check complete with Selene  Devices in place PIV left forearm  Skin assessed under devices N/A  Confirmed pressure ulcers found on N/A  New potential pressure ulcers noted on N/A.  Patient refusing turns due to yelling out because of back pain, therefore no assessment of posterior region(s).  Skin frail throughout, yet no breakdown to report, unremarkable.

## 2019-06-14 NOTE — THERAPY
"Physical Therapy Evaluation completed.   Bed Mobility:  Supine to Sit: Minimal Assist  Transfers: Sit to Stand: Minimal Assist  Gait: Level Of Assist: Unable to Participate with Front-Wheel Walker       Plan of Care: Will benefit from Physical Therapy 2 times per week  Discharge Recommendations: Equipment: Will Continue to Assess for Equipment Needs. Post-acute therapy Discharge to a transitional care facility for continued skilled therapy services.    See \"Rehab Therapy-Acute\" Patient Summary Report for complete documentation.     Pt is a 89 y/o female that presents to acute care with inability to ambulate and intractable back pain. Pt was a poor historian, but based on previous chart lives with adult children on 1st story. Pt was not participatory duing evaluation w/ c/o of dizziness and R hip pain while seated EOB. Pt demonstrated impaired bed mobility, transfers, and balance. Unable to ambulate due to pt high pain. Continue with acute PT to address pt mobility deficits. Pt would benefit from post acute care 24 hour services.   "

## 2019-06-14 NOTE — DISCHARGE PLANNING
Received Choice form at 1320  Agency/Facility Name: [facility]  Referral sent per Choice form at 1327

## 2019-06-14 NOTE — CARE PLAN
Problem: Safety  Goal: Will remain free from falls  Outcome: PROGRESSING AS EXPECTED  Pt. With hx of fall. Bed alarm ON, bed kept low and locked, call light within reach.     Problem: Pain Management  Goal: Pain level will decrease to patient's comfort goal  Outcome: PROGRESSING AS EXPECTED  Pt. With complaints of pain, medicated per MAR.

## 2019-06-14 NOTE — DISCHARGE INSTRUCTIONS
Discharge Instructions    Discharged to home by car with relative. Discharged via wheelchair, hospital escort: Yes.  Special equipment needed: Not Applicable    Be sure to schedule a follow-up appointment with your primary care doctor or any specialists as instructed.     Discharge Plan:   Diet Plan: Discussed (Regular)  Activity Level: Discussed (Activity as Tolerated)  Confirmed Follow up Appointment: Patient to Call and Schedule Appointment  Confirmed Symptoms Management: Discussed  Medication Reconciliation Updated: Yes    I understand that a diet low in cholesterol, fat, and sodium is recommended for good health. Unless I have been given specific instructions below for another diet, I accept this instruction as my diet prescription.   Other diet: Regular    Special Instructions: None    · Is patient discharged on Warfarin / Coumadin?   No     Depression / Suicide Risk    As you are discharged from this [facility], it is important to learn how to keep safe from harming yourself.    Recognize the warning signs:  · Abrupt changes in personality, positive or negative- including increase in energy   · Giving away possessions  · Change in eating patterns- significant weight changes-  positive or negative  · Change in sleeping patterns- unable to sleep or sleeping all the time   · Unwillingness or inability to communicate  · Depression  · Unusual sadness, discouragement and loneliness  · Talk of wanting to die  · Neglect of personal appearance   · Rebelliousness- reckless behavior  · Withdrawal from people/activities they love  · Confusion- inability to concentrate     If you or a loved one observes any of these behaviors or has concerns about self-harm, here's what you can do:  · Talk about it- your feelings and reasons for harming yourself  · Remove any means that you might use to hurt yourself (examples: pills, rope, extension cords, firearm)  · Get professional help from the community (Mental Health,  Substance Abuse, psychological counseling)  · Do not be alone:Call your Safe Contact- someone whom you trust who will be there for you.  · Call your local CRISIS HOTLINE 644-9433 or 980-241-7866  · Call your local Children's Mobile Crisis Response Team [place] (079) 250-9493 or www.[employer]  · Call the toll free National Suicide Prevention Hotlines   · National Suicide Prevention Lifeline 328-619-VKXD (2715)  · [employer] Hope Line Network 800-SUICIDE (979-5701)      Back Pain, Adult  Introduction  Back pain is very common. The pain often gets better over time. The cause of back pain is usually not dangerous. Most people can learn to manage their back pain on their own.  Follow these instructions at home:  Watch your back pain for any changes. The following actions may help to lessen any pain you are feeling:  · Stay active. Start with short walks on flat ground if you can. Try to walk farther each day.  · Exercise regularly as told by your doctor. Exercise helps your back heal faster. It also helps avoid future injury by keeping your muscles strong and flexible.  · Do not sit, drive, or  one place for more than 30 minutes.  · Do not stay in bed. Resting more than 1-2 days can slow down your recovery.  · Be careful when you bend or lift an object. Use good form when lifting:  ¨ Bend at your knees.  ¨ Keep the object close to your body.  ¨ Do not twist.  · Sleep on a firm mattress. Lie on your side, and bend your knees. If you lie on your back, put a pillow under your knees.  · Take medicines only as told by your doctor.  · Put ice on the injured area.  ¨ Put ice in a plastic bag.  ¨ Place a towel between your skin and the bag.  ¨ Leave the ice on for 20 minutes, 2-3 times a day for the first 2-3 days. After that, you can switch between ice and heat packs.  · Avoid feeling anxious or stressed. Find good ways to deal with stress, such as exercise.  · Maintain a healthy weight. Extra weight puts  stress on your back.  Contact a doctor if:  · You have pain that does not go away with rest or medicine.  · You have worsening pain that goes down into your legs or buttocks.  · You have pain that does not get better in one week.  · You have pain at night.  · You lose weight.  · You have a fever or chills.  Get help right away if:  · You cannot control when you poop (bowel movement) or pee (urinate).  · Your arms or legs feel weak.  · Your arms or legs lose feeling (numbness).  · You feel sick to your stomach (nauseous) or throw up (vomit).  · You have belly (abdominal) pain.  · You feel like you may pass out (faint).  This information is not intended to replace advice given to you by your health care provider. Make sure you discuss any questions you have with your health care provider.  Document Released: 06/05/2009 Document Revised: 05/25/2017 Document Reviewed: 04/21/2015  © 2017 Elsevier      Opioid Overdose  Introduction  Opioids are substances that relieve pain by binding to pain receptors in your brain and spinal cord. Opioids include illegal drugs, such as heroin, as well as prescription pain medicines. An opioid overdose happens when you take too much of an opioid substance. This can happen with any type of opioid, including:  · Heroin.  · Morphine.  · Codeine.  · Methadone.  · Oxycodone.  · Hydrocodone.  · Fentanyl.  · Hydromorphone.  · Buprenorphine.  The effects of an overdose can be mild, dangerous, or even deadly. Opioid overdose is a medical emergency.  What are the causes?  This condition may be caused by:  · Taking too much of an opioid by accident.  · Taking too much of an opioid on purpose.  · An error made by a health care provider who prescribes a medicine.  · An error made by the pharmacist who fills the prescription order.  · Using more than one substance that contains opioids at the same time.  · Mixing an opioid with a substance that affects your heart, breathing, or blood pressure. These  include alcohol, tranquilizers, sleeping pills, illegal drugs, and some over-the-counter medicines.  What increases the risk?  This condition is more likely in:  · Children. They may be attracted to colorful pills. Because of a child's small size, even a small amount of a drug can be dangerous.  · Elderly people. They may be taking many different drugs. Elderly people may have difficulty reading labels or remembering when they last took their medicine.  · People who take an opioid on a long-term basis.  · People who use:  ¨ Illegal drugs.  ¨ Other substances, including alcohol, while using an opioid.  · People who have:  ¨ A history of drug or alcohol abuse.  ¨ Certain mental health conditions.  · People who take opioids that are not prescribed for them.  What are the signs or symptoms?  Symptoms of this condition depend on the type of opioid and the amount that was taken. Common symptoms include:  · Sleepiness or difficulty waking from sleep.  · Confusion.  · Slurred speech.  · Slowed breathing and a slow pulse.  · Nausea and vomiting.  · Abnormally small pupils.  Signs and symptoms that require emergency treatment include:  · Cold, clammy, and pale skin.  · Blue lips and fingernails.  · Vomiting.  · Gurgling sounds in the throat.  · A pulse that is very slow or difficult to detect.  · Breathing that is very slow, noisy, or difficult to detect.  · Limp body.  · Inability to respond to speech or be awakened from sleep (stupor).  How is this diagnosed?  This condition is diagnosed based on your symptoms. It is important to tell your health care provider:  · All of the opioids that you took.  · When you took the opioids.  · Whether you were drinking alcohol or using other substances.  Your health care provider will do a physical exam. This exam may include:  · Checking and monitoring your heart rate and rhythm, your breathing rate and depth, your temperature, and your blood pressure (vital signs).  · Checking for  abnormally small pupils.  · Measuring oxygen levels in your blood.  You may also have blood tests or urine tests.  How is this treated?  Supporting your vital signs and your breathing is the first step in treating an opioid overdose. Treatment may also include:  · Giving fluids and minerals (electrolytes) through an IV tube.  · Inserting a breathing tube (endotracheal tube) in your airway to help you breathe.  · Giving oxygen.  · Passing a tube through your nose and into your stomach (NG tube, or nasogastric tube) to wash out your stomach.  · Giving medicines that:  ¨ Increase your blood pressure.  ¨ Absorb any opioid that is in your digestive system.  ¨ Reverse the effects of the opioid (naloxone).  · Ongoing counseling and mental health support if you intentionally overdosed or used an illegal drug.  Follow these instructions at home:  · Take over-the-counter and prescription medicines only as told by your health care provider. Always ask your health care provider about possible side effects and interactions of any new medicine that you start taking.  · Keep a list of all of the medicines that you take, including over-the-counter medicines. Bring this list with you to all of your medical visits.  · Drink enough fluid to keep your urine clear or pale yellow.  · Keep all follow-up visits as told by your health care provider. This is important.  How is this prevented?  · Get help if you are struggling with:  ¨ Alcohol or drug use.  ¨ Depression or another mental health problem.  · Keep the phone number of your local poison control center near your phone or on your cell phone.  · Store all medicines in safety containers that are out of the reach of children.  · Read the drug inserts that come with your medicines.  · Do not drink alcohol when taking opioids.  · Do not use illegal drugs.  · Do not take opioid medicines that are not prescribed for you.  Contact a health care provider if:  · Your symptoms return.  · You  develop new symptoms or side effects when you are taking medicines.  Get help right away if:  · You think that you or someone else may have taken too much of an opioid. The hotline of the National Poison Control Center is (113) 389-4941.  · You or someone else is having symptoms of an opioid overdose.  · You have serious thoughts about hurting yourself or others.  · You have:  ¨ Chest pain.  ¨ Difficulty breathing.  ¨ A loss of consciousness.  Opioid overdose is an emergency. Do not wait to see if the symptoms will go away. Get medical help right away. Call your local emergency services (911 in the [place] Do not drive yourself to the hospital.   This information is not intended to replace advice given to you by your health care provider. Make sure you discuss any questions you have with your health care provider.  Document Released: 01/25/2006 Document Revised: 05/25/2017 Document Reviewed: 12/31/2014  © 2017 Elsevier

## 2019-06-14 NOTE — PROGRESS NOTES
A&OX4. Pt resting in bed throughout shift. Pt declined pain medication. Educated on pain control and medications. Pt verbalized understanding. Pt and family updated on plan of care and medications. All questions answered at the bedside. Fall precautions in place. Call bell and bedside table within reach. Hourly rounding completed. Will continue to monitor.

## 2019-06-17 ENCOUNTER — PATIENT OUTREACH (OUTPATIENT)
Dept: HEALTH INFORMATION MANAGEMENT | Facility: OTHER | Age: 84
End: 2019-06-17

## 2019-07-14 ENCOUNTER — PATIENT OUTREACH (OUTPATIENT)
Dept: HEALTH INFORMATION MANAGEMENT | Facility: OTHER | Age: 84
End: 2019-07-14

## 2019-07-25 NOTE — PROGRESS NOTES
A 89-year-old female was an emergent admission to [facility] from 6/13/2019 to 6/14/2019 to treat Difficulty in walking. IHD visited the patient bedside. The patient was discharged Home w/ Home Health. The patient's medical conditions includes: Anemia or Blood Disorder and Pain. The patient was not under clinical case management.     The Patient was discharged with the following medications: Senna-Docusate, Oxycodone IR, and Acetaminophen. The patient successfully filled all medications.    The patient was ordered to follow-up with PCP and Home Health. The patient had the following appointments:     1) 6/17/2019 @ 12:00 Ash Camarillo, internal medicine - PATIENT IS NOT ESTABLISHED     2) 6/18/2019 @ 12:00 [facility] - Appointment Kept     3) 6/26/2019 @ 12:00 Savannah García, internal medicine - CONFIRMED AS KEPT  The patient has no future appointments scheduled.    IHD followed the patient for a total of 33 days and Patient’s family was engaged.  Low LACE score, PPS not conducted.

## 2019-08-07 ENCOUNTER — APPOINTMENT (OUTPATIENT)
Dept: RADIOLOGY | Facility: MEDICAL CENTER | Age: 84
End: 2019-08-07
Attending: EMERGENCY MEDICINE
Payer: MEDICARE

## 2019-08-07 ENCOUNTER — HOSPITAL ENCOUNTER (EMERGENCY)
Facility: MEDICAL CENTER | Age: 84
End: 2019-08-07
Attending: EMERGENCY MEDICINE
Payer: MEDICARE

## 2019-08-07 VITALS
TEMPERATURE: 97.9 F | RESPIRATION RATE: 19 BRPM | HEIGHT: 61 IN | WEIGHT: 112 LBS | SYSTOLIC BLOOD PRESSURE: 163 MMHG | BODY MASS INDEX: 21.14 KG/M2 | HEART RATE: 104 BPM | DIASTOLIC BLOOD PRESSURE: 64 MMHG | OXYGEN SATURATION: 97 %

## 2019-08-07 DIAGNOSIS — R42 DIZZINESS: ICD-10-CM

## 2019-08-07 LAB
ALBUMIN SERPL BCP-MCNC: 3.6 G/DL (ref 3.2–4.9)
ALBUMIN/GLOB SERPL: 1.1 G/DL
ALP SERPL-CCNC: 71 U/L (ref 30–99)
ALT SERPL-CCNC: 13 U/L (ref 2–50)
ANION GAP SERPL CALC-SCNC: 10 MMOL/L (ref 0–11.9)
AST SERPL-CCNC: 18 U/L (ref 12–45)
BASOPHILS # BLD AUTO: 0.5 % (ref 0–1.8)
BASOPHILS # BLD: 0.05 K/UL (ref 0–0.12)
BILIRUB SERPL-MCNC: 0.3 MG/DL (ref 0.1–1.5)
BUN SERPL-MCNC: 16 MG/DL (ref 8–22)
CALCIUM SERPL-MCNC: 8.8 MG/DL (ref 8.5–10.5)
CHLORIDE SERPL-SCNC: 104 MMOL/L (ref 96–112)
CO2 SERPL-SCNC: 22 MMOL/L (ref 20–33)
CREAT SERPL-MCNC: 1.12 MG/DL (ref 0.5–1.4)
EKG IMPRESSION: NORMAL
EOSINOPHIL # BLD AUTO: 0.14 K/UL (ref 0–0.51)
EOSINOPHIL NFR BLD: 1.5 % (ref 0–6.9)
ERYTHROCYTE [DISTWIDTH] IN BLOOD BY AUTOMATED COUNT: 43.7 FL (ref 35.9–50)
GLOBULIN SER CALC-MCNC: 3.3 G/DL (ref 1.9–3.5)
GLUCOSE SERPL-MCNC: 175 MG/DL (ref 65–99)
HCT VFR BLD AUTO: 33.8 % (ref 37–47)
HGB BLD-MCNC: 11.3 G/DL (ref 12–16)
IMM GRANULOCYTES # BLD AUTO: 0.05 K/UL (ref 0–0.11)
IMM GRANULOCYTES NFR BLD AUTO: 0.5 % (ref 0–0.9)
LYMPHOCYTES # BLD AUTO: 1.09 K/UL (ref 1–4.8)
LYMPHOCYTES NFR BLD: 11.5 % (ref 22–41)
MCH RBC QN AUTO: 31 PG (ref 27–33)
MCHC RBC AUTO-ENTMCNC: 33.4 G/DL (ref 33.6–35)
MCV RBC AUTO: 92.6 FL (ref 81.4–97.8)
MONOCYTES # BLD AUTO: 0.57 K/UL (ref 0–0.85)
MONOCYTES NFR BLD AUTO: 6 % (ref 0–13.4)
NEUTROPHILS # BLD AUTO: 7.55 K/UL (ref 2–7.15)
NEUTROPHILS NFR BLD: 80 % (ref 44–72)
NRBC # BLD AUTO: 0 K/UL
NRBC BLD-RTO: 0 /100 WBC
PLATELET # BLD AUTO: 235 K/UL (ref 164–446)
PMV BLD AUTO: 9.9 FL (ref 9–12.9)
POTASSIUM SERPL-SCNC: 4.4 MMOL/L (ref 3.6–5.5)
PROT SERPL-MCNC: 6.9 G/DL (ref 6–8.2)
RBC # BLD AUTO: 3.65 M/UL (ref 4.2–5.4)
SODIUM SERPL-SCNC: 136 MMOL/L (ref 135–145)
WBC # BLD AUTO: 9.5 K/UL (ref 4.8–10.8)

## 2019-08-07 PROCEDURE — 93005 ELECTROCARDIOGRAM TRACING: CPT | Performed by: EMERGENCY MEDICINE

## 2019-08-07 PROCEDURE — 700105 HCHG RX REV CODE 258: Performed by: EMERGENCY MEDICINE

## 2019-08-07 PROCEDURE — 99284 EMERGENCY DEPT VISIT MOD MDM: CPT

## 2019-08-07 PROCEDURE — 70450 CT HEAD/BRAIN W/O DYE: CPT

## 2019-08-07 PROCEDURE — 85025 COMPLETE CBC W/AUTO DIFF WBC: CPT

## 2019-08-07 PROCEDURE — 80053 COMPREHEN METABOLIC PANEL: CPT

## 2019-08-07 RX ORDER — ACETAMINOPHEN 500 MG
500 TABLET ORAL EVERY 6 HOURS
Status: SHIPPED | COMMUNITY
End: 2022-02-15

## 2019-08-07 RX ORDER — AMLODIPINE BESYLATE 10 MG/1
10 TABLET ORAL DAILY
COMMUNITY
End: 2020-01-08 | Stop reason: SDUPTHER

## 2019-08-07 RX ORDER — ALENDRONATE SODIUM 35 MG/1
35 TABLET ORAL
Status: SHIPPED | COMMUNITY
End: 2022-02-15

## 2019-08-07 RX ORDER — FEXOFENADINE HCL 180 MG/1
180 TABLET ORAL DAILY
Status: SHIPPED | COMMUNITY
End: 2022-02-15

## 2019-08-07 RX ORDER — SIMVASTATIN 20 MG
20 TABLET ORAL EVERY MORNING
COMMUNITY
End: 2020-01-08 | Stop reason: SDUPTHER

## 2019-08-07 RX ORDER — ENALAPRIL MALEATE 20 MG/1
20 TABLET ORAL DAILY
Status: ON HOLD | COMMUNITY
End: 2019-09-19

## 2019-08-07 RX ORDER — SODIUM CHLORIDE, SODIUM LACTATE, POTASSIUM CHLORIDE, CALCIUM CHLORIDE 600; 310; 30; 20 MG/100ML; MG/100ML; MG/100ML; MG/100ML
1000 INJECTION, SOLUTION INTRAVENOUS ONCE
Status: COMPLETED | OUTPATIENT
Start: 2019-08-07 | End: 2019-08-07

## 2019-08-07 RX ORDER — ASPIRIN 81 MG/1
81 TABLET, COATED ORAL DAILY
Refills: 5 | Status: SHIPPED | COMMUNITY
Start: 2019-06-25 | End: 2022-02-15

## 2019-08-07 RX ADMIN — SODIUM CHLORIDE, POTASSIUM CHLORIDE, SODIUM LACTATE AND CALCIUM CHLORIDE 1000 ML: 600; 310; 30; 20 INJECTION, SOLUTION INTRAVENOUS at 17:12

## 2019-08-07 NOTE — ED TRIAGE NOTES
Pt presents to ED from a care facility for a HA. Per report pts son was at facility and called EMS because pt was c/o a HA x1 hour. No meds given per EMS. Pt is awake but is uncooperative towards staff and is selective with responses. Pt in gown. HOB elevated. Lights dimmed for comfort.

## 2019-08-07 NOTE — ED NOTES
Pt is conversing more with staff. Pt allowed this RN to established IV. PIV placed and secure. Blood obtained and sent. Pt tolerated. Pt gave per mission to update Son Prabhjot via phone. Update given per request.

## 2019-08-07 NOTE — ED PROVIDER NOTES
ED Provider Note    Scribed for Boy Reid M.D. by Yulissa Vidal. 8/7/2019  2:55 PM    Primary care provider: Ash Whiting M.D.  Means of arrival: EMS  History obtained from: Patient  History limited by: Poor historian    CHIEF COMPLAINT  Chief Complaint   Patient presents with   • Headache       HPI  Sabrina Lobo is a 89 y.o. female who presents to the Emergency Department for a chief complaint of dizziness onset earlier today. No exacerbating or alleviating factors were reported for the patient's dizziness. Denies abdominal pain, or swelling to the legs. The patient states that she lives with her son and that she utilizes a walker to aide with ambulation.     Further HPI is limited secondary to poor historian      REVIEW OF SYSTEMS  Pertinent negatives include no abdominal pain or swelling to the legs.   See HPI for further details.     Further ROS is limited secondary to poor historian    PAST MEDICAL HISTORY   has a past medical history of H/O bladder repair surgery, H/O: hysterectomy, Hypertension, and Vertigo.    SURGICAL HISTORY   has a past surgical history that includes hysterectomy radical.    SOCIAL HISTORY  Social History     Tobacco Use   • Smoking status: Never Smoker   • Smokeless tobacco: Never Used   Substance Use Topics   • Alcohol use: No   • Drug use: No      Social History     Substance and Sexual Activity   Drug Use No       FAMILY HISTORY  Family History   Problem Relation Age of Onset   • Heart Disease Neg Hx    • Hypertension Neg Hx    • Hyperlipidemia Neg Hx    • Psychiatric Illness Neg Hx    • Diabetes Neg Hx        CURRENT MEDICATIONS   Current Outpatient Medications:   •  acetaminophen (TYLENOL) 500 MG Tab, Take 1 Tab by mouth every 6 hours., Disp: 30 Tab, Rfl: 0  •  senna-docusate (PERICOLACE OR SENOKOT S) 8.6-50 MG Tab, Take 2 Tabs by mouth 2 Times a Day., Disp: 30 Tab, Rfl: 0  •  multivitamin (THERAGRAN) Tab, Take 1 Tab by mouth every day., Disp: , Rfl:   •  ascorbic acid  "(ASCORBIC ACID) 500 MG Tab, Take 500 mg by mouth every day., Disp: , Rfl:   •  alendronate (FOSAMAX) 35 MG tablet, Take 1 Tab by mouth every 7 days., Disp: 12 Tab, Rfl: 1  •  amLODIPine (NORVASC) 10 MG Tab, Take 1 Tab by mouth every day., Disp: 30 Tab, Rfl: 0  •  enalapril (VASOTEC) 20 MG tablet, Take 1 Tab by mouth every morning., Disp: 30 Tab, Rfl: 0  •  simvastatin (ZOCOR) 20 MG Tab, Take 1 Tab by mouth every morning., Disp: 30 Tab, Rfl: 0  •  fexofenadine (ALLEGRA) 180 MG tablet, Take 1 Tab by mouth every day., Disp: 90 Tab, Rfl: 3  •  fluticasone (FLONASE) 50 MCG/ACT nasal spray, Spray 1 Spray in nose every day., Disp: 16 g, Rfl: 5  •  aspirin 81 MG tablet, Take 1 Tab by mouth every morning., Disp: 100 Each, Rfl: 0    ALLERGIES  No Known Allergies    PHYSICAL EXAM  VITAL SIGNS: /49   Pulse 72   Temp 36.6 °C (97.8 °F) (Temporal)   Resp 12   Ht 1.549 m (5' 1\")   Wt 50.8 kg (112 lb)   SpO2 97%   BMI 21.16 kg/m²   Constitutional:  Non-toxic appearance. See abdomen and neurologic for further details.   HENT: Normocephalic, Atraumatic, Bilateral external ears normal, Oropharynx is clear mucous membranes are moist. No oral exudates or nasal discharge.   Eyes: Pupils are equal round and reactive, EOMI, Conjunctiva normal, No discharge.   Neck: Normal range of motion, No tenderness, Supple, No stridor. No meningismus.  Lymphatic: No lymphadenopathy noted.   Cardiovascular: Regular rate and rhythm without murmur rub or gallop.  Thorax & Lungs: Clear breath sounds bilaterally without wheezes, rhonchi or rales. There is no chest wall tenderness.   Abdomen: Abdominal binder present. Soft non-tender non-distended. There is no rebound or guarding. No organomegaly is appreciated. Bowel sounds are normal.  Skin: Normal without rash.   Back: No CVA or spinal tenderness.   Extremities: Intact distal pulses, No edema, No tenderness, No cyanosis, No clubbing. Capillary refill is less than 2 seconds.  Musculoskeletal: " Good range of motion in all major joints. No tenderness to palpation or major deformities noted.   Neurologic:  Incomprehensible. Able to state name. Speaking softly. Able to count fingers. Follow some commands. Normal sensory function, No focal deficits noted.   Psychiatric: There is no suicidal ideation or patient reported hallucinations.       DIAGNOSTIC STUDIES / PROCEDURES    LABS  Labs Reviewed   CBC WITH DIFFERENTIAL - Abnormal; Notable for the following components:       Result Value    RBC 3.65 (*)     Hemoglobin 11.3 (*)     Hematocrit 33.8 (*)     MCHC 33.4 (*)     Neutrophils-Polys 80.00 (*)     Lymphocytes 11.50 (*)     Neutrophils (Absolute) 7.55 (*)     All other components within normal limits   COMP METABOLIC PANEL - Abnormal; Notable for the following components:    Glucose 175 (*)     All other components within normal limits   ESTIMATED GFR - Abnormal; Notable for the following components:    GFR If  55 (*)     GFR If Non  46 (*)     All other components within normal limits   URINALYSIS,CULTURE IF INDICATED      All labs reviewed by me.    EKG Interpretation:  Results for orders placed or performed during the hospital encounter of 19   EKG (NOW)   Result Value Ref Range    Report       [facility] Emergency Dept.    Test Date:  2019  Pt Name:    EMILY HONG                 Department: ER  MRN:        [id number]                      Room:       [facility]  Gender:     Female                       Technician: [id number]  :        [id number]                   Requested By:TESSA LEWIS  Order #:    [id number]                    Reading MD: TESSA LEWIS MD    Measurements  Intervals                                Axis  Rate:       78                           P:          -21  ID:         [id number]                          QRS:        53  QRSD:       82                           T:          37  QT:         387  QTc:        441    Interpretive  Statements  Sinus rhythm  Prolonged PA interval  Compared to ECG 10/28/2018 23:25:19  No significant changes    Electronically Signed On 8-7-2019 18:01:55 PDT by TESSA LEWIS MD           RADIOLOGY  CT-HEAD W/O   Final Result      1.  No evidence of acute territorial infarct, intracranial hemorrhage or mass lesion.   2.  Mild diffuse cerebral substance loss.   3.  Mild microangiopathic ischemic change versus demyelination or gliosis.        The radiologist's interpretation of all radiological studies have been reviewed by me.    COURSE & MEDICAL DECISION MAKING  Nursing notes, VS, PMSFHx reviewed in chart.    2:55 PM Patient seen and examined at bedside. Ordered for CT-head and labs to evaluate. Patient was treated with LR infusion 1,000 mL for her symptoms.      Screening EKG shows no evidence of ischemic changes or dysrhythmia.  Nothing to explain her dizziness.  I performed CT scan of the head secondary to her complaints and this shows no evidence of infarct, mass, intracranial bleed.  There is some diffuse cerebral atrophy.    HYDRATION: Based on the patient's presentation of Other NPO status the patient was given IV fluids. IV Hydration was used because oral hydration was not adequate alone. Upon recheck following hydration, the patient was She was feeling better.    4:43 PM The patient's radiology results were reviewed. Her CT scan of the head was unremarkable. The patient will be ambulated in the ED to see if she is stable for discharge.     4:45 PM The patient was updated on her CT scan results.  No evidence of acute intracranial bleed or mass  Laboratory evaluation reveals no leukocytosis, anemia, which white derangements    Patient has had high blood pressure while in the emergency department, felt likely secondary to medical condition. Counseled patient to monitor blood pressure at home and follow up with primary care physician.  She should be rechecked in 2 days time and I said to the family she should  probably use a walker from here on out for ambulation stability    The patient will return for new or worsening symptoms and is stable at the time of discharge.    DISPOSITION:  Patient will be discharged home in stable condition.  Family is confident with this plan of care and follow-up as she is ambulating well with walker    FINAL IMPRESSION  1. Dizziness          Yulissa HILL (Scribe), am scribing for, and in the presence of, Boy Reid M.D..    Electronically signed by: Yulissa Vidal (Scribe), 8/7/2019    Boy HILL M.D. personally performed the services described in this documentation, as scribed by Yulissa Vidal in my presence, and it is both accurate and complete.    C    The note accurately reflects work and decisions made by me.  Boy Reid  8/7/2019  6:04 PM

## 2019-08-08 NOTE — ED NOTES
Med rec updated and complete. Allergies reviewed. Per family/home pharmacy. No antibiotic use in last 2 weeks.  Home pharmacy [facility].

## 2019-08-08 NOTE — ED NOTES
PIV removed. Catheter intact. Dressing applied. Bleeding controlled. D/C instructions reviewed with pt and family. Both state understanding. Pt left via wheelchair. Family will drive home.

## 2019-09-18 ENCOUNTER — APPOINTMENT (OUTPATIENT)
Dept: RADIOLOGY | Facility: MEDICAL CENTER | Age: 84
End: 2019-09-18
Attending: EMERGENCY MEDICINE
Payer: MEDICARE

## 2019-09-18 ENCOUNTER — HOSPITAL ENCOUNTER (OUTPATIENT)
Facility: MEDICAL CENTER | Age: 84
End: 2019-09-19
Attending: EMERGENCY MEDICINE | Admitting: INTERNAL MEDICINE
Payer: MEDICARE

## 2019-09-18 LAB
ANION GAP SERPL CALC-SCNC: 13 MMOL/L (ref 0–11.9)
APPEARANCE UR: CLEAR
BASOPHILS # BLD AUTO: 0.9 % (ref 0–1.8)
BASOPHILS # BLD: 0.08 K/UL (ref 0–0.12)
BILIRUB UR QL STRIP.AUTO: NEGATIVE
BUN SERPL-MCNC: 14 MG/DL (ref 8–22)
CALCIUM SERPL-MCNC: 9.1 MG/DL (ref 8.4–10.2)
CHLORIDE SERPL-SCNC: 103 MMOL/L (ref 96–112)
CO2 SERPL-SCNC: 23 MMOL/L (ref 20–33)
COLOR UR: YELLOW
CREAT SERPL-MCNC: 0.93 MG/DL (ref 0.5–1.4)
EKG IMPRESSION: NORMAL
EOSINOPHIL # BLD AUTO: 0.32 K/UL (ref 0–0.51)
EOSINOPHIL NFR BLD: 3.4 % (ref 0–6.9)
ERYTHROCYTE [DISTWIDTH] IN BLOOD BY AUTOMATED COUNT: 43.7 FL (ref 35.9–50)
GLUCOSE SERPL-MCNC: 98 MG/DL (ref 65–99)
GLUCOSE UR STRIP.AUTO-MCNC: NEGATIVE MG/DL
HCT VFR BLD AUTO: 39.3 % (ref 37–47)
HGB BLD-MCNC: 12.9 G/DL (ref 12–16)
IMM GRANULOCYTES # BLD AUTO: 0.03 K/UL (ref 0–0.11)
IMM GRANULOCYTES NFR BLD AUTO: 0.3 % (ref 0–0.9)
KETONES UR STRIP.AUTO-MCNC: NEGATIVE MG/DL
LEUKOCYTE ESTERASE UR QL STRIP.AUTO: NEGATIVE
LYMPHOCYTES # BLD AUTO: 2.86 K/UL (ref 1–4.8)
LYMPHOCYTES NFR BLD: 30.7 % (ref 22–41)
MCH RBC QN AUTO: 29.8 PG (ref 27–33)
MCHC RBC AUTO-ENTMCNC: 32.8 G/DL (ref 33.6–35)
MCV RBC AUTO: 90.8 FL (ref 81.4–97.8)
MICRO URNS: NORMAL
MONOCYTES # BLD AUTO: 0.52 K/UL (ref 0–0.85)
MONOCYTES NFR BLD AUTO: 5.6 % (ref 0–13.4)
NEUTROPHILS # BLD AUTO: 5.52 K/UL (ref 2–7.15)
NEUTROPHILS NFR BLD: 59.1 % (ref 44–72)
NITRITE UR QL STRIP.AUTO: NEGATIVE
NRBC # BLD AUTO: 0 K/UL
NRBC BLD-RTO: 0 /100 WBC
PH UR STRIP.AUTO: 7 [PH] (ref 5–8)
PLATELET # BLD AUTO: 270 K/UL (ref 164–446)
PMV BLD AUTO: 9.3 FL (ref 9–12.9)
POTASSIUM SERPL-SCNC: 4.2 MMOL/L (ref 3.6–5.5)
PROT UR QL STRIP: NEGATIVE MG/DL
RBC # BLD AUTO: 4.33 M/UL (ref 4.2–5.4)
RBC UR QL AUTO: NEGATIVE
SODIUM SERPL-SCNC: 139 MMOL/L (ref 135–145)
SP GR UR STRIP.AUTO: <=1.005
TROPONIN T SERPL-MCNC: 10 NG/L (ref 6–19)
WBC # BLD AUTO: 9.3 K/UL (ref 4.8–10.8)

## 2019-09-18 PROCEDURE — 81003 URINALYSIS AUTO W/O SCOPE: CPT

## 2019-09-18 PROCEDURE — 80048 BASIC METABOLIC PNL TOTAL CA: CPT

## 2019-09-18 PROCEDURE — 85025 COMPLETE CBC W/AUTO DIFF WBC: CPT

## 2019-09-18 PROCEDURE — 99285 EMERGENCY DEPT VISIT HI MDM: CPT

## 2019-09-18 PROCEDURE — 94760 N-INVAS EAR/PLS OXIMETRY 1: CPT

## 2019-09-18 PROCEDURE — 700101 HCHG RX REV CODE 250: Performed by: EMERGENCY MEDICINE

## 2019-09-18 PROCEDURE — 96374 THER/PROPH/DIAG INJ IV PUSH: CPT

## 2019-09-18 PROCEDURE — 93005 ELECTROCARDIOGRAM TRACING: CPT | Performed by: EMERGENCY MEDICINE

## 2019-09-18 PROCEDURE — 83036 HEMOGLOBIN GLYCOSYLATED A1C: CPT

## 2019-09-18 PROCEDURE — 71045 X-RAY EXAM CHEST 1 VIEW: CPT

## 2019-09-18 PROCEDURE — 84484 ASSAY OF TROPONIN QUANT: CPT

## 2019-09-18 RX ORDER — LABETALOL HYDROCHLORIDE 5 MG/ML
10 INJECTION, SOLUTION INTRAVENOUS ONCE
Status: COMPLETED | OUTPATIENT
Start: 2019-09-18 | End: 2019-09-18

## 2019-09-18 RX ADMIN — LABETALOL HYDROCHLORIDE 10 MG: 5 INJECTION, SOLUTION INTRAVENOUS at 22:57

## 2019-09-18 ASSESSMENT — PAIN SCALES - WONG BAKER: WONGBAKER_NUMERICALRESPONSE: DOESN'T HURT AT ALL

## 2019-09-19 ENCOUNTER — APPOINTMENT (OUTPATIENT)
Dept: RADIOLOGY | Facility: MEDICAL CENTER | Age: 84
End: 2019-09-19
Attending: INTERNAL MEDICINE
Payer: MEDICARE

## 2019-09-19 ENCOUNTER — APPOINTMENT (OUTPATIENT)
Dept: CARDIOLOGY | Facility: MEDICAL CENTER | Age: 84
End: 2019-09-19
Attending: INTERNAL MEDICINE
Payer: MEDICARE

## 2019-09-19 VITALS
HEIGHT: 62 IN | SYSTOLIC BLOOD PRESSURE: 143 MMHG | HEART RATE: 77 BPM | TEMPERATURE: 98.3 F | DIASTOLIC BLOOD PRESSURE: 64 MMHG | WEIGHT: 116.18 LBS | BODY MASS INDEX: 21.38 KG/M2 | RESPIRATION RATE: 16 BRPM | OXYGEN SATURATION: 99 %

## 2019-09-19 PROBLEM — R29.90 STROKE-LIKE SYMPTOM: Status: ACTIVE | Noted: 2019-09-19

## 2019-09-19 PROBLEM — R26.2 INABILITY TO WALK: Status: RESOLVED | Noted: 2019-06-13 | Resolved: 2019-09-19

## 2019-09-19 PROBLEM — R29.90 STROKE-LIKE SYMPTOM: Status: RESOLVED | Noted: 2019-09-19 | Resolved: 2019-09-19

## 2019-09-19 LAB
ANION GAP SERPL CALC-SCNC: 12 MMOL/L (ref 0–11.9)
BUN SERPL-MCNC: 12 MG/DL (ref 8–22)
CALCIUM SERPL-MCNC: 9 MG/DL (ref 8.4–10.2)
CHLORIDE SERPL-SCNC: 106 MMOL/L (ref 96–112)
CO2 SERPL-SCNC: 23 MMOL/L (ref 20–33)
CREAT SERPL-MCNC: 0.74 MG/DL (ref 0.5–1.4)
ERYTHROCYTE [DISTWIDTH] IN BLOOD BY AUTOMATED COUNT: 43.9 FL (ref 35.9–50)
EST. AVERAGE GLUCOSE BLD GHB EST-MCNC: 108 MG/DL
GLUCOSE SERPL-MCNC: 102 MG/DL (ref 65–99)
HBA1C MFR BLD: 5.4 % (ref 0–5.6)
HCT VFR BLD AUTO: 35.3 % (ref 37–47)
HGB BLD-MCNC: 11.8 G/DL (ref 12–16)
LV EJECT FRACT  99904: 65
LV EJECT FRACT MOD 2C 99903: 54.79
LV EJECT FRACT MOD 4C 99902: 80.99
LV EJECT FRACT MOD BP 99901: 72.01
MCH RBC QN AUTO: 30 PG (ref 27–33)
MCHC RBC AUTO-ENTMCNC: 33.4 G/DL (ref 33.6–35)
MCV RBC AUTO: 89.8 FL (ref 81.4–97.8)
PLATELET # BLD AUTO: 253 K/UL (ref 164–446)
PMV BLD AUTO: 9.5 FL (ref 9–12.9)
POTASSIUM SERPL-SCNC: 3.8 MMOL/L (ref 3.6–5.5)
RBC # BLD AUTO: 3.93 M/UL (ref 4.2–5.4)
SODIUM SERPL-SCNC: 141 MMOL/L (ref 135–145)
WBC # BLD AUTO: 9.3 K/UL (ref 4.8–10.8)

## 2019-09-19 PROCEDURE — 85027 COMPLETE CBC AUTOMATED: CPT

## 2019-09-19 PROCEDURE — 93306 TTE W/DOPPLER COMPLETE: CPT

## 2019-09-19 PROCEDURE — G0378 HOSPITAL OBSERVATION PER HR: HCPCS

## 2019-09-19 PROCEDURE — 700105 HCHG RX REV CODE 258: Performed by: INTERNAL MEDICINE

## 2019-09-19 PROCEDURE — 97166 OT EVAL MOD COMPLEX 45 MIN: CPT

## 2019-09-19 PROCEDURE — 80048 BASIC METABOLIC PNL TOTAL CA: CPT

## 2019-09-19 PROCEDURE — 93306 TTE W/DOPPLER COMPLETE: CPT | Mod: 26 | Performed by: INTERNAL MEDICINE

## 2019-09-19 PROCEDURE — A9270 NON-COVERED ITEM OR SERVICE: HCPCS | Performed by: INTERNAL MEDICINE

## 2019-09-19 PROCEDURE — 70551 MRI BRAIN STEM W/O DYE: CPT

## 2019-09-19 PROCEDURE — 700102 HCHG RX REV CODE 250 W/ 637 OVERRIDE(OP): Performed by: INTERNAL MEDICINE

## 2019-09-19 PROCEDURE — 70450 CT HEAD/BRAIN W/O DYE: CPT

## 2019-09-19 PROCEDURE — 700111 HCHG RX REV CODE 636 W/ 250 OVERRIDE (IP)

## 2019-09-19 PROCEDURE — 99236 HOSP IP/OBS SAME DATE HI 85: CPT | Performed by: INTERNAL MEDICINE

## 2019-09-19 PROCEDURE — 96375 TX/PRO/DX INJ NEW DRUG ADDON: CPT | Mod: XU

## 2019-09-19 RX ORDER — LISINOPRIL 20 MG/1
20 TABLET ORAL
Status: DISCONTINUED | OUTPATIENT
Start: 2019-09-19 | End: 2019-09-19 | Stop reason: HOSPADM

## 2019-09-19 RX ORDER — AMOXICILLIN 250 MG
2 CAPSULE ORAL 2 TIMES DAILY
Status: DISCONTINUED | OUTPATIENT
Start: 2019-09-19 | End: 2019-09-19 | Stop reason: HOSPADM

## 2019-09-19 RX ORDER — ASPIRIN 325 MG
325 TABLET ORAL DAILY
Status: DISCONTINUED | OUTPATIENT
Start: 2019-09-19 | End: 2019-09-19

## 2019-09-19 RX ORDER — ASPIRIN 81 MG/1
81 TABLET, CHEWABLE ORAL DAILY
Status: DISCONTINUED | OUTPATIENT
Start: 2019-09-20 | End: 2019-09-19 | Stop reason: HOSPADM

## 2019-09-19 RX ORDER — ONDANSETRON 4 MG/1
4 TABLET, ORALLY DISINTEGRATING ORAL EVERY 4 HOURS PRN
Status: DISCONTINUED | OUTPATIENT
Start: 2019-09-19 | End: 2019-09-19 | Stop reason: HOSPADM

## 2019-09-19 RX ORDER — ACETAMINOPHEN 325 MG/1
650 TABLET ORAL EVERY 6 HOURS PRN
Status: DISCONTINUED | OUTPATIENT
Start: 2019-09-19 | End: 2019-09-19 | Stop reason: HOSPADM

## 2019-09-19 RX ORDER — LISINOPRIL 20 MG/1
10 TABLET ORAL 2 TIMES DAILY
Qty: 60 TAB | Refills: 5 | Status: ON HOLD | OUTPATIENT
Start: 2019-09-19 | End: 2020-08-13

## 2019-09-19 RX ORDER — ONDANSETRON 2 MG/ML
4 INJECTION INTRAMUSCULAR; INTRAVENOUS EVERY 4 HOURS PRN
Status: DISCONTINUED | OUTPATIENT
Start: 2019-09-19 | End: 2019-09-19 | Stop reason: HOSPADM

## 2019-09-19 RX ORDER — HYDRALAZINE HYDROCHLORIDE 20 MG/ML
10 INJECTION INTRAMUSCULAR; INTRAVENOUS
Status: DISCONTINUED | OUTPATIENT
Start: 2019-09-19 | End: 2019-09-19 | Stop reason: HOSPADM

## 2019-09-19 RX ORDER — HYDRALAZINE HYDROCHLORIDE 20 MG/ML
INJECTION INTRAMUSCULAR; INTRAVENOUS
Status: COMPLETED
Start: 2019-09-19 | End: 2019-09-19

## 2019-09-19 RX ORDER — LABETALOL HYDROCHLORIDE 5 MG/ML
10 INJECTION, SOLUTION INTRAVENOUS EVERY 4 HOURS PRN
Status: DISCONTINUED | OUTPATIENT
Start: 2019-09-19 | End: 2019-09-19 | Stop reason: HOSPADM

## 2019-09-19 RX ORDER — BISACODYL 10 MG
10 SUPPOSITORY, RECTAL RECTAL
Status: DISCONTINUED | OUTPATIENT
Start: 2019-09-19 | End: 2019-09-19 | Stop reason: HOSPADM

## 2019-09-19 RX ORDER — ASPIRIN 81 MG/1
324 TABLET, CHEWABLE ORAL DAILY
Status: DISCONTINUED | OUTPATIENT
Start: 2019-09-19 | End: 2019-09-19

## 2019-09-19 RX ORDER — ENALAPRIL MALEATE 10 MG/1
20 TABLET ORAL DAILY
Status: ON HOLD | COMMUNITY
End: 2019-09-19

## 2019-09-19 RX ORDER — SODIUM CHLORIDE 9 MG/ML
INJECTION, SOLUTION INTRAVENOUS CONTINUOUS
Status: DISCONTINUED | OUTPATIENT
Start: 2019-09-19 | End: 2019-09-19

## 2019-09-19 RX ORDER — ASPIRIN 600 MG/1
300 SUPPOSITORY RECTAL DAILY
Status: DISCONTINUED | OUTPATIENT
Start: 2019-09-19 | End: 2019-09-19

## 2019-09-19 RX ORDER — ATORVASTATIN CALCIUM 40 MG/1
40 TABLET, FILM COATED ORAL EVERY EVENING
Status: DISCONTINUED | OUTPATIENT
Start: 2019-09-19 | End: 2019-09-19 | Stop reason: HOSPADM

## 2019-09-19 RX ORDER — AMLODIPINE BESYLATE 5 MG/1
10 TABLET ORAL DAILY
Status: DISCONTINUED | OUTPATIENT
Start: 2019-09-19 | End: 2019-09-19 | Stop reason: HOSPADM

## 2019-09-19 RX ORDER — POLYETHYLENE GLYCOL 3350 17 G/17G
1 POWDER, FOR SOLUTION ORAL
Status: DISCONTINUED | OUTPATIENT
Start: 2019-09-19 | End: 2019-09-19 | Stop reason: HOSPADM

## 2019-09-19 RX ADMIN — ASPIRIN 325 MG ORAL TABLET 325 MG: 325 PILL ORAL at 05:29

## 2019-09-19 RX ADMIN — SODIUM CHLORIDE: 9 INJECTION, SOLUTION INTRAVENOUS at 01:07

## 2019-09-19 RX ADMIN — AMLODIPINE BESYLATE 10 MG: 5 TABLET ORAL at 12:06

## 2019-09-19 RX ADMIN — LISINOPRIL 20 MG: 20 TABLET ORAL at 12:06

## 2019-09-19 RX ADMIN — SENNOSIDES AND DOCUSATE SODIUM 2 TABLET: 8.6; 5 TABLET ORAL at 05:29

## 2019-09-19 RX ADMIN — HYDRALAZINE HYDROCHLORIDE 10 MG: 20 INJECTION INTRAMUSCULAR; INTRAVENOUS at 00:39

## 2019-09-19 RX ADMIN — ACETAMINOPHEN 650 MG: 325 TABLET, FILM COATED ORAL at 05:30

## 2019-09-19 ASSESSMENT — ENCOUNTER SYMPTOMS
LOSS OF CONSCIOUSNESS: 0
CONSTIPATION: 0
HEADACHES: 0
FALLS: 0
FOCAL WEAKNESS: 0
SHORTNESS OF BREATH: 0
NAUSEA: 0
DIARRHEA: 0
COUGH: 0
WEAKNESS: 1
STRIDOR: 0
PALPITATIONS: 0
CHILLS: 0
ABDOMINAL PAIN: 0
SPUTUM PRODUCTION: 0
VOMITING: 0
DEPRESSION: 0
TINGLING: 0
FEVER: 0
MYALGIAS: 0
DIZZINESS: 1

## 2019-09-19 ASSESSMENT — COGNITIVE AND FUNCTIONAL STATUS - GENERAL
PERSONAL GROOMING: A LITTLE
HELP NEEDED FOR BATHING: A LOT
DRESSING REGULAR UPPER BODY CLOTHING: A LITTLE
DAILY ACTIVITIY SCORE: 17
MOVING FROM LYING ON BACK TO SITTING ON SIDE OF FLAT BED: A LOT
SUGGESTED CMS G CODE MODIFIER DAILY ACTIVITY: CK
STANDING UP FROM CHAIR USING ARMS: A LOT
SUGGESTED CMS G CODE MODIFIER DAILY ACTIVITY: CK
DAILY ACTIVITIY SCORE: 16
WALKING IN HOSPITAL ROOM: A LOT
PERSONAL GROOMING: A LITTLE
MOVING TO AND FROM BED TO CHAIR: A LOT
DRESSING REGULAR UPPER BODY CLOTHING: A LITTLE
TOILETING: A LITTLE
DRESSING REGULAR LOWER BODY CLOTHING: A LOT
EATING MEALS: A LITTLE
DRESSING REGULAR LOWER BODY CLOTHING: A LOT
TURNING FROM BACK TO SIDE WHILE IN FLAT BAD: A LITTLE
HELP NEEDED FOR BATHING: A LITTLE
MOBILITY SCORE: 13
SUGGESTED CMS G CODE MODIFIER MOBILITY: CL
CLIMB 3 TO 5 STEPS WITH RAILING: A LOT
TOILETING: A LOT

## 2019-09-19 ASSESSMENT — LIFESTYLE VARIABLES
TOTAL SCORE: 0
AVERAGE NUMBER OF DAYS PER WEEK YOU HAVE A DRINK CONTAINING ALCOHOL: 0
ALCOHOL_USE: NO
EVER_SMOKED: NEVER
EVER HAD A DRINK FIRST THING IN THE MORNING TO STEADY YOUR NERVES TO GET RID OF A HANGOVER: NO
CONSUMPTION TOTAL: NEGATIVE
TOTAL SCORE: 0
HAVE YOU EVER FELT YOU SHOULD CUT DOWN ON YOUR DRINKING: NO
TOTAL SCORE: 0
HAVE PEOPLE ANNOYED YOU BY CRITICIZING YOUR DRINKING: NO
EVER FELT BAD OR GUILTY ABOUT YOUR DRINKING: NO
HOW MANY TIMES IN THE PAST YEAR HAVE YOU HAD 5 OR MORE DRINKS IN A DAY: 0
ON A TYPICAL DAY WHEN YOU DRINK ALCOHOL HOW MANY DRINKS DO YOU HAVE: 0

## 2019-09-19 ASSESSMENT — PATIENT HEALTH QUESTIONNAIRE - PHQ9
2. FEELING DOWN, DEPRESSED, IRRITABLE, OR HOPELESS: NOT AT ALL
1. LITTLE INTEREST OR PLEASURE IN DOING THINGS: NOT AT ALL
SUM OF ALL RESPONSES TO PHQ9 QUESTIONS 1 AND 2: 0

## 2019-09-19 ASSESSMENT — ACTIVITIES OF DAILY LIVING (ADL): TOILETING: INDEPENDENT

## 2019-09-19 NOTE — PROGRESS NOTES
[id number] - report from Rosanna ALLISON.  Pt resting comfortably in bed.  A&O x 4, speaks some english.  Up to transfer to bsc with x 1 asst, unsteady on feet.  Fall precautions in effect.  Pt calls approp for asst oob.  Denies pain, sob, dizziness, nausea. See flowsheet for assess.  poc reviewed with pt, pt vu, all questions answered.  Tele = sr,  Vss.    Pt to MRI via wc.  Pt back from MRI, fifi testing well.  0930 - Good po intake with am meal, fifi diet well.      1845 - Pt d/c'd to home with family.  A&O x 4.  Up to amb with sba, sl unsteady on feet.  Iv x 2 d/c'd intact. dx info/handouts given.  Pt & family vu of all d/c teaching, all questions answered.  Pt escorted to private car via wc & staff.

## 2019-09-19 NOTE — PROGRESS NOTES
2 RN skin assessment completed  Devices in place: nasal cannula  Skin assessed below devices: yes, intact  Confirmed pressure ulcers: none  Potential pressure ulcers: sacrum/buttocks area maroon colored, non-blanching  Preventative measures in place: frequent turning encouraged, pillows used for support to offload pressure to skin    Skin is intact, no open wounds. No wound care order placed

## 2019-09-19 NOTE — ED PROVIDER NOTES
ED Provider Note    CHIEF COMPLAINT  Chief Complaint   Patient presents with   • Malaise   • Hypertension       HPI  Sabrina Lobo is a 89 y.o. female who presents with chief complaint of hypertension.  She also reports dizziness.  Patient with a long-standing history of recurrent dizziness.  Patient was seen approximately 1 month ago for identical complaints.  Patient reports that today she checked her blood pressure and it was significantly elevated, she reports that she has had some waxing waning dizziness over the past few weeks.  This typically only occurs when patient is moving, no dizziness at rest.  Patient has not vomited although occasionally she feels relatively nauseous.  She denies any associated chest pain or shortness of breath.  She takes 20 mg of enalapril and has not missed any doses recently.  Patient denies any headache or neck pain.  She denies any new weakness or numbness.    REVIEW OF SYSTEMS  Review of Systems   Cardiovascular: Negative for chest pain.   Neurological: Positive for dizziness. Negative for focal weakness.       See HPI for further details. All other systems are negative.     PAST MEDICAL HISTORY   has a past medical history of H/O bladder repair surgery, H/O: hysterectomy, Hypertension, and Vertigo.    SOCIAL HISTORY  Social History     Tobacco Use   • Smoking status: Never Smoker   • Smokeless tobacco: Never Used   Substance and Sexual Activity   • Alcohol use: No   • Drug use: No   • Sexual activity: Not on file       SURGICAL HISTORY   has a past surgical history that includes hysterectomy radical.    CURRENT MEDICATIONS  Home Medications    **Home medications have not yet been reviewed for this encounter**         ALLERGIES  No Known Allergies    PHYSICAL EXAM  Physical Exam   Constitutional: She is oriented to person, place, and time. She appears well-developed and well-nourished.   HENT:   Head: Normocephalic and atraumatic.   Eyes: Conjunctivae are normal.   Neck:  Normal range of motion. Neck supple.   Cardiovascular: Normal rate and regular rhythm.   Pulmonary/Chest: Effort normal and breath sounds normal.   Abdominal: Soft. Bowel sounds are normal. She exhibits no distension. There is no tenderness. There is no rebound.   Neurological: She is alert and oriented to person, place, and time.   Distal and proximal strength 5/5 in upper extremities.  Left leg with 4-5 strength, per family this is chronic and unchanged for years. . No dysmetria. No sensation deficits. No visual field deficits. Cranial nerves intact.      Skin: Skin is warm and dry. No rash noted.   Psychiatric: She has a normal mood and affect. Her behavior is normal.         DIAGNOSTIC STUDIES / PROCEDURES    EKG  See below    LABS  Results for orders placed or performed during the hospital encounter of 09/18/19   BASIC METABOLIC PANEL   Result Value Ref Range    Sodium 139 135 - 145 mmol/L    Potassium 4.2 3.6 - 5.5 mmol/L    Chloride 103 96 - 112 mmol/L    Co2 23 20 - 33 mmol/L    Glucose 98 65 - 99 mg/dL    Bun 14 8 - 22 mg/dL    Creatinine 0.93 0.50 - 1.40 mg/dL    Calcium 9.1 8.4 - 10.2 mg/dL    Anion Gap 13.0 (H) 0.0 - 11.9   TROPONIN   Result Value Ref Range    Troponin T 10 6 - 19 ng/L   CBC WITH DIFFERENTIAL   Result Value Ref Range    WBC 9.3 4.8 - 10.8 K/uL    RBC 4.33 4.20 - 5.40 M/uL    Hemoglobin 12.9 12.0 - 16.0 g/dL    Hematocrit 39.3 37.0 - 47.0 %    MCV 90.8 81.4 - 97.8 fL    MCH 29.8 27.0 - 33.0 pg    MCHC 32.8 (L) 33.6 - 35.0 g/dL    RDW 43.7 35.9 - 50.0 fL    Platelet Count 270 164 - 446 K/uL    MPV 9.3 9.0 - 12.9 fL    Neutrophils-Polys 59.10 44.00 - 72.00 %    Lymphocytes 30.70 22.00 - 41.00 %    Monocytes 5.60 0.00 - 13.40 %    Eosinophils 3.40 0.00 - 6.90 %    Basophils 0.90 0.00 - 1.80 %    Immature Granulocytes 0.30 0.00 - 0.90 %    Nucleated RBC 0.00 /100 WBC    Neutrophils (Absolute) 5.52 2.00 - 7.15 K/uL    Lymphs (Absolute) 2.86 1.00 - 4.80 K/uL    Monos (Absolute) 0.52 0.00 - 0.85  K/uL    Eos (Absolute) 0.32 0.00 - 0.51 K/uL    Baso (Absolute) 0.08 0.00 - 0.12 K/uL    Immature Granulocytes (abs) 0.03 0.00 - 0.11 K/uL    NRBC (Absolute) 0.00 K/uL   ESTIMATED GFR   Result Value Ref Range    GFR If African American >60 >60 mL/min/1.73 m 2    GFR If Non  57 (A) >60 mL/min/1.73 m 2   URINALYSIS,CULTURE IF INDICATED   Result Value Ref Range    Color Yellow     Character Clear     Specific Gravity <=1.005 <1.035    Ph 7.0 5.0 - 8.0    Glucose Negative Negative mg/dL    Ketones Negative Negative mg/dL    Protein Negative Negative mg/dL    Bilirubin Negative Negative    Nitrite Negative Negative    Leukocyte Esterase Negative Negative    Occult Blood Negative Negative    Micro Urine Req see below    EKG (NOW)   Result Value Ref Range    Report       [facility] Emergency Dept.    Test Date:  2019  Pt Name:    EMILY HONG                 Department: [facility]  MRN:        [id number]                      [facility]   Gender:     Female                       Technician: [id number]  :        [id number]                   Requested By:LEDA OTT  Order #:    [id number]                    Reading MD: Charles Doe MD    Measurements  Intervals                                Axis  Rate:       78                           P:          15  KS:         232                          QRS:        27  QRSD:       88                           T:          19  QT:         395  QTc:        450    Interpretive Statements  EKG is normal sinus rhythm normal axis, poor R wave progression in precordial    leads which is chronic, mildly prolonged KS, no ST elevation or depression  Electronically Signed On 2019 22:48:08 PDT by Charles Doe MD           RADIOLOGY  CT-HEAD W/O   Final Result      1.  There is no acute abnormality.   2.  Mild cerebral volume loss.   3.  There has been no significant interval change.      DX-CHEST-PORTABLE (1 VIEW)   Final Result       No acute cardiac or pulmonary abnormalities are identified.      MR-BRAIN-W/O    (Results Pending)           COURSE & MEDICAL DECISION MAKING  Pertinent Labs & Imaging studies reviewed. (See chart for details)  Very well-appearing patient here with elevated blood pressure.  Basic labs which were checked in triage are unremarkable.  Patient's EKG is unchanged.  Troponin checked for evaluation of hypertensive emergency.  CVA highly unlikely, patient with chronic weakness of her left leg but no new weakness.  She has no dysmetria, her heel-to-shin is normal.  However she reports she is unable to walk because she is too dizzy. she had a recent head CT which was negative for any territorial infarc or ICH however this would be an incomplete work-up of possible CVA given no MRI.  She denies any recent head trauma.  There is no evidence of any head trauma.  I have treated patient's hypertension w/ IV labetolol and she continues to refuse to walk secondary to dizziness.  Patient is far outside the window for TPA, we do not have CT capabilities here and she is far outside of the window for meaningful rescue of large vessel occlusion.  Given her inability to ambulate she will be admitted for ongoing care and possible MRI tomorrow.      FINAL IMPRESSION  1.  Dizziness, hypertensive urgency    Electronically signed by: Nader Soto, 9/18/2019 10:48 PM

## 2019-09-19 NOTE — H&P
Hospital Medicine History & Physical Note    Date of Service  9/19/2019    Primary Care Physician  Ash Whiting M.D.    Consultants  None    Code Status  Full    Chief Complaint  Dizziness    History of Presenting Illness  89 y.o. female who presented 9/18/2019 with dizziness.  Patient states yesterday she began experiencing dizziness.  When asked to explain it further she did state the room was spinning.  She states this is been constant and has worsened.  She states now she is too dizzy to walk.  She denies any unilateral weakness but does describe overall weakness.  She denied any nausea, vomiting, fever or chills.  She has not fallen or passed out.  I did discuss the case including labs and imaging with the ER physician.    Review of Systems  Review of Systems   Constitutional: Positive for malaise/fatigue. Negative for chills and fever.   HENT: Negative for congestion.    Respiratory: Negative for cough, sputum production, shortness of breath and stridor.    Cardiovascular: Negative for chest pain, palpitations and leg swelling.   Gastrointestinal: Negative for abdominal pain, constipation, diarrhea, nausea and vomiting.   Genitourinary: Negative for dysuria and urgency.   Musculoskeletal: Negative for falls and myalgias.   Neurological: Positive for dizziness and weakness. Negative for tingling, loss of consciousness and headaches.   Psychiatric/Behavioral: Negative for depression and suicidal ideas.   All other systems reviewed and are negative.      Past Medical History   has a past medical history of H/O bladder repair surgery, H/O: hysterectomy, Hypertension, and Vertigo.    Surgical History   has a past surgical history that includes hysterectomy radical.     Family History  Reviewed, nonpertinent    Social History   reports that she has never smoked. She has never used smokeless tobacco. She reports that she does not drink alcohol or use drugs.    Allergies  No Known  Allergies    Medications  Prior to Admission Medications   Prescriptions Last Dose Informant Patient Reported? Taking?   ASPIRIN LOW DOSE 81 MG EC tablet  Patient Yes No   Sig: Take 81 mg by mouth every day.   acetaminophen (TYLENOL) 500 MG Tab  Patient Yes No   Sig: Take 500 mg by mouth every 6 hours. Scheduled   alendronate (FOSAMAX) 35 MG tablet  Patient Yes No   Sig: Take 35 mg by mouth every Tuesday.   amLODIPine (NORVASC) 10 MG Tab  Patient Yes No   Sig: Take 10 mg by mouth every day.   ascorbic acid (ASCORBIC ACID) 500 MG Tab  Patient Yes No   Sig: Take 500 mg by mouth every day.   enalapril (VASOTEC) 20 MG tablet  Patient Yes No   Sig: Take 20 mg by mouth every day.   fexofenadine (ALLEGRA ALLERGY) 180 MG tablet  Patient Yes No   Sig: Take 180 mg by mouth every day.   fluticasone (FLONASE) 50 MCG/ACT nasal spray  Patient No No   Sig: Spray 1 Spray in nose every day.   multivitamin (THERAGRAN) Tab  Patient Yes No   Sig: Take 1 Tab by mouth every day.   senna-docusate (PERICOLACE OR SENOKOT S) 8.6-50 MG Tab  Patient No No   Sig: Take 2 Tabs by mouth 2 Times a Day.   simvastatin (ZOCOR) 20 MG Tab  Patient Yes No   Sig: Take 20 mg by mouth every morning.      Facility-Administered Medications: None       Physical Exam  Temp:  [36.6 °C (97.9 °F)] 36.6 °C (97.9 °F)  Pulse:  [60-85] 63  Resp:  [19-22] 21  BP: (175-210)/() 175/75  SpO2:  [96 %-98 %] 96 %    Physical Exam   Constitutional: She is oriented to person, place, and time. She appears well-developed. She is cooperative. No distress.   HENT:   Head: Normocephalic and atraumatic. Not macrocephalic and not microcephalic. Head is without raccoon's eyes and without Swanson's sign.   Right Ear: External ear normal.   Left Ear: External ear normal.   Mouth/Throat: Mucous membranes are dry. No oropharyngeal exudate.   Eyes: Conjunctivae are normal. Right eye exhibits no discharge. Left eye exhibits no discharge. No scleral icterus.   Neck: Normal range of  motion. Neck supple. No tracheal deviation present.   Cardiovascular: Normal rate, regular rhythm and intact distal pulses. Exam reveals no gallop, no distant heart sounds and no friction rub.   No murmur heard.  Pulmonary/Chest: Effort normal and breath sounds normal. No accessory muscle usage or stridor. No tachypnea and no bradypnea. No respiratory distress. She has no decreased breath sounds. She has no wheezes. She has no rhonchi. She has no rales. She exhibits no tenderness.   Abdominal: Soft. Bowel sounds are normal. She exhibits no distension. There is no hepatosplenomegaly, splenomegaly or hepatomegaly. There is no tenderness. There is no rebound and no guarding.   Musculoskeletal: Normal range of motion. She exhibits no edema or tenderness.   Lymphadenopathy:     She has no cervical adenopathy.   Neurological: She is alert and oriented to person, place, and time. No cranial nerve deficit. She displays no seizure activity.   Skin: Skin is warm, dry and intact. No rash noted. She is not diaphoretic. No erythema. No pallor.   Psychiatric: She has a normal mood and affect. Her speech is normal and behavior is normal. Judgment and thought content normal. Cognition and memory are normal.   Nursing note and vitals reviewed.      Laboratory:  Recent Labs     09/18/19 2110   WBC 9.3   RBC 4.33   HEMOGLOBIN 12.9   HEMATOCRIT 39.3   MCV 90.8   MCH 29.8   MCHC 32.8*   RDW 43.7   PLATELETCT 270   MPV 9.3     Recent Labs     09/18/19 2110   SODIUM 139   POTASSIUM 4.2   CHLORIDE 103   CO2 23   GLUCOSE 98   BUN 14   CREATININE 0.93   CALCIUM 9.1     Recent Labs     09/18/19  2110   GLUCOSE 98         No results for input(s): NTPROBNP in the last 72 hours.      Recent Labs     09/18/19 2110   TROPONINT 10       Urinalysis:    Recent Labs     09/18/19 2225   SPECGRAVITY <=1.005   GLUCOSEUR Negative   KETONES Negative   NITRITE Negative   LEUKESTERAS Negative        Imaging:  DX-CHEST-PORTABLE (1 VIEW)   Final Result       No acute cardiac or pulmonary abnormalities are identified.      CT-HEAD W/O    (Results Pending)   MR-BRAIN-W/O    (Results Pending)         Assessment/Plan:  I anticipate this patient is appropriate for observation status at this time.    Stroke-like symptom- (present on admission)  Assessment & Plan  -Patient continues to have significant vertigo  -In association with the vertigo she has significant hypertension  -States she has taken her meds today  -Hold home antihypertensives and allow permissive hypertension  -Obtain MRI of the brain  -Start aspirin and statin  -I did personally review her CT head, noted no acute intracranial abnormality, did note significant atrophy which is chronic    Inability to walk- (present on admission)  Assessment & Plan  -Due to vertigo and weakness  -Obtain PT/OT  -She may require placement    CKD (chronic kidney disease), stage III (HCC)- (present on admission)  Assessment & Plan  -At baseline, good urinary output  -She is dehydrated though and does require IV fluids  -Repeat BMP in the morning    Dyslipidemia- (present on admission)  Assessment & Plan  -Patient will still be on a statin but higher dose than her baseline    Essential hypertension- (present on admission)  Assessment & Plan  -Hold home antihypertensives  -Allow permissive hypertension      VTE prophylaxis: SCDs

## 2019-09-19 NOTE — THERAPY
"Occupational Therapy Evaluation completed.   Functional Status:  Pt is a 89 y/o female, admit with c/o vertigo while at home. PLOF- I with basic ADLS and functional mobility with the use of a FWW. Pt lives with her son, who , per Pt report is..\" Too busy to help\" . Pt presents with c/o residual dizziness with functional transfers, has decreased I from baseline for ADLS and functional transfers. Pt requires Min A for UB, Mod A for LB self care, and is Min A for functional transfers, Pt will benefit from Acute OT services to increase functional I and safety , prior to d/c.  Plan of Care: Will benefit from Occupational Therapy 3 times per week  Discharge Recommendations:  Equipment: Will Continue to Assess for Equipment Needs. Post-acute therapy Discharge to a transitional care facility for continued skilled therapy services vs Home health, dependent upon progress with therapy and availability of help at home.    See \"Rehab Therapy-Acute\" Patient Summary Report for complete documentation.    "

## 2019-09-19 NOTE — DISCHARGE PLANNING
PMR referral from Dr. Banks.      Vertigo stroke symptoms ongoing work upp MRI pending. Mobility and self care scores 13/16. Will follow for definite diagnosis and therapy evaluations as medically appropriate. No physiatry consult ordered at this time.

## 2019-09-19 NOTE — PROGRESS NOTES
Tele summary :  Rhythm:  SR 1st hb  Rate:  66  NH:  .26  QRS:  .08  QT:  .36    Ectopy:  Rare pvc    Telemetry strips signed and placed in chart.

## 2019-09-19 NOTE — ASSESSMENT & PLAN NOTE
Patient with first degree AV block when up with PT and had dizziness, will check an echocardiogram as last one was a year ago

## 2019-09-19 NOTE — PROGRESS NOTES
The patient was seen and examined by myself upon arrival to the medical floor. She had first degree av block when up with physical therapy and MRI shows old strokes but nothing acute. Will check an echocardiogram, resume home blood pressure medication and ambulate as tolerated.

## 2019-09-19 NOTE — PROGRESS NOTES
"Patient admitted from ED, brought up to floor by esther/spike and transferred to bed. C/o dizziness when \"moving or walking around\", no complaints of dizziness on admission while in bed. Alertx4, able to make needs known. Primarily language is Tagalog but can understand English. On RA without SOB. Admission navigator completed with patient, unable to review medications because patient does not remember correct doses, states son can bring meds in morning. Updated regarding plan of care. Made comfortable to room and unit. All needs tended to, call light placed within reach. Will continue to monitor  "

## 2019-09-19 NOTE — ED TRIAGE NOTES
Pt. To ED via [facility] States dizziness and feeling tried that began yesterday, pt. Is wake alert hard of hearing wctm

## 2019-09-19 NOTE — ED NOTES
attempted to walk pt. She states she can not stand she is too dizzy attempted muilpite times and had pt. Rest sitting on the edge of the bed pt. Unable/refsuing to walk MD aware

## 2019-09-20 ENCOUNTER — PATIENT OUTREACH (OUTPATIENT)
Dept: HEALTH INFORMATION MANAGEMENT | Facility: OTHER | Age: 84
End: 2019-09-20

## 2019-09-20 NOTE — DISCHARGE SUMMARY
Discharge Summary    CHIEF COMPLAINT ON ADMISSION  Chief Complaint   Patient presents with   • Malaise   • Hypertension       Reason for Admission  dizziness    Admission Date  9/18/2019    CODE STATUS  Full Code    HPI & HOSPITAL COURSE  This is a 89 y.o. female here with dizziness.  Patient states yesterday she began experiencing dizziness.  When asked to explain it further she did state the room was spinning.  She states this is been constant and has worsened.  On presentation she is too dizzy to walk.  She was monitored on telemetry and did have intermittent first degree AV block that was confirmed with cardiology. An MRI showed old infarcts but nothing acute and echocardiogram was unremarkable. She did have elevated blood pressure that improved with norvasc and lisinopril administration. Her son felt that lisinopril worked better for her than enalapril so she is switched to lisinopril for discharge home.       Therefore, she is discharged in good and stable condition to home with close outpatient follow-up.        Discharge Date  9/19/2019    FOLLOW UP ITEMS POST DISCHARGE  Primary care provider for blood pressure  monitoring    DISCHARGE DIAGNOSES  Active Problems:    CKD (chronic kidney disease), stage III (HCC) POA: Yes    Dyslipidemia POA: Yes  Resolved Problems:    Stroke-like symptom POA: Yes    Inability to walk POA: Yes    Essential hypertension POA: Yes      FOLLOW UP  No future appointments.  Ash Whiting M.D.  [id number] [address] [facility] [address]  [facility] [id number] [phone]    Schedule an appointment as soon as possible for a visit        MEDICATIONS ON DISCHARGE     Medication List      START taking these medications      Instructions   lisinopril 20 MG Tabs  Commonly known as:  PRINIVIL   Take 0.5 Tabs by mouth 2 times a day.  Dose:  10 mg        CONTINUE taking these medications      Instructions   acetaminophen 500 MG Tabs  Commonly known as:  TYLENOL   Take 500 mg by mouth every 6 hours.  Scheduled  Dose:  500 mg     alendronate 35 MG tablet  Commonly known as:  FOSAMAX   Take 35 mg by mouth every Tuesday.  Dose:  35 mg     ALLEGRA ALLERGY 180 MG tablet  Generic drug:  fexofenadine   Take 180 mg by mouth every day.  Dose:  180 mg     amLODIPine 10 MG Tabs  Commonly known as:  NORVASC   Take 10 mg by mouth every day.  Dose:  10 mg     ascorbic acid 500 MG Tabs  Commonly known as:  ascorbic acid   Take 500 mg by mouth every day.  Dose:  500 mg     ASPIRIN LOW DOSE 81 MG EC tablet  Generic drug:  aspirin   Take 81 mg by mouth every day.  Dose:  81 mg     fluticasone 50 MCG/ACT nasal spray  Commonly known as:  FLONASE   Spray 1 Spray in nose every day.  Dose:  1 Spray     multivitamin Tabs   Take 1 Tab by mouth every day.  Dose:  1 Tab     senna-docusate 8.6-50 MG Tabs  Commonly known as:  PERICOLACE or SENOKOT S   Take 2 Tabs by mouth 2 Times a Day.  Dose:  2 Tab     simvastatin 20 MG Tabs  Commonly known as:  ZOCOR   Take 20 mg by mouth every morning.  Dose:  20 mg        STOP taking these medications    enalapril 10 MG Tabs  Commonly known as:  VASOTEC            Allergies  No Known Allergies    DIET  Orders Placed This Encounter   Procedures   • Diet Order Regular     Standing Status:   Standing     Number of Occurrences:   1     Order Specific Question:   Diet:     Answer:   Regular [1]       ACTIVITY  As tolerated.  Weight bearing as tolerated    CONSULTATIONS  none    PROCEDURES  none    LABORATORY  Lab Results   Component Value Date    SODIUM 141 09/19/2019    POTASSIUM 3.8 09/19/2019    CHLORIDE 106 09/19/2019    CO2 23 09/19/2019    GLUCOSE 102 (H) 09/19/2019    BUN 12 09/19/2019    CREATININE 0.74 09/19/2019    CREATININE 0.9 01/15/2009        Lab Results   Component Value Date    WBC 9.3 09/19/2019    HEMOGLOBIN 11.8 (L) 09/19/2019    HEMATOCRIT 35.3 (L) 09/19/2019    PLATELETCT 253 09/19/2019        Total time of the discharge process exceeds 22 minutes.

## 2019-09-20 NOTE — DISCHARGE INSTRUCTIONS
Hypertension  Hypertension, commonly called high blood pressure, is when the force of blood pumping through your arteries is too strong. Your arteries are the blood vessels that carry blood from your heart throughout your body. A blood pressure reading consists of a higher number over a lower number, such as 110/72. The higher number (systolic) is the pressure inside your arteries when your heart pumps. The lower number (diastolic) is the pressure inside your arteries when your heart relaxes. Ideally you want your blood pressure below 120/80.  Hypertension forces your heart to work harder to pump blood. Your arteries may become narrow or stiff. Having untreated or uncontrolled hypertension can cause heart attack, stroke, kidney disease, and other problems.  What increases the risk?  Some risk factors for high blood pressure are controllable. Others are not.  Risk factors you cannot control include:  · Race. You may be at higher risk if you are .  · Age. Risk increases with age.  · Gender. Men are at higher risk than women before age 45 years. After age 65, women are at higher risk than men.  Risk factors you can control include:  · Not getting enough exercise or physical activity.  · Being overweight.  · Getting too much fat, sugar, calories, or salt in your diet.  · Drinking too much alcohol.  What are the signs or symptoms?  Hypertension does not usually cause signs or symptoms. Extremely high blood pressure (hypertensive crisis) may cause headache, anxiety, shortness of breath, and nosebleed.  How is this diagnosed?  To check if you have hypertension, your health care provider will measure your blood pressure while you are seated, with your arm held at the level of your heart. It should be measured at least twice using the same arm. Certain conditions can cause a difference in blood pressure between your right and left arms. A blood pressure reading that is higher than normal on one occasion does  not mean that you need treatment. If it is not clear whether you have high blood pressure, you may be asked to return on a different day to have your blood pressure checked again. Or, you may be asked to monitor your blood pressure at home for 1 or more weeks.  How is this treated?  Treating high blood pressure includes making lifestyle changes and possibly taking medicine. Living a healthy lifestyle can help lower high blood pressure. You may need to change some of your habits.  Lifestyle changes may include:  · Following the DASH diet. This diet is high in fruits, vegetables, and whole grains. It is low in salt, red meat, and added sugars.  · Keep your sodium intake below 2,300 mg per day.  · Getting at least 30-45 minutes of aerobic exercise at least 4 times per week.  · Losing weight if necessary.  · Not smoking.  · Limiting alcoholic beverages.  · Learning ways to reduce stress.  Your health care provider may prescribe medicine if lifestyle changes are not enough to get your blood pressure under control, and if one of the following is true:  · You are 18-59 years of age and your systolic blood pressure is above 140.  · You are 60 years of age or older, and your systolic blood pressure is above 150.  · Your diastolic blood pressure is above 90.  · You have diabetes, and your systolic blood pressure is over 140 or your diastolic blood pressure is over 90.  · You have kidney disease and your blood pressure is above 140/90.  · You have heart disease and your blood pressure is above 140/90.  Your personal target blood pressure may vary depending on your medical conditions, your age, and other factors.  Follow these instructions at home:  · Have your blood pressure rechecked as directed by your health care provider.  · Take medicines only as directed by your health care provider. Follow the directions carefully. Blood pressure medicines must be taken as prescribed. The medicine does not work as well when you skip  doses. Skipping doses also puts you at risk for problems.  · Do not smoke.  · Monitor your blood pressure at home as directed by your health care provider.  Contact a health care provider if:  · You think you are having a reaction to medicines taken.  · You have recurrent headaches or feel dizzy.  · You have swelling in your ankles.  · You have trouble with your vision.  Get help right away if:  · You develop a severe headache or confusion.  · You have unusual weakness, numbness, or feel faint.  · You have severe chest or abdominal pain.  · You vomit repeatedly.  · You have trouble breathing.  This information is not intended to replace advice given to you by your health care provider. Make sure you discuss any questions you have with your health care provider.  Document Released: 12/18/2006 Document Revised: 05/25/2017 Document Reviewed: 10/10/2014  [employer] Interactive Patient Education © 2017 [employer]  Discharge Instructions    Discharged to home by car with relative. Discharged via wheelchair, hospital escort: Yes.  Special equipment needed: Not Applicable    Be sure to schedule a follow-up appointment with your primary care doctor or any specialists as instructed.     Discharge Plan:   Diet Plan: Discussed  Activity Level: Discussed  Confirmed Follow up Appointment: Patient to Call and Schedule Appointment  Confirmed Symptoms Management: Discussed  Medication Reconciliation Updated: Yes  Influenza Vaccine Indication: Patient Refuses    I understand that a diet low in cholesterol, fat, and sodium is recommended for good health. Unless I have been given specific instructions below for another diet, I accept this instruction as my diet prescription.     Special Instructions:     Discharge Instructions per Soledad Gore M.D.    Follow up with primary care provider  DIET: cardiac  ACTIVITY: as tolerated  DIAGNOSIS: dizziness  Return to ER if fever or confusion develop      · Is patient discharged on Warfarin  / Coumadin?   No     Depression / Suicide Risk    As you are discharged from this [facility] facility, it is important to learn how to keep safe from harming yourself.    Recognize the warning signs:  · Abrupt changes in personality, positive or negative- including increase in energy   · Giving away possessions  · Change in eating patterns- significant weight changes-  positive or negative  · Change in sleeping patterns- unable to sleep or sleeping all the time   · Unwillingness or inability to communicate  · Depression  · Unusual sadness, discouragement and loneliness  · Talk of wanting to die  · Neglect of personal appearance   · Rebelliousness- reckless behavior  · Withdrawal from people/activities they love  · Confusion- inability to concentrate     If you or a loved one observes any of these behaviors or has concerns about self-harm, here's what you can do:  · Talk about it- your feelings and reasons for harming yourself  · Remove any means that you might use to hurt yourself (examples: pills, rope, extension cords, firearm)  · Get professional help from the community (Mental Health, Substance Abuse, psychological counseling)  · Do not be alone:Call your Safe Contact- someone whom you trust who will be there for you.  · Call your local CRISIS HOTLINE 478-9996 or 806-810-8710  · Call your local Children's Mobile Crisis Response Team [place] (366) 516-0667 or www.Positive Networks  · Call the toll free National Suicide Prevention Hotlines   · National Suicide Prevention Lifeline 780-597-APGD (0223)  · National Hope Line Network 800-SUICIDE (492-8477)

## 2019-10-21 ENCOUNTER — PATIENT OUTREACH (OUTPATIENT)
Dept: HEALTH INFORMATION MANAGEMENT | Facility: OTHER | Age: 84
End: 2019-10-21

## 2019-11-01 NOTE — PROGRESS NOTES
A 89-year-old female was an emergent admission to [facility] from 9/18/2019 to 9/19/2019 to treat Vertigo of central origin, unspecified ear. IHD visited the patient bedside. The patient was discharged Home. The patient's medical condition included: Dizziness. The patient was not under clinical case management.     The patient was ordered to start/continue to take the following medication: Lisinopril (Prinivil). The patient successfully filled all medications.    The patient was ordered to follow-up with PCP. The patient had the following appointments:     1) 9/20/2019 @ 12:00 [facility] internal medicine - PATIENT IS NOT ESTABLISHED     2) 9/24/2019 @ 8:00 [facility] medicine - PHYSICIAN OFFICE CANCELLED     3) 9/26/2019 @ 8:00 [facility] internal medicine - CONFIRMED AS KEPT  The patient has a future appointment scheduled for 10/25/2019 @ 8:00 [facility] internal medicine - SCHEDULED.     IHD communicated with the patient/caregiver via phone throughout the case.  Patient was discharged with a Low LACE score, therefore, no PPS conducted.

## 2020-01-08 ENCOUNTER — OFFICE VISIT (OUTPATIENT)
Dept: MEDICAL GROUP | Facility: MEDICAL CENTER | Age: 85
End: 2020-01-08
Payer: MEDICARE

## 2020-01-08 VITALS
OXYGEN SATURATION: 99 % | HEART RATE: 82 BPM | WEIGHT: 112.43 LBS | BODY MASS INDEX: 20.69 KG/M2 | TEMPERATURE: 98.2 F | RESPIRATION RATE: 16 BRPM | DIASTOLIC BLOOD PRESSURE: 56 MMHG | SYSTOLIC BLOOD PRESSURE: 134 MMHG | HEIGHT: 62 IN

## 2020-01-08 DIAGNOSIS — Z86.73 H/O: STROKE: ICD-10-CM

## 2020-01-08 DIAGNOSIS — M81.0 SENILE OSTEOPOROSIS: ICD-10-CM

## 2020-01-08 DIAGNOSIS — D64.9 ANEMIA, UNSPECIFIED TYPE: ICD-10-CM

## 2020-01-08 DIAGNOSIS — I10 ESSENTIAL HYPERTENSION: ICD-10-CM

## 2020-01-08 DIAGNOSIS — I25.10 CORONARY ARTERY DISEASE INVOLVING NATIVE CORONARY ARTERY OF NATIVE HEART WITHOUT ANGINA PECTORIS: ICD-10-CM

## 2020-01-08 DIAGNOSIS — Z23 NEEDS FLU SHOT: ICD-10-CM

## 2020-01-08 DIAGNOSIS — E78.5 DYSLIPIDEMIA: ICD-10-CM

## 2020-01-08 DIAGNOSIS — N18.30 CKD (CHRONIC KIDNEY DISEASE), STAGE III: ICD-10-CM

## 2020-01-08 PROCEDURE — G0008 ADMIN INFLUENZA VIRUS VAC: HCPCS | Performed by: INTERNAL MEDICINE

## 2020-01-08 PROCEDURE — 99214 OFFICE O/P EST MOD 30 MIN: CPT | Mod: 25 | Performed by: INTERNAL MEDICINE

## 2020-01-08 PROCEDURE — 90662 IIV NO PRSV INCREASED AG IM: CPT | Performed by: INTERNAL MEDICINE

## 2020-01-08 RX ORDER — SIMVASTATIN 20 MG
20 TABLET ORAL EVERY MORNING
Qty: 30 TAB | Refills: 0 | Status: SHIPPED | OUTPATIENT
Start: 2020-01-08 | End: 2020-04-06 | Stop reason: SDUPTHER

## 2020-01-08 RX ORDER — AMLODIPINE BESYLATE 10 MG/1
10 TABLET ORAL DAILY
Qty: 30 TAB | Refills: 0 | Status: SHIPPED | OUTPATIENT
Start: 2020-01-08 | End: 2020-04-06 | Stop reason: SDUPTHER

## 2020-01-08 ASSESSMENT — ACTIVITIES OF DAILY LIVING (ADL): BATHING_REQUIRES_ASSISTANCE: 1

## 2020-01-08 ASSESSMENT — PATIENT HEALTH QUESTIONNAIRE - PHQ9: CLINICAL INTERPRETATION OF PHQ2 SCORE: 0

## 2020-01-08 ASSESSMENT — ENCOUNTER SYMPTOMS: GENERAL WELL-BEING: POOR

## 2020-01-08 NOTE — NON-PROVIDER
Depression Screening    Little interest or pleasure in doing things?  0 - not at all  Feeling down, depressed , or hopeless? 0 - not at all  Patient Health Questionnaire Score: 0     If depressive symptoms identified deferred to follow up visit unless specifically addressed in assessment and plan.    Interpretation of PHQ-9 Total Score   Score Severity   1-4 No Depression   5-9 Mild Depression   10-14 Moderate Depression   15-19 Moderately Severe Depression   20-27 Severe Depression    Screening for Cognitive Impairment    Three Minute Recall (village, kitchen, baby) 3/3    Jero clock face with all 12 numbers and set the hands to show 10 past 10.       Cognitive concerns identified deferred for follow up unless specifically addressed in assessment and plan.    Fall Risk Assessment    Has the patient had two or more falls in the last year or any fall with injury in the last year?  No    Safety Assessment    Throw rugs on floor.  No  Handrails on all stairs.  Yes  Good lighting in all hallways.  Yes  Difficulty hearing.  Yes  Patient counseled about all safety risks that were identified.    Functional Assessment ADLs    Are there any barriers preventing you from cooking for yourself or meeting nutritional needs?  Yes.    Are there any barriers preventing you from driving safely or obtaining transportation?  Yes.    Are there any barriers preventing you from using a telephone or calling for help?  No.    Are there any barriers preventing you from shopping?  Yes.    Are there any barriers preventing you from taking care of your own finances?  Yes.    Are there any barriers preventing you from managing your medications?  Yes.    Are there any barriers preventing you from showering, bathing or dressing yourself?  Yes.    Are you currently engaging in any exercise or physical activity?  No.     What is your perception of your health?  Poor.      Health Maintenance Summary                IMM DTaP/Tdap/Td Vaccine Overdue  4[birth year]/1940     IMM ZOSTER VACCINES Overdue 4/13/1979     IMM PNEUMOCOCCAL VACCINE: 65+ Years Overdue 9/1/2011      Done 9/1/2010 Imm Admin: Pneumococcal polysaccharide vaccine (PPSV-23)    IMM INFLUENZA Overdue 9/1/2019      Done 10/9/2018 Imm Admin: Influenza Vaccine Adult HD     Patient has more history with this topic...    Annual Wellness Visit Overdue 10/10/2019      Done 10/9/2018 Visit Dx: Medicare annual wellness visit, initial    BONE DENSITY Next Due 6/1/2023      Done 6/1/2018 DS-BONE DENSITY STUDY (DEXA)          Patient Care Team:  Ash Whiting M.D. as PCP - General (Family Medicine)  Serina Ortega, PT, DPT as Physical Therapy (Physical Therapy)

## 2020-01-08 NOTE — PROGRESS NOTES
CHIEF COMPLAINT  Hypertension, dizziness    HPI  Sabrina Lobo is a 89 y.o. female who presents today for the following     Hypertension, CAD, H/o stroke, CKD stage III  Meds: Enalapril, 20 mg daily, amlodipine 10 mg daily,  ASA 81 mg QD;taking as prescribed.   Measuring BP at home: yes, has been < 150/90.  Denies:  -  headaches, vision problems, tinnitus.                 -  chest pain/pressure, palpitations, irregular heart beats, exertional, dyspnea, peripheral edema.                 - medication side effect: unusual fatigue, slow heartbeat, foot/leg swelling, cough.  Low salt diet: Y  Diet: Healthy  Exercise: limited  BMI: 20  Renal panel showed stable CKD stage III, with normal Cr.  FH of HTN:  Unknown  Recent MRI of the brain showed multiple small strokes.     Dyslipidemia  On simvastatin 20 mg, taking daily, as prescribed. No myalgias, muscle cramps or pain.   Diet /Exercise/BMI:  As above  FH: unknown    Osteoporosis  Found on screening DEXA scan from 6/1/2018.   She has low vitamin D level.   Exercise is limited.   Non-smoker.   BMI 21.   Family history of osteoporosis: Unknown.  On alendronate, 35 mg once in a week.     Anemia  The patient had abnormal CBC in the last labs.  She has been on regular diet, complains of anorexia and some weight loss.  Also complains of fatigue.      Reviewed PMH, PSH, FH, SH, ALL, HCM/IMM, no changes  Reviewed MEDS, no changes    Patient Active Problem List    Diagnosis Date Noted   • Intractable back pain 06/13/2019     Priority: High   • Dyslipidemia 11/20/2012     Priority: Low   • Senile osteoporosis 01/08/2020   • Full code status 06/14/2019   • Frailty syndrome in geriatric patient 06/14/2019   • Anemia 06/13/2019   • Seasonal allergies 10/09/2018   • Other osteoporosis without current pathological fracture 10/09/2018   • CKD (chronic kidney disease), stage III ([facility]) 10/09/2018   • Health care maintenance 04/26/2018     CURRENT MEDICATIONS  Current Outpatient  Medications   Medication Sig Dispense Refill   • simvastatin (ZOCOR) 20 MG Tab Take 1 Tab by mouth every morning. 30 Tab 0   • amLODIPine (NORVASC) 10 MG Tab Take 1 Tab by mouth every day. 30 Tab 0   • lisinopril (PRINIVIL) 20 MG Tab Take 0.5 Tabs by mouth 2 times a day. 60 Tab 5   • ASPIRIN LOW DOSE 81 MG EC tablet Take 81 mg by mouth every day.  5   • multivitamin (THERAGRAN) Tab Take 1 Tab by mouth every day.     • ascorbic acid (ASCORBIC ACID) 500 MG Tab Take 500 mg by mouth every day.     • alendronate (FOSAMAX) 35 MG tablet Take 35 mg by mouth every Tuesday.     • acetaminophen (TYLENOL) 500 MG Tab Take 500 mg by mouth every 6 hours. Scheduled     • fexofenadine (ALLEGRA ALLERGY) 180 MG tablet Take 180 mg by mouth every day.     • senna-docusate (PERICOLACE OR SENOKOT S) 8.6-50 MG Tab Take 2 Tabs by mouth 2 Times a Day. (Patient not taking: Reported on 1/8/2020) 30 Tab 0   • fluticasone (FLONASE) 50 MCG/ACT nasal spray Spray 1 Spray in nose every day. (Patient not taking: Reported on 1/8/2020) 16 g 5     No current facility-administered medications for this visit.      ALLERGIES  Allergies: Patient has no known allergies.  PAST MEDICAL HISTORY  Past Medical History:   Diagnosis Date   • H/O bladder repair surgery     not sure per patient daughter    • H/O: hysterectomy    • Hypertension    • Vertigo      SURGICAL HISTORY  She  has a past surgical history that includes hysterectomy radical.  SOCIAL HISTORY  Social History     Tobacco Use   • Smoking status: Never Smoker   • Smokeless tobacco: Never Used   Substance Use Topics   • Alcohol use: No   • Drug use: No     Social History     Patient does not qualify to have social determinant information on file (likely too young).   Social History Narrative   • Not on file     FAMILY HISTORY  Family History   Problem Relation Age of Onset   • Heart Disease Neg Hx    • Hypertension Neg Hx    • Hyperlipidemia Neg Hx    • Psychiatric Illness Neg Hx    • Diabetes Neg Hx   "    Family Status   Relation Name Status   • Mo     • Fa     • Sis     • Bro     • Neg Hx  (Not Specified)       ROS   Constitutional: Negative for fever, chills, fatigue.  HENT: Negative for congestion, sore throat.  Eyes: Negative for vision problems.   Respiratory: Negative for cough, shortness of breath.  Cardiovascular: Negative for chest pain, palpitations.   Gastrointestinal: Negative for heartburn, nausea, abdominal pain.   Genitourinary: Negative for dysuria.  Musculoskeletal: Negative for significant myalgia, back and joint pain.   Skin: Negative for rash.   Neuro: Negative for dizziness, weakness and headaches.   Endo/Heme/Allergies: Does not bruise/bleed easily.   Psychiatric/Behavioral: Negative for depression.    PHYSICAL EXAM   Blood Pressure 134/56 (BP Location: Left arm, Patient Position: Sitting, BP Cuff Size: Adult)   Pulse 82   Temperature 36.8 °C (98.2 °F) (Temporal)   Respiration 16   Height 1.575 m (5' 2\")   Weight 51 kg (112 lb 7 oz)   Oxygen Saturation 99%  Body mass index is 20.56 kg/m².  General:  NAD, well appearing  HEENT:   NC/AT, PERRLA, EOMI, TMs are clear. Oropharyngeal mucosa is pink,  without lesions;  no cervical / supraclavicular  lymphadenopathy, no thyromegaly.    Cardiovascular: RRR.   No m/r/g.       Lungs:   CTAB, no w/r/r, no respiratory distress.  Abdomen: Soft, NT/ND; no hepatosplenomegaly.  Extremities:  2+ DP and radial pulses bilaterally.  No c/c/e.   Skin:  Warm, dry.  No erythema. No rash.   Neurologic: Alert & oriented x 3. CN II-XII grossly intact. No focal deficits.  Psychiatric:  Affect normal, mood normal, judgment normal.    Labs     Labs are reviewed and discussed with a patient  Lab Results   Component Value Date/Time    CHOLSTRLTOT 168 2019 08:26 AM    LDL 96 2019 08:26 AM    HDL 57 2019 08:26 AM    TRIGLYCERIDE 76 2019 08:26 AM       Lab Results   Component Value Date/Time    SODIUM 141 " 09/19/2019 04:23 AM    POTASSIUM 3.8 09/19/2019 04:23 AM    CHLORIDE 106 09/19/2019 04:23 AM    CO2 23 09/19/2019 04:23 AM    GLUCOSE 102 (H) 09/19/2019 04:23 AM    BUN 12 09/19/2019 04:23 AM    CREATININE 0.74 09/19/2019 04:23 AM    CREATININE 0.9 01/15/2009 06:34 AM     Lab Results   Component Value Date/Time    ALKPHOSPHAT 71 08/07/2019 03:36 PM    ASTSGOT 18 08/07/2019 03:36 PM    ALTSGPT 13 08/07/2019 03:36 PM    TBILIRUBIN 0.3 08/07/2019 03:36 PM      Lab Results   Component Value Date/Time    HBA1C 5.4 09/18/2019 09:10 PM    HBA1C 6.0 (H) 04/04/2019 08:26 AM    HBA1C 5.7 (H) 10/29/2018 03:40 AM     No results found for: TSH  Lab Results   Component Value Date/Time    FREET4 0.98 07/02/2017 02:16 PM    FREET4 1.01 12/13/2013 09:20 AM       Lab Results   Component Value Date/Time    WBC 9.3 09/19/2019 04:23 AM    RBC 3.93 (L) 09/19/2019 04:23 AM    HEMOGLOBIN 11.8 (L) 09/19/2019 04:23 AM    HEMATOCRIT 35.3 (L) 09/19/2019 04:23 AM    MCV 89.8 09/19/2019 04:23 AM    MCH 30.0 09/19/2019 04:23 AM    MCHC 33.4 (L) 09/19/2019 04:23 AM    MPV 9.5 09/19/2019 04:23 AM    NEUTSPOLYS 59.10 09/18/2019 09:10 PM    LYMPHOCYTES 30.70 09/18/2019 09:10 PM    MONOCYTES 5.60 09/18/2019 09:10 PM    EOSINOPHILS 3.40 09/18/2019 09:10 PM    BASOPHILS 0.90 09/18/2019 09:10 PM      Imaging     Reviewed and discussed MRI of the brain, 9/19/2019  1.  Mild/moderate chronic microvascular ischemic changes.  2.  Old small cerebral and cerebellar infarcts as detailed.  3.  No acute infarct.  4.  Mild generalized volume loss.    Assessment and Plan     Sabrina Lobo is a 89 y.o. female    1. Essential hypertension  Controlled, continue current treatment  - Comp Metabolic Panel; Future  2. Coronary artery disease involving native coronary artery of native heart without angina pectoris  Asymptomatic, continue current treatment    3. H/O: stroke  Blood pressure is controlled    4. CKD (chronic kidney disease), stage III (HCC)  Advised good fluid  intake, avoid NSAIDs  - Comp Metabolic Panel; Future    5. Dyslipidemia  Pending labs, continue current treatment  - Comp Metabolic Panel; Future  - Lipid Profile; Future    6. Senile osteoporosis  Continue Fosamax    7. Anemia, unspecified type  Pending labs  - CBC WITH DIFFERENTIAL; Future  - VIT B12,  FOLIC ACID  - OCCULT BLOOD FECES IMMUNOASSAY; Future  - IRON/TOTAL IRON BIND; Future    8. Needs flu shot  Information was provided to the patient regarding the vaccine, including side effects. Vaccine was given by my medical assistant under my supervision.  - Influenza Vaccine, High Dose (65+ Only)    Counseling:   - Smoking:  Nonsmoker    Followup: within 7 days with labs    All questions are answered.    Please note that this dictation was created using voice recognition software, and that there might be errors of dayna and possibly content.

## 2020-01-09 ENCOUNTER — HOSPITAL ENCOUNTER (OUTPATIENT)
Dept: LAB | Facility: MEDICAL CENTER | Age: 85
End: 2020-01-09
Attending: INTERNAL MEDICINE
Payer: MEDICARE

## 2020-01-09 DIAGNOSIS — I10 ESSENTIAL HYPERTENSION: ICD-10-CM

## 2020-01-09 DIAGNOSIS — D64.9 ANEMIA, UNSPECIFIED TYPE: ICD-10-CM

## 2020-01-09 DIAGNOSIS — E78.5 DYSLIPIDEMIA: ICD-10-CM

## 2020-01-09 DIAGNOSIS — N18.30 CKD (CHRONIC KIDNEY DISEASE), STAGE III: ICD-10-CM

## 2020-01-09 LAB
ALBUMIN SERPL BCP-MCNC: 4.1 G/DL (ref 3.2–4.9)
ALBUMIN/GLOB SERPL: 1.5 G/DL
ALP SERPL-CCNC: 67 U/L (ref 30–99)
ALT SERPL-CCNC: 12 U/L (ref 2–50)
ANION GAP SERPL CALC-SCNC: 10 MMOL/L (ref 0–11.9)
AST SERPL-CCNC: 20 U/L (ref 12–45)
BASOPHILS # BLD AUTO: 1.1 % (ref 0–1.8)
BASOPHILS # BLD: 0.06 K/UL (ref 0–0.12)
BILIRUB SERPL-MCNC: 0.6 MG/DL (ref 0.1–1.5)
BUN SERPL-MCNC: 17 MG/DL (ref 8–22)
CALCIUM SERPL-MCNC: 8.9 MG/DL (ref 8.5–10.5)
CHLORIDE SERPL-SCNC: 106 MMOL/L (ref 96–112)
CHOLEST SERPL-MCNC: 204 MG/DL (ref 100–199)
CO2 SERPL-SCNC: 23 MMOL/L (ref 20–33)
CREAT SERPL-MCNC: 1.2 MG/DL (ref 0.5–1.4)
EOSINOPHIL # BLD AUTO: 0.25 K/UL (ref 0–0.51)
EOSINOPHIL NFR BLD: 4.7 % (ref 0–6.9)
ERYTHROCYTE [DISTWIDTH] IN BLOOD BY AUTOMATED COUNT: 45.5 FL (ref 35.9–50)
GLOBULIN SER CALC-MCNC: 2.7 G/DL (ref 1.9–3.5)
GLUCOSE SERPL-MCNC: 97 MG/DL (ref 65–99)
HCT VFR BLD AUTO: 43.4 % (ref 37–47)
HDLC SERPL-MCNC: 62 MG/DL
HGB BLD-MCNC: 14.2 G/DL (ref 12–16)
IMM GRANULOCYTES # BLD AUTO: 0.02 K/UL (ref 0–0.11)
IMM GRANULOCYTES NFR BLD AUTO: 0.4 % (ref 0–0.9)
IRON SATN MFR SERPL: 34 % (ref 15–55)
IRON SERPL-MCNC: 98 UG/DL (ref 40–170)
LDLC SERPL CALC-MCNC: 116 MG/DL
LYMPHOCYTES # BLD AUTO: 1.74 K/UL (ref 1–4.8)
LYMPHOCYTES NFR BLD: 32.6 % (ref 22–41)
MCH RBC QN AUTO: 31.2 PG (ref 27–33)
MCHC RBC AUTO-ENTMCNC: 32.7 G/DL (ref 33.6–35)
MCV RBC AUTO: 95.4 FL (ref 81.4–97.8)
MONOCYTES # BLD AUTO: 0.33 K/UL (ref 0–0.85)
MONOCYTES NFR BLD AUTO: 6.2 % (ref 0–13.4)
NEUTROPHILS # BLD AUTO: 2.94 K/UL (ref 2–7.15)
NEUTROPHILS NFR BLD: 55 % (ref 44–72)
NRBC # BLD AUTO: 0 K/UL
NRBC BLD-RTO: 0 /100 WBC
PLATELET # BLD AUTO: 100 K/UL (ref 164–446)
PMV BLD AUTO: 11.6 FL (ref 9–12.9)
POTASSIUM SERPL-SCNC: 4.3 MMOL/L (ref 3.6–5.5)
PROT SERPL-MCNC: 6.8 G/DL (ref 6–8.2)
RBC # BLD AUTO: 4.55 M/UL (ref 4.2–5.4)
SODIUM SERPL-SCNC: 139 MMOL/L (ref 135–145)
TIBC SERPL-MCNC: 288 UG/DL (ref 250–450)
TRIGL SERPL-MCNC: 132 MG/DL (ref 0–149)
WBC # BLD AUTO: 5.3 K/UL (ref 4.8–10.8)

## 2020-01-09 PROCEDURE — 82746 ASSAY OF FOLIC ACID SERUM: CPT

## 2020-01-09 PROCEDURE — 82607 VITAMIN B-12: CPT

## 2020-01-09 PROCEDURE — 80061 LIPID PANEL: CPT

## 2020-01-09 PROCEDURE — 83550 IRON BINDING TEST: CPT

## 2020-01-09 PROCEDURE — 85025 COMPLETE CBC W/AUTO DIFF WBC: CPT

## 2020-01-09 PROCEDURE — 83540 ASSAY OF IRON: CPT

## 2020-01-09 PROCEDURE — 36415 COLL VENOUS BLD VENIPUNCTURE: CPT

## 2020-01-09 PROCEDURE — 80053 COMPREHEN METABOLIC PANEL: CPT

## 2020-01-10 LAB
FOLATE SERPL-MCNC: 15.2 NG/ML
VIT B12 SERPL-MCNC: 421 PG/ML (ref 211–911)

## 2020-01-16 ENCOUNTER — OFFICE VISIT (OUTPATIENT)
Dept: MEDICAL GROUP | Facility: MEDICAL CENTER | Age: 85
End: 2020-01-16
Payer: MEDICARE

## 2020-01-16 VITALS
HEART RATE: 97 BPM | DIASTOLIC BLOOD PRESSURE: 66 MMHG | BODY MASS INDEX: 20.85 KG/M2 | TEMPERATURE: 98.2 F | RESPIRATION RATE: 12 BRPM | HEIGHT: 62 IN | SYSTOLIC BLOOD PRESSURE: 132 MMHG | WEIGHT: 113.32 LBS | OXYGEN SATURATION: 95 %

## 2020-01-16 DIAGNOSIS — I25.10 CORONARY ARTERY DISEASE INVOLVING NATIVE CORONARY ARTERY OF NATIVE HEART WITHOUT ANGINA PECTORIS: ICD-10-CM

## 2020-01-16 DIAGNOSIS — H91.90 DECREASED HEARING, UNSPECIFIED LATERALITY: ICD-10-CM

## 2020-01-16 DIAGNOSIS — N18.30 CKD (CHRONIC KIDNEY DISEASE), STAGE III: ICD-10-CM

## 2020-01-16 DIAGNOSIS — Z86.73 H/O: STROKE: ICD-10-CM

## 2020-01-16 DIAGNOSIS — E55.9 HYPOVITAMINOSIS D: ICD-10-CM

## 2020-01-16 DIAGNOSIS — I10 ESSENTIAL HYPERTENSION: ICD-10-CM

## 2020-01-16 DIAGNOSIS — D69.6 THROMBOCYTOPENIA (HCC): ICD-10-CM

## 2020-01-16 DIAGNOSIS — Z00.00 HEALTH CARE MAINTENANCE: ICD-10-CM

## 2020-01-16 DIAGNOSIS — E78.5 DYSLIPIDEMIA: ICD-10-CM

## 2020-01-16 PROCEDURE — 99214 OFFICE O/P EST MOD 30 MIN: CPT | Performed by: INTERNAL MEDICINE

## 2020-01-16 NOTE — PROGRESS NOTES
CHIEF COMPLAINT  HTN    HPI  Sabrina Lobo is a 89 y.o. female, accompanied by her son, who presents today for the following     Hypertension, CAD, H/o stroke, CKD stage III  Meds: Enalapril, 20 mg daily, amlodipine 10 mg daily,  ASA 81 mg QD;taking as prescribed.   Measuring BP at home: yes, has been < 150/90.  Denies:  -  headaches, vision problems, tinnitus.                 -  chest pain/pressure, palpitations, irregular heart beats, exertional, dyspnea, peripheral edema.                 - medication side effect: unusual fatigue, slow heartbeat, foot/leg swelling, cough.  Low salt diet: Y  Diet: Healthy  Exercise: limited  BMI: 20  Renal panel showed stable CKD stage III, with normal Cr.  FH of HTN:  Unknown  Recent MRI of the brain showed multiple small strokes.     Dyslipidemia  On simvastatin 20 mg, taking daily, as prescribed. No myalgias, muscle cramps or pain.   Diet /Exercise/BMI:  As above  FH: unknown     Thrombocytopenia  The patient had abnormal CBC in the last labs.  She has been on regular diet, complains of anorexia and some weight loss.  Also complains of fatigue.     Hypovitaminosis D  The patient had low vitamin D level.  Vitamin D supplement: Multivitamins.    Decreased hearing  Her son complains that patient has have gradually worsening decreased hearing; she declined in the past hearing testing.     Reviewed PMH, PSH, FH, SH, ALL, HCM/IMM, no changes  Reviewed MEDS, no changes    Patient Active Problem List    Diagnosis Date Noted   • Intractable back pain 06/13/2019     Priority: High   • Dyslipidemia 11/20/2012     Priority: Low   • Senile osteoporosis 01/08/2020   • Coronary artery disease involving native coronary artery of native heart without angina pectoris 01/08/2020   • H/O: stroke 01/08/2020   • Full code status 06/14/2019   • Frailty syndrome in geriatric patient 06/14/2019   • Anemia 06/13/2019   • Seasonal allergies 10/09/2018   • Other osteoporosis without current pathological  fracture 10/09/2018   • CKD (chronic kidney disease), stage III ([facility]) 10/09/2018   • Health care maintenance 04/26/2018     CURRENT MEDICATIONS  Current Outpatient Medications   Medication Sig Dispense Refill   • simvastatin (ZOCOR) 20 MG Tab Take 1 Tab by mouth every morning. 30 Tab 0   • amLODIPine (NORVASC) 10 MG Tab Take 1 Tab by mouth every day. 30 Tab 0   • lisinopril (PRINIVIL) 20 MG Tab Take 0.5 Tabs by mouth 2 times a day. 60 Tab 5   • ASPIRIN LOW DOSE 81 MG EC tablet Take 81 mg by mouth every day.  5   • alendronate (FOSAMAX) 35 MG tablet Take 35 mg by mouth every Tuesday.     • acetaminophen (TYLENOL) 500 MG Tab Take 500 mg by mouth every 6 hours. Scheduled     • fexofenadine (ALLEGRA ALLERGY) 180 MG tablet Take 180 mg by mouth every day.     • senna-docusate (PERICOLACE OR SENOKOT S) 8.6-50 MG Tab Take 2 Tabs by mouth 2 Times a Day. (Patient not taking: Reported on 1/8/2020) 30 Tab 0   • multivitamin (THERAGRAN) Tab Take 1 Tab by mouth every day.     • ascorbic acid (ASCORBIC ACID) 500 MG Tab Take 500 mg by mouth every day.     • fluticasone (FLONASE) 50 MCG/ACT nasal spray Spray 1 Spray in nose every day. (Patient not taking: Reported on 1/8/2020) 16 g 5     No current facility-administered medications for this visit.      ALLERGIES  Allergies: Patient has no known allergies.  PAST MEDICAL HISTORY  Past Medical History:   Diagnosis Date   • H/O bladder repair surgery     not sure per patient daughter    • H/O: hysterectomy    • Hypertension    • Vertigo      SURGICAL HISTORY  She  has a past surgical history that includes hysterectomy radical.  SOCIAL HISTORY  Social History     Tobacco Use   • Smoking status: Never Smoker   • Smokeless tobacco: Never Used   Substance Use Topics   • Alcohol use: No   • Drug use: No     Social History     Patient does not qualify to have social determinant information on file (likely too young).   Social History Narrative   • Not on file     FAMILY HISTORY  Family  "History   Problem Relation Age of Onset   • Heart Disease Neg Hx    • Hypertension Neg Hx    • Hyperlipidemia Neg Hx    • Psychiatric Illness Neg Hx    • Diabetes Neg Hx      Family Status   Relation Name Status   • Mo     • Fa     • Sis     • Bro     • Neg Hx  (Not Specified)     ROS   Constitutional: Negative for fever, chills, fatigue.  HENT: Negative for congestion, sore throat.  Eyes: Negative for vision problems.   Respiratory: Negative for cough, shortness of breath.  Cardiovascular: Negative for chest pain, palpitations.   Gastrointestinal: Negative for heartburn, nausea, abdominal pain.   Genitourinary: Negative for dysuria.  Musculoskeletal: Negative for significant myalgia, back and joint pain.   Skin: Negative for rash.   Neuro: Negative for dizziness, weakness and headaches.   Endo/Heme/Allergies: Does not bruise/bleed easily.   Psychiatric/Behavioral: Negative for depression.    PHYSICAL EXAM   Blood Pressure 132/66 (BP Location: Left arm, Patient Position: Sitting, BP Cuff Size: Adult)   Pulse 97   Temperature 36.8 °C (98.2 °F) (Temporal)   Respiration 12   Height 1.575 m (5' 2\")   Weight 51.4 kg (113 lb 5.1 oz)   Oxygen Saturation 95%   Body Mass Index 20.73 kg/m²   General:  NAD, elderly lady  HEENT:   NC/AT, PERRLA, EOMI, TMs are clear. Oropharyngeal mucosa is pink,  without lesions;  no cervical / supraclavicular  lymphadenopathy, no thyromegaly.    Cardiovascular: RRR.   No m/r/g.       Lungs:   CTAB, no w/r/r, no respiratory distress.  Abdomen: Soft, NT/ND; no hepatosplenomegaly.  Extremities:  2+ DP and radial pulses bilaterally.  No c/c/e.   Skin:  Warm, dry.  No erythema. No rash.   Neurologic: Alert & oriented x 3. CN II-XII grossly intact. No focal deficits.  Psychiatric:  Affect normal, mood normal, judgment normal.    Labs     Labs are reviewed and discussed with a patient  Lab Results   Component Value Date/Time    CHOLSTRLTOT 204 (H) 2020 " 07:53 AM     (H) 01/09/2020 07:53 AM    HDL 62 01/09/2020 07:53 AM    TRIGLYCERIDE 132 01/09/2020 07:53 AM       Lab Results   Component Value Date/Time    SODIUM 139 01/09/2020 07:53 AM    POTASSIUM 4.3 01/09/2020 07:53 AM    CHLORIDE 106 01/09/2020 07:53 AM    CO2 23 01/09/2020 07:53 AM    GLUCOSE 97 01/09/2020 07:53 AM    BUN 17 01/09/2020 07:53 AM    CREATININE 1.20 01/09/2020 07:53 AM    CREATININE 0.9 01/15/2009 06:34 AM     Lab Results   Component Value Date/Time    ALKPHOSPHAT 67 01/09/2020 07:53 AM    ASTSGOT 20 01/09/2020 07:53 AM    ALTSGPT 12 01/09/2020 07:53 AM    TBILIRUBIN 0.6 01/09/2020 07:53 AM      Lab Results   Component Value Date/Time    HBA1C 5.4 09/18/2019 09:10 PM    HBA1C 6.0 (H) 04/04/2019 08:26 AM    HBA1C 5.7 (H) 10/29/2018 03:40 AM     No results found for: TSH  Lab Results   Component Value Date/Time    FREET4 0.98 07/02/2017 02:16 PM    FREET4 1.01 12/13/2013 09:20 AM       Lab Results   Component Value Date/Time    WBC 5.3 01/09/2020 07:53 AM    RBC 4.55 01/09/2020 07:53 AM    HEMOGLOBIN 14.2 01/09/2020 07:53 AM    HEMATOCRIT 43.4 01/09/2020 07:53 AM    MCV 95.4 01/09/2020 07:53 AM    MCH 31.2 01/09/2020 07:53 AM    MCHC 32.7 (L) 01/09/2020 07:53 AM    MPV 11.6 01/09/2020 07:53 AM    NEUTSPOLYS 55.00 01/09/2020 07:53 AM    LYMPHOCYTES 32.60 01/09/2020 07:53 AM    MONOCYTES 6.20 01/09/2020 07:53 AM    EOSINOPHILS 4.70 01/09/2020 07:53 AM    BASOPHILS 1.10 01/09/2020 07:53 AM      Imaging     None    Assessment and Plan     Sabrina Lobo is a 89 y.o. female    1. Essential hypertension  Controlled, continue current treatment  - Comp Metabolic Panel; Future    2. Coronary artery disease involving native coronary artery of native heart without angina pectoris  3. H/O: stroke  Asymptomatic, continue current treatment and cardiology follow-up    4. CKD (chronic kidney disease), stage III (HCC)  Stable, advised good fluid intake  - Comp Metabolic Panel; Future    5.  Dyslipidemia  Controlled, continue current treatment  - Lipid Profile; Future  - Comp Metabolic Panel; Future    6. Thrombocytopenia (HCC)  Borderline, follow-up labs  - CBC WITH DIFFERENTIAL; Future    7. Hypovitaminosis D  Advised 2000 units vitamin D daily, OTC  - VITAMIN D,25 HYDROXY; Future    8. Decreased hearing, unspecified laterality  - REFERRAL TO AUDIOLOGY    9. Health care maintenance  Due immunizations x 3    Counseling:   - Smoking:  Nonsmoker    Followup: in 3 months with labs    All questions are answered.    Please note that this dictation was created using voice recognition software, and that there might be errors of dayna and possibly content.

## 2020-03-06 ENCOUNTER — PATIENT OUTREACH (OUTPATIENT)
Dept: HEALTH INFORMATION MANAGEMENT | Facility: OTHER | Age: 85
End: 2020-03-06

## 2020-03-06 NOTE — PROGRESS NOTES
Outcome: Left Message welcome call and intro    Please transfer to Patient Outreach Team at [phone] when patient returns call.    [facility] Verified: yes    Attempt # 1

## 2020-04-01 NOTE — PROGRESS NOTES
Outcome: Left Message  Welcome call and scppa intro    Please transfer to Patient Outreach Team at [phone] when patient returns call.      [facility] Verified: yes    Attempt # 2

## 2020-04-03 ENCOUNTER — TELEPHONE (OUTPATIENT)
Dept: MEDICAL GROUP | Age: 85
End: 2020-04-03

## 2020-04-03 NOTE — TELEPHONE ENCOUNTER
ESTABLISHED PATIENT PRE-VISIT PLANNING     Patient was NOT contacted to complete PVP.     Note: Patient will not be contacted if there is no indication to call.     1.  Reviewed notes from the last few office visits within the medical group: Yes    2.  If any orders were placed at last visit or intended to be done for this visit (i.e. 6 mos follow-up), do we have Results/Consult Notes?        •  Labs - Labs ordered, but not to be completed until future.   Note: If patient appointment is for lab review and patient did not complete labs, check with provider if OK to reschedule patient until labs completed.       •  Imaging - Imaging was not ordered at last office visit.       •  Referrals - Referral ordered, patient has NOT been seen.    3. Is this appointment scheduled as a Hospital Follow-Up? No    4.  Immunizations were updated in Epic using WebIZ?: Epic matches WebIZ       •  Web Iz Recommendations: PREVNAR (PCV13) , TDAP and SHINGRIX (Shingles)    5.  Patient is due for the following Health Maintenance Topics:   Health Maintenance Due   Topic Date Due   • IMM DTaP/Tdap/Td Vaccine (1 - Tdap) 04/13/1940   • IMM ZOSTER VACCINES (1 of 2) 04/13/1979   • IMM PNEUMOCOCCAL VACCINE: 65+ Years (2 of 2 - PCV13) 09/01/2011   • Annual Wellness Visit  10/10/2019           6. Orders for overdue Health Maintenance topics pended in Pre-Charting? N\A    7.  AHA (MDX) form printed for Provider? No, already completed    8.  Patient was NOT informed to arrive 15 min prior to their scheduled appointment and bring in their medication bottles.

## 2020-04-06 ENCOUNTER — OFFICE VISIT (OUTPATIENT)
Dept: MEDICAL GROUP | Age: 85
End: 2020-04-06
Payer: MEDICARE

## 2020-04-06 VITALS
DIASTOLIC BLOOD PRESSURE: 63 MMHG | BODY MASS INDEX: 20.8 KG/M2 | SYSTOLIC BLOOD PRESSURE: 143 MMHG | HEART RATE: 79 BPM | HEIGHT: 62 IN | WEIGHT: 113 LBS

## 2020-04-06 DIAGNOSIS — D69.6 THROMBOCYTOPENIA (HCC): ICD-10-CM

## 2020-04-06 DIAGNOSIS — E78.5 DYSLIPIDEMIA: ICD-10-CM

## 2020-04-06 DIAGNOSIS — I25.10 CORONARY ARTERY DISEASE INVOLVING NATIVE CORONARY ARTERY OF NATIVE HEART WITHOUT ANGINA PECTORIS: ICD-10-CM

## 2020-04-06 DIAGNOSIS — E55.9 HYPOVITAMINOSIS D: ICD-10-CM

## 2020-04-06 DIAGNOSIS — I10 ESSENTIAL HYPERTENSION: ICD-10-CM

## 2020-04-06 DIAGNOSIS — Z86.73 H/O: STROKE: ICD-10-CM

## 2020-04-06 DIAGNOSIS — N18.30 CKD (CHRONIC KIDNEY DISEASE), STAGE III: ICD-10-CM

## 2020-04-06 PROCEDURE — 99214 OFFICE O/P EST MOD 30 MIN: CPT | Mod: CR,95 | Performed by: INTERNAL MEDICINE

## 2020-04-06 RX ORDER — SIMVASTATIN 20 MG
20 TABLET ORAL EVERY MORNING
Qty: 90 TAB | Refills: 0 | Status: SHIPPED | OUTPATIENT
Start: 2020-04-06 | End: 2022-02-15

## 2020-04-06 RX ORDER — AMLODIPINE BESYLATE 10 MG/1
10 TABLET ORAL DAILY
Qty: 90 TAB | Refills: 0 | Status: ON HOLD | OUTPATIENT
Start: 2020-04-06 | End: 2020-08-13

## 2020-04-06 ASSESSMENT — FIBROSIS 4 INDEX: FIB4 SCORE: 5.2

## 2020-04-06 NOTE — PROGRESS NOTES
Telemedicine Visit: Established Patient     This encounter was conducted via [employer].   Verbal consent was obtained. Patient's identity was verified.    Subjective:   CC: hypertension    Sabrina Lobo is a 89 y.o. female presenting for evaluation and management of:    Hypertension, CAD, H/o stroke, CKD stage III  Meds: Enalapril, 20 mg daily, amlodipine 10 mg daily,  ASA 81 mg QD;taking as prescribed.   Measuring BP at home: yes, has been < 150/90.  Denies:  -  headaches, vision problems, tinnitus.                 -  chest pain/pressure, palpitations, irregular heart beats, exertional, dyspnea, peripheral edema.                 - medication side effect: unusual fatigue, slow heartbeat, foot/leg swelling, cough.  Low salt diet: Y  Diet: Healthy  Exercise: limited  BMI: 20  Renal panel showed stable CKD stage III, with normal Cr.  FH of HTN:  Unknown  Recent MRI of the brain showed multiple small strokes.     Dyslipidemia  On simvastatin 20 mg, taking daily, as prescribed. No myalgias, muscle cramps or pain.   Diet /Exercise/BMI:  As above  FH: unknown      Thrombocytopenia  The patient had abnormal CBC in the last labs.  She has been on regular diet, complains of anorexia and some weight loss.  Also complains of fatigue.      Hypovitaminosis D  The patient had low vitamin D level.  Vitamin D supplement: Multivitamins.    ROS:     - Constitutional:  Negative for fever, chills, unexpected weight change, and fatigue/generalized weakness.    - HEENT:  Negative for headaches, vision changes, hearing changes, ear pain, ear discharge, rhinorrhea, sinus congestion, sore throat, and neck pain.      - Respiratory: Negative for cough, sputum production, chest congestion, dyspnea, wheezing, and crackles.      - Cardiovascular: Negative for chest pain, palpitations, orthopnea, and bilateral lower extremity edema.     - Gastrointestinal: Negative for heartburn, nausea, vomiting, abdominal pain, hematochezia, melena, diarrhea,  constipation, and greasy/foul-smelling stools.     - Genitourinary: Negative for dysuria, polyuria, hematuria, pyuria, urinary urgency, and urinary incontinence.     - Musculoskeletal: Negative for myalgias, back pain, and joint pain.     - Skin: Negative for rash, itching, cyanotic skin color change.     - Neurological: Negative for dizziness, tingling, tremors, focal sensory deficit, focal weakness and headaches.     - Endo/Heme/Allergies: Does not bruise/bleed easily.     - Psychiatric/Behavioral: Negative for depression, suicidal/homicidal ideation and memory loss.          - NOTE: All other systems reviewed and are negative, except as in HPI.      Patient Active Problem List    Diagnosis Date Noted   • Intractable back pain 06/13/2019     Priority: High   • Dyslipidemia 11/20/2012     Priority: Low   • Thrombocytopenia (HCC) 01/16/2020   • Hypovitaminosis D 01/16/2020   • Decreased hearing 01/16/2020   • Senile osteoporosis 01/08/2020   • Coronary artery disease involving native coronary artery of native heart without angina pectoris 01/08/2020   • H/O: stroke 01/08/2020   • Full code status 06/14/2019   • Frailty syndrome in geriatric patient 06/14/2019   • Anemia 06/13/2019   • Seasonal allergies 10/09/2018   • Other osteoporosis without current pathological fracture 10/09/2018   • CKD (chronic kidney disease), stage III (HCC) 10/09/2018   • Health care maintenance 04/26/2018      Allergies:Patient has no known allergies.    Current Outpatient Medications   Medication Sig Dispense Refill   • simvastatin (ZOCOR) 20 MG Tab Take 1 Tab by mouth every morning. 30 Tab 0   • amLODIPine (NORVASC) 10 MG Tab Take 1 Tab by mouth every day. 30 Tab 0   • lisinopril (PRINIVIL) 20 MG Tab Take 0.5 Tabs by mouth 2 times a day. 60 Tab 5   • ASPIRIN LOW DOSE 81 MG EC tablet Take 81 mg by mouth every day.  5   • alendronate (FOSAMAX) 35 MG tablet Take 35 mg by mouth every Tuesday.     • acetaminophen (TYLENOL) 500 MG Tab Take  "500 mg by mouth every 6 hours. Scheduled     • fexofenadine (ALLEGRA ALLERGY) 180 MG tablet Take 180 mg by mouth every day.     • senna-docusate (PERICOLACE OR SENOKOT S) 8.6-50 MG Tab Take 2 Tabs by mouth 2 Times a Day. (Patient not taking: Reported on 1/8/2020) 30 Tab 0   • multivitamin (THERAGRAN) Tab Take 1 Tab by mouth every day.     • ascorbic acid (ASCORBIC ACID) 500 MG Tab Take 500 mg by mouth every day.     • fluticasone (FLONASE) 50 MCG/ACT nasal spray Spray 1 Spray in nose every day. (Patient not taking: Reported on 1/8/2020) 16 g 5     No current facility-administered medications for this visit.        Social History     Tobacco Use   • Smoking status: Never Smoker   • Smokeless tobacco: Never Used   Substance Use Topics   • Alcohol use: No   • Drug use: No     Social History     Social History Narrative   • Not on file       Family History   Problem Relation Age of Onset   • Heart Disease Neg Hx    • Hypertension Neg Hx    • Hyperlipidemia Neg Hx    • Psychiatric Illness Neg Hx    • Diabetes Neg Hx           Objective:   Vitals obtained by patient:  Blood Pressure 143/63 (BP Location: Left arm, Patient Position: Sitting, BP Cuff Size: Adult) Comment: Pt. stated  Pulse 79 Comment: Pt. stated  Height 1.575 m (5' 2\")   Weight 51.3 kg (113 lb) Comment: Pt. stated  Body Mass Index 20.67 kg/m²     Physical Exam:  Constitutional: Alert, no distress, well-groomed.  Skin: No rashes in visible areas.  Eye: Round. Conjunctiva clear, lids normal. No icterus.   ENMT: Lips pink without lesions, good dentition, moist mucous membranes. Phonation normal.  Neck: No masses, no thyromegaly. Moves freely without pain.  CV: Pulse as reported by patient  Respiratory: Unlabored respiratory effort, no cough or audible wheeze  Psych: Alert and oriented x3, normal affect and mood.       Labs:     Reviewed and discussed:    Lab Results   Component Value Date/Time    CHOLSTRLTOT 204 (H) 01/09/2020 07:53 AM     (H) " 01/09/2020 07:53 AM    HDL 62 01/09/2020 07:53 AM    TRIGLYCERIDE 132 01/09/2020 07:53 AM       Lab Results   Component Value Date/Time    SODIUM 139 01/09/2020 07:53 AM    POTASSIUM 4.3 01/09/2020 07:53 AM    CHLORIDE 106 01/09/2020 07:53 AM    CO2 23 01/09/2020 07:53 AM    GLUCOSE 97 01/09/2020 07:53 AM    BUN 17 01/09/2020 07:53 AM    CREATININE 1.20 01/09/2020 07:53 AM    CREATININE 0.9 01/15/2009 06:34 AM     Lab Results   Component Value Date/Time    ALKPHOSPHAT 67 01/09/2020 07:53 AM    ASTSGOT 20 01/09/2020 07:53 AM    ALTSGPT 12 01/09/2020 07:53 AM    TBILIRUBIN 0.6 01/09/2020 07:53 AM      Lab Results   Component Value Date/Time    HBA1C 5.4 09/18/2019 09:10 PM    HBA1C 6.0 (H) 04/04/2019 08:26 AM    HBA1C 5.7 (H) 10/29/2018 03:40 AM     No results found for: TSH  Lab Results   Component Value Date/Time    FREET4 0.98 07/02/2017 02:16 PM    FREET4 1.01 12/13/2013 09:20 AM     Lab Results   Component Value Date/Time    WBC 5.3 01/09/2020 07:53 AM    RBC 4.55 01/09/2020 07:53 AM    HEMOGLOBIN 14.2 01/09/2020 07:53 AM    HEMATOCRIT 43.4 01/09/2020 07:53 AM    MCV 95.4 01/09/2020 07:53 AM    MCH 31.2 01/09/2020 07:53 AM    MCHC 32.7 (L) 01/09/2020 07:53 AM    MPV 11.6 01/09/2020 07:53 AM    NEUTSPOLYS 55.00 01/09/2020 07:53 AM    LYMPHOCYTES 32.60 01/09/2020 07:53 AM    MONOCYTES 6.20 01/09/2020 07:53 AM    EOSINOPHILS 4.70 01/09/2020 07:53 AM    BASOPHILS 1.10 01/09/2020 07:53 AM      Imaging:     None    Assessment and Plan:   The following treatment plan was discussed:     1. Essential hypertension  Blood pressure was on the upper side, advised to continue current treatment, monitor blood pressure at home and let the office know if it is higher    2. Coronary artery disease involving native coronary artery of native heart without angina pectoris  3. H/O: stroke  Asymptomatic, continue current treatment    4. CKD (chronic kidney disease), stage III (HCC)  Stable, advised to continue good fluid intake, avoid  NSAIDs    5. Dyslipidemia  Controlled, continue current treatment    6. Thrombocytopenia (HCC)  PLT count was 100, asymptomatic, continue labs follow-up    7. Hypovitaminosis D  Continue current supplement, follow-up labs    Follow-up: According to labs, that should be done in approximately 1 month, according to epidemiology situation

## 2020-05-19 ENCOUNTER — OFFICE VISIT (OUTPATIENT)
Dept: MEDICAL GROUP | Age: 85
End: 2020-05-19
Payer: MEDICARE

## 2020-05-19 ENCOUNTER — HOSPITAL ENCOUNTER (OUTPATIENT)
Dept: LAB | Facility: MEDICAL CENTER | Age: 85
End: 2020-05-19
Attending: INTERNAL MEDICINE
Payer: MEDICARE

## 2020-05-19 VITALS
HEART RATE: 80 BPM | TEMPERATURE: 97.6 F | SYSTOLIC BLOOD PRESSURE: 112 MMHG | BODY MASS INDEX: 21.42 KG/M2 | DIASTOLIC BLOOD PRESSURE: 50 MMHG | HEIGHT: 62 IN | WEIGHT: 116.4 LBS | OXYGEN SATURATION: 98 %

## 2020-05-19 DIAGNOSIS — I25.10 CORONARY ARTERY DISEASE INVOLVING NATIVE CORONARY ARTERY OF NATIVE HEART WITHOUT ANGINA PECTORIS: ICD-10-CM

## 2020-05-19 DIAGNOSIS — N18.30 CKD (CHRONIC KIDNEY DISEASE), STAGE III: ICD-10-CM

## 2020-05-19 DIAGNOSIS — M79.89 LEG SWELLING: ICD-10-CM

## 2020-05-19 DIAGNOSIS — J30.2 SEASONAL ALLERGIES: ICD-10-CM

## 2020-05-19 DIAGNOSIS — E78.5 DYSLIPIDEMIA: ICD-10-CM

## 2020-05-19 DIAGNOSIS — E55.9 HYPOVITAMINOSIS D: ICD-10-CM

## 2020-05-19 DIAGNOSIS — I10 ESSENTIAL HYPERTENSION: ICD-10-CM

## 2020-05-19 DIAGNOSIS — Z86.73 H/O: STROKE: ICD-10-CM

## 2020-05-19 DIAGNOSIS — D69.6 THROMBOCYTOPENIA (HCC): ICD-10-CM

## 2020-05-19 LAB
25(OH)D3 SERPL-MCNC: 27 NG/ML (ref 30–100)
ALBUMIN SERPL BCP-MCNC: 4.5 G/DL (ref 3.2–4.9)
ALBUMIN/GLOB SERPL: 1.3 G/DL
ALP SERPL-CCNC: 92 U/L (ref 30–99)
ALT SERPL-CCNC: 15 U/L (ref 2–50)
ANION GAP SERPL CALC-SCNC: 13 MMOL/L (ref 7–16)
AST SERPL-CCNC: 25 U/L (ref 12–45)
BASOPHILS # BLD AUTO: 1.2 % (ref 0–1.8)
BASOPHILS # BLD: 0.08 K/UL (ref 0–0.12)
BILIRUB SERPL-MCNC: 0.5 MG/DL (ref 0.1–1.5)
BUN SERPL-MCNC: 21 MG/DL (ref 8–22)
CALCIUM SERPL-MCNC: 9.7 MG/DL (ref 8.5–10.5)
CHLORIDE SERPL-SCNC: 104 MMOL/L (ref 96–112)
CHOLEST SERPL-MCNC: 205 MG/DL (ref 100–199)
CO2 SERPL-SCNC: 22 MMOL/L (ref 20–33)
CREAT SERPL-MCNC: 1.09 MG/DL (ref 0.5–1.4)
EOSINOPHIL # BLD AUTO: 0.47 K/UL (ref 0–0.51)
EOSINOPHIL NFR BLD: 6.8 % (ref 0–6.9)
ERYTHROCYTE [DISTWIDTH] IN BLOOD BY AUTOMATED COUNT: 45 FL (ref 35.9–50)
FASTING STATUS PATIENT QL REPORTED: NORMAL
GLOBULIN SER CALC-MCNC: 3.5 G/DL (ref 1.9–3.5)
GLUCOSE SERPL-MCNC: 101 MG/DL (ref 65–99)
HCT VFR BLD AUTO: 47.9 % (ref 37–47)
HDLC SERPL-MCNC: 64 MG/DL
HGB BLD-MCNC: 15.4 G/DL (ref 12–16)
IMM GRANULOCYTES # BLD AUTO: 0.02 K/UL (ref 0–0.11)
IMM GRANULOCYTES NFR BLD AUTO: 0.3 % (ref 0–0.9)
LDLC SERPL CALC-MCNC: 108 MG/DL
LYMPHOCYTES # BLD AUTO: 1.87 K/UL (ref 1–4.8)
LYMPHOCYTES NFR BLD: 27.2 % (ref 22–41)
MCH RBC QN AUTO: 30.9 PG (ref 27–33)
MCHC RBC AUTO-ENTMCNC: 32.2 G/DL (ref 33.6–35)
MCV RBC AUTO: 96.2 FL (ref 81.4–97.8)
MONOCYTES # BLD AUTO: 0.55 K/UL (ref 0–0.85)
MONOCYTES NFR BLD AUTO: 8 % (ref 0–13.4)
NEUTROPHILS # BLD AUTO: 3.88 K/UL (ref 2–7.15)
NEUTROPHILS NFR BLD: 56.5 % (ref 44–72)
NRBC # BLD AUTO: 0 K/UL
NRBC BLD-RTO: 0 /100 WBC
PLATELET # BLD AUTO: 160 K/UL (ref 164–446)
PMV BLD AUTO: 11.6 FL (ref 9–12.9)
POTASSIUM SERPL-SCNC: 4.3 MMOL/L (ref 3.6–5.5)
PROT SERPL-MCNC: 8 G/DL (ref 6–8.2)
RBC # BLD AUTO: 4.98 M/UL (ref 4.2–5.4)
SODIUM SERPL-SCNC: 139 MMOL/L (ref 135–145)
TRIGL SERPL-MCNC: 165 MG/DL (ref 0–149)
WBC # BLD AUTO: 6.9 K/UL (ref 4.8–10.8)

## 2020-05-19 PROCEDURE — 82306 VITAMIN D 25 HYDROXY: CPT

## 2020-05-19 PROCEDURE — 85025 COMPLETE CBC W/AUTO DIFF WBC: CPT

## 2020-05-19 PROCEDURE — 80053 COMPREHEN METABOLIC PANEL: CPT

## 2020-05-19 PROCEDURE — 80061 LIPID PANEL: CPT

## 2020-05-19 PROCEDURE — 99214 OFFICE O/P EST MOD 30 MIN: CPT | Performed by: INTERNAL MEDICINE

## 2020-05-19 PROCEDURE — 36415 COLL VENOUS BLD VENIPUNCTURE: CPT

## 2020-05-19 RX ORDER — MONTELUKAST SODIUM 5 MG/1
5 TABLET, CHEWABLE ORAL DAILY
Qty: 90 TAB | Refills: 1 | Status: SHIPPED | OUTPATIENT
Start: 2020-05-19 | End: 2022-02-15

## 2020-05-19 ASSESSMENT — FIBROSIS 4 INDEX: FIB4 SCORE: 5.25

## 2020-05-19 NOTE — PROGRESS NOTES
CHIEF COMPLAINT   HTN, LE swelling    HPI  Sabrina Lobo is a 89 y.o. female who presents today for the following     LE swelling  Hypertension, CAD, H/o stroke, CKD stage III,   Meds: Enalapril, 20 mg daily, amlodipine 10 mg daily,  ASA 81 mg QD;taking as prescribed.   C/o LE swelling bellow knees, improved with LE elevation.    Measuring BP at home: yes, has been < 150/90.  Denies:  -  headaches, vision problems, tinnitus.                 -  chest pain/pressure, palpitations, irregular heart beats, exertional, dyspnea, peripheral edema.                 - medication side effect: unusual fatigue, slow heartbeat, foot/leg swelling, cough.  Low salt diet: Y  Diet: Healthy  Exercise: limited  BMI: 20  Renal panel showed stable CKD stage III, with normal Cr.  FH of HTN:  Unknown  Recent MRI of the brain showed multiple small strokes.    Echocardiography, 9/19/19  Normal regional wall motion. Left ventricular ejection fraction is   visually estimated to be 65%.   Normal diastolic function.  Normal inferior vena cava size and inspiratory collapse.  Aortic sclerosis without stenosis.     Seasonal allergies  Onset: remote  C/o nasal congestion, sneezing.  Used allegra and flonase, not resolved sx.  FH: son    Reviewed PMH, PSH, FH, SH, ALL, HCM/IMM, no changes  Reviewed MEDS, no changes    Patient Active Problem List    Diagnosis Date Noted   • Intractable back pain 06/13/2019     Priority: High   • Dyslipidemia 11/20/2012     Priority: Low   • Thrombocytopenia (HCC) 01/16/2020   • Hypovitaminosis D 01/16/2020   • Decreased hearing 01/16/2020   • Senile osteoporosis 01/08/2020   • Coronary artery disease involving native coronary artery of native heart without angina pectoris 01/08/2020   • H/O: stroke 01/08/2020   • Full code status 06/14/2019   • Frailty syndrome in geriatric patient 06/14/2019   • Anemia 06/13/2019   • Seasonal allergies 10/09/2018   • Other osteoporosis without current pathological fracture  10/09/2018   • CKD (chronic kidney disease), stage III ([facility]) 10/09/2018   • Health care maintenance 04/26/2018     CURRENT MEDICATIONS  Current Outpatient Medications   Medication Sig Dispense Refill   • simvastatin (ZOCOR) 20 MG Tab Take 1 Tab by mouth every morning. 90 Tab 0   • amLODIPine (NORVASC) 10 MG Tab Take 1 Tab by mouth every day. 90 Tab 0   • lisinopril (PRINIVIL) 20 MG Tab Take 0.5 Tabs by mouth 2 times a day. 60 Tab 5   • ASPIRIN LOW DOSE 81 MG EC tablet Take 81 mg by mouth every day.  5   • alendronate (FOSAMAX) 35 MG tablet Take 35 mg by mouth every Tuesday.     • acetaminophen (TYLENOL) 500 MG Tab Take 500 mg by mouth every 6 hours. Scheduled     • fexofenadine (ALLEGRA ALLERGY) 180 MG tablet Take 180 mg by mouth every day.     • senna-docusate (PERICOLACE OR SENOKOT S) 8.6-50 MG Tab Take 2 Tabs by mouth 2 Times a Day. (Patient not taking: Reported on 1/8/2020) 30 Tab 0   • multivitamin (THERAGRAN) Tab Take 1 Tab by mouth every day.     • ascorbic acid (ASCORBIC ACID) 500 MG Tab Take 500 mg by mouth every day.     • fluticasone (FLONASE) 50 MCG/ACT nasal spray Spray 1 Spray in nose every day. (Patient not taking: Reported on 1/8/2020) 16 g 5     No current facility-administered medications for this visit.      ALLERGIES  Allergies: Patient has no known allergies.  PAST MEDICAL HISTORY  Past Medical History:   Diagnosis Date   • H/O bladder repair surgery     not sure per patient daughter    • H/O: hysterectomy    • Hypertension    • Vertigo      SURGICAL HISTORY  She  has a past surgical history that includes hysterectomy radical.  SOCIAL HISTORY  Social History     Tobacco Use   • Smoking status: Never Smoker   • Smokeless tobacco: Never Used   Substance Use Topics   • Alcohol use: No   • Drug use: No     Social History     Social History Narrative   • Not on file     FAMILY HISTORY  Family History   Problem Relation Age of Onset   • Heart Disease Neg Hx    • Hypertension Neg Hx    • Hyperlipidemia  "Neg Hx    • Psychiatric Illness Neg Hx    • Diabetes Neg Hx      Family Status   Relation Name Status   • Mo     • Fa     • Sis     • Bro     • Neg Hx  (Not Specified)     ROS   Constitutional: Negative for fever, chills, fatigue.  HENT: Negative for congestion, sore throat.  Eyes: Negative for vision problems.   Respiratory: Negative for cough, shortness of breath.  Cardiovascular: Negative for chest pain, palpitations. And per HPI.  Gastrointestinal: Negative for heartburn, nausea, abdominal pain.   Genitourinary: Negative for dysuria.  Musculoskeletal: Negative for significant myalgia, back and joint pain.   Skin: Negative for rash.   Neuro: Negative for dizziness, weakness and headaches.   Endo/Heme/Allergies: Does not bruise/bleed easily.   Psychiatric/Behavioral: Negative for depression.    Objective   Vitals obtained by patient:  Blood Pressure 112/50 (BP Location: Right arm, Patient Position: Sitting, BP Cuff Size: Adult)   Pulse 80   Temperature 36.4 °C (97.6 °F) (Temporal)   Height 1.575 m (5' 2\")   Weight 52.8 kg (116 lb 6.4 oz)   Oxygen Saturation 98%   Body Mass Index 21.29 kg/m²   Physical Exam:  Constitutional: Alert, no distress, well-groomed.  Skin: No rash in visible areas.  Eye: Round. Conjunctiva clear, lids normal.  ENMT: Lips pink without lesions, good dentition. Phonation normal.  Neck: No visible masses or thyromegaly. Moves freely without pain.  CV: no peripheral cyanosis, tachycardia.  Respiratory: Unlabored respiratory effort, no cough or audible wheezing.  Psych: Alert and oriented x3, normal affect and mood.   No peripheral swelling.    Labs     Labs are reviewed and discussed with a patient  Lab Results   Component Value Date/Time    CHOLSTRLTOT 204 (H) 2020 07:53 AM     (H) 2020 07:53 AM    HDL 62 2020 07:53 AM    TRIGLYCERIDE 132 2020 07:53 AM       Lab Results   Component Value Date/Time    SODIUM 139 2020 " 07:53 AM    POTASSIUM 4.3 01/09/2020 07:53 AM    CHLORIDE 106 01/09/2020 07:53 AM    CO2 23 01/09/2020 07:53 AM    GLUCOSE 97 01/09/2020 07:53 AM    BUN 17 01/09/2020 07:53 AM    CREATININE 1.20 01/09/2020 07:53 AM    CREATININE 0.9 01/15/2009 06:34 AM     Lab Results   Component Value Date/Time    ALKPHOSPHAT 67 01/09/2020 07:53 AM    ASTSGOT 20 01/09/2020 07:53 AM    ALTSGPT 12 01/09/2020 07:53 AM    TBILIRUBIN 0.6 01/09/2020 07:53 AM      Lab Results   Component Value Date/Time    HBA1C 5.4 09/18/2019 09:10 PM    HBA1C 6.0 (H) 04/04/2019 08:26 AM    HBA1C 5.7 (H) 10/29/2018 03:40 AM     No results found for: TSH  Lab Results   Component Value Date/Time    FREET4 0.98 07/02/2017 02:16 PM    FREET4 1.01 12/13/2013 09:20 AM       Lab Results   Component Value Date/Time    WBC 5.3 01/09/2020 07:53 AM    RBC 4.55 01/09/2020 07:53 AM    HEMOGLOBIN 14.2 01/09/2020 07:53 AM    HEMATOCRIT 43.4 01/09/2020 07:53 AM    MCV 95.4 01/09/2020 07:53 AM    MCH 31.2 01/09/2020 07:53 AM    MCHC 32.7 (L) 01/09/2020 07:53 AM    MPV 11.6 01/09/2020 07:53 AM    NEUTSPOLYS 55.00 01/09/2020 07:53 AM    LYMPHOCYTES 32.60 01/09/2020 07:53 AM    MONOCYTES 6.20 01/09/2020 07:53 AM    EOSINOPHILS 4.70 01/09/2020 07:53 AM    BASOPHILS 1.10 01/09/2020 07:53 AM      Imaging      Reviewed and discussed per HPI    Assessment and Plan     Sabrina Lobo is a 89 y.o. female    1. Leg swelling  It resolved by LE elevation, advised to continue daily. May use compression stockings.  No additional med, especially with BP on lower side.    2. Essential hypertension  Controlled, continue with current treatment.  - Comp Metabolic Panel; Future    3. Coronary artery disease involving native coronary artery of native heart without angina pectoris  4. H/O: stroke  BP is controlled, continue current rx    5. CKD (chronic kidney disease), stage III (HCC)  Stable, advised to continue good fluid intake, avoid NSAIDs  - Comp Metabolic Panel; Future    6. Seasonal  allergies  Added small dose of singulair  - montelukast (SINGULAIR) 5 MG Chew Tab; Take 1 Tab by mouth every day.  Dispense: 90 Tab; Refill: 1    7. Thrombocytopenia (HCC)  F/u labs  - CBC WITH DIFFERENTIAL; Future    Follow-up: in 3 mos and prn

## 2020-08-05 ENCOUNTER — PATIENT OUTREACH (OUTPATIENT)
Dept: HEALTH INFORMATION MANAGEMENT | Facility: OTHER | Age: 85
End: 2020-08-05

## 2020-08-12 ENCOUNTER — APPOINTMENT (OUTPATIENT)
Dept: RADIOLOGY | Facility: MEDICAL CENTER | Age: 85
End: 2020-08-12
Attending: EMERGENCY MEDICINE
Payer: MEDICARE

## 2020-08-12 ENCOUNTER — HOSPITAL ENCOUNTER (OUTPATIENT)
Facility: MEDICAL CENTER | Age: 85
End: 2020-08-13
Attending: EMERGENCY MEDICINE | Admitting: INTERNAL MEDICINE
Payer: MEDICARE

## 2020-08-12 DIAGNOSIS — N18.30 CKD (CHRONIC KIDNEY DISEASE), STAGE III: ICD-10-CM

## 2020-08-12 DIAGNOSIS — N30.00 ACUTE CYSTITIS WITHOUT HEMATURIA: ICD-10-CM

## 2020-08-12 DIAGNOSIS — R55 SYNCOPE, UNSPECIFIED SYNCOPE TYPE: ICD-10-CM

## 2020-08-12 DIAGNOSIS — E86.0 DEHYDRATION: ICD-10-CM

## 2020-08-12 DIAGNOSIS — I25.10 CORONARY ARTERY DISEASE INVOLVING NATIVE CORONARY ARTERY OF NATIVE HEART WITHOUT ANGINA PECTORIS: ICD-10-CM

## 2020-08-12 DIAGNOSIS — Z00.00 HEALTH CARE MAINTENANCE: ICD-10-CM

## 2020-08-12 DIAGNOSIS — N30.01 ACUTE CYSTITIS WITH HEMATURIA: ICD-10-CM

## 2020-08-12 LAB
ALBUMIN SERPL BCP-MCNC: 4.5 G/DL (ref 3.2–4.9)
ALBUMIN/GLOB SERPL: 1.6 G/DL
ALP SERPL-CCNC: 71 U/L (ref 30–99)
ALT SERPL-CCNC: 16 U/L (ref 2–50)
ANION GAP SERPL CALC-SCNC: 16 MMOL/L (ref 7–16)
APPEARANCE UR: ABNORMAL
AST SERPL-CCNC: 28 U/L (ref 12–45)
BACTERIA #/AREA URNS HPF: ABNORMAL /HPF
BASOPHILS # BLD AUTO: 0.6 % (ref 0–1.8)
BASOPHILS # BLD: 0.06 K/UL (ref 0–0.12)
BILIRUB SERPL-MCNC: 0.3 MG/DL (ref 0.1–1.5)
BILIRUB UR QL STRIP.AUTO: NEGATIVE
BUN SERPL-MCNC: 14 MG/DL (ref 8–22)
CALCIUM SERPL-MCNC: 9.4 MG/DL (ref 8.5–10.5)
CHLORIDE SERPL-SCNC: 102 MMOL/L (ref 96–112)
CO2 SERPL-SCNC: 19 MMOL/L (ref 20–33)
COLOR UR: ABNORMAL
CREAT SERPL-MCNC: 1.56 MG/DL (ref 0.5–1.4)
D DIMER PPP IA.FEU-MCNC: 0.64 UG/ML (FEU) (ref 0–0.5)
EKG IMPRESSION: NORMAL
EOSINOPHIL # BLD AUTO: 0.21 K/UL (ref 0–0.51)
EOSINOPHIL NFR BLD: 2.2 % (ref 0–6.9)
EPI CELLS #/AREA URNS HPF: ABNORMAL /HPF
ERYTHROCYTE [DISTWIDTH] IN BLOOD BY AUTOMATED COUNT: 43.5 FL (ref 35.9–50)
ETHANOL BLD-MCNC: <10.1 MG/DL (ref 0–10.1)
GLOBULIN SER CALC-MCNC: 2.8 G/DL (ref 1.9–3.5)
GLUCOSE SERPL-MCNC: 136 MG/DL (ref 65–99)
GLUCOSE UR STRIP.AUTO-MCNC: NEGATIVE MG/DL
HCT VFR BLD AUTO: 43.5 % (ref 37–47)
HGB BLD-MCNC: 14.4 G/DL (ref 12–16)
HYALINE CASTS #/AREA URNS LPF: ABNORMAL /LPF
IMM GRANULOCYTES # BLD AUTO: 0.03 K/UL (ref 0–0.11)
IMM GRANULOCYTES NFR BLD AUTO: 0.3 % (ref 0–0.9)
KETONES UR STRIP.AUTO-MCNC: 15 MG/DL
LEUKOCYTE ESTERASE UR QL STRIP.AUTO: ABNORMAL
LYMPHOCYTES # BLD AUTO: 3.45 K/UL (ref 1–4.8)
LYMPHOCYTES NFR BLD: 36.4 % (ref 22–41)
MAGNESIUM SERPL-MCNC: 2.2 MG/DL (ref 1.5–2.5)
MCH RBC QN AUTO: 31.2 PG (ref 27–33)
MCHC RBC AUTO-ENTMCNC: 33.1 G/DL (ref 33.6–35)
MCV RBC AUTO: 94.4 FL (ref 81.4–97.8)
MICRO URNS: ABNORMAL
MONOCYTES # BLD AUTO: 0.68 K/UL (ref 0–0.85)
MONOCYTES NFR BLD AUTO: 7.2 % (ref 0–13.4)
NEUTROPHILS # BLD AUTO: 5.06 K/UL (ref 2–7.15)
NEUTROPHILS NFR BLD: 53.3 % (ref 44–72)
NITRITE UR QL STRIP.AUTO: NEGATIVE
NRBC # BLD AUTO: 0 K/UL
NRBC BLD-RTO: 0 /100 WBC
PH UR STRIP.AUTO: 5 [PH] (ref 5–8)
PLATELET # BLD AUTO: 194 K/UL (ref 164–446)
PMV BLD AUTO: 9.9 FL (ref 9–12.9)
POTASSIUM SERPL-SCNC: 4.5 MMOL/L (ref 3.6–5.5)
PROT SERPL-MCNC: 7.3 G/DL (ref 6–8.2)
PROT UR QL STRIP: NEGATIVE MG/DL
RBC # BLD AUTO: 4.61 M/UL (ref 4.2–5.4)
RBC # URNS HPF: ABNORMAL /HPF
RBC UR QL AUTO: NEGATIVE
SODIUM SERPL-SCNC: 137 MMOL/L (ref 135–145)
SP GR UR STRIP.AUTO: 1.03
TROPONIN T SERPL-MCNC: 15 NG/L (ref 6–19)
UROBILINOGEN UR STRIP.AUTO-MCNC: 1 MG/DL
WBC # BLD AUTO: 9.5 K/UL (ref 4.8–10.8)
WBC #/AREA URNS HPF: ABNORMAL /HPF

## 2020-08-12 PROCEDURE — 700117 HCHG RX CONTRAST REV CODE 255: Performed by: EMERGENCY MEDICINE

## 2020-08-12 PROCEDURE — 71275 CT ANGIOGRAPHY CHEST: CPT | Mod: ME

## 2020-08-12 PROCEDURE — A9270 NON-COVERED ITEM OR SERVICE: HCPCS | Performed by: INTERNAL MEDICINE

## 2020-08-12 PROCEDURE — 96375 TX/PRO/DX INJ NEW DRUG ADDON: CPT | Mod: XU

## 2020-08-12 PROCEDURE — 96372 THER/PROPH/DIAG INJ SC/IM: CPT | Mod: XU

## 2020-08-12 PROCEDURE — G0378 HOSPITAL OBSERVATION PER HR: HCPCS

## 2020-08-12 PROCEDURE — 93005 ELECTROCARDIOGRAM TRACING: CPT | Performed by: INTERNAL MEDICINE

## 2020-08-12 PROCEDURE — 85379 FIBRIN DEGRADATION QUANT: CPT

## 2020-08-12 PROCEDURE — 99203 OFFICE O/P NEW LOW 30 MIN: CPT | Performed by: INTERNAL MEDICINE

## 2020-08-12 PROCEDURE — 84484 ASSAY OF TROPONIN QUANT: CPT

## 2020-08-12 PROCEDURE — 96365 THER/PROPH/DIAG IV INF INIT: CPT | Mod: XU

## 2020-08-12 PROCEDURE — 36415 COLL VENOUS BLD VENIPUNCTURE: CPT

## 2020-08-12 PROCEDURE — 93010 ELECTROCARDIOGRAM REPORT: CPT | Mod: 77 | Performed by: INTERNAL MEDICINE

## 2020-08-12 PROCEDURE — 99220 PR INITIAL OBSERVATION CARE,LEVL III: CPT | Performed by: INTERNAL MEDICINE

## 2020-08-12 PROCEDURE — 700111 HCHG RX REV CODE 636 W/ 250 OVERRIDE (IP): Performed by: EMERGENCY MEDICINE

## 2020-08-12 PROCEDURE — 93010 ELECTROCARDIOGRAM REPORT: CPT | Mod: 76 | Performed by: INTERNAL MEDICINE

## 2020-08-12 PROCEDURE — 87086 URINE CULTURE/COLONY COUNT: CPT

## 2020-08-12 PROCEDURE — 99285 EMERGENCY DEPT VISIT HI MDM: CPT

## 2020-08-12 PROCEDURE — 700105 HCHG RX REV CODE 258: Performed by: EMERGENCY MEDICINE

## 2020-08-12 PROCEDURE — C9803 HOPD COVID-19 SPEC COLLECT: HCPCS | Performed by: INTERNAL MEDICINE

## 2020-08-12 PROCEDURE — 70450 CT HEAD/BRAIN W/O DYE: CPT | Mod: MF

## 2020-08-12 PROCEDURE — 81001 URINALYSIS AUTO W/SCOPE: CPT | Mod: XU

## 2020-08-12 PROCEDURE — 700111 HCHG RX REV CODE 636 W/ 250 OVERRIDE (IP): Performed by: INTERNAL MEDICINE

## 2020-08-12 PROCEDURE — 80053 COMPREHEN METABOLIC PANEL: CPT

## 2020-08-12 PROCEDURE — 85025 COMPLETE CBC W/AUTO DIFF WBC: CPT

## 2020-08-12 PROCEDURE — 71045 X-RAY EXAM CHEST 1 VIEW: CPT

## 2020-08-12 PROCEDURE — 93005 ELECTROCARDIOGRAM TRACING: CPT | Performed by: EMERGENCY MEDICINE

## 2020-08-12 PROCEDURE — 83735 ASSAY OF MAGNESIUM: CPT

## 2020-08-12 PROCEDURE — 80307 DRUG TEST PRSMV CHEM ANLYZR: CPT

## 2020-08-12 PROCEDURE — 93005 ELECTROCARDIOGRAM TRACING: CPT

## 2020-08-12 PROCEDURE — 700102 HCHG RX REV CODE 250 W/ 637 OVERRIDE(OP): Performed by: INTERNAL MEDICINE

## 2020-08-12 PROCEDURE — 700105 HCHG RX REV CODE 258: Performed by: INTERNAL MEDICINE

## 2020-08-12 RX ORDER — ONDANSETRON 2 MG/ML
4 INJECTION INTRAMUSCULAR; INTRAVENOUS ONCE
Status: COMPLETED | OUTPATIENT
Start: 2020-08-12 | End: 2020-08-12

## 2020-08-12 RX ORDER — BISACODYL 10 MG
10 SUPPOSITORY, RECTAL RECTAL
Status: DISCONTINUED | OUTPATIENT
Start: 2020-08-12 | End: 2020-08-13 | Stop reason: HOSPADM

## 2020-08-12 RX ORDER — SODIUM CHLORIDE 9 MG/ML
1000 INJECTION, SOLUTION INTRAVENOUS ONCE
Status: COMPLETED | OUTPATIENT
Start: 2020-08-12 | End: 2020-08-12

## 2020-08-12 RX ORDER — ACETAMINOPHEN 325 MG/1
650 TABLET ORAL EVERY 6 HOURS PRN
Status: DISCONTINUED | OUTPATIENT
Start: 2020-08-12 | End: 2020-08-13 | Stop reason: HOSPADM

## 2020-08-12 RX ORDER — HEPARIN SODIUM 5000 [USP'U]/ML
5000 INJECTION, SOLUTION INTRAVENOUS; SUBCUTANEOUS EVERY 8 HOURS
Status: DISCONTINUED | OUTPATIENT
Start: 2020-08-12 | End: 2020-08-13 | Stop reason: HOSPADM

## 2020-08-12 RX ORDER — ONDANSETRON 4 MG/1
4 TABLET, ORALLY DISINTEGRATING ORAL EVERY 4 HOURS PRN
Status: DISCONTINUED | OUTPATIENT
Start: 2020-08-12 | End: 2020-08-13 | Stop reason: HOSPADM

## 2020-08-12 RX ORDER — SODIUM CHLORIDE 9 MG/ML
INJECTION, SOLUTION INTRAVENOUS CONTINUOUS
Status: DISCONTINUED | OUTPATIENT
Start: 2020-08-12 | End: 2020-08-13

## 2020-08-12 RX ORDER — POLYETHYLENE GLYCOL 3350 17 G/17G
1 POWDER, FOR SOLUTION ORAL
Status: DISCONTINUED | OUTPATIENT
Start: 2020-08-12 | End: 2020-08-13 | Stop reason: HOSPADM

## 2020-08-12 RX ORDER — AMOXICILLIN 250 MG
2 CAPSULE ORAL 2 TIMES DAILY
Status: DISCONTINUED | OUTPATIENT
Start: 2020-08-12 | End: 2020-08-13 | Stop reason: HOSPADM

## 2020-08-12 RX ORDER — SIMVASTATIN 40 MG
20 TABLET ORAL EVERY MORNING
Status: DISCONTINUED | OUTPATIENT
Start: 2020-08-12 | End: 2020-08-13 | Stop reason: HOSPADM

## 2020-08-12 RX ORDER — FEXOFENADINE HCL 60 MG/1
180 TABLET, FILM COATED ORAL DAILY
Status: DISCONTINUED | OUTPATIENT
Start: 2020-08-12 | End: 2020-08-13 | Stop reason: HOSPADM

## 2020-08-12 RX ORDER — ONDANSETRON 2 MG/ML
4 INJECTION INTRAMUSCULAR; INTRAVENOUS EVERY 4 HOURS PRN
Status: DISCONTINUED | OUTPATIENT
Start: 2020-08-12 | End: 2020-08-13 | Stop reason: HOSPADM

## 2020-08-12 RX ADMIN — HEPARIN SODIUM 5000 UNITS: 5000 INJECTION, SOLUTION INTRAVENOUS; SUBCUTANEOUS at 06:29

## 2020-08-12 RX ADMIN — SODIUM CHLORIDE: 9 INJECTION, SOLUTION INTRAVENOUS at 05:06

## 2020-08-12 RX ADMIN — HEPARIN SODIUM 5000 UNITS: 5000 INJECTION, SOLUTION INTRAVENOUS; SUBCUTANEOUS at 23:09

## 2020-08-12 RX ADMIN — FEXOFENADINE HCL 180 MG: 60 TABLET, FILM COATED ORAL at 07:24

## 2020-08-12 RX ADMIN — IOHEXOL 40 ML: 350 INJECTION, SOLUTION INTRAVENOUS at 03:10

## 2020-08-12 RX ADMIN — DOCUSATE SODIUM 50 MG AND SENNOSIDES 8.6 MG 2 TABLET: 8.6; 5 TABLET, FILM COATED ORAL at 06:28

## 2020-08-12 RX ADMIN — ASPIRIN 81 MG: 81 TABLET, COATED ORAL at 06:28

## 2020-08-12 RX ADMIN — SIMVASTATIN 20 MG: 40 TABLET, FILM COATED ORAL at 06:27

## 2020-08-12 RX ADMIN — SODIUM CHLORIDE 1000 ML: 9 INJECTION, SOLUTION INTRAVENOUS at 01:45

## 2020-08-12 RX ADMIN — ONDANSETRON 4 MG: 2 INJECTION INTRAMUSCULAR; INTRAVENOUS at 01:45

## 2020-08-12 RX ADMIN — CEFTRIAXONE SODIUM 1 G: 1 INJECTION, POWDER, FOR SOLUTION INTRAMUSCULAR; INTRAVENOUS at 03:00

## 2020-08-12 ASSESSMENT — FIBROSIS 4 INDEX
FIB4 SCORE: 3.28
FIB4 SCORE: 3.67

## 2020-08-12 NOTE — CONSULTS
Cardiology Initial Consultation    Date of Service  8/12/2020    Referring Physician  Carlo Durant M.D.    Reason for Consultation  Concern for Mobitz 2 AV block    History of Presenting Illness  Sabrina Lobo is a 89 y.o. female with no prior cardiac history who presented 8/12/2020 with syncope.  Patient is a poor historian.  She remembers sitting and drinking water yesterday and then remembers waking up when EMS was around her.  She does not recall any further details like prodromal dizziness or palpitations or dyspnea.  She denies any prior history of syncope initially however later on stated she does not know if she is ever passed out.  Since she has been in the hospital she has been asymptomatic.     Further history could not be obtained as the patient is a poor historian and very hard of hearing.    Review of Systems  Review of Systems   Unable to perform ROS: Other   Patient is a poor historian.    The following past medical, past surgical, family and social history could not be confirmed with the patient and she is a poor historian.    Past Medical History   has a past medical history of H/O bladder repair surgery, H/O: hysterectomy, Hypertension, and Vertigo.    Surgical History   has a past surgical history that includes hysterectomy radical.    Family History  family history is not on file.    Social History   reports that she has never smoked. She has never used smokeless tobacco. She reports that she does not drink alcohol or use drugs.    Home Medications   Prior to Admission Medications   Prescriptions Last Dose Informant Patient Reported? Taking?   ASPIRIN LOW DOSE 81 MG EC tablet  Patient Yes No   Sig: Take 81 mg by mouth every day.   acetaminophen (TYLENOL) 500 MG Tab  Patient Yes No   Sig: Take 500 mg by mouth every 6 hours. Scheduled   alendronate (FOSAMAX) 35 MG tablet  Patient Yes No   Sig: Take 35 mg by mouth every Tuesday.   amLODIPine (NORVASC) 10 MG Tab   No No   Sig: Take 1 Tab by  mouth every day.   ascorbic acid (ASCORBIC ACID) 500 MG Tab  Patient Yes No   Sig: Take 500 mg by mouth every day.   fexofenadine (ALLEGRA ALLERGY) 180 MG tablet  Patient Yes No   Sig: Take 180 mg by mouth every day.   lisinopril (PRINIVIL) 20 MG Tab   No No   Sig: Take 0.5 Tabs by mouth 2 times a day.   montelukast (SINGULAIR) 5 MG Chew Tab   No No   Sig: Take 1 Tab by mouth every day.   multivitamin (THERAGRAN) Tab  Patient Yes No   Sig: Take 1 Tab by mouth every day.   senna-docusate (PERICOLACE OR SENOKOT S) 8.6-50 MG Tab  Patient No No   Sig: Take 2 Tabs by mouth 2 Times a Day.   Patient not taking: Reported on 1/8/2020   simvastatin (ZOCOR) 20 MG Tab   No No   Sig: Take 1 Tab by mouth every morning.      Facility-Administered Medications: None       Inpatient Medications  • aspirin  81 mg DAILY   • fexofenadine  180 mg DAILY   • simvastatin  20 mg QAM   • senna-docusate  2 Tab BID    And   • polyethylene glycol/lytes  1 Packet QDAY PRN    And   • magnesium hydroxide  30 mL QDAY PRN    And   • bisacodyl  10 mg QDAY PRN   • NS   Continuous   • heparin  5,000 Units Q8HRS   • acetaminophen  650 mg Q6HRS PRN   • ondansetron  4 mg Q4HRS PRN   • ondansetron  4 mg Q4HRS PRN       Allergies  No Known Allergies    Vital signs in last 24 hours  Temp:  [35.7 °C (96.3 °F)-36.2 °C (97.2 °F)] 35.7 °C (96.3 °F)  Pulse:  [60-77] 73  Resp:  [12-20] 20  BP: (128-151)/(44-88) 147/65  SpO2:  [94 %-98 %] 94 %    Physical Exam  Physical Exam   Constitutional: She is oriented to person, place, and time and well-developed, well-nourished, and in no distress. No distress.   HENT:   Head: Normocephalic and atraumatic.   Eyes: Conjunctivae are normal. No scleral icterus.   Neck: Normal range of motion. Neck supple. No JVD present.   Cardiovascular: Normal rate, regular rhythm and intact distal pulses. Exam reveals no gallop and no friction rub.   No murmur heard.  Pulmonary/Chest: Effort normal and breath sounds normal. No respiratory  distress. She has no wheezes. She has no rales. She exhibits no tenderness.   Abdominal: Soft. Bowel sounds are normal. She exhibits no distension. There is no abdominal tenderness.   Musculoskeletal: Normal range of motion.         General: No edema.   Neurological: She is alert and oriented to person, place, and time.   Skin: Skin is warm and dry. No rash noted. She is not diaphoretic.   Psychiatric: Mood, affect and judgment normal.   Nursing note and vitals reviewed.      Lab Review  Recent Labs     08/12/20  0115   WBC 9.5   RBC 4.61   HEMOGLOBIN 14.4   HEMATOCRIT 43.5   PLATELETCT 194   MCV 94.4   MPV 9.9     Recent Labs     08/12/20  0115   SODIUM 137   POTASSIUM 4.5   CHLORIDE 102   CO2 19*   GLUCOSE 136*   BUN 14   CREATININE 1.56*      Lab Results   Component Value Date/Time    TROPONINI <0.01 10/29/2018 03:40 AM    TROPONINT 15 08/12/2020 01:15 AM    CKMB 1.2 08/21/2013 01:19 PM       Lab Results   Component Value Date/Time    ASTSGOT 28 08/12/2020 01:15 AM    ALTSGPT 16 08/12/2020 01:15 AM     Lab Results   Component Value Date/Time    CHOLSTRLTOT 205 (H) 05/19/2020 09:24 AM     (H) 05/19/2020 09:24 AM    HDL 64 05/19/2020 09:24 AM    TRIGLYCERIDE 165 (H) 05/19/2020 09:24 AM         Labs reviewed as noted above.  Creatinine 1.56.  Concern for UTI    Cardiac Imaging and Procedures Review  EKG performed today at 10:52 AM was personally reviewed and per my interpretation shows sinus rhythm with Mobitz 1 AV block.  Poor R wave progression.    Telemetry reviewed which shows Mobitz 1 AV block.  No evidence of Mobitz 2 AV block.  At baseline patient has first-degree AV block.    Assessment/Plan    Syncope:  UTI, JEOVANNY:  Mobitz 1 AV block:    Syncope likely secondary to her UTI and possible dehydration and JEOVANNY.  She has intermittent Mobitz 1 AV block however she does not have any associated symptoms.  She does not have any high-grade AV block.  I do not think her Mobitz 1 AV block is the cause of her  syncope.  Would recommend ongoing monitoring on telemetry for worsening arrhythmias.  If patient does not have any concerning arrhythmias during the admission we may need to consider outpatient ambulatory EKG monitoring for further evaluation.  Continue to avoid AV bere blocking agents for now.    Dr. Durant was personally updated about the above.      Thank you for allowing me to participate in the care of this patient. Please do not hesitate to contact me with any questions.    Edith Anaya MD, [facility]  Cardiologist  [facility]

## 2020-08-12 NOTE — ED NOTES
ADDITIONAL ORDERS RECEIVED FOR CTA. CURRENTLY ATTEMPTING HIGHER, LARGER ELENA IV. CT TO BE UPDATED WHEN LINE IN PLACE

## 2020-08-12 NOTE — ASSESSMENT & PLAN NOTE
Etiology: arrhythmia vs hypotension?  CT head : negative  CTPE: no PE  Holding BP meds  Started on IVF  Monitor on tele  Ordered echo

## 2020-08-12 NOTE — ED PROVIDER NOTES
ED Provider Note    CHIEF COMPLAINT  Chief Complaint   Patient presents with   • Syncope     BIB EMS FOR WITNESSED SYNCOPAL EPISODE THIS EVENING BY FAMILY. EASED TO THE GROUND. DENIES HEAD, NECK, BACK PAIN, CP, SOB       HPI  Patient is a 89-year-old female with a past medical history of hypertension, lower extremity swelling, CAD/stroke/CKD stage III who presents emergency room brought in by ambulance for evaluation of likely syncopal event.  Initial report by EMS who notes that apparently the patient was checking into a local hotel when she appeared to have a glazed over look on her face and was appearing to fall and looked severely weak.  They helped her to the ground at which point she had a positive loss of consciousness.  There was no witnessed seizure activity.  She apparently was not necessarily talking in sentences but did slowly come back to her baseline.  When EMS arrived she was slightly unsteady on her feet but demonstrated no other acute complaints beyond feeling slightly dizzy.  No medications provided, EKG at the scene was unremarkable and vital signs have been stable    PPE Note: I personally donned full PPE for all patient encounters during this visit, including being clean-shaven with an N95 respirator mask, gloves, and goggles.     REVIEW OF SYSTEMS  Constitutional: No fevers, chills, or recent illness.  Skin: No rashes or diaphoresis.  Eyes: No change in vision, no discharge.  Denies diplopia  ENT: No hearing change. No rhinorrhea or nasal congestion, no ST or difficulty swallowing.  Respiratory: No SOB. No coughing or hemoptysis. No Wheezing.  Cardiac: No CP, palpitations, edema. No PND or orthopnea.  GI: No Abdominal Pain; N/V; diarrhea, constipation. No blood in stool.  : Possible dysuria and increased frequency.  Denies vaginal discharge. No hesitancy. LMP= postmenopausal  MSK: No pain in joints or muscles. No calf pain or swelling.  Neuro: No HA or paresthesias.  Reports increased fatigue  and global weakness  Psych: No SI, HI, AH, VH.  Endocrine: No polyuria or polydipsia. No heat or cold intolerance.  Heme: No easy bruising. No history of bleeding disorders or anemia.    PAST MEDICAL HISTORY   has a past medical history of H/O bladder repair surgery, H/O: hysterectomy, Hypertension, and Vertigo.    SOCIAL HISTORY  Social History     Tobacco Use   • Smoking status: Never Smoker   • Smokeless tobacco: Never Used   Substance and Sexual Activity   • Alcohol use: No   • Drug use: No   • Sexual activity: Not on file       SURGICAL HISTORY   has a past surgical history that includes hysterectomy radical.    CURRENT MEDICATIONS  Home Medications     Reviewed by Falguni Mccann R.N. (Registered Nurse) on 08/12/20 at 0117  Med List Status: <None>   Medication Last Dose Status   acetaminophen (TYLENOL) 500 MG Tab  Active   alendronate (FOSAMAX) 35 MG tablet  Active   amLODIPine (NORVASC) 10 MG Tab  Active   ascorbic acid (ASCORBIC ACID) 500 MG Tab  Active   ASPIRIN LOW DOSE 81 MG EC tablet  Active   fexofenadine (ALLEGRA ALLERGY) 180 MG tablet  Active   lisinopril (PRINIVIL) 20 MG Tab  Active   montelukast (SINGULAIR) 5 MG Chew Tab  Active   multivitamin (THERAGRAN) Tab  Active   senna-docusate (PERICOLACE OR SENOKOT S) 8.6-50 MG Tab  Active   simvastatin (ZOCOR) 20 MG Tab  Active              ALLERGIES  No Known Allergies    PHYSICAL EXAM  VITAL SIGNS: /60   Pulse 75   Temp 35.9 °C (96.6 °F) (Temporal)   Resp 19   Ht 1.524 m (5')   Wt 54.4 kg (120 lb)   SpO2 96%   BMI 23.44 kg/m²   Pulse ox interpretation: I interpret this pulse ox as normal.  Genl: Elderly F sitting in gurney uncomfortably, speaking clearly, appears in no acute distress  Head: NC/AT  ENT: Mucous membranes dry, posterior pharynx clear, uvula midline, nares patent bilaterally   Eyes: Normal sclera, pupils equal round reactive to light  Neck: Supple, FROM, no LAD appreciated   Pulmonary: Lungs are clear to auscultation  bilaterally  Chest: No TTP  CV:  RRR, no murmur appreciated, pulses 2+ in both upper and lower extremities,  Abdomen: soft, NT/ND; no rebound/guarding, no masses palpated, no HSM  : no CVA or suprapubic tenderness  Musculoskeletal: Pain free ROM of the neck. Moving upper and lower extremities and spontaneous in coordinated fashion  Neuro: Mental Status: Speech fluent without errors. Follows all commands. No dysarthria or apraxia.  Cranial Nerves: Pupils equal round and reactive to light. Extraocular motion intact. Visual fields intact. + fatigable bilateral nystagmus. CN V1-V3 intact to light touch. No facial asymmetry. Hearing clinically intact bilaterally. Tongue protrusion midline. No uvular deviation. Normal shoulder shrug and head turn.  Motor:  RUE: 5/5 with hand , 5/5 with flexion at the elbow 5/5 with extension at the elbow  LUE: 5/5 with hand , 5/5 with flexion at the elbow 5/5 with extension at the elbow  RLE: 5/5 with leg raise, 5/5 with plantar flexion, 5/5 with dorsal flexion  LLE: 5/5 with leg raise, 5/5 with plantar flexion, 5/5 with dorsal flexion  Sensation to light touch intact throughout  Reflexes 2+ Patellar tendons  Not initially ambulated  Rapidly alternating movements without difficulty  Psych: Patient has an appropriate affect and behavior  Skin: No rash or lesions.  No pallor or jaundice.  No cyanosis.  Warm and dry.     DIAGNOSTIC STUDIES / PROCEDURES    EKG  Results for orders placed or performed during the hospital encounter of 20   EKG (NOW)   Result Value Ref Range    Report       [facility] Emergency Dept.    Test Date:  2020  Pt Name:    EMILY HONG                 Department: ER  MRN:        [id number]                      Room:       [facility]  Gender:     Female                       Technician: [id number]  :        [id number]                   Requested By:JODI RDZ  Order #:    [id number]                    Reading MD: Sylvain Roche,  MD    Measurements  Intervals                                Axis  Rate:       62                           P:          47  NE:         264                          QRS:        103  QRSD:       78                           T:          33  QT:         420  QTc:        427    Interpretive Statements  SINUS RHYTHM  FIRST DEGREE AV BLOCK  ANTERIOR INFARCT, OLD  Compared to ECG 09/18/2019 20:59:51  Myocardial infarct finding now present  Poor R-wave progression no longer present  ST (T wave) deviation no longer present  Electronically Signed On 8- 1:28:31 PDT by Sylvain Roche MD       LABS  Labs Reviewed   CBC WITH DIFFERENTIAL - Abnormal; Notable for the following components:       Result Value    MCHC 33.1 (*)     All other components within normal limits    Narrative:     1. Age less then 50  2. Heart reate less then 100  3. 02 sat > 94%  4. No prior history of DVT or PE  5. No recent trauma or surgery (2 weeks)  6. No hemoptisis.  7. No  or HRP  8. No un-lateral leg swelling.   COMP METABOLIC PANEL - Abnormal; Notable for the following components:    Co2 19 (*)     Glucose 136 (*)     Creatinine 1.56 (*)     All other components within normal limits    Narrative:     1. Age less then 50  2. Heart reate less then 100  3. 02 sat > 94%  4. No prior history of DVT or PE  5. No recent trauma or surgery (2 weeks)  6. No hemoptisis.  7. No  or HRP  8. No un-lateral leg swelling.   D-DIMER - Abnormal; Notable for the following components:    D-Dimer Screen 0.64 (*)     All other components within normal limits    Narrative:     1. Age less then 50  2. Heart reate less then 100  3. 02 sat > 94%  4. No prior history of DVT or PE  5. No recent trauma or surgery (2 weeks)  6. No hemoptisis.  7. No  or HRP  8. No un-lateral leg swelling.   URINALYSIS,CULTURE IF INDICATED - Abnormal; Notable for the following components:    Character Cloudy (*)     Ketones 15 (*)     Leukocyte Esterase Small (*)     All other  components within normal limits   ESTIMATED GFR - Abnormal; Notable for the following components:    GFR If  38 (*)     GFR If Non  31 (*)     All other components within normal limits    Narrative:     1. Age less then 50  2. Heart reate less then 100  3. 02 sat > 94%  4. No prior history of DVT or PE  5. No recent trauma or surgery (2 weeks)  6. No hemoptisis.  7. No  or HRP  8. No un-lateral leg swelling.   URINE MICROSCOPIC (W/UA) - Abnormal; Notable for the following components:    WBC  (*)     RBC 5-10 (*)     Bacteria Few (*)     Hyaline Cast 3-5 (*)     All other components within normal limits   TROPONIN    Narrative:     1. Age less then 50  2. Heart reate less then 100  3. 02 sat > 94%  4. No prior history of DVT or PE  5. No recent trauma or surgery (2 weeks)  6. No hemoptisis.  7. No  or HRP  8. No un-lateral leg swelling.   MAGNESIUM    Narrative:     1. Age less then 50  2. Heart reate less then 100  3. 02 sat > 94%  4. No prior history of DVT or PE  5. No recent trauma or surgery (2 weeks)  6. No hemoptisis.  7. No  or HRP  8. No un-lateral leg swelling.   DIAGNOSTIC ALCOHOL   URINE CULTURE(NEW)   COVID/SARS COV-2     RADIOLOGY  CT-CTA CHEST PULMONARY ARTERY W/ RECONS   Final Result      1.  No pulmonary embolus. No acute abnormality in the chest.   2.  Small hiatal hernia.         DX-CHEST-PORTABLE (1 VIEW)   Final Result      No acute cardiopulmonary abnormality.      CT-HEAD W/O   Final Result      1. No CT evidence of acute infarct, hemorrhage or mass.   2. Mild to moderate global parenchymal atrophy. Chronic small vessel ischemic changes.      EC-ECHOCARDIOGRAM COMPLETE W/O CONT    (Results Pending)     COURSE & MEDICAL DECISION MAKING  Pertinent Labs & Imaging studies reviewed. (See chart for details)    DDX:  Heart arrhythmia  ACS  Neurocardiogenic  Stroke unlikely  Seizure  GI bleeding  Vasovagal  syncope/Orthostasis  Dehydration  Polypharmacy  Anemia  Medication side effect    MDM    Initial evaluation at 0115:  Presents emergency room for symptoms as described above.  She has a recent history of witnessed syncopal event without witnessed seizure activity and did not appear to have any provoked inciting incident.  She has somewhat of a poor historian at the time my initial evaluation and a broad medical work-up and differentials considered.  On initial assessment she does not have any focal neurological deficits, an NIH stroke scale of 0 and overall I believe stroke is unlikely.  With her confounding medical history she could have multiple electrolyte derangements and other confounding features made worse by dehydration so fluid resuscitation is initiated in addition to work-up for above differential.    Labs show no leukocytosis, no leftward shift, very slight creatinine elevation with normal BUN and no LFT elevations or abnormal bilirubin.  D-dimer slightly elevated 0.64.  Chest x-ray is without focal consolidations, pneumothorax, hemothorax or cardiac enlargement.  CTA of the chest is obtained and shows no evidence of acute pulmonary embolus.  I do not believe at this time that with the current findings and laboratory results that her syncopal event was due to a cardiac or cardiopulmonary process.  Does appear that she is dehydrated and may have had a vasovagal event or some sort of dysrhythmia.  Troponin is not acutely elevated, no gross metabolic derangements at this time.  The patient does have evidence of acute urinary tract infection and received Rocephin here in the emergency department.  With her syncopal event, age and possible comorbidities I recommend the patient be admitted, observed and transition from IV medications to oral medications.  This is discussed with hospitalist who will evaluate the patient for potential admission.    EKG is without acute ischemia, though some non-specific changes  from prior in 2018    HYDRATION: Based on the patient's presentation of Dehydration and Eldery ALOC the patient was given IV fluids. IV Hydration was used because oral hydration was not adequate alone. Upon recheck following hydration, the patient was improved.    FINAL IMPRESSION  Visit Diagnoses     ICD-10-CM   1. Syncope, unspecified syncope type  R55   2. Dehydration  E86.0   3. Acute cystitis without hematuria  N30.00     Electronically signed by: Melchor Narayan M.D., 8/12/2020 1:12 AM

## 2020-08-12 NOTE — PROGRESS NOTES
Bedside report received from night RN; assumed pt care. Pt assessment complete. Pt resting in bed, pt speaks tigolic Reviewed plan of care with pt. Tele box on and rhythm verified. Chart and labs reviewed. Bed in lowest position, and 2 side rails up. Pt educated on call light; call light with in reach.  No admit profile has been completed by NOC RN

## 2020-08-12 NOTE — ED NOTES
PT DAUGHTER, TANYA [phone], CALLED FOR AN UPDATE. WITH PT PERMISSION POC AND PT STATUS GIVEN TO FAMILY.

## 2020-08-12 NOTE — PROGRESS NOTES
Report received from Falguni Patel ED, Rn. Assumed pt care. Pt VSS are stable. Pt. Is on the telemetry box. Pt A&O x 4. Fall precautions in place, call light and belongings within reach, bed in lowest position. No signs of distress.

## 2020-08-12 NOTE — ED NOTES
SPOKE WITH PT STS WAS VERY TIRED AND BELIEVES THAT'S WHY SHE FAINTED. UNDERSTANDS THE NEED FOR URINE AND ORTHOSTATIC VS, HOWEVER STS CURRENTLY TOO TIRED. WILL CHECK BACK

## 2020-08-12 NOTE — ED TRIAGE NOTES
Chief Complaint   Patient presents with   • Syncope     BIB EMS FOR WITNESSED SYNCOPAL EPISODE THIS EVENING BY FAMILY. EASED TO THE GROUND. DENIES HEAD, NECK, BACK PAIN, CP, SOB       /66   Pulse 61   Temp 35.9 °C (96.6 °F) (Temporal)   Resp 14   Ht 1.524 m (5')   Wt 54.4 kg (120 lb)   SpO2 95%   BMI 23.44 kg/m²    BLOOD SUGAR 131

## 2020-08-12 NOTE — PROGRESS NOTES
I saw Sabrina Lobo  History of HTN  Presented with Syncope (BIB EMS FOR WITNESSED SYNCOPAL EPISODE THIS EVENING BY FAMILY. EASED TO THE GROUND. DENIES HEAD, NECK, BACK PAIN, CP, SOB)   on 8/12/2020   Poor historian, memory deficits.  She did admit she felt dizzy before passing out. Denied tongue biting, micturition, incontonence, posturing, tremors.   At the ED afebrile, normotensive.  CT:  1. No CT evidence of acute infarct, hemorrhage or mass.  2. Mild to moderate global parenchymal atrophy. Chronic small vessel ischemic changes.  CTA Chest:  1.  No pulmonary embolus. No acute abnormality in the chest.  2.  Small hiatal hernia.  No leukocytosis  Urinalysis indicative of UTI  On exam  Continue antibiotics, follow cultures  Echo PENDING  Mobitz type 1 second degree on telemetry however concern for degrading to type 2  Consulted and discussed with Dr. Anaya, Cardiology  I spent an additional 1 hour reviewing the strips and EKGs, speaking with the patient, speaking with her son and consulting with Dr. Anaya, Cardiology

## 2020-08-12 NOTE — PROGRESS NOTES
2 RN skin check complete with RN Mis  Devices in place N/A.  Skin assessed under devices N/A..    Pt. Has some mild excoriation under her breasts, and scabbing on her Left heel.   Heels , ears, elbows and sacrum are pink and blanchable.

## 2020-08-12 NOTE — ED NOTES
PT BACK FROM CT. ABX STARTED PER MAR. PT RESTING COMFORTABLY IN BED AT THIS TIME. ALL NEEDS MET. CALL LIGHT WITHIN REACH. AWAITING BED ASSIGNMENT ON FLOOR

## 2020-08-12 NOTE — ED NOTES
ATTEMPTED TO GET PT UP TO CHECK VS UPON STANDING, PT REFUSING AT THIS TIME STS FEELING TOO WEAK. EP TO BE UPDATED

## 2020-08-12 NOTE — H&P
Hospital Medicine History & Physical Note    Date of Service  8/12/2020    Primary Care Physician  Ash Whiting M.D.    Consultants  none    Code Status  Full Code    Chief Complaint  Chief Complaint   Patient presents with   • Syncope     BIB EMS FOR WITNESSED SYNCOPAL EPISODE THIS EVENING BY FAMILY. EASED TO THE GROUND. DENIES HEAD, NECK, BACK PAIN, CP, SOB       History of Presenting Illness  89 y.o. female with PMH of HTN who presented 8/12/2020 with syncope episode earlier today.  The patient is somewhat poor historian and she could not remember the event.  The only thing she remembered was she felt dizzy before she passed out.  The patient denies any tongue biting, urinary or bowel incontinence.  In the ER she has CT scan of the head done with no acute finding and CT PE study was negative for pulmonary embolism.  She will be admitted for observation.    Review of Systems  Review of Systems   Unable to perform ROS: Acuity of condition       Past Medical History   has a past medical history of H/O bladder repair surgery, H/O: hysterectomy, Hypertension, and Vertigo.    Surgical History   has a past surgical history that includes hysterectomy radical.     Family History  Reviewed and no pertinent family history     Social History   reports that she has never smoked. She has never used smokeless tobacco. She reports that she does not drink alcohol or use drugs.    Allergies  No Known Allergies    Medications  Prior to Admission Medications   Prescriptions Last Dose Informant Patient Reported? Taking?   ASPIRIN LOW DOSE 81 MG EC tablet  Patient Yes No   Sig: Take 81 mg by mouth every day.   acetaminophen (TYLENOL) 500 MG Tab  Patient Yes No   Sig: Take 500 mg by mouth every 6 hours. Scheduled   alendronate (FOSAMAX) 35 MG tablet  Patient Yes No   Sig: Take 35 mg by mouth every Tuesday.   amLODIPine (NORVASC) 10 MG Tab   No No   Sig: Take 1 Tab by mouth every day.   ascorbic acid (ASCORBIC ACID) 500 MG Tab   Patient Yes No   Sig: Take 500 mg by mouth every day.   fexofenadine (ALLEGRA ALLERGY) 180 MG tablet  Patient Yes No   Sig: Take 180 mg by mouth every day.   lisinopril (PRINIVIL) 20 MG Tab   No No   Sig: Take 0.5 Tabs by mouth 2 times a day.   montelukast (SINGULAIR) 5 MG Chew Tab   No No   Sig: Take 1 Tab by mouth every day.   multivitamin (THERAGRAN) Tab  Patient Yes No   Sig: Take 1 Tab by mouth every day.   senna-docusate (PERICOLACE OR SENOKOT S) 8.6-50 MG Tab  Patient No No   Sig: Take 2 Tabs by mouth 2 Times a Day.   Patient not taking: Reported on 1/8/2020   simvastatin (ZOCOR) 20 MG Tab   No No   Sig: Take 1 Tab by mouth every morning.      Facility-Administered Medications: None       Physical Exam  Temp:  [35.9 °C (96.6 °F)] 35.9 °C (96.6 °F)  Pulse:  [60-75] 75  Resp:  [14-20] 19  BP: (133-148)/(60-68) 133/60  SpO2:  [94 %-98 %] 96 %    Physical Exam  Vitals signs reviewed.   Constitutional:       General: She is not in acute distress.     Appearance: Normal appearance.   HENT:      Head: Normocephalic and atraumatic.      Nose: No congestion or rhinorrhea.   Eyes:      Extraocular Movements: Extraocular movements intact.      Pupils: Pupils are equal, round, and reactive to light.   Neck:      Musculoskeletal: Normal range of motion and neck supple.   Cardiovascular:      Rate and Rhythm: Normal rate and regular rhythm.      Pulses: Normal pulses.   Pulmonary:      Effort: Pulmonary effort is normal. No respiratory distress.      Breath sounds: Normal breath sounds.   Abdominal:      General: Bowel sounds are normal. There is no distension.      Palpations: Abdomen is soft.      Tenderness: There is no abdominal tenderness.   Musculoskeletal:         General: No swelling or tenderness.   Skin:     General: Skin is warm.      Findings: No erythema.   Neurological:      General: No focal deficit present.      Mental Status: She is alert.         Laboratory:  Recent Labs     08/12/20  0115   WBC 9.5   RBC  4.61   HEMOGLOBIN 14.4   HEMATOCRIT 43.5   MCV 94.4   MCH 31.2   MCHC 33.1*   RDW 43.5   PLATELETCT 194   MPV 9.9     Recent Labs     08/12/20 0115   SODIUM 137   POTASSIUM 4.5   CHLORIDE 102   CO2 19*   GLUCOSE 136*   BUN 14   CREATININE 1.56*   CALCIUM 9.4     Recent Labs     08/12/20 0115   ALTSGPT 16   ASTSGOT 28   ALKPHOSPHAT 71   TBILIRUBIN 0.3   GLUCOSE 136*         No results for input(s): NTPROBNP in the last 72 hours.      Recent Labs     08/12/20 0115   TROPONINT 15       Imaging:  CT-CTA CHEST PULMONARY ARTERY W/ RECONS   Final Result      1.  No pulmonary embolus. No acute abnormality in the chest.   2.  Small hiatal hernia.         DX-CHEST-PORTABLE (1 VIEW)   Final Result      No acute cardiopulmonary abnormality.      CT-HEAD W/O   Final Result      1. No CT evidence of acute infarct, hemorrhage or mass.   2. Mild to moderate global parenchymal atrophy. Chronic small vessel ischemic changes.      EC-ECHOCARDIOGRAM COMPLETE W/O CONT    (Results Pending)         Assessment/Plan:  I anticipate this patient is appropriate for observation status at this time.    Acute cystitis with hematuria- (present on admission)  Assessment & Plan  On IV Ceftriaxone  Follow culture    Syncope- (present on admission)  Assessment & Plan  Etiology: arrhythmia vs hypotension?  CT head : negative  CTPE: no PE  Holding BP meds  Started on IVF  Monitor on tele  Ordered echo    JEOVANNY (acute kidney injury) (HCC)- (present on admission)  Assessment & Plan  Likely prerenal  On IVF  Holding lisinopril  Avoid nephrotoxic drugs

## 2020-08-12 NOTE — CARE PLAN
Problem: Communication  Goal: The ability to communicate needs accurately and effectively will improve  Outcome: PROGRESSING AS EXPECTED  Intervention: [place] patient and significant other/support system to call light to alert staff of needs  Flowsheets (Taken 8/12/2020 8057)  Oriented to:: All of the Following : Location of Bathroom, Visiting Policy, Unit Routine, Call Light and Bedside Controls, Bedside Rail Policy, Smoking Policy, Rights and Responsibilities, Bedside Report, and Patient Education Notebook     Problem: Safety  Goal: Will remain free from injury  Outcome: PROGRESSING AS EXPECTED  Note: Pt safety precautions in place; bed in lowest lock position, 2 side rails up, non slip socks on, call light within reach- educated on when and how to use call light.

## 2020-08-13 ENCOUNTER — APPOINTMENT (OUTPATIENT)
Dept: CARDIOLOGY | Facility: MEDICAL CENTER | Age: 85
End: 2020-08-13
Attending: INTERNAL MEDICINE
Payer: MEDICARE

## 2020-08-13 ENCOUNTER — HOME HEALTH ADMISSION (OUTPATIENT)
Dept: HOME HEALTH SERVICES | Facility: HOME HEALTHCARE | Age: 85
End: 2020-08-13
Payer: MEDICARE

## 2020-08-13 ENCOUNTER — TELEPHONE (OUTPATIENT)
Dept: CARDIOLOGY | Facility: MEDICAL CENTER | Age: 85
End: 2020-08-13

## 2020-08-13 VITALS
WEIGHT: 120.81 LBS | SYSTOLIC BLOOD PRESSURE: 162 MMHG | DIASTOLIC BLOOD PRESSURE: 57 MMHG | HEIGHT: 60 IN | TEMPERATURE: 97.2 F | RESPIRATION RATE: 18 BRPM | BODY MASS INDEX: 23.72 KG/M2 | HEART RATE: 61 BPM | OXYGEN SATURATION: 98 %

## 2020-08-13 DIAGNOSIS — I44.30 AVB (ATRIOVENTRICULAR BLOCK): ICD-10-CM

## 2020-08-13 DIAGNOSIS — R55 SYNCOPE AND COLLAPSE: ICD-10-CM

## 2020-08-13 LAB
ALBUMIN SERPL BCP-MCNC: 3.5 G/DL (ref 3.2–4.9)
ALBUMIN/GLOB SERPL: 1.5 G/DL
ALP SERPL-CCNC: 60 U/L (ref 30–99)
ALT SERPL-CCNC: 13 U/L (ref 2–50)
ANION GAP SERPL CALC-SCNC: 13 MMOL/L (ref 7–16)
AST SERPL-CCNC: 19 U/L (ref 12–45)
BILIRUB SERPL-MCNC: 0.3 MG/DL (ref 0.1–1.5)
BUN SERPL-MCNC: 20 MG/DL (ref 8–22)
CALCIUM SERPL-MCNC: 8.7 MG/DL (ref 8.5–10.5)
CHLORIDE SERPL-SCNC: 106 MMOL/L (ref 96–112)
CO2 SERPL-SCNC: 21 MMOL/L (ref 20–33)
CREAT SERPL-MCNC: 1.04 MG/DL (ref 0.5–1.4)
EKG IMPRESSION: NORMAL
EKG IMPRESSION: NORMAL
ERYTHROCYTE [DISTWIDTH] IN BLOOD BY AUTOMATED COUNT: 43.8 FL (ref 35.9–50)
GLOBULIN SER CALC-MCNC: 2.4 G/DL (ref 1.9–3.5)
GLUCOSE SERPL-MCNC: 95 MG/DL (ref 65–99)
HCT VFR BLD AUTO: 38.5 % (ref 37–47)
HGB BLD-MCNC: 12.6 G/DL (ref 12–16)
LV EJECT FRACT  99904: 60
LV EJECT FRACT MOD 2C 99903: 71.57
LV EJECT FRACT MOD 4C 99902: 59.62
LV EJECT FRACT MOD BP 99901: 65.52
MCH RBC QN AUTO: 31.2 PG (ref 27–33)
MCHC RBC AUTO-ENTMCNC: 32.7 G/DL (ref 33.6–35)
MCV RBC AUTO: 95.3 FL (ref 81.4–97.8)
PLATELET # BLD AUTO: 173 K/UL (ref 164–446)
PMV BLD AUTO: 9.7 FL (ref 9–12.9)
POTASSIUM SERPL-SCNC: 4.5 MMOL/L (ref 3.6–5.5)
PROT SERPL-MCNC: 5.9 G/DL (ref 6–8.2)
RBC # BLD AUTO: 4.04 M/UL (ref 4.2–5.4)
SODIUM SERPL-SCNC: 140 MMOL/L (ref 135–145)
WBC # BLD AUTO: 5.1 K/UL (ref 4.8–10.8)

## 2020-08-13 PROCEDURE — 80053 COMPREHEN METABOLIC PANEL: CPT

## 2020-08-13 PROCEDURE — 700105 HCHG RX REV CODE 258: Performed by: INTERNAL MEDICINE

## 2020-08-13 PROCEDURE — 85027 COMPLETE CBC AUTOMATED: CPT

## 2020-08-13 PROCEDURE — 700111 HCHG RX REV CODE 636 W/ 250 OVERRIDE (IP): Performed by: INTERNAL MEDICINE

## 2020-08-13 PROCEDURE — G0378 HOSPITAL OBSERVATION PER HR: HCPCS

## 2020-08-13 PROCEDURE — 97161 PT EVAL LOW COMPLEX 20 MIN: CPT

## 2020-08-13 PROCEDURE — 99217 PR OBSERVATION CARE DISCHARGE: CPT | Performed by: INTERNAL MEDICINE

## 2020-08-13 PROCEDURE — 93306 TTE W/DOPPLER COMPLETE: CPT | Mod: 26 | Performed by: INTERNAL MEDICINE

## 2020-08-13 PROCEDURE — 36415 COLL VENOUS BLD VENIPUNCTURE: CPT

## 2020-08-13 PROCEDURE — 96372 THER/PROPH/DIAG INJ SC/IM: CPT

## 2020-08-13 PROCEDURE — 93010 ELECTROCARDIOGRAM REPORT: CPT | Mod: 76,59 | Performed by: INTERNAL MEDICINE

## 2020-08-13 PROCEDURE — 700102 HCHG RX REV CODE 250 W/ 637 OVERRIDE(OP): Performed by: INTERNAL MEDICINE

## 2020-08-13 PROCEDURE — 96376 TX/PRO/DX INJ SAME DRUG ADON: CPT | Mod: XU

## 2020-08-13 PROCEDURE — 93306 TTE W/DOPPLER COMPLETE: CPT

## 2020-08-13 PROCEDURE — A9270 NON-COVERED ITEM OR SERVICE: HCPCS | Performed by: INTERNAL MEDICINE

## 2020-08-13 PROCEDURE — 99214 OFFICE O/P EST MOD 30 MIN: CPT | Mod: 25 | Performed by: INTERNAL MEDICINE

## 2020-08-13 RX ORDER — CEFUROXIME AXETIL 500 MG/1
500 TABLET ORAL 2 TIMES DAILY
Qty: 2 TAB | Refills: 0 | Status: SHIPPED | OUTPATIENT
Start: 2020-08-13 | End: 2020-08-14

## 2020-08-13 RX ORDER — NICOTINE 14MG/24HR
1 PATCH, TRANSDERMAL 24 HOURS TRANSDERMAL 2 TIMES DAILY WITH MEALS
Qty: 14 CAP | Refills: 0 | Status: SHIPPED | OUTPATIENT
Start: 2020-08-13 | End: 2022-02-15

## 2020-08-13 RX ORDER — LISINOPRIL 2.5 MG/1
2.5 TABLET ORAL DAILY
Qty: 30 TAB | Refills: 0 | Status: SHIPPED | OUTPATIENT
Start: 2020-08-13 | End: 2022-02-15

## 2020-08-13 RX ORDER — POLYETHYLENE GLYCOL 3350 17 G/17G
POWDER, FOR SOLUTION ORAL
Refills: 3 | COMMUNITY
Start: 2020-08-13 | End: 2022-02-15

## 2020-08-13 RX ORDER — AMOXICILLIN 250 MG
2 CAPSULE ORAL 2 TIMES DAILY
Qty: 30 TAB | Refills: 0 | COMMUNITY
Start: 2020-08-13 | End: 2022-02-15

## 2020-08-13 RX ADMIN — SIMVASTATIN 20 MG: 40 TABLET, FILM COATED ORAL at 05:30

## 2020-08-13 RX ADMIN — FEXOFENADINE HCL 180 MG: 60 TABLET, FILM COATED ORAL at 05:31

## 2020-08-13 RX ADMIN — ASPIRIN 81 MG: 81 TABLET, COATED ORAL at 05:29

## 2020-08-13 RX ADMIN — HEPARIN SODIUM 5000 UNITS: 5000 INJECTION, SOLUTION INTRAVENOUS; SUBCUTANEOUS at 05:31

## 2020-08-13 RX ADMIN — CEFTRIAXONE SODIUM 1 G: 1 INJECTION, POWDER, FOR SOLUTION INTRAMUSCULAR; INTRAVENOUS at 05:26

## 2020-08-13 RX ADMIN — SODIUM CHLORIDE: 9 INJECTION, SOLUTION INTRAVENOUS at 06:22

## 2020-08-13 ASSESSMENT — COGNITIVE AND FUNCTIONAL STATUS - GENERAL
SUGGESTED CMS G CODE MODIFIER MOBILITY: CH
MOBILITY SCORE: 24

## 2020-08-13 ASSESSMENT — GAIT ASSESSMENTS
GAIT LEVEL OF ASSIST: SUPERVISED
DISTANCE (FEET): 200
ASSISTIVE DEVICE: FRONT WHEEL WALKER

## 2020-08-13 ASSESSMENT — ENCOUNTER SYMPTOMS
CHEST TIGHTNESS: 0
SHORTNESS OF BREATH: 0
CHOKING: 0

## 2020-08-13 NOTE — TELEPHONE ENCOUNTER
Lizbeth,    Can you please place this order for Dr. Anaya?    To [facility] scheduling pool to schedule once order has been placed.    Thank You,  Christiane

## 2020-08-13 NOTE — DISCHARGE PLANNING
Received Choice form at 1130  Agency/Facility Name: [facility]  Referral sent per Choice form @ 1130     @1120  Agency/Facility Name: [facility]  Spoke To: Vickie  Outcome: Declined due to cannot take anymore [facility] at this time.    Received Choice form at 1055  Agency/Facility Name: [facility]  Referral sent per Choice form @ 1100     @1040  [facility]  Spoke To: Ciera  Outcome: Declined due to non-contracted insurance.    Received Choice form at 1035  Agency/Facility Name: [facility]  Referral sent per Choice form @ 1033

## 2020-08-13 NOTE — PROGRESS NOTES
Cardiology Follow Up Progress Note    Date of Service  8/13/2020    Attending Physician  Carlo Durant M.D.    Chief Complaint   syncope    HPI  Sabrina Lobo is a 89 y.o. female admitted 8/12/2020 with syncope.    Concern for Mobitz 2 AV block      Past medical history significant for hypertension, no prior cardiac history, no prior history of syncope.    Interim Events    No overnight cardiac events  Wishes to go home  No high degree AV blocks noted overnight  Mobitz 1  Being treated for UTI  Echocardiogram pending  Labs reviewed  Creatinine improved      Review of Systems  Review of Systems   Respiratory: Negative for choking, chest tightness and shortness of breath.    Cardiovascular: Negative for chest pain.       Vital signs in last 24 hours  Temp:  [35.7 °C (96.3 °F)-36.4 °C (97.5 °F)] 36 °C (96.8 °F)  Pulse:  [53-73] 61  Resp:  [16-20] 18  BP: (106-155)/(45-65) 142/45  SpO2:  [91 %-98 %] 98 %    Physical Exam  Physical Exam  Cardiovascular:      Rate and Rhythm: Normal rate and regular rhythm.   Pulmonary:      Effort: Pulmonary effort is normal.   Skin:     General: Skin is warm.   Neurological:      General: No focal deficit present.      Mental Status: She is alert.   Psychiatric:         Mood and Affect: Mood normal.         Lab Review  Lab Results   Component Value Date/Time    WBC 5.1 08/13/2020 03:56 AM    RBC 4.04 (L) 08/13/2020 03:56 AM    HEMOGLOBIN 12.6 08/13/2020 03:56 AM    HEMATOCRIT 38.5 08/13/2020 03:56 AM    MCV 95.3 08/13/2020 03:56 AM    MCH 31.2 08/13/2020 03:56 AM    MCHC 32.7 (L) 08/13/2020 03:56 AM    MPV 9.7 08/13/2020 03:56 AM      Lab Results   Component Value Date/Time    SODIUM 140 08/13/2020 03:56 AM    POTASSIUM 4.5 08/13/2020 03:56 AM    CHLORIDE 106 08/13/2020 03:56 AM    CO2 21 08/13/2020 03:56 AM    GLUCOSE 95 08/13/2020 03:56 AM    BUN 20 08/13/2020 03:56 AM    CREATININE 1.04 08/13/2020 03:56 AM    CREATININE 0.9 01/15/2009 06:34 AM      Lab Results   Component Value  Date/Time    ASTSGOT 19 08/13/2020 03:56 AM    ALTSGPT 13 08/13/2020 03:56 AM     Lab Results   Component Value Date/Time    CHOLSTRLTOT 205 (H) 05/19/2020 09:24 AM     (H) 05/19/2020 09:24 AM    HDL 64 05/19/2020 09:24 AM    TRIGLYCERIDE 165 (H) 05/19/2020 09:24 AM    TROPONINT 15 08/12/2020 01:15 AM       No results for input(s): NTPROBNP in the last 72 hours.    Cardiac Imaging and Procedures Review  EKG: Mobitz 1    Echocardiogram: Pending    Cardiac Catheterization: Not applicable    Imaging  Chest X-Ray: No acute cardiopulmonary abnormality    Stress Test: Not applicable    Assessment/Plan      Syncope  -Likely secondary to UTI and possible dehydration with evidence for JEOVANNY  -Telemetry shows intermittent Mobitz 1 AV block, asymptomatic  -No indication of high-grade AV block  -We will consider outpatient ambulatory EKG monitoring, will arrange as an outpatient.  -Avoid AV node blocking agents      UTI, JEOVANNY  -On Rocephin  -IV fluids  -Creatinine improved, 1.04    Follow-up on echo  If echo normal, cardiology will sign off and follow-up as an outpatient.  Biotel as an outpatient, will arrange           Please contact me with any questions.    ALESHIA Loco.   Cardiologist, [facility]  [phone]

## 2020-08-13 NOTE — PROGRESS NOTES
Paged on call cardiologist Marly to notify him of patient EKG rhythm I/O 2nd degree type 2 and third degree heart block. Patient is currently in 1st degree with a IA interval of 56. No new orders at this time, will continue to monitor.

## 2020-08-13 NOTE — CARE PLAN
Problem: Safety  Goal: Will remain free from falls  Outcome: PROGRESSING AS EXPECTED   Patient educated to use call light for assistance. Fall precautions in place. Staff will assist with mobilization. Hourly rounding in place.   Problem: Infection  Goal: Will remain free from infection  Outcome: PROGRESSING AS EXPECTED   Appropriate protocols and standards utilized to minimize likelihood of infection and the spread of infection. Proper hand hygiene utilized and pt and family members educated on hand hygiene.

## 2020-08-13 NOTE — TELEPHONE ENCOUNTER
----- Message from CHERYLE Loco sent at 8/13/2020  9:52 AM PDT -----  DR Hernandez requesting Biotel on this patient for syncope, concern for high degree AVB  Thank you

## 2020-08-13 NOTE — DISCHARGE PLANNING
Anticipated Discharge Disposition: Home with home health.    Action: Spoke with patient at bedside, she does not know the name of her home health agency and requests RN OPAL to call her son, Prabhjot. Spoke with Prabhjot, patient was with [facility] and they are OK with sending referral over to them since [facility] doesn't take her insurance.     Obtained choice for Home Health. Faxed choice form to [facility]. Fax confirmation at [phone].    4. [facility].    Barriers to Discharge: Home health acceptance.    Plan: D/C after acceptance from home health.

## 2020-08-13 NOTE — PROGRESS NOTES
Report received from Rn. Assumed pt care. Pt A&O x 4. Fall precautions in place, call light and belongings within reach, bed in lowest position. No signs of distress.

## 2020-08-13 NOTE — DISCHARGE PLANNING
Anticipated Discharge Disposition: Home with Home Health.    Action: Obtained choice for home health. Faxed choice form to [facility]. Fax confirmation at [phone].    1. [facility] [place].    Barriers to Discharge: Home health acceptance.    Plan: Discharge pending home health acceptance.

## 2020-08-13 NOTE — PROGRESS NOTES
Monitor Summary:  SR 62-78  Adria down to 42, with first degree HB  I/O second degree type 1 and 2  Rare PVC  .46/.08/.40(varying ID)

## 2020-08-13 NOTE — DISCHARGE INSTRUCTIONS
Discharge Instructions    Discharged to home by car with relative. Discharged via wheelchair, hospital escort: Yes.  Special equipment needed: Not Applicable    Be sure to schedule a follow-up appointment with your primary care doctor or any specialists as instructed.     Discharge Plan:        I understand that a diet low in cholesterol, fat, and sodium is recommended for good health. Unless I have been given specific instructions below for another diet, I accept this instruction as my diet prescription.   Other diet: Cardiac    Special Instructions: None    · Is patient discharged on Warfarin / Coumadin?   No     Depression / Suicide Risk    As you are discharged from this [facility] facility, it is important to learn how to keep safe from harming yourself.    Recognize the warning signs:  · Abrupt changes in personality, positive or negative- including increase in energy   · Giving away possessions  · Change in eating patterns- significant weight changes-  positive or negative  · Change in sleeping patterns- unable to sleep or sleeping all the time   · Unwillingness or inability to communicate  · Depression  · Unusual sadness, discouragement and loneliness  · Talk of wanting to die  · Neglect of personal appearance   · Rebelliousness- reckless behavior  · Withdrawal from people/activities they love  · Confusion- inability to concentrate     If you or a loved one observes any of these behaviors or has concerns about self-harm, here's what you can do:  · Talk about it- your feelings and reasons for harming yourself  · Remove any means that you might use to hurt yourself (examples: pills, rope, extension cords, firearm)  · Get professional help from the community (Mental Health, Substance Abuse, psychological counseling)  · Do not be alone:Call your Safe Contact- someone whom you trust who will be there for you.  · Call your local CRISIS HOTLINE 073-1908 or 458-499-3377  · Call your local Children's Mobile Crisis  Response Team [facility] (835) 414-0863 or www.My Top 10  · Call the toll free National Suicide Prevention Hotlines   · National Suicide Prevention Lifeline 313-950-UIPR (6075)  · [facility] Line Network 800-SUICIDE (253-8118)      Second-Degree Atrioventricular Block  Second-degree atrioventricular (AV) block is a condition that can cause the heart to miss beats. In this condition, the electrical signals that travel from the heart’s upper chambers (atria) to its lower chambers (ventricles) move too slowly or are interrupted. There are two types of second-degree AV block:  · Mobitz type 1 block. This does not cause symptoms. It rarely requires treatment.  · Mobitz type 2 block. This is more serious. It results in more missed beats and a very irregular heartbeat. It can lead to complete heart block, meaning that no signal exists between the upper and lower chambers of the heart. This is a dangerous condition that can lead to fainting or cardiac arrest.  What are the causes?  This condition may be caused by:  · Any condition that damages the system that controls the heart’s rate and rhythm, such as a heart attack or infection of the heart.  · Overstimulation of the nerve that slows down heart rate (vagus nerve). This cause is common among well-conditioned athletes.  · Some medicines that slow down the heart rate, such as beta blockers or calcium channel blockers.  · Surgery that damages the heart.  Some people are born with this condition (congenital heart block), but most people develop it over time.  What increases the risk?  The risk for this condition increases with age. You are also more likely to develop this condition if you have:  · A history of heart attack.  · Heart failure.  · Coronary heart disease.  · Inflammation of heart muscle (myocarditis).  · Disease of heart muscle (cardiomyopathy).  · Infection of the heart valves (endocarditis).  · Infections or diseases that affect the heart. These  include:  ? Lyme disease.  ? Sarcoidosis.  ? Hemochromatosis.  ? Rheumatic fever.  ? Certain muscle disorders.  Babies are more likely to be born with heart block if:  · The child's mother has an autoimmune disease, such as lupus.  · The baby is born with a heart defect that affects the heart’s structure.  · A parent was born with a heart defect.  What are the signs or symptoms?  Symptoms of this condition include:  · Tiredness.  · Shortness of breath.  · Dizziness.  · Lightheadedness.  · Fainting.  · Chest pain.  How is this diagnosed?  This condition may be diagnosed based on:  · A physical exam.  · Your medical history.  · A measurement of your pulse or heartbeat.  · Tests. These may include:  ? An electrocardiogram (ECG). This checks for problems with electrical activity in your heart.  ? An ambulatory cardiac monitor or event monitor test. This portable device monitors your heart rate over time.  ? An electrophysiology (EP) study. Long, thin tubes (catheters) are placed in your heart. The catheters give information about your heart's electrical signals.  How is this treated?  Treatment for this condition depends on the type of block you have and how severe it is. Treatment may involve:  · Treating an underlying condition, such as heart disease.  · Changing or stopping any heart medicines that may have caused heart block.  · Having a permanent pacemaker placed in your chest. A pacemaker uses electrical pulses to help the heart beat normally. It is usually placed under the skin on your chest or abdomen. You may need a pacemaker if you have Mobitz type 2 block.  Follow these instructions at home:  General instructions         · Take over-the-counter and prescription medicines only as told by your health care provider.  · Follow your health care provider's recommendations to help reduce your risk for heart disease. These may include:  ? Exercising for at least 30 minutes 5 or more days (150 minutes) each week. Ask  your health care provider what type of exercise is safe for you.  ? Eating a heart-healthy diet with fruits and vegetables, whole grains, low-fat dairy products, and lean proteins like poultry and eggs. Your health care provider or dietitian can help you make healthy choices.  ? Maintaining a healthy weight.  · Do not use any products that contain nicotine or tobacco, such as cigarettes and e-cigarettes. If you need help quitting, ask your health care provider.  · Keep all follow-up visits as told by your health care provider. This is important.  Alcohol use  · Do not drink alcohol if:  ? Your health care provider tells you not to drink.  ? You are pregnant, may be pregnant, or are planning to become pregnant.  · If you drink alcohol, limit how much you have:  ? 0-1 drink a day for women.  ? 0-2 drinks a day for men.  § Be aware of how much alcohol is in your drink. In the [place] one drink equals one typical bottle of beer (12 oz), one-half glass of wine (5 oz), or one shot of hard liquor (1½ oz).  Contact a health care provider if you:  · Feel like your heart is skipping beats.  · Feel more tired than normal.  · Have swelling in your lower legs or your feet.  Get help right away if you:  · Have symptoms that change or get worse.  · Develop new symptoms.  · Have chest pain, especially if the pain:  ? Feels like crushing or pressure.  ? Spreads to your arms, back, neck, or jaw.  · Feel short of breath.  · Feel light-headed or weak.  · Faint.  These symptoms may represent a serious problem that is an emergency. Do not wait to see if the symptoms will go away. Get medical help right away. Call your local emergency services (911 in the [place] Do not drive yourself to the hospital.  Summary  · Second-degree AV block is a type of heart block that can cause the heart to miss beats. In this condition, the electrical signals that control heart rate move too slowly or are interrupted.  · You may need a pacemaker if you have  the more serious type of AV block (Mobitz type 2 block).  · Get help right away if you have chest pain, if you faint, or if you develop new symptoms.  This information is not intended to replace advice given to you by your health care provider. Make sure you discuss any questions you have with your health care provider.  Document Released: 11/30/2009 Document Revised: 01/29/2019 Document Reviewed: 01/29/2019  Elsevier Patient Education © 2020 [employer]

## 2020-08-13 NOTE — DISCHARGE PLANNING
ATTN: Case Management  RE: Referral for Home Health    As of 08/13/2020, we have accepted the Home Health referral for the patient listed above.    A [facility] clinician will be out to see the patient within 48 hours. If you have any questions or concerns regarding the patient's transition to Home Health, please do not hesitate to contact us at [phone].      We look forward to collaborating with you,  [facility] Home Health Team

## 2020-08-13 NOTE — FACE TO FACE
Face to Face Supporting Documentation - Home Health    The encounter with this patient was in whole or in part the primary reason for home health admission.    Date of encounter:   Patient:                    MRN:                       YOB: 2020  Sabrina Lobo  [id number]  4/13/1929     Home health to see patient for:  Skilled Nursing care for assessment, interventions & education, Physical Therapy evaluation and treatment and Occupational therapy evaluation and treatment    Skilled need for:  Medication Management med mgmt, she had HHC in the past    Skilled nursing interventions to include:  Comment: as above    Homebound status evidenced by:  Needs the assistance of another person in order to leave the home. Leaving home requires a considerable and taxing effort. There is a normal inability to leave the home.    Community Physician to provide follow up care: Ash Whiting M.D.     Optional Interventions? No      I certify the face to face encounter for this home health care referral meets the CMS requirements and the encounter/clinical assessment with the patient was, in whole, or in part, for the medical condition(s) listed above, which is the primary reason for home health care. Based on my clinical findings: the service(s) are medically necessary, support the need for home health care, and the homebound criteria are met.  I certify that this patient has had a face to face encounter by myself.  Carlo Durant M.D. - NPI: [id number]

## 2020-08-13 NOTE — DISCHARGE SUMMARY
Discharge Summary    CHIEF COMPLAINT ON ADMISSION  Chief Complaint   Patient presents with   • Syncope     BIB EMS FOR WITNESSED SYNCOPAL EPISODE THIS EVENING BY FAMILY. EASED TO THE GROUND. DENIES HEAD, NECK, BACK PAIN, CP, SOB       Reason for Admission  EMS     Admission Date  8/12/2020    CODE STATUS  Full Code    HPI & HOSPITAL COURSE  This is a 89 y.o. female here with Syncope (BIB EMS FOR WITNESSED SYNCOPAL EPISODE THIS EVENING BY FAMILY. EASED TO THE GROUND. DENIES HEAD, NECK, BACK PAIN, CP, SOB)  Please review Dr. Vance Laurent M.D. notes for further details of history of present illness, past medical/social/family histories, allergies and medications. Please review Dr. Anaya, Cardiology consultation notes.  History of HTN  Presented with Syncope (BIB EMS FOR WITNESSED SYNCOPAL EPISODE THIS EVENING BY FAMILY. EASED TO THE GROUND. DENIES HEAD, NECK, BACK PAIN, CP, SOB)   on 8/12/2020   Poor historian, memory deficits.  She did admit she felt dizzy before passing out. Denied tongue biting, micturition, incontonence, posturing, tremors.   At the ED afebrile, normotensive.  CT:  1. No CT evidence of acute infarct, hemorrhage or mass.  2. Mild to moderate global parenchymal atrophy. Chronic small vessel ischemic changes.  CTA Chest:  1.  No pulmonary embolus. No acute abnormality in the chest.  2.  Small hiatal hernia.  No leukocytosis  Urinalysis indicative of UTI  Antibiotics started for uncomplicated UTI. She is given PO antibiotics to compete total 3d course at dischagre.  Echo:   Normal left ventricular systolic function.  Left ventricular ejection fraction is visually estimated to be 60%.  Indeterminate diastolic function.  Normal right ventricular size.  Calcified aortic valve leaflets.  Moderate aortic insufficiency.  Unable to estimate pulmonary artery pressure due to an inadequate   tricuspid regurgitant jet.  Normal right ventricular size and systolic function.  Small left ventricular chamber  size.  Normal inferior vena cava size and inspiratory collapse.  Telemetry showed Mobitz type 1 second degree. Consulted and discussed with Dr. Anaya, Cardiology. Most likely UTI and hypovolemia/prerenal state caused her syncope. Cardiology did not feel arrhythmia is the culprit at this time. She felt that this is Mobitz type 1 and at this point, no indication for pacer; she can have several episodes of this. She however recommended close follow up. A Holter or event monitor ([employer] is recommended. Meanwhile I stopped her amlodipine and restarted her lisinopril at lower dose as her creatinine normalized. Home Health Care will be arranged.    Because of her dizziness, I held her amlodipine and reduced her ACEI. Home Health Care nurse can monitor her blood pressures. Her primary provider can monitor and titrate her medictaions.    At discharge date, Sabrina Lobo afebrile and hemodynamically stable.  Sabrina Lobo wanted to be discharged today.    Discharge Physical Exam  General/Constitutional: No acute distress. ELderly, frail  Head: Normocephalic, atraumatic  ENT: Oral mucosa is moist. No obvious pharyngeal exudates  Eyes: Pink conjunctiva, no scleral icterus  Neck: Supple, no lymphadenopathy  Cardiovascular: Normal rate and regular rhythm. S1,2 noted. No murmurs, gallops or rubs.  Pulmonary: Clear to auscultation bilaterally. No wheezes, rales or rhonchi.  Abdominal: Soft, nontender, not distended, bowel sounds normoactive. No guarding or peritoneal signs.  Musculoskeletal: No tenderness to palpation of chest wall.  Neurologic: Alert and oriented. Grossly nonfocal, moving all extremities. Only mild cognitive deficits.  Genitourinary: No gross hematuria  Skin: No obvious rash.  Psychiatric: Pleasant, cooperative.  Vitals Reviewed  Labs Reviewed  Imaging reviewed  Nursing notes reviewed      Imaging  CT-CTA CHEST PULMONARY ARTERY W/ RECONS   Final Result      1.  No pulmonary embolus. No acute abnormality in  the chest.   2.  Small hiatal hernia.         DX-CHEST-PORTABLE (1 VIEW)   Final Result      No acute cardiopulmonary abnormality.      CT-HEAD W/O   Final Result      1. No CT evidence of acute infarct, hemorrhage or mass.   2. Mild to moderate global parenchymal atrophy. Chronic small vessel ischemic changes.      EC-ECHOCARDIOGRAM COMPLETE W/O CONT    (Results Pending)                Therefore, she is discharged in fair and stable condition to home with organized home healthcare and close outpatient follow-up.      Discharge Date  8/13/2020    FOLLOW UP ITEMS POST DISCHARGE      DISCHARGE DIAGNOSES  Active Problems:    JEOVANNY (acute kidney injury) (HCC) POA: Yes    Syncope POA: Yes    Acute cystitis with hematuria POA: Yes  Resolved Problems:    * No resolved hospital problems. *      FOLLOW UP  Future Appointments   Date Time Provider Department Center   9/4/2020 10:00 AM Ash Whiting M.D. [facility]     No follow-up provider specified.  Follow up Ash Whiting M.D. in 1 week. I have held your amlodipine and only restarted lisinopril at the lowest dose bec of syncope. Advise Sabrina Lobo to check blood pressure at home at least twice a day and have a log for primary provider to review or [facility] nurse can also check your blood pressure.  You will complete 3d antibiotics for uncomplicated UTI.  Follow up with Dr. Anaya or [facility] in 1 week, Appointment MUST be made. Outpatient Holter or event monitor recommended. Monitor Mobitz 1 2nd degree heart block.  Return to ER in the event of new or worsening symptoms. Please note importance of compliance and the patient has agreed to proceed with all medical recommendations and follow up plan indicated above. All medications come with benefits and risks. Risks may include permanent injury or death and these risks can be minimized with close reassessment and monitoring. Please make it to your scheduled follow ups with Ash Whiting M.D.,  and/or specialists clinic.  Given current pandemic, advise mask use, social distancing and proper hygiene/hand washing      MEDICATIONS ON DISCHARGE     Medication List      START taking these medications      Instructions   cefUROXime 500 MG Tabs  Commonly known as: CEFTIN   Take 1 Tab by mouth 2 times a day for 1 day.  Dose: 500 mg     polyethylene glycol/lytes 17 g Pack  Commonly known as: MIRALAX   Take  by mouth 1 time daily as needed (if sennosides and docusate ineffective after 24 hours).     Probiotic 250 MG Caps   Take 1 Cap by mouth 2 times a day, with meals.  Dose: 1 Cap        CHANGE how you take these medications      Instructions   lisinopril 2.5 MG Tabs  What changed:   · medication strength  · how much to take  · when to take this  Commonly known as: PRINIVIL   Take 1 Tab by mouth every day.  Dose: 2.5 mg     * senna-docusate 8.6-50 MG Tabs  What changed: Another medication with the same name was added. Make sure you understand how and when to take each.  Commonly known as: PERICOLACE or SENOKOT S   Take 2 Tabs by mouth 2 Times a Day.  Dose: 2 Tab     * senna-docusate 8.6-50 MG Tabs  What changed: You were already taking a medication with the same name, and this prescription was added. Make sure you understand how and when to take each.  Commonly known as: PERICOLACE or SENOKOT S   Take 2 Tabs by mouth 2 Times a Day.  Dose: 2 Tab         * This list has 2 medication(s) that are the same as other medications prescribed for you. Read the directions carefully, and ask your doctor or other care provider to review them with you.            CONTINUE taking these medications      Instructions   acetaminophen 500 MG Tabs  Commonly known as: TYLENOL   Take 500 mg by mouth every 6 hours. Scheduled  Dose: 500 mg     alendronate 35 MG tablet  Commonly known as: FOSAMAX   Take 35 mg by mouth every Tuesday.  Dose: 35 mg     Allegra Allergy 180 MG tablet  Generic drug: fexofenadine   Take 180 mg by mouth every  day.  Dose: 180 mg     ascorbic acid 500 MG Tabs  Commonly known as: ascorbic acid   Take 500 mg by mouth every day.  Dose: 500 mg     Aspirin Low Dose 81 MG EC tablet  Generic drug: aspirin   Take 81 mg by mouth every day.  Dose: 81 mg     montelukast 5 MG Chew  Commonly known as: SINGULAIR   Take 1 Tab by mouth every day.  Dose: 5 mg     multivitamin Tabs   Take 1 Tab by mouth every day.  Dose: 1 Tab     simvastatin 20 MG Tabs  Commonly known as: ZOCOR   Take 1 Tab by mouth every morning.  Dose: 20 mg        STOP taking these medications    amLODIPine 10 MG Tabs  Commonly known as: NORVASC            Allergies  No Known Allergies    DIET  Orders Placed This Encounter   Procedures   • Diet Order Regular     Standing Status:   Standing     Number of Occurrences:   1     Order Specific Question:   Diet:     Answer:   Regular [1]       ACTIVITY  As per [facility]    CONSULTATIONS  Cardiology    PROCEDURES  Ct-head W/o    Result Date: 8/12/2020   CT HEAD WITHOUT CONTRAST 8/12/2020 1:24 AM HISTORY/REASON FOR EXAM:  Syncope TECHNIQUE/EXAM DESCRIPTION AND NUMBER OF VIEWS: CT of the head without contrast. The study was performed on a helical multidetector CT scanner. Contiguous 2.5 mm axial sections were obtained from the skull base through the vertex. Up to date radiation dose reduction adjustments have been utilized to meet ALARA standards for radiation dose reduction. COMPARISON:  9/18/2019 FINDINGS: Lateral ventricles are normal in size and symmetric. Mild to moderate global parenchymal atrophy. Chronic small vessel ischemic changes. No significant mass effect or midline shift. Basal cisterns are patent. No evidence for intracranial hemorrhage. Calvaria are intact. Atherosclerosis. Visualized orbits are unremarkable. Visualized mastoid air cells are clear. No significant sinus disease in the visualized paranasal sinuses.     1. No CT evidence of acute infarct, hemorrhage or mass. 2. Mild to moderate global parenchymal  atrophy. Chronic small vessel ischemic changes.    Dx-chest-portable (1 View)    Result Date: 8/12/2020 8/12/2020 1:44 AM HISTORY/REASON FOR EXAM:  Chest Pain. TECHNIQUE/EXAM DESCRIPTION AND NUMBER OF VIEWS: Single portable view of the chest. COMPARISON: 9/18/2019 FINDINGS: LUNGS: Clear. No effusions. PNEUMOTHORAX: None. LINES AND TUBES: None. MEDIASTINUM: No cardiomegaly. Atherosclerosis. MUSCULOSKELETAL STRUCTURES: No acute displaced fracture.     No acute cardiopulmonary abnormality.    Ct-cta Chest Pulmonary Artery W/ Recons    Result Date: 8/12/2020 8/12/2020 2:57 AM HISTORY/REASON FOR EXAM:  PE suspected, intermediate probability, positive D-dimer TECHNIQUE/EXAM DESCRIPTION: CT angiogram scan for pulmonary embolism with contrast, with reconstructions. 1.25 mm helical sections were obtained from the lung apices through the lung bases following the rapid bolus administration of 40 mL of Omnipaque 350 nonionic contrast. Thin-section overlapping reconstruction interval was utilized. Coronal reconstructions were generated from the axial data. MIP post processing was performed and utilized for the interpretation. Low dose optimization technique was utilized for this CT exam including automated exposure control and adjustment of the mA and/or kV according to patient size. COMPARISON: 6/13/2019 FINDINGS: Pulmonary Embolism: None. Lungs: A few scattered areas of linear atelectasis/scarring. No focal consolidation. No suspicious nodules or masses. No pleural effusions. Mediastinum: Unremarkable thyroid. No mediastinal mass. Nodes: No enlarged lymph nodes. Heart: Not enlarged. No pericardial effusion. Coronary calcifications. Aorta and great vessels: No aneurysm. Atherosclerosis. Musculoskeletal structures: No acute fracture or destructive lesion. Chronic, benign sclerotic lesion involving T12 vertebral body. Upper abdomen: Scattered small hepatic cysts. Several are pancreatic calcifications, likely sequela of prior  pancreatitis. Small hiatal hernia.     1.  No pulmonary embolus. No acute abnormality in the chest. 2.  Small hiatal hernia.       LABORATORY  Lab Results   Component Value Date    SODIUM 140 08/13/2020    POTASSIUM 4.5 08/13/2020    CHLORIDE 106 08/13/2020    CO2 21 08/13/2020    GLUCOSE 95 08/13/2020    BUN 20 08/13/2020    CREATININE 1.04 08/13/2020    CREATININE 0.9 01/15/2009        Lab Results   Component Value Date    WBC 5.1 08/13/2020    HEMOGLOBIN 12.6 08/13/2020    HEMATOCRIT 38.5 08/13/2020    PLATELETCT 173 08/13/2020        Total time of the discharge process exceeds 40 minutes.

## 2020-08-13 NOTE — THERAPY
Physical Therapy   Initial Evaluation     Patient Name: Sabrina Lobo  Age:  89 y.o., Sex:  female  Medical Record #: [id number]  Today's Date: 8/13/2020          Assessment  Patient is 89 y.o. female with a diagnosis of syncope.  She was indep PTA w/ a cane.  Pt lives in a single story house w/ her son.  She denies stairs.  Today, she is able to move in/out of bed and ambulate 250 ft w/ a fww w/o assist and w/o loss of balance.  She owns both a fww and a cane.  Anticipate she is at or very close to her PLOF.  No acute PT needs at this time.  Recommend oob/amb prn w/ fww and spv.      Plan    Recommend Physical Therapy for Evaluation only       DC Equipment Recommendations: None  Discharge Recommendations: Anticipate that the patient will have no further physical therapy needs after discharge from the hospital       Objective       08/13/20 0842   Prior Living Situation   Housing / Facility 1 Story House   Steps Into Home 0   Steps In Home 0   Equipment Owned Front-Wheel Walker;Single Point Cane   Lives with - Patient's Self Care Capacity Adult Children   Prior Level of Functional Mobility   Bed Mobility Independent   Transfer Status Independent   Ambulation Independent   Assistive Devices Used Single Point Cane   Gait Analysis   Gait Level Of Assist Supervised   Assistive Device Front Wheel Walker   Distance (Feet) 200   Bed Mobility    Supine to Sit Supervised   Sit to Supine Supervised   Scooting Supervised   Functional Mobility   Sit to Stand Supervised   Anticipated Discharge Equipment and Recommendations   DC Equipment Recommendations None   Discharge Recommendations Anticipate that the patient will have no further physical therapy needs after discharge from the hospital

## 2020-08-13 NOTE — DISCHARGE PLANNING
Anticipated Discharge Disposition: Home with home health.    Action: Obtained more choices from son for home health d/t [place] declining. Faxed choice form to [facility]. Fax confirmation at [phone].    1. [facility]  2. [facility]    Barriers to Discharge: Home health acceptance.    Plan: Awaiting acceptance form home health.

## 2020-08-14 LAB
BACTERIA UR CULT: NORMAL
SIGNIFICANT IND 70042: NORMAL
SITE SITE: NORMAL
SOURCE SOURCE: NORMAL

## 2020-08-14 NOTE — PROGRESS NOTES
Discharge orders received. All lines and monitors discontinued. Reviewed discharge paperwork with patient. Discussed diet, activity, medications, follow up care and worsening symptoms. No questions at this time. Pt to be discharged to home via relative.

## 2020-08-17 ENCOUNTER — TELEPHONE (OUTPATIENT)
Dept: MEDICAL GROUP | Age: 85
End: 2020-08-17

## 2020-08-17 NOTE — TELEPHONE ENCOUNTER
ESTABLISHED PATIENT PRE-VISIT PLANNING     Patient was NOT contacted to complete PVP.     Note: Patient will not be contacted if there is no indication to call.     1.  Reviewed notes from the last few office visits within the medical group: Yes    2.  If any orders were placed at last visit or intended to be done for this visit (i.e. 6 mos follow-up), do we have Results/Consult Notes?        •  Labs - Labs ordered, completed on 5/19/2020 and results are in chart.   Note: If patient appointment is for lab review and patient did not complete labs, check with provider if OK to reschedule patient until labs completed.       •  Imaging - Imaging was not ordered at last office visit.       •  Referrals - No referrals were ordered at last office visit.    3. Is this appointment scheduled as a Hospital Follow-Up? Yes, visit was at [facility].     4.  Immunizations were updated in Epic using WebIZ?: Epic matches WebIZ       •  Web Iz Recommendations: TDAP and SHINGRIX (Shingles)    5.  Patient is due for the following Health Maintenance Topics:   Health Maintenance Due   Topic Date Due   • IMM DTaP/Tdap/Td Vaccine (1 - Tdap) 04/13/1948   • IMM ZOSTER VACCINES (1 of 2) 04/13/1979   • Annual Wellness Visit  10/10/2019           6. Orders for overdue Health Maintenance topics pended in Pre-Charting? N\A    7.  AHA (MDX) form printed for Provider? No, already completed    8.  Patient was NOT informed to arrive 15 min prior to their scheduled appointment and bring in their medication bottles.

## 2020-09-03 ENCOUNTER — TELEPHONE (OUTPATIENT)
Dept: MEDICAL GROUP | Age: 85
End: 2020-09-03

## 2020-09-03 NOTE — TELEPHONE ENCOUNTER
"ESTABLISHED PATIENT PRE-VISIT PLANNING     Patient was NOT contacted to complete PVP.     Note: Patient will not be contacted if there is no indication to call.  Placed call to patient & spoke to patients son, Prabhjot Lobo [phone] (home) who is listed as an authorized patient contact to discuss both treatment and billing under patient's demographics. Advised [address] is closed and Dr. Balbuena is working from the [facility] @ [address] [facility]. [email or web address] where patient can still be seen as scheduled for Friday, 09/04/2020 @ 10:00AM. Patient's son abruptly ended call stating, \"he's driving\". Patient is over due for AWV which needs to be scheduled.      1.  Reviewed notes from the last few office visits within the medical group: Yes    2.  If any orders were placed at last visit or intended to be done for this visit (i.e. 6 mos follow-up), do we have Results/Consult Notes?        •  Labs - Labs ordered, but not to be completed until Future, labs presently not completed.   Note: If patient appointment is for lab review and patient did not complete labs, check with provider if OK to reschedule patient until labs completed.       •  Imaging - Imaging ordered, completed and results are in chart.       •  Referrals - No referrals were ordered at last office visit.    3. Is this appointment scheduled as a Hospital Follow-Up? Yes, visit was at [facility].     4.  Immunizations were updated in Epic using WebIZ?: Epic matches WebIZ       •  Web Iz Recommendations: FLU, TDAP and SHINGRIX (Shingles)    5.  Patient is due for the following Health Maintenance Topics:   Health Maintenance Due   Topic Date Due   • IMM DTaP/Tdap/Td Vaccine (1 - Tdap) 04/13/1948   • IMM ZOSTER VACCINES (1 of 2) 04/13/1979   • Annual Wellness Visit  10/10/2019   • IMM INFLUENZA (1) 09/01/2020       6. Orders for overdue Health Maintenance topics pended in Pre-Charting? YES    7.  AHA (MDX) form printed for Provider? No, already " completed    8.  Patient was NOT informed to arrive 15 min prior to their scheduled appointment and bring in their medication bottles.

## 2020-09-12 ENCOUNTER — APPOINTMENT (OUTPATIENT)
Dept: RADIOLOGY | Facility: MEDICAL CENTER | Age: 85
End: 2020-09-12
Attending: EMERGENCY MEDICINE
Payer: MEDICARE

## 2020-09-12 ENCOUNTER — HOSPITAL ENCOUNTER (EMERGENCY)
Facility: MEDICAL CENTER | Age: 85
End: 2020-09-12
Attending: EMERGENCY MEDICINE
Payer: MEDICARE

## 2020-09-12 VITALS
OXYGEN SATURATION: 96 % | SYSTOLIC BLOOD PRESSURE: 136 MMHG | RESPIRATION RATE: 18 BRPM | DIASTOLIC BLOOD PRESSURE: 63 MMHG | BODY MASS INDEX: 21.35 KG/M2 | TEMPERATURE: 98.1 F | HEIGHT: 62 IN | HEART RATE: 66 BPM | WEIGHT: 116 LBS

## 2020-09-12 DIAGNOSIS — R51.9 ACUTE NONINTRACTABLE HEADACHE, UNSPECIFIED HEADACHE TYPE: ICD-10-CM

## 2020-09-12 LAB
ALBUMIN SERPL BCP-MCNC: 3.3 G/DL (ref 3.2–4.9)
ALBUMIN/GLOB SERPL: 1.1 G/DL
ALP SERPL-CCNC: 66 U/L (ref 30–99)
ALT SERPL-CCNC: 15 U/L (ref 2–50)
ANION GAP SERPL CALC-SCNC: 14 MMOL/L (ref 7–16)
AST SERPL-CCNC: 23 U/L (ref 12–45)
BASOPHILS # BLD AUTO: 0.5 % (ref 0–1.8)
BASOPHILS # BLD: 0.04 K/UL (ref 0–0.12)
BILIRUB SERPL-MCNC: 0.4 MG/DL (ref 0.1–1.5)
BUN SERPL-MCNC: 11 MG/DL (ref 8–22)
CALCIUM SERPL-MCNC: 8.4 MG/DL (ref 8.5–10.5)
CHLORIDE SERPL-SCNC: 102 MMOL/L (ref 96–112)
CO2 SERPL-SCNC: 20 MMOL/L (ref 20–33)
CREAT SERPL-MCNC: 0.99 MG/DL (ref 0.5–1.4)
CRP SERPL HS-MCNC: 2.7 MG/DL (ref 0–0.75)
EOSINOPHIL # BLD AUTO: 0.12 K/UL (ref 0–0.51)
EOSINOPHIL NFR BLD: 1.6 % (ref 0–6.9)
ERYTHROCYTE [DISTWIDTH] IN BLOOD BY AUTOMATED COUNT: 41.4 FL (ref 35.9–50)
ERYTHROCYTE [SEDIMENTATION RATE] IN BLOOD BY WESTERGREN METHOD: 23 MM/HOUR (ref 0–30)
GLOBULIN SER CALC-MCNC: 3 G/DL (ref 1.9–3.5)
GLUCOSE SERPL-MCNC: 104 MG/DL (ref 65–99)
HCT VFR BLD AUTO: 37.4 % (ref 37–47)
HGB BLD-MCNC: 12.3 G/DL (ref 12–16)
IMM GRANULOCYTES # BLD AUTO: 0.02 K/UL (ref 0–0.11)
IMM GRANULOCYTES NFR BLD AUTO: 0.3 % (ref 0–0.9)
LYMPHOCYTES # BLD AUTO: 1.75 K/UL (ref 1–4.8)
LYMPHOCYTES NFR BLD: 23.6 % (ref 22–41)
MCH RBC QN AUTO: 30.7 PG (ref 27–33)
MCHC RBC AUTO-ENTMCNC: 32.9 G/DL (ref 33.6–35)
MCV RBC AUTO: 93.3 FL (ref 81.4–97.8)
MONOCYTES # BLD AUTO: 0.73 K/UL (ref 0–0.85)
MONOCYTES NFR BLD AUTO: 9.9 % (ref 0–13.4)
NEUTROPHILS # BLD AUTO: 4.75 K/UL (ref 2–7.15)
NEUTROPHILS NFR BLD: 64.1 % (ref 44–72)
NRBC # BLD AUTO: 0 K/UL
NRBC BLD-RTO: 0 /100 WBC
PLATELET # BLD AUTO: 207 K/UL (ref 164–446)
PMV BLD AUTO: 9.9 FL (ref 9–12.9)
POTASSIUM SERPL-SCNC: 4 MMOL/L (ref 3.6–5.5)
PROT SERPL-MCNC: 6.3 G/DL (ref 6–8.2)
RBC # BLD AUTO: 4.01 M/UL (ref 4.2–5.4)
SODIUM SERPL-SCNC: 136 MMOL/L (ref 135–145)
WBC # BLD AUTO: 7.4 K/UL (ref 4.8–10.8)

## 2020-09-12 PROCEDURE — 86140 C-REACTIVE PROTEIN: CPT

## 2020-09-12 PROCEDURE — 85025 COMPLETE CBC W/AUTO DIFF WBC: CPT

## 2020-09-12 PROCEDURE — 85652 RBC SED RATE AUTOMATED: CPT

## 2020-09-12 PROCEDURE — 70498 CT ANGIOGRAPHY NECK: CPT

## 2020-09-12 PROCEDURE — 80053 COMPREHEN METABOLIC PANEL: CPT

## 2020-09-12 PROCEDURE — 70496 CT ANGIOGRAPHY HEAD: CPT

## 2020-09-12 PROCEDURE — 700102 HCHG RX REV CODE 250 W/ 637 OVERRIDE(OP): Performed by: EMERGENCY MEDICINE

## 2020-09-12 PROCEDURE — 99284 EMERGENCY DEPT VISIT MOD MDM: CPT

## 2020-09-12 PROCEDURE — A9270 NON-COVERED ITEM OR SERVICE: HCPCS | Performed by: EMERGENCY MEDICINE

## 2020-09-12 PROCEDURE — 700117 HCHG RX CONTRAST REV CODE 255: Performed by: EMERGENCY MEDICINE

## 2020-09-12 RX ORDER — METHYLPREDNISOLONE 4 MG/1
TABLET ORAL
Qty: 1 EACH | Refills: 0 | Status: SHIPPED | OUTPATIENT
Start: 2020-09-12 | End: 2022-02-15

## 2020-09-12 RX ORDER — BUTALBITAL, ACETAMINOPHEN AND CAFFEINE 50; 325; 40 MG/1; MG/1; MG/1
1 TABLET ORAL ONCE
Status: COMPLETED | OUTPATIENT
Start: 2020-09-12 | End: 2020-09-12

## 2020-09-12 RX ADMIN — BUTALBITAL, ACETAMINOPHEN, AND CAFFEINE 1 TABLET: 50; 325; 40 TABLET ORAL at 14:26

## 2020-09-12 RX ADMIN — IOHEXOL 80 ML: 350 INJECTION, SOLUTION INTRAVENOUS at 16:03

## 2020-09-12 ASSESSMENT — LIFESTYLE VARIABLES
HAVE YOU EVER FELT YOU SHOULD CUT DOWN ON YOUR DRINKING: NO
CONSUMPTION TOTAL: INCOMPLETE
HAVE PEOPLE ANNOYED YOU BY CRITICIZING YOUR DRINKING: NO
EVER FELT BAD OR GUILTY ABOUT YOUR DRINKING: NO
EVER HAD A DRINK FIRST THING IN THE MORNING TO STEADY YOUR NERVES TO GET RID OF A HANGOVER: NO
TOTAL SCORE: 0

## 2020-09-12 ASSESSMENT — FIBROSIS 4 INDEX: FIB4 SCORE: 2.77

## 2020-09-12 NOTE — ED PROVIDER NOTES
ED Provider Note    Scribed for Gricel Robins M.D. by Nita Best. 9/12/2020  2:12 PM    Primary care provider: Ash Whiting M.D.  Means of arrival: ambulance   History obtained from: patient   History limited by: none     CHIEF COMPLAINT  Chief Complaint   Patient presents with   • Headache       HPI  Sabrina Lobo is a 89 y.o. female who presents to the Emergency Department for evaluation of a consistent headache onset 4 days ago. Patient reports associated neck soreness. Patient notes that she had taken some Tylenol with no alleviation. She denies any falls, weakness in arms or legs, nausea, fever, rhinorrhea, sore throat, abdominal pain, blood thinning medications, or ear pain.  Denies any falls or trauma.  She denies any vomiting dizziness or vision changes.  He has no cough or cold symptoms or sinus pain.    REVIEW OF SYSTEMS  HEENT:  No ear pain, congestion, or sore throat   EYES: no discharge, redness, or vision changes  PULMONARY: no dyspnea, cough, or congestion   GI: no vomiting, diarrhea, or abdominal pain   Neuro: no weakness, numbness, aphasia. Positive headache  Endocrine: no fevers, sweating, or weight loss       See history of present illness. All other systems are negative. C.    PAST MEDICAL HISTORY   has a past medical history of H/O bladder repair surgery, H/O: hysterectomy, Hypertension, and Vertigo.    SURGICAL HISTORY   has a past surgical history that includes hysterectomy radical.    SOCIAL HISTORY  Social History     Tobacco Use   • Smoking status: Never Smoker   • Smokeless tobacco: Never Used   Substance Use Topics   • Alcohol use: No   • Drug use: No      Social History     Substance and Sexual Activity   Drug Use No       FAMILY HISTORY  Family History   Problem Relation Age of Onset   • Heart Disease Neg Hx    • Hypertension Neg Hx    • Hyperlipidemia Neg Hx    • Psychiatric Illness Neg Hx    • Diabetes Neg Hx        CURRENT MEDICATIONS  No current  "facility-administered medications for this encounter.         ALLERGIES  No Known Allergies    PHYSICAL EXAM  VITAL SIGNS: /87   Pulse 76   Temp 36.7 °C (98.1 °F) (Tympanic)   Resp 18   Ht 1.575 m (5' 2\")   Wt 52.6 kg (116 lb)   SpO2 95%   BMI 21.22 kg/m²     Constitutional: Well developed, Well nourished, No acute distress, Non-toxic appearance.   HEENT: Normocephalic, Atraumatic,  external ears normal, pharynx pink,  Mucous  Membranes moist, No rhinorrhea or mucosal edema. No pharyngeal erythema.   Eyes: PERRL, EOMI, Conjunctiva normal, No discharge.   Neck: Normal range of motion, No tenderness, Supple, No stridor.   Lymphatic: No lymphadenopathy    Cardiovascular: Regular Rate and Rhythm, No murmurs,  rubs, or gallops.   Thorax & Lungs: Lungs clear to auscultation bilaterally, No respiratory distress, No wheezes, rhales or rhonchi, No chest wall tenderness.   Abdomen: Bowel sounds normal, Soft, non tender, non distended,  No pulsatile masses., no rebound guarding or peritoneal signs.   Skin: Warm, Dry, No erythema, No rash,   Back:  No CVA tenderness,  No spinal tenderness, bony crepitance, step offs, or instability.   Neurologic: Alert & oriented x 3, Normal motor function, Normal sensory function, No focal deficits noted. Normal reflexes. Normal Cranial Nerves. Awake. Answers appropriately and is able to follow commands.   Extremities: Equal, intact distal pulses, No cyanosis, clubbing or edema,  No tenderness.   Musculoskeletal: Good range of motion in all major joints. No tenderness to palpation or major deformities noted.       DIAGNOSTIC STUDIES / PROCEDURES    LABS  Results for orders placed or performed during the hospital encounter of 09/12/20   CBC WITH DIFFERENTIAL   Result Value Ref Range    WBC 7.4 4.8 - 10.8 K/uL    RBC 4.01 (L) 4.20 - 5.40 M/uL    Hemoglobin 12.3 12.0 - 16.0 g/dL    Hematocrit 37.4 37.0 - 47.0 %    MCV 93.3 81.4 - 97.8 fL    MCH 30.7 27.0 - 33.0 pg    MCHC 32.9 (L) " 33.6 - 35.0 g/dL    RDW 41.4 35.9 - 50.0 fL    Platelet Count 207 164 - 446 K/uL    MPV 9.9 9.0 - 12.9 fL    Neutrophils-Polys 64.10 44.00 - 72.00 %    Lymphocytes 23.60 22.00 - 41.00 %    Monocytes 9.90 0.00 - 13.40 %    Eosinophils 1.60 0.00 - 6.90 %    Basophils 0.50 0.00 - 1.80 %    Immature Granulocytes 0.30 0.00 - 0.90 %    Nucleated RBC 0.00 /100 WBC    Neutrophils (Absolute) 4.75 2.00 - 7.15 K/uL    Lymphs (Absolute) 1.75 1.00 - 4.80 K/uL    Monos (Absolute) 0.73 0.00 - 0.85 K/uL    Eos (Absolute) 0.12 0.00 - 0.51 K/uL    Baso (Absolute) 0.04 0.00 - 0.12 K/uL    Immature Granulocytes (abs) 0.02 0.00 - 0.11 K/uL    NRBC (Absolute) 0.00 K/uL   Comp Metabolic Panel   Result Value Ref Range    Sodium 136 135 - 145 mmol/L    Potassium 4.0 3.6 - 5.5 mmol/L    Chloride 102 96 - 112 mmol/L    Co2 20 20 - 33 mmol/L    Anion Gap 14.0 7.0 - 16.0    Glucose 104 (H) 65 - 99 mg/dL    Bun 11 8 - 22 mg/dL    Creatinine 0.99 0.50 - 1.40 mg/dL    Calcium 8.4 (L) 8.5 - 10.5 mg/dL    AST(SGOT) 23 12 - 45 U/L    ALT(SGPT) 15 2 - 50 U/L    Alkaline Phosphatase 66 30 - 99 U/L    Total Bilirubin 0.4 0.1 - 1.5 mg/dL    Albumin 3.3 3.2 - 4.9 g/dL    Total Protein 6.3 6.0 - 8.2 g/dL    Globulin 3.0 1.9 - 3.5 g/dL    A-G Ratio 1.1 g/dL   Sed Rate   Result Value Ref Range    Sed Rate Westergren 23 0 - 30 mm/hour   CRP QUANTITIVE (NON-CARDIAC)   Result Value Ref Range    Stat C-Reactive Protein 2.70 (H) 0.00 - 0.75 mg/dL   ESTIMATED GFR   Result Value Ref Range    GFR If African American >60 >60 mL/min/1.73 m 2    GFR If Non  53 (A) >60 mL/min/1.73 m 2      All labs reviewed by me.    RADIOLOGY  CT-CTA HEAD WITH & W/O-POST PROCESS   Final Result      CT angiogram of the [place] within normal limits.      CT-CTA NECK WITH & W/O-POST PROCESSING   Final Result      CT angiogram of the neck within normal limits.        The radiologist's interpretation of all radiological studies have been reviewed by me.    COURSE &  MEDICAL DECISION MAKING  Nursing notes, VS, PMSFHx reviewed in chart.    Review of patients medical history shows that the patient was seen in the ED August 12th and diagnosed with a UTI and discharged with antibiotics.     2:12 PM Patient seen and examined at bedside. Patient will be treated with Fioricet tablet. Ordered CT-CTA head with and without post process, CTt-CTA neck with and without post processing, CT head without, CRP quantitive, CBC with diff, CMP, and SED rate  to evaluate her symptoms. The differential diagnoses include but are not limited to: sinus problem vs migraine vs stroke  Vs intercranial hemorrhage vs cluster headache versus vertebrobasilar artery syndrome vertebral dissection.  I informed the patient the need for labs and radiology to rule out any emergent processes. Currently awaiting labs and radiology results before deciding if intervention is necessary. Patient will be informed on the results once I have reviewed them. Patient verbalizes understanding and agreement to this plan of care. Patient verbalizes understanding and agreement to this plan of care.      4:28 PM - Patient was reevaluated at bedside. I updated the patient on the results of their labs and imaging.  I discussed with the patient that they did not require any emergent interventions at this time and as such were stable for discharge. At this time, the patient was given an opportunity to ask questions. I discussed a plan of discharge with the patient, informing them to return to the ED for any new or worsening symptoms as well as discussing a follow up recheck in 2 days. Patient verbalizes understanding and agreement to this plan of discharge.     PPE Note: I personally donned full PPE for all patient encounters during this visit, including being clean-shaven with an N95 respirator mask, gloves.     Scribe remained outside the patient's room and did not have any contact with the patient for the duration of patient  encounter.        The patient will return for new or worsening symptoms and is stable at the time of discharge.    The patient is referred to a primary physician for blood pressure management, diabetic screening, and for all other preventative health concerns.    DISPOSITION:  Patient will be discharged home in stable condition.    FOLLOW UP:  Ash Whiting M.D.  [address] [place] [address] [address] [place] [id number]  [phone]    Call in 2 days  for recheck      OUTPATIENT MEDICATIONS:  Discharge Medication List as of 9/12/2020  4:31 PM      START taking these medications    Details   methylPREDNISolone (MEDROL DOSEPAK) 4 MG Tablet Therapy Pack Use as directed, Disp-1 Each,R-0, Normal              FINAL IMPRESSION  1. Acute nonintractable headache, unspecified headache type          I, Nita Best (Trudy), am scribing for, and in the presence of, Gricel Robins M.D..    Electronically signed by: Nita Best (Trudy), 9/12/2020    IGricel M.D. personally performed the services described in this documentation, as scribed by Nita Best in my presence, and it is both accurate and complete. C    The note accurately reflects work and decisions made by me.  Gricel Robins M.D.  9/12/2020  9:46 PM

## 2020-12-10 ENCOUNTER — HOSPITAL ENCOUNTER (OUTPATIENT)
Facility: MEDICAL CENTER | Age: 85
End: 2020-12-10
Attending: INTERNAL MEDICINE
Payer: MEDICARE

## 2020-12-10 LAB
ALBUMIN SERPL BCP-MCNC: 4.5 G/DL (ref 3.2–4.9)
ALBUMIN/GLOB SERPL: 1.7 G/DL
ALP SERPL-CCNC: 77 U/L (ref 30–99)
ALT SERPL-CCNC: 12 U/L (ref 2–50)
ANION GAP SERPL CALC-SCNC: 14 MMOL/L (ref 7–16)
AST SERPL-CCNC: 17 U/L (ref 12–45)
BILIRUB SERPL-MCNC: 0.3 MG/DL (ref 0.1–1.5)
BUN SERPL-MCNC: 23 MG/DL (ref 8–22)
CALCIUM SERPL-MCNC: 9.3 MG/DL (ref 8.5–10.5)
CHLORIDE SERPL-SCNC: 103 MMOL/L (ref 96–112)
CO2 SERPL-SCNC: 18 MMOL/L (ref 20–33)
CREAT SERPL-MCNC: 1.38 MG/DL (ref 0.5–1.4)
ERYTHROCYTE [DISTWIDTH] IN BLOOD BY AUTOMATED COUNT: 47 FL (ref 35.9–50)
GLOBULIN SER CALC-MCNC: 2.6 G/DL (ref 1.9–3.5)
GLUCOSE SERPL-MCNC: 144 MG/DL (ref 65–99)
HCT VFR BLD AUTO: 41.2 % (ref 37–47)
HGB BLD-MCNC: 13.5 G/DL (ref 12–16)
MCH RBC QN AUTO: 31.8 PG (ref 27–33)
MCHC RBC AUTO-ENTMCNC: 32.8 G/DL (ref 33.6–35)
MCV RBC AUTO: 96.9 FL (ref 81.4–97.8)
PLATELET # BLD AUTO: 116 K/UL (ref 164–446)
PMV BLD AUTO: 11.7 FL (ref 9–12.9)
POTASSIUM SERPL-SCNC: 4.7 MMOL/L (ref 3.6–5.5)
PROT SERPL-MCNC: 7.1 G/DL (ref 6–8.2)
RBC # BLD AUTO: 4.25 M/UL (ref 4.2–5.4)
SODIUM SERPL-SCNC: 135 MMOL/L (ref 135–145)
WBC # BLD AUTO: 7.8 K/UL (ref 4.8–10.8)

## 2020-12-10 PROCEDURE — 85027 COMPLETE CBC AUTOMATED: CPT

## 2020-12-10 PROCEDURE — 80053 COMPREHEN METABOLIC PANEL: CPT

## 2021-01-11 DIAGNOSIS — Z23 NEED FOR VACCINATION: ICD-10-CM

## 2021-08-22 ENCOUNTER — PATIENT MESSAGE (OUTPATIENT)
Dept: HEALTH INFORMATION MANAGEMENT | Facility: OTHER | Age: 86
End: 2021-08-22

## 2021-09-01 ENCOUNTER — HOME HEALTH ADMISSION (OUTPATIENT)
Dept: HOME HEALTH SERVICES | Facility: HOME HEALTHCARE | Age: 86
End: 2021-09-01
Payer: MEDICARE

## 2021-11-08 ENCOUNTER — TELEPHONE (OUTPATIENT)
Dept: HEALTH INFORMATION MANAGEMENT | Facility: OTHER | Age: 86
End: 2021-11-08

## 2021-11-09 NOTE — TELEPHONE ENCOUNTER
Outcome:Hospice     Please transfer to Patient Outreach Team at [phone] when patient returns call.        Attempt # 2

## 2022-02-15 ENCOUNTER — HOSPITAL ENCOUNTER (INPATIENT)
Facility: MEDICAL CENTER | Age: 87
LOS: 1 days | DRG: 392 | End: 2022-02-16
Attending: EMERGENCY MEDICINE | Admitting: FAMILY MEDICINE
Payer: MEDICARE

## 2022-02-15 ENCOUNTER — APPOINTMENT (OUTPATIENT)
Dept: RADIOLOGY | Facility: MEDICAL CENTER | Age: 87
DRG: 392 | End: 2022-02-15
Attending: EMERGENCY MEDICINE
Payer: MEDICARE

## 2022-02-15 DIAGNOSIS — R79.89 ELEVATED TROPONIN: ICD-10-CM

## 2022-02-15 DIAGNOSIS — R06.02 SHORTNESS OF BREATH: ICD-10-CM

## 2022-02-15 PROBLEM — R13.10 DYSPHAGIA: Status: ACTIVE | Noted: 2022-02-15

## 2022-02-15 LAB
ANION GAP SERPL CALC-SCNC: 16 MMOL/L (ref 7–16)
BASOPHILS # BLD AUTO: 1.1 % (ref 0–1.8)
BASOPHILS # BLD: 0.07 K/UL (ref 0–0.12)
BUN SERPL-MCNC: 17 MG/DL (ref 8–22)
CALCIUM SERPL-MCNC: 9.2 MG/DL (ref 8.5–10.5)
CHLORIDE SERPL-SCNC: 103 MMOL/L (ref 96–112)
CO2 SERPL-SCNC: 24 MMOL/L (ref 20–33)
CREAT SERPL-MCNC: 1.1 MG/DL (ref 0.5–1.4)
EKG IMPRESSION: NORMAL
EOSINOPHIL # BLD AUTO: 0.15 K/UL (ref 0–0.51)
EOSINOPHIL NFR BLD: 2.3 % (ref 0–6.9)
ERYTHROCYTE [DISTWIDTH] IN BLOOD BY AUTOMATED COUNT: 47 FL (ref 35.9–50)
FLUAV RNA SPEC QL NAA+PROBE: NEGATIVE
FLUBV RNA SPEC QL NAA+PROBE: NEGATIVE
GLUCOSE SERPL-MCNC: 105 MG/DL (ref 65–99)
HCT VFR BLD AUTO: 44.1 % (ref 37–47)
HGB BLD-MCNC: 14.7 G/DL (ref 12–16)
IMM GRANULOCYTES # BLD AUTO: 0.02 K/UL (ref 0–0.11)
IMM GRANULOCYTES NFR BLD AUTO: 0.3 % (ref 0–0.9)
LYMPHOCYTES # BLD AUTO: 1.57 K/UL (ref 1–4.8)
LYMPHOCYTES NFR BLD: 24.3 % (ref 22–41)
MAGNESIUM SERPL-MCNC: 2 MG/DL (ref 1.5–2.5)
MCH RBC QN AUTO: 30.4 PG (ref 27–33)
MCHC RBC AUTO-ENTMCNC: 33.3 G/DL (ref 33.6–35)
MCV RBC AUTO: 91.3 FL (ref 81.4–97.8)
MONOCYTES # BLD AUTO: 0.47 K/UL (ref 0–0.85)
MONOCYTES NFR BLD AUTO: 7.3 % (ref 0–13.4)
NEUTROPHILS # BLD AUTO: 4.19 K/UL (ref 2–7.15)
NEUTROPHILS NFR BLD: 64.7 % (ref 44–72)
NRBC # BLD AUTO: 0 K/UL
NRBC BLD-RTO: 0 /100 WBC
NT-PROBNP SERPL IA-MCNC: 697 PG/ML (ref 0–125)
PLATELET # BLD AUTO: 290 K/UL (ref 164–446)
PMV BLD AUTO: 10.4 FL (ref 9–12.9)
POTASSIUM SERPL-SCNC: 3.2 MMOL/L (ref 3.6–5.5)
PROCALCITONIN SERPL-MCNC: <0.05 NG/ML
RBC # BLD AUTO: 4.83 M/UL (ref 4.2–5.4)
RSV RNA SPEC QL NAA+PROBE: NEGATIVE
SARS-COV-2 RNA RESP QL NAA+PROBE: NOTDETECTED
SODIUM SERPL-SCNC: 143 MMOL/L (ref 135–145)
SPECIMEN SOURCE: NORMAL
TROPONIN T SERPL-MCNC: 22 NG/L (ref 6–19)
WBC # BLD AUTO: 6.5 K/UL (ref 4.8–10.8)

## 2022-02-15 PROCEDURE — 92610 EVALUATE SWALLOWING FUNCTION: CPT

## 2022-02-15 PROCEDURE — 84145 PROCALCITONIN (PCT): CPT

## 2022-02-15 PROCEDURE — 700117 HCHG RX CONTRAST REV CODE 255: Performed by: EMERGENCY MEDICINE

## 2022-02-15 PROCEDURE — 700111 HCHG RX REV CODE 636 W/ 250 OVERRIDE (IP): Performed by: FAMILY MEDICINE

## 2022-02-15 PROCEDURE — 83735 ASSAY OF MAGNESIUM: CPT

## 2022-02-15 PROCEDURE — 70491 CT SOFT TISSUE NECK W/DYE: CPT

## 2022-02-15 PROCEDURE — 36415 COLL VENOUS BLD VENIPUNCTURE: CPT

## 2022-02-15 PROCEDURE — C9803 HOPD COVID-19 SPEC COLLECT: HCPCS | Performed by: EMERGENCY MEDICINE

## 2022-02-15 PROCEDURE — 84484 ASSAY OF TROPONIN QUANT: CPT

## 2022-02-15 PROCEDURE — 71045 X-RAY EXAM CHEST 1 VIEW: CPT

## 2022-02-15 PROCEDURE — 80048 BASIC METABOLIC PNL TOTAL CA: CPT

## 2022-02-15 PROCEDURE — 85025 COMPLETE CBC W/AUTO DIFF WBC: CPT

## 2022-02-15 PROCEDURE — 96372 THER/PROPH/DIAG INJ SC/IM: CPT

## 2022-02-15 PROCEDURE — 93005 ELECTROCARDIOGRAM TRACING: CPT | Performed by: EMERGENCY MEDICINE

## 2022-02-15 PROCEDURE — 0241U HCHG SARS-COV-2 COVID-19 NFCT DS RESP RNA 4 TRGT MIC: CPT

## 2022-02-15 PROCEDURE — 83880 ASSAY OF NATRIURETIC PEPTIDE: CPT

## 2022-02-15 PROCEDURE — 99285 EMERGENCY DEPT VISIT HI MDM: CPT

## 2022-02-15 PROCEDURE — G0378 HOSPITAL OBSERVATION PER HR: HCPCS

## 2022-02-15 PROCEDURE — 700102 HCHG RX REV CODE 250 W/ 637 OVERRIDE(OP): Performed by: FAMILY MEDICINE

## 2022-02-15 PROCEDURE — A9270 NON-COVERED ITEM OR SERVICE: HCPCS | Performed by: FAMILY MEDICINE

## 2022-02-15 PROCEDURE — 99220 PR INITIAL OBSERVATION CARE,LEVL III: CPT | Performed by: FAMILY MEDICINE

## 2022-02-15 RX ORDER — BISACODYL 10 MG
10 SUPPOSITORY, RECTAL RECTAL
Status: DISCONTINUED | OUTPATIENT
Start: 2022-02-15 | End: 2022-02-16 | Stop reason: HOSPADM

## 2022-02-15 RX ORDER — POLYETHYLENE GLYCOL 3350 17 G/17G
1 POWDER, FOR SOLUTION ORAL
Status: DISCONTINUED | OUTPATIENT
Start: 2022-02-15 | End: 2022-02-16 | Stop reason: HOSPADM

## 2022-02-15 RX ORDER — AMLODIPINE BESYLATE 5 MG/1
5 TABLET ORAL EVERY MORNING
Status: DISCONTINUED | OUTPATIENT
Start: 2022-02-16 | End: 2022-02-16 | Stop reason: HOSPADM

## 2022-02-15 RX ORDER — AMOXICILLIN 250 MG
2 CAPSULE ORAL 2 TIMES DAILY
Status: DISCONTINUED | OUTPATIENT
Start: 2022-02-15 | End: 2022-02-16 | Stop reason: HOSPADM

## 2022-02-15 RX ORDER — SODIUM CHLORIDE, SODIUM LACTATE, POTASSIUM CHLORIDE, CALCIUM CHLORIDE 600; 310; 30; 20 MG/100ML; MG/100ML; MG/100ML; MG/100ML
INJECTION, SOLUTION INTRAVENOUS CONTINUOUS
Status: DISCONTINUED | OUTPATIENT
Start: 2022-02-15 | End: 2022-02-16 | Stop reason: HOSPADM

## 2022-02-15 RX ORDER — ONDANSETRON 4 MG/1
4 TABLET, ORALLY DISINTEGRATING ORAL EVERY 4 HOURS PRN
Status: DISCONTINUED | OUTPATIENT
Start: 2022-02-15 | End: 2022-02-16 | Stop reason: HOSPADM

## 2022-02-15 RX ORDER — FUROSEMIDE 40 MG/1
40 TABLET ORAL EVERY MORNING
Status: ON HOLD | COMMUNITY
End: 2023-06-08

## 2022-02-15 RX ORDER — LABETALOL HYDROCHLORIDE 5 MG/ML
10 INJECTION, SOLUTION INTRAVENOUS EVERY 4 HOURS PRN
Status: DISCONTINUED | OUTPATIENT
Start: 2022-02-15 | End: 2022-02-16 | Stop reason: HOSPADM

## 2022-02-15 RX ORDER — HEPARIN SODIUM 5000 [USP'U]/ML
5000 INJECTION, SOLUTION INTRAVENOUS; SUBCUTANEOUS EVERY 8 HOURS
Status: DISCONTINUED | OUTPATIENT
Start: 2022-02-15 | End: 2022-02-16 | Stop reason: HOSPADM

## 2022-02-15 RX ORDER — AMLODIPINE BESYLATE 5 MG/1
5 TABLET ORAL EVERY MORNING
Status: ON HOLD | COMMUNITY
End: 2023-04-29

## 2022-02-15 RX ORDER — ONDANSETRON 2 MG/ML
4 INJECTION INTRAMUSCULAR; INTRAVENOUS EVERY 4 HOURS PRN
Status: DISCONTINUED | OUTPATIENT
Start: 2022-02-15 | End: 2022-02-16 | Stop reason: HOSPADM

## 2022-02-15 RX ADMIN — IOHEXOL 75 ML: 350 INJECTION, SOLUTION INTRAVENOUS at 13:23

## 2022-02-15 RX ADMIN — POTASSIUM BICARBONATE 25 MEQ: 978 TABLET, EFFERVESCENT ORAL at 23:18

## 2022-02-15 RX ADMIN — DOCUSATE SODIUM 50 MG AND SENNOSIDES 8.6 MG 2 TABLET: 8.6; 5 TABLET, FILM COATED ORAL at 22:21

## 2022-02-15 RX ADMIN — HEPARIN SODIUM 5000 UNITS: 5000 INJECTION, SOLUTION INTRAVENOUS; SUBCUTANEOUS at 22:22

## 2022-02-15 ASSESSMENT — COGNITIVE AND FUNCTIONAL STATUS - GENERAL
SUGGESTED CMS G CODE MODIFIER DAILY ACTIVITY: CK
DAILY ACTIVITIY SCORE: 18
SUGGESTED CMS G CODE MODIFIER MOBILITY: CJ
HELP NEEDED FOR BATHING: A LITTLE
EATING MEALS: A LITTLE
DRESSING REGULAR LOWER BODY CLOTHING: A LITTLE
WALKING IN HOSPITAL ROOM: A LITTLE
MOBILITY SCORE: 22
DRESSING REGULAR UPPER BODY CLOTHING: A LITTLE
PERSONAL GROOMING: A LITTLE
TOILETING: A LITTLE
CLIMB 3 TO 5 STEPS WITH RAILING: A LITTLE

## 2022-02-15 ASSESSMENT — ENCOUNTER SYMPTOMS
SHORTNESS OF BREATH: 1
COUGH: 1
VOMITING: 0
NAUSEA: 0
FEVER: 0
CHILLS: 0
DOUBLE VISION: 0
BLURRED VISION: 0
HEADACHES: 0
MYALGIAS: 0
NECK PAIN: 0
PALPITATIONS: 0
DEPRESSION: 0
DIZZINESS: 0

## 2022-02-15 ASSESSMENT — LIFESTYLE VARIABLES
TOTAL SCORE: 0
ON A TYPICAL DAY WHEN YOU DRINK ALCOHOL HOW MANY DRINKS DO YOU HAVE: 0
CONSUMPTION TOTAL: NEGATIVE
EVER FELT BAD OR GUILTY ABOUT YOUR DRINKING: NO
DOES PATIENT WANT TO STOP DRINKING: NO
HAVE PEOPLE ANNOYED YOU BY CRITICIZING YOUR DRINKING: NO
HAVE YOU EVER FELT YOU SHOULD CUT DOWN ON YOUR DRINKING: NO
TOTAL SCORE: 0
ALCOHOL_USE: NO
TOTAL SCORE: 0
EVER HAD A DRINK FIRST THING IN THE MORNING TO STEADY YOUR NERVES TO GET RID OF A HANGOVER: NO
AVERAGE NUMBER OF DAYS PER WEEK YOU HAVE A DRINK CONTAINING ALCOHOL: 0
HOW MANY TIMES IN THE PAST YEAR HAVE YOU HAD 5 OR MORE DRINKS IN A DAY: 0

## 2022-02-15 ASSESSMENT — PAIN DESCRIPTION - PAIN TYPE: TYPE: ACUTE PAIN

## 2022-02-15 ASSESSMENT — FIBROSIS 4 INDEX
FIB4 SCORE: 1.56
FIB4 SCORE: 3.89

## 2022-02-15 NOTE — ASSESSMENT & PLAN NOTE
Positional pain  Worse when flexes her head down.  CT soft tissue of the neck showed mild mucosal irregularity and enhancement in the hypopharynx may be infectious or inflammatory.  ENT Dr. Beach was consulted by the ER physician.  Pending recommendations.  SLP evaluation ordered.

## 2022-02-15 NOTE — ED PROVIDER NOTES
ED Provider Note    CHIEF COMPLAINT  Chief Complaint   Patient presents with   • Shortness of Breath     Pt c/o sudden onset of SOB this morning. Per EMS, pt was breeathing at a elevated breathing rate and o/o carpal pedal spasms when they arrived. Pt has no hx of anxiety. Pt states her SOB has improved now. Pt denies any recent illness.        HPI  Sabrina Lobo is a 89 y.o. female with history of hypertension who presents complaining of difficulty breathing through her nose and mouth, pointing to her throat/neck as the issue.    Patient states this was acute in onset at 5 AM and woke her up. She denies heartburn, chest pain, nausea, vomiting, fever, chills, cough. She states when she looks down/flexes her head/neck she cannot breathe but if she keeps her head in a neutral position she can breathe. Patient denies history of similar symptoms. She denies choking or having any sensation that food is stuck.    EMS reported the patient was very anxious when they arrived.      ALLERGIES  No Known Allergies    CURRENT MEDICATIONS  Home Medications     Reviewed by Jenifer Savage (Pharmacy Tech) on 02/15/22 at 1236  Med List Status: Complete   Medication Last Dose Status   amLODIPine (NORVASC) 5 MG Tab 2/15/2022 Active   furosemide (LASIX) 40 MG Tab 2/15/2022 Active                PAST MEDICAL HISTORY   has a past medical history of H/O bladder repair surgery, H/O: hysterectomy, Hypertension, and Vertigo.    SURGICAL HISTORY   has a past surgical history that includes hysterectomy radical.    SOCIAL HISTORY  Social History     Tobacco Use   • Smoking status: Never Smoker   • Smokeless tobacco: Never Used   Vaping Use   • Vaping Use: Never used   Substance and Sexual Activity   • Alcohol use: No   • Drug use: No   • Sexual activity: Not on file       Family Hx:  DM      REVIEW OF SYSTEMS  See HPI for further details.  All other systems are negative except as above in HPI.    PHYSICAL EXAM  VITAL SIGNS: /65    "Pulse 87   Temp 37.4 °C (99.3 °F) (Temporal)   Resp 19   Ht 1.575 m (5' 2\")   Wt 49.9 kg (110 lb)   SpO2 95%   BMI 20.12 kg/m²    General:  WD thin elderly female, nontoxic appearing in NAD; A+Ox3; V/S as above; not hypoxic or tachypneic  Skin: warm and dry; good color; no rash  HEENT: NCAT; EOMs intact; no scleral icterus; edentulous; no exudate, trismus, or drooling  Neck: FROM; no meningismus, no LAD; no stridor  Cardiovascular: Regular heart rate and rhythm.  No murmurs, rubs, or gallops; pulses 2+ bilaterally radially and DP areas  Lungs: Clear to auscultation with good air movement bilaterally.  No wheezes, rhonchi, or rales.   Abdomen: BS present; soft; NTND; no rebound, guarding, or rigidity.  No organomegaly or pulsatile mass  Extremities: BARRAZA x 4; no e/o trauma; no pedal edema; neg Brayan's  Neurologic: CNs III-XII grossly intact; speech clear; distal sensation intact; strength 5/5 UE/LEs  Psychiatric: Appropriate affect, normal mood    LABS  Results for orders placed or performed during the hospital encounter of 02/15/22   CBC WITH DIFFERENTIAL   Result Value Ref Range    WBC 6.5 4.8 - 10.8 K/uL    RBC 4.83 4.20 - 5.40 M/uL    Hemoglobin 14.7 12.0 - 16.0 g/dL    Hematocrit 44.1 37.0 - 47.0 %    MCV 91.3 81.4 - 97.8 fL    MCH 30.4 27.0 - 33.0 pg    MCHC 33.3 (L) 33.6 - 35.0 g/dL    RDW 47.0 35.9 - 50.0 fL    Platelet Count 290 164 - 446 K/uL    MPV 10.4 9.0 - 12.9 fL    Neutrophils-Polys 64.70 44.00 - 72.00 %    Lymphocytes 24.30 22.00 - 41.00 %    Monocytes 7.30 0.00 - 13.40 %    Eosinophils 2.30 0.00 - 6.90 %    Basophils 1.10 0.00 - 1.80 %    Immature Granulocytes 0.30 0.00 - 0.90 %    Nucleated RBC 0.00 /100 WBC    Neutrophils (Absolute) 4.19 2.00 - 7.15 K/uL    Lymphs (Absolute) 1.57 1.00 - 4.80 K/uL    Monos (Absolute) 0.47 0.00 - 0.85 K/uL    Eos (Absolute) 0.15 0.00 - 0.51 K/uL    Baso (Absolute) 0.07 0.00 - 0.12 K/uL    Immature Granulocytes (abs) 0.02 0.00 - 0.11 K/uL    NRBC (Absolute) 0.00 " K/uL   Basic Metabolic Panel   Result Value Ref Range    Sodium 143 135 - 145 mmol/L    Potassium 3.2 (L) 3.6 - 5.5 mmol/L    Chloride 103 96 - 112 mmol/L    Co2 24 20 - 33 mmol/L    Glucose 105 (H) 65 - 99 mg/dL    Bun 17 8 - 22 mg/dL    Creatinine 1.10 0.50 - 1.40 mg/dL    Calcium 9.2 8.5 - 10.5 mg/dL    Anion Gap 16.0 7.0 - 16.0   proBrain Natriuretic Peptide, NT   Result Value Ref Range    NT-proBNP 697 (H) 0 - 125 pg/mL   TROPONIN   Result Value Ref Range    Troponin T 22 (H) 6 - 19 ng/L   ESTIMATED GFR   Result Value Ref Range    GFR If  56 (A) >60 mL/min/1.73 m 2    GFR If Non  46 (A) >60 mL/min/1.73 m 2   COV-2, FLU A/B, AND RSV BY PCR (2-4 HOURS CEPHEID): Collect NP swab in VTM    Specimen: Respirate   Result Value Ref Range    SARS-CoV-2 Source NP Swab    EKG   Result Value Ref Range    Report       [facility] Emergency Dept.    Test Date:  2022-02-15  Pt Name:    EMILY HONG                 Department: ER  MRN:        [id number]                      Room:        19  Gender:     Female                       Technician: [id number]  :        [id number]                   Requested By:AIDEN ANDERSON  Order #:    [id number]                    Reading MD: AIDEN ANDERSON MD    Measurements  Intervals                                Axis  Rate:       90                           P:          19  NE:         212                          QRS:        48  QRSD:       88                           T:          -27  QT:         390  QTc:        478    Interpretive Statements  Sinus rhythm  Borderline prolonged NE interval  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   Mild mucosal irregularity and enhancement in the hypopharynx may be infectious or inflammatory.   2.  No abscess is identified.   3.  Borderline prominent right cervical lymph node is nonspecific.   4.  Atherosclerotic plaque in the aorta and carotid arteries.   5.  Mildly enlarged precarinal lymph node in the mediastinum is nonspecific.   6.  Emphysematous changes with biapical fibrotic changes.   7.  Mucosal thickening within the maxillary sinuses bilaterally.      DX-CHEST-PORTABLE (1 VIEW)   Final Result      1.  Mild interstitial/reticular opacities in the right midlung could be infection such as mild Covid pneumonia or postinflammatory changes/scarring.          MEDICAL RECORD  I have reviewed patient's medical record and pertinent results are listed below.      COURSE & MEDICAL DECISION MAKING  I have reviewed any medical record information, laboratory studies and radiographic results as noted.    Sabrina Lobo is a 89 y.o. female who presents complaining of shortness of breath when she awoke this morning and if she flexes her neck down.  She denies chest pain.  She is very anxious when EMS arrived and was having hyperventilatory symptoms.    Appropriate PPE was worn at all times while interacting with the patient.    Chest x-ray demonstrates possible early mild Covid pneumonia versus postinflammatory changes.    EKG demonstrates no acute ST changes.    Troponin is elevated to 22.  BNP elevated to 697.  Patient is mildly hypokalemic.  No evidence of anemia and no elevated white blood cell count or bandemia are noted.    1:44 PM  ENT paged regarding CT soft tissue of the neck results which demonstrated mild mucosal irregularity and enhancement of the hypopharynx--infectious versus inflammatory. Paging hospitalist for admission.    1:56 PM  I discussed the case with Dr. Beach from ENT who will review the patient's imaging and consult.  I discussed the case with the hospitalist who agrees to admit the patient  for further work-up.  Covid is pending.      FINAL IMPRESSION  1. Shortness of breath     2. Elevated troponin       Electronically signed by: Ashely Matos M.D., 2/15/2022 9:15 AM

## 2022-02-15 NOTE — ED NOTES
UNABLE to complete med rec at this time  Pt's family attempting to find out if pt takes medication   Pt's pharmacy has not filled any medications since sept

## 2022-02-15 NOTE — ED TRIAGE NOTES
"Chief Complaint   Patient presents with   • Shortness of Breath     Pt c/o sudden onset of SOB this morning. Per EMS, pt was breeathing at a elevated breathing rate and o/o carpal pedal spasms when they arrived. Pt has no hx of anxiety. Pt states her SOB has improved now. Pt denies any recent illness.      /68   Pulse 88   Temp 37.4 °C (99.3 °F) (Temporal)   Resp 18   Ht 1.575 m (5' 2\")   Wt 49.9 kg (110 lb)   SpO2 98%   BMI 20.12 kg/m²     "

## 2022-02-15 NOTE — H&P
Hospital Medicine History & Physical Note    Date of Service  2/15/2022    Primary Care Physician  Ash Whiting M.D.    Consultants  ENT    Specialist Names: Dr Winston    Code Status  Full Code    Chief Complaint  Chief Complaint   Patient presents with   • Shortness of Breath     Pt c/o sudden onset of SOB this morning. Per EMS, pt was breeathing at a elevated breathing rate and o/o carpal pedal spasms when they arrived. Pt has no hx of anxiety. Pt states her SOB has improved now. Pt denies any recent illness.        History of Presenting Illness  Sabrina Lobo is a 89 y.o. female who presented 2/15/2022 with shortness of breath and difficulty swallowing.  She has a past medical history of hypertension.  Patient stated that she had a new onset of difficulty swallowing associated with shortness of breath that is positional worse with head flexion and touching her chest with her chin.  However, her symptoms improve when she extend her head.  Present to ER for further evaluation.  Noted to be afebrile.  Was saturating well on room air.  Had a CT of the soft tissue of the neck that showed mild mucosal irregularity and enhancement in the hypopharynx area concerning for infectious versus inflammatory.  Chest x-ray showed mild interstitial reticular opacities of the right midlung that could be infectious or postinflammatory changes.  ENT Dr. Beach was consulted.  Pending recommendations.    I discussed the plan of care with patient and ERP.    Review of Systems  Review of Systems   Constitutional: Negative for chills and fever.   HENT: Negative for hearing loss and tinnitus.    Eyes: Negative for blurred vision and double vision.   Respiratory: Positive for cough and shortness of breath.    Cardiovascular: Positive for chest pain (Pleuritic chest pain). Negative for palpitations.   Gastrointestinal: Negative for nausea and vomiting.   Genitourinary: Negative for dysuria and urgency.   Musculoskeletal: Negative  for myalgias and neck pain.   Skin: Negative for rash.   Neurological: Negative for dizziness and headaches.   Psychiatric/Behavioral: Negative for depression and suicidal ideas.       Past Medical History   has a past medical history of H/O bladder repair surgery, H/O: hysterectomy, Hypertension, and Vertigo.    Surgical History   has a past surgical history that includes hysterectomy radical.     Family History  family history is not on file.   Family history reviewed with patient. There is no family history that is pertinent to the chief complaint.     Social History   reports that she has never smoked. She has never used smokeless tobacco. She reports that she does not drink alcohol and does not use drugs.    Allergies  No Known Allergies    Medications  Prior to Admission Medications   Prescriptions Last Dose Informant Patient Reported? Taking?   amLODIPine (NORVASC) 5 MG Tab 2/15/2022 at AM Patient Yes Yes   Sig: Take 5 mg by mouth every morning.   furosemide (LASIX) 40 MG Tab 2/15/2022 at AM Patient Yes Yes   Sig: Take 40 mg by mouth every morning.      Facility-Administered Medications: None       Physical Exam  Temp:  [37.4 °C (99.3 °F)] 37.4 °C (99.3 °F)  Pulse:  [87-95] 87  Resp:  [15-19] 19  BP: (138-172)/(65-77) 138/65  SpO2:  [95 %-98 %] 95 %  Blood Pressure : 138/65   Temperature: 37.4 °C (99.3 °F)   Pulse: 87   Respiration: 19   Pulse Oximetry: 95 %       Physical Exam  Constitutional:       General: She is not in acute distress.  HENT:      Head: Normocephalic and atraumatic.      Mouth/Throat:      Mouth: Mucous membranes are dry.   Eyes:      Extraocular Movements: Extraocular movements intact.      Pupils: Pupils are equal, round, and reactive to light.   Cardiovascular:      Rate and Rhythm: Normal rate and regular rhythm.      Heart sounds: No friction rub. No gallop.    Pulmonary:      Effort: Pulmonary effort is normal. No respiratory distress.      Breath sounds: Rhonchi present.    Abdominal:      General: There is no distension.      Palpations: Abdomen is soft.   Musculoskeletal:      Right lower leg: No edema.      Left lower leg: No edema.   Neurological:      General: No focal deficit present.      Mental Status: She is alert and oriented to person, place, and time.   Psychiatric:         Mood and Affect: Mood normal.         Laboratory:  Recent Labs     02/15/22  0915   WBC 6.5   RBC 4.83   HEMOGLOBIN 14.7   HEMATOCRIT 44.1   MCV 91.3   MCH 30.4   MCHC 33.3*   RDW 47.0   PLATELETCT 290   MPV 10.4     Recent Labs     02/15/22  1100   SODIUM 143   POTASSIUM 3.2*   CHLORIDE 103   CO2 24   GLUCOSE 105*   BUN 17   CREATININE 1.10   CALCIUM 9.2     Recent Labs     02/15/22  1100   GLUCOSE 105*         Recent Labs     02/15/22  1100   NTPROBNP 697*         Recent Labs     02/15/22  1100   TROPONINT 22*       Imaging:  CT-SOFT TISSUE NECK WITH   Final Result      1.  Mild mucosal irregularity and enhancement in the hypopharynx may be infectious or inflammatory.   2.  No abscess is identified.   3.  Borderline prominent right cervical lymph node is nonspecific.   4.  Atherosclerotic plaque in the aorta and carotid arteries.   5.  Mildly enlarged precarinal lymph node in the mediastinum is nonspecific.   6.  Emphysematous changes with biapical fibrotic changes.   7.  Mucosal thickening within the maxillary sinuses bilaterally.      DX-CHEST-PORTABLE (1 VIEW)   Final Result      1.  Mild interstitial/reticular opacities in the right midlung could be infection such as mild Covid pneumonia or postinflammatory changes/scarring.          EKG:  I have personally reviewed the images and compared with prior images.    Assessment/Plan:  I anticipate this patient is appropriate for observation status at this time.    * Dysphagia- (present on admission)  Assessment & Plan  Positional pain  Worse when flexes her head down.  CT soft tissue of the neck showed mild mucosal irregularity and enhancement in the  hypopharynx may be infectious or inflammatory.  ENT Dr. Beach was consulted by the ER physician.  Pending recommendations.  SLP evaluation ordered.    Shortness of breath- (present on admission)  Assessment & Plan  Not hypoxic.  Chest x-ray showed right midlung interstitial opacities concerning for Covid 19 or postinflammatory changes.  COVID-19 PCR pending.    CKD (chronic kidney disease), stage III (HCC)- (present on admission)  Assessment & Plan  At baseline.  Avoid nephrotoxins  Renal dose medications.      VTE prophylaxis: heparin ppx

## 2022-02-15 NOTE — ASSESSMENT & PLAN NOTE
Not hypoxic.  Chest x-ray showed right midlung interstitial opacities concerning for Covid 19 or postinflammatory changes.  COVID-19 PCR pending.

## 2022-02-15 NOTE — ED NOTES
Ambulated to the bathroom    I will START or STAY ON the medications listed below when I get home from the hospital:    finasteride 5 mg oral tablet  -- 1 tab(s) by mouth once a day  -- Indication: For BPH    aspirin 81 mg oral delayed release tablet  -- 1 tab(s) by mouth once a day  -- Indication: For Need for prophylactic measure    Entresto 97 mg-103 mg oral tablet  -- 1 tab(s) by mouth 2 times a day  -- Indication: For HTN (hypertension)    tamsulosin 0.4 mg oral capsule  -- 1 cap(s) by mouth once a day  -- Indication: For BPH    digoxin 250 mcg (0.25 mg) oral tablet  -- 1 tab(s) by mouth once a day except for Friday: 2 tabs  -- Indication: For Atrial fibrillation, unspecified type    warfarin 2.5 mg oral tablet  -- 2 tab(s) M/W/F, 1 tab Tu/TH/Sat/Su  -- Indication: For Atrial fibrillation, unspecified type    divalproex sodium 500 mg oral delayed release tablet  -- 2 tab(s) by mouth once a day (at bedtime)  -- Indication: For PTSD (post-traumatic stress disorder)    simvastatin 40 mg oral tablet  -- 1 tab(s) by mouth once a day (at bedtime)  -- Indication: For HLD (hyperlipidemia)    doxycycline monohydrate 100 mg oral capsule  -- 1 cap(s) by mouth every 12 hours -for wound care   -- Indication: For Cellulitis of left lower extremity    QUEtiapine 50 mg oral tablet  -- 1 tab(s) by mouth once a day (at bedtime)  -- Indication: For PTSD (post-traumatic stress disorder)    metoprolol succinate 50 mg oral tablet, extended release  -- 1 tab(s) by mouth once a day  -- Indication: For HTN (hypertension)    Lasix 40 mg oral tablet  -- 1 tab(s) by mouth once a day  -- Indication: For HTN (hypertension)    PreserVision oral capsule  -- 1 cap(s) by mouth once a day  -- Indication: For Need for prophylactic measure

## 2022-02-16 VITALS
WEIGHT: 110.67 LBS | SYSTOLIC BLOOD PRESSURE: 165 MMHG | HEIGHT: 62 IN | TEMPERATURE: 97.4 F | HEART RATE: 73 BPM | OXYGEN SATURATION: 97 % | BODY MASS INDEX: 20.37 KG/M2 | DIASTOLIC BLOOD PRESSURE: 49 MMHG | RESPIRATION RATE: 16 BRPM

## 2022-02-16 PROBLEM — F41.9 ANXIETY: Status: ACTIVE | Noted: 2022-02-16

## 2022-02-16 LAB
ALBUMIN SERPL BCP-MCNC: 4.1 G/DL (ref 3.2–4.9)
ALBUMIN/GLOB SERPL: 1.5 G/DL
ALP SERPL-CCNC: 68 U/L (ref 30–99)
ALT SERPL-CCNC: 11 U/L (ref 2–50)
ANION GAP SERPL CALC-SCNC: 18 MMOL/L (ref 7–16)
AST SERPL-CCNC: 22 U/L (ref 12–45)
BASOPHILS # BLD AUTO: 1.1 % (ref 0–1.8)
BASOPHILS # BLD: 0.08 K/UL (ref 0–0.12)
BILIRUB SERPL-MCNC: 0.6 MG/DL (ref 0.1–1.5)
BUN SERPL-MCNC: 18 MG/DL (ref 8–22)
CALCIUM SERPL-MCNC: 9.8 MG/DL (ref 8.5–10.5)
CHLORIDE SERPL-SCNC: 103 MMOL/L (ref 96–112)
CO2 SERPL-SCNC: 23 MMOL/L (ref 20–33)
CREAT SERPL-MCNC: 1.15 MG/DL (ref 0.5–1.4)
EOSINOPHIL # BLD AUTO: 0.22 K/UL (ref 0–0.51)
EOSINOPHIL NFR BLD: 3.1 % (ref 0–6.9)
ERYTHROCYTE [DISTWIDTH] IN BLOOD BY AUTOMATED COUNT: 45.2 FL (ref 35.9–50)
GLOBULIN SER CALC-MCNC: 2.8 G/DL (ref 1.9–3.5)
GLUCOSE SERPL-MCNC: 92 MG/DL (ref 65–99)
HCT VFR BLD AUTO: 39.9 % (ref 37–47)
HGB BLD-MCNC: 13.5 G/DL (ref 12–16)
IMM GRANULOCYTES # BLD AUTO: 0.02 K/UL (ref 0–0.11)
IMM GRANULOCYTES NFR BLD AUTO: 0.3 % (ref 0–0.9)
LYMPHOCYTES # BLD AUTO: 2.18 K/UL (ref 1–4.8)
LYMPHOCYTES NFR BLD: 30.5 % (ref 22–41)
MCH RBC QN AUTO: 30.3 PG (ref 27–33)
MCHC RBC AUTO-ENTMCNC: 33.8 G/DL (ref 33.6–35)
MCV RBC AUTO: 89.5 FL (ref 81.4–97.8)
MONOCYTES # BLD AUTO: 0.6 K/UL (ref 0–0.85)
MONOCYTES NFR BLD AUTO: 8.4 % (ref 0–13.4)
NEUTROPHILS # BLD AUTO: 4.04 K/UL (ref 2–7.15)
NEUTROPHILS NFR BLD: 56.6 % (ref 44–72)
NRBC # BLD AUTO: 0 K/UL
NRBC BLD-RTO: 0 /100 WBC
PLATELET # BLD AUTO: 230 K/UL (ref 164–446)
PMV BLD AUTO: 9.9 FL (ref 9–12.9)
POTASSIUM SERPL-SCNC: 3.9 MMOL/L (ref 3.6–5.5)
PROT SERPL-MCNC: 6.9 G/DL (ref 6–8.2)
RBC # BLD AUTO: 4.46 M/UL (ref 4.2–5.4)
SODIUM SERPL-SCNC: 144 MMOL/L (ref 135–145)
TROPONIN T SERPL-MCNC: 24 NG/L (ref 6–19)
WBC # BLD AUTO: 7.1 K/UL (ref 4.8–10.8)

## 2022-02-16 PROCEDURE — 700111 HCHG RX REV CODE 636 W/ 250 OVERRIDE (IP): Performed by: FAMILY MEDICINE

## 2022-02-16 PROCEDURE — 84484 ASSAY OF TROPONIN QUANT: CPT

## 2022-02-16 PROCEDURE — 700105 HCHG RX REV CODE 258: Performed by: NURSE PRACTITIONER

## 2022-02-16 PROCEDURE — 96372 THER/PROPH/DIAG INJ SC/IM: CPT

## 2022-02-16 PROCEDURE — 36415 COLL VENOUS BLD VENIPUNCTURE: CPT

## 2022-02-16 PROCEDURE — 85025 COMPLETE CBC W/AUTO DIFF WBC: CPT

## 2022-02-16 PROCEDURE — A9270 NON-COVERED ITEM OR SERVICE: HCPCS | Performed by: FAMILY MEDICINE

## 2022-02-16 PROCEDURE — 99239 HOSP IP/OBS DSCHRG MGMT >30: CPT | Performed by: INTERNAL MEDICINE

## 2022-02-16 PROCEDURE — 770006 HCHG ROOM/CARE - MED/SURG/GYN SEMI*

## 2022-02-16 PROCEDURE — 80053 COMPREHEN METABOLIC PANEL: CPT

## 2022-02-16 PROCEDURE — 700102 HCHG RX REV CODE 250 W/ 637 OVERRIDE(OP): Performed by: FAMILY MEDICINE

## 2022-02-16 RX ADMIN — HEPARIN SODIUM 5000 UNITS: 5000 INJECTION, SOLUTION INTRAVENOUS; SUBCUTANEOUS at 14:31

## 2022-02-16 RX ADMIN — SODIUM CHLORIDE, POTASSIUM CHLORIDE, SODIUM LACTATE AND CALCIUM CHLORIDE: 600; 310; 30; 20 INJECTION, SOLUTION INTRAVENOUS at 00:22

## 2022-02-16 RX ADMIN — HEPARIN SODIUM 5000 UNITS: 5000 INJECTION, SOLUTION INTRAVENOUS; SUBCUTANEOUS at 06:30

## 2022-02-16 RX ADMIN — POTASSIUM BICARBONATE 25 MEQ: 978 TABLET, EFFERVESCENT ORAL at 09:32

## 2022-02-16 RX ADMIN — AMLODIPINE BESYLATE 5 MG: 5 TABLET ORAL at 06:30

## 2022-02-16 ASSESSMENT — PATIENT HEALTH QUESTIONNAIRE - PHQ9
3. TROUBLE FALLING OR STAYING ASLEEP OR SLEEPING TOO MUCH: NOT AT ALL
5. POOR APPETITE OR OVEREATING: MORE THAN HALF THE DAYS
SUM OF ALL RESPONSES TO PHQ9 QUESTIONS 1 AND 2: 1
7. TROUBLE CONCENTRATING ON THINGS, SUCH AS READING THE NEWSPAPER OR WATCHING TELEVISION: NOT AT ALL
9. THOUGHTS THAT YOU WOULD BE BETTER OFF DEAD, OR OF HURTING YOURSELF: NOT AT ALL
1. LITTLE INTEREST OR PLEASURE IN DOING THINGS: NOT AT ALL
2. FEELING DOWN, DEPRESSED, IRRITABLE, OR HOPELESS: SEVERAL DAYS
8. MOVING OR SPEAKING SO SLOWLY THAT OTHER PEOPLE COULD HAVE NOTICED. OR THE OPPOSITE, BEING SO FIGETY OR RESTLESS THAT YOU HAVE BEEN MOVING AROUND A LOT MORE THAN USUAL: NOT AT ALL
SUM OF ALL RESPONSES TO PHQ QUESTIONS 1-9: 3
4. FEELING TIRED OR HAVING LITTLE ENERGY: NOT AT ALL
6. FEELING BAD ABOUT YOURSELF - OR THAT YOU ARE A FAILURE OR HAVE LET YOURSELF OR YOUR FAMILY DOWN: NOT AL ALL

## 2022-02-16 NOTE — DISCHARGE INSTRUCTIONS
Discharge Instructions    Discharged to home by car with relative. Discharged via wheelchair, hospital escort: Yes.  Special equipment needed: Not Applicable    Be sure to schedule a follow-up appointment with your primary care doctor or any specialists as instructed.     Discharge Plan:   Diet Plan: Discussed  Activity Level: Discussed  Confirmed Follow up Appointment: Patient to Call and Schedule Appointment  Confirmed Symptoms Management: Discussed  Medication Reconciliation Updated: Yes  Influenza Vaccine Indication: Not indicated: Previously immunized this influenza season and > 8 years of age    I understand that a diet low in cholesterol, fat, and sodium is recommended for good health. Unless I have been given specific instructions below for another diet, I accept this instruction as my diet prescription.   Other diet: Regular    Special Instructions: None    · Is patient discharged on Warfarin / Coumadin?   No     Depression / Suicide Risk    As [facility] are [facility], it is important to learn how to keep safe from harming yourself.    Recognize the warning signs:  · Abrupt changes in personality, positive or negative- including increase in energy   · Giving away possessions  · Change in eating patterns- significant weight changes-  positive or negative  · Change in sleeping patterns- unable to sleep or sleeping all the time   · Unwillingness or inability to communicate  · Depression  · Unusual sadness, discouragement and loneliness  · Talk of wanting to die  · Neglect of personal appearance   · Rebelliousness- reckless behavior  · Withdrawal from people/activities they love  · Confusion- inability to concentrate     If you or a loved one observes any of these behaviors or has concerns about self-harm, here's what you can do:  · Talk about it- your feelings and reasons for harming yourself  · Remove any means that you might use to hurt yourself (examples: pills, rope, extension  cords, firearm)  · Get professional help from the community (Mental Health, Substance Abuse, psychological counseling)  · Do not be alone:Call your Safe Contact- someone whom you trust who will be there for you.  · Call your local CRISIS HOTLINE 157-8522 or 168-564-8251  · Call your local Children's Mobile Crisis Response Team [place] (600) 692-4018 or www.[employer]  · Call the toll free National Suicide Prevention Hotlines   · National Suicide Prevention Lifeline 954-978-KDYX (5742)  · National Hope Line Network 800-SUICIDE (009-2560)

## 2022-02-16 NOTE — DISCHARGE PLANNING
Bedside Nurse and Dr. Rebolledo report patient discharging to home tonight  Daughter to provide transportation   DPA contacted to notify [facility]

## 2022-02-16 NOTE — PROGRESS NOTES
Received report and assumed care at shift change. A&O x 4 , compliant with cares. Fall precautions in place, bed locked and in lowest position. Needs attended to. No complains of pain

## 2022-02-16 NOTE — DISCHARGE PLANNING
HTH/SCP TCN chart review completed. Pt previously on service with [facility]. Besides swallowing issue, no apparent decline in functional mobility indicating need for PT/OT. Collaboarted with [facility], who has already obtained choice for hospice. No TCN further needs anticipated at this time. Should post acute discharge needs arise warranting TCN involvement, please contact TCN team via Voalte. Thank you.

## 2022-02-16 NOTE — DIETARY
"Nutrition services: Day 0 of admit.  Sabrina Lobo is a 89 y.o. female with admitting DX of shortness of breath, dysphagia.    Consult received for MST 3 with unsure weight loss and poor PO intake.    Pt seen at bedside, unfortunately is [place] and roommate getting transferred during discussion. Difficult to communicate with pt during visit, unable to answer PO intake and weight changes.    Assessment:  Height: 157.5 cm (5' 2\")  Weight: 50.2 kg (110 lb 10.7 oz) - bed scale  Body mass index is 20.24 kg/m²., BMI classification: normal  Diet/Intake: Regular, no PO intake yet    Evaluation:   1. PMH of HTN and vertigo.  2. Last weight in hx is 116 lbs on 9/12/20, down 6 lbs in ~ 1 1/2 years, not significant.  3. SLP deferred eval today, awaiting ENT recs. MD advanced diet after.  4. Nutrition focused physical exam showing mild muscle and mild fat loss of the temples, scapula, triceps, calves, dorsal hand.  5. Discharge planning notes referral to [facility].  6. Skin: No wounds or edema noted.  7. Labs: GFR 44  8. Meds: colace, zofran prn, bowel protocol, LR infusion  9. Last BM: 2/14    Malnutrition Risk: Moderate malnutrition in the context of starvation-related as evidenced by mild muscle and mild fat wasting.    Recommendations/Plan:  1. Regular liberalized diet as tolerated. Will order Boost VHC/Boost Plus  with meals to promote adequate PO intake.  2. Encourage intake of meals and supplements.  3. Document intake of all meals and supplements as % taken in ADL's to provide interdisciplinary communication across all shifts.   4. Monitor weight.  5. Nutrition rep will continue to see patient for ongoing meal and snack preferences.     RD following.            "

## 2022-02-16 NOTE — DISCHARGE PLANNING
Received phone call from Gladis with [facility] notifying hospital that pt is on services with them. Upon discharge, new consents will need to be signed for pt to continue services with [facility].

## 2022-02-16 NOTE — THERAPY
"Speech Language Pathology   Clinical Swallow Evaluation     Patient Name: Sabrina Lobo  AGE:  89 y.o., SEX:  female  Medical Record #: [id number]  Today's Date: 2/15/2022     Precautions  Precautions: (P) Fall Risk    Assessment    Patient is 89 y.o. female who presented 2/15/2022 with shortness of breath and difficulty swallowing.  She has a past medical history of hypertension.  Patient stated that she had a new onset of difficulty swallowing associated with shortness of breath that is positional worse with head flexion and touching her chest with her chin.  However, her symptoms improve when she extend her head.      Patient participated in clinical swallow evaluation this date.     Oral Mechanism Exam:    Patient with symmetrical facial features. Tongue protrusion to midline. ROM and strength of oral musculature found to be WFL. Patient edentulous but consuming a regular diet at home.     Oral/Pharyngeal phase:    The patient consumed trials of regular textures and thin liquids. Patient with adequate oral acceptance and containment. Bite and mastication of solids was functional with presumed timely and functional AP transit and timely swallow trigger. No overt s/sx of aspiration/penetration was appreciated. Patient endorsed that food sometimes feels stuck and that it cannot \"go down\". She prefers to take small sips of thin liquids to compensate for this feeling.     Clinical Impressions:    Clinical signs of pharyngeal dysphagia, likely acute possibly related to CT scan revealing \"enhancement in the hypopharynx area concerning for infectious versus inflammatory\". Swallow prognosis is excellent. Instrumental swallow study is indicated to determine impact of possible inflammation on swallow function, but may be beneficial in the outpatient setting if the patient is not admitted. Patient is safe for oral diet prior to instrumental swallow study.     Recommendations:    Initiation of diet regular with thin liquids. " "    Plan    Recommend Speech Therapy 3 times per week until therapy goals are met for the following treatments:  Dysphagia Training.    Discharge Recommendations: (P) Recommend post-acute placement for additional speech therapy services prior to discharge home     Objective       02/15/22 1710   Oral Motor Eval    Is Patient Able to Complete Oral Motor Eval Yes, Within Normal Limits   Laryngeal Function   Voice Quality Within Functional Limits   Volutional Cough Within Functional Limits   Excursion Upon Swallow Complete   Oral Food Presentation   Single Swallow Thin (0) Within Functional Limits   Liquidised (3) Within Functional Limits   Regular (7) Within Functional Limits   Tracheostomy   Tracheostomy  No   Dysphagia Strategies / Recommendations   Strategies / Interventions Recommended (Yes / No) Yes   Compensatory Strategies Head of Bed 90 Degrees During Eating / Drinking   Diet / Liquid Recommendation Thin (0);Regular (7)   Medication Administration  Whole with Liquid Wash   Dysphagia Rating   Nutritional Liquid Intake Rating Scale Non thickened beverages   Nutritional Food Intake Rating Scale Total oral diet with no restrictions   Patient / Family Goals   Patient / Family Goal #1 \"I can only take small sips of water\"   Short Term Goals   Short Term Goal # 1 The pt will consume RG7/TN0 meal with no overt s/sx of aspiration          "

## 2022-02-16 NOTE — CARE PLAN
The patient is Stable - Low risk of patient condition declining or worsening    Shift Goals  Clinical Goals: safety    Progress made toward(s) clinical / shift goals:  fall precautions in place. Patient compliant with use of call light.     Patient is not progressing towards the following goals:

## 2022-02-16 NOTE — THERAPY
Missed Therapy     Patient Name: Sabrina Lobo  Age:  89 y.o., Sex:  female  Medical Record #: [id number]  Today's Date: 2/16/2022 02/16/22 0928   Treatment Variance   Reason For Missed Therapy Medical - Other (Please Comment)   Interdisciplinary Plan of Care Collaboration   IDT Collaboration with  Nursing   Collaboration Comments Attempted to see pt for dysphagia treatment. Per RN, pt is NPO pending ENT recommendations. SLP following as appropriate.

## 2022-02-16 NOTE — DISCHARGE PLANNING
Agency/Facility Name: [facility]  Spoke To: [facility]  Outcome: On service with [facility].  Call [facility] when pt discharges.

## 2022-02-16 NOTE — DISCHARGE SUMMARY
Discharge Summary    CHIEF COMPLAINT ON ADMISSION  Chief Complaint   Patient presents with   • Shortness of Breath     Pt c/o sudden onset of SOB this morning. Per EMS, pt was breeathing at a elevated breathing rate and o/o carpal pedal spasms when they arrived. Pt has no hx of anxiety. Pt states her SOB has improved now. Pt denies any recent illness.        Reason for Admission  EMS     Admission Date  2/15/2022    CODE STATUS  DNAR/DNI    HPI & HOSPITAL COURSE  Sabrina Lobo is a 89 y.o. female with Hx HTN, anxiety who presented 2/15/2022 with shortness of breath and difficulty swallowing.  Patient stated that she had a new onset of difficulty swallowing associated with shortness of breath that is positional worse with head flexion and touching her chest with her chin.  However, her symptoms improve when she extend her head.  She presented to ER for further evaluation.  Noted to be afebrile, saturating well on room air.  CT of the soft tissue of the neck revealed mild mucosal irregularity and enhancement in the hypopharynx area concerning for infectious versus inflammatory.  Chest x-ray showed mild interstitial reticular opacities of the right midlung that could be infectious or postinflammatory changes.  ENT Dr. Beach was consulted and recommended nonoperative management.  Speech therapist evaluated and cleared the patient for a regular diet with thin liquids.    Symptoms resolved.  Patient and daughter noted that she had recently had an upper respiratory infection, covid test was negative on presentation.      Therefore, she is discharged in good and stable condition to home with close outpatient follow-up.    The patient recovered much more quickly than anticipated on admission.    Discharge Date  2/16/22    FOLLOW UP ITEMS POST DISCHARGE  Resume home hospice with [facility]    DISCHARGE DIAGNOSES  Principal Problem:    Dysphagia POA: Yes  Active Problems:    CKD (chronic kidney disease), stage III (HCC)  POA: Yes    Shortness of breath POA: Yes    Anxiety POA: Yes  Resolved Problems:    * No resolved hospital problems. *      FOLLOW UP  [facility]    MEDICATIONS ON DISCHARGE     Medication List      CONTINUE taking these medications      Instructions   amLODIPine 5 MG Tabs  Commonly known as: NORVASC   Take 5 mg by mouth every morning.  Dose: 5 mg     furosemide 40 MG Tabs  Commonly known as: LASIX   Take 40 mg by mouth every morning.  Dose: 40 mg            Allergies  No Known Allergies    DIET  Orders Placed This Encounter   Procedures   • Diet Order Diet: Regular     Standing Status:   Standing     Number of Occurrences:   1     Order Specific Question:   Diet:     Answer:   Regular [1]       ACTIVITY  As tolerated.  Weight bearing as tolerated    CONSULTATIONS  Otolaryngology     PROCEDURES  None     LABORATORY  Lab Results   Component Value Date    SODIUM 144 02/16/2022    POTASSIUM 3.9 02/16/2022    CHLORIDE 103 02/16/2022    CO2 23 02/16/2022    GLUCOSE 92 02/16/2022    BUN 18 02/16/2022    CREATININE 1.15 02/16/2022    CREATININE 0.9 01/15/2009        Lab Results   Component Value Date    WBC 7.1 02/16/2022    HEMOGLOBIN 13.5 02/16/2022    HEMATOCRIT 39.9 02/16/2022    PLATELETCT 230 02/16/2022        Total time of the discharge process exceeds 34 minutes.

## 2022-02-16 NOTE — HOSPITAL COURSE
Sabrina Lobo is a 89 y.o. female with Hx HTN, anxiety who presented 2/15/2022 with shortness of breath and difficulty swallowing.  Patient stated that she had a new onset of difficulty swallowing associated with shortness of breath that is positional worse with head flexion and touching her chest with her chin.  However, her symptoms improve when she extend her head.  She presented to ER for further evaluation.  Noted to be afebrile, saturating well on room air.  CT of the soft tissue of the neck revealed mild mucosal irregularity and enhancement in the hypopharynx area concerning for infectious versus inflammatory.  Chest x-ray showed mild interstitial reticular opacities of the right midlung that could be infectious or postinflammatory changes.  ENT Dr. Beach was consulted and recommended nonoperative management.  Speech therapist evaluated and cleared the patient for a regular diet with thin liquids.    Symptoms resolved.  Patient and daughter noted that she had recently had an upper respiratory infection, covid test was negative on presentation.

## 2022-02-16 NOTE — PROGRESS NOTES
4 Eyes Skin Assessment Completed by Vanesa RN and ESTEFANY Croft.    Head WDL  Ears Redness and Blanching  Nose WDL  Mouth WDL  Neck WDL  Breast/Chest WDL  Shoulder Blades WDL  Spine WDL  (R) Arm/Elbow/Hand Redness and Blanching  (L) Arm/Elbow/Hand Redness and Blanching  Abdomen WDL  Groin Redness and Blanching  Scrotum/Coccyx/Buttocks Redness and Blanching  (R) Leg WDL  (L) Leg WDL  (R) Heel/Foot/Toe Redness and Blanching  (L) Heel/Foot/Toe Redness and Blanching          Devices In PlacesL PIV      Interventions In Place Barrier Cream, Dri-Tanmay Pads, Heels Loaded W/Pillows and Pressure Redistribution Mattress    Possible Skin Injury No    Pictures Uploaded Into Epic N/A  Wound Consult Placed N/A  RN Wound Prevention Protocol Ordered No  Upon admission to the floor patient was informed how we do 2 RN Skin checks to make sure there are no skin issues. Pt gave this RN consent to perform a skin check.

## 2022-02-16 NOTE — DISCHARGE PLANNING
Anticipated Discharge Disposition: home with [facility]     Action: Patient was receiving home services with [facility] prior to admission. RNCM called [facility] and talked to Tres as Dr. Rebolledo requested copy of POLST. Tres to fax POLST to unit and verifies patient revoked yesterday hospice services but plans to resume upon discharge.     Barriers to Discharge: medical clearance, ENT consult    Plan: home with hospice

## 2022-02-16 NOTE — DISCHARGE PLANNING
Received Choice form at 0929  Agency/Facility Name: [facility]  Referral sent per Choice form @ 9321

## 2022-02-17 NOTE — DISCHARGE PLANNING
Agency/Facility Name: [facility]  Spoke To: Quinton  Outcome: Notified Quinton the pt is discharging home tonight.

## 2022-05-06 ENCOUNTER — PATIENT MESSAGE (OUTPATIENT)
Dept: HEALTH INFORMATION MANAGEMENT | Facility: OTHER | Age: 87
End: 2022-05-06

## 2022-08-04 ENCOUNTER — TELEPHONE (OUTPATIENT)
Dept: HEALTH INFORMATION MANAGEMENT | Facility: OTHER | Age: 87
End: 2022-08-04

## 2022-08-17 ENCOUNTER — APPOINTMENT (OUTPATIENT)
Dept: URGENT CARE | Facility: PHYSICIAN GROUP | Age: 87
End: 2022-08-17
Payer: MEDICARE

## 2022-08-17 ENCOUNTER — HOSPITAL ENCOUNTER (EMERGENCY)
Facility: MEDICAL CENTER | Age: 87
End: 2022-08-17
Payer: MEDICARE

## 2022-08-17 VITALS
OXYGEN SATURATION: 95 % | DIASTOLIC BLOOD PRESSURE: 91 MMHG | SYSTOLIC BLOOD PRESSURE: 147 MMHG | TEMPERATURE: 98.2 F | BODY MASS INDEX: 20.24 KG/M2 | WEIGHT: 110 LBS | HEIGHT: 62 IN | HEART RATE: 103 BPM | RESPIRATION RATE: 18 BRPM

## 2022-08-17 LAB
ALBUMIN SERPL BCP-MCNC: 4.1 G/DL (ref 3.2–4.9)
ALBUMIN/GLOB SERPL: 1.2 G/DL
ALP SERPL-CCNC: 106 U/L (ref 30–99)
ALT SERPL-CCNC: 14 U/L (ref 2–50)
ANION GAP SERPL CALC-SCNC: 14 MMOL/L (ref 7–16)
AST SERPL-CCNC: 27 U/L (ref 12–45)
BASOPHILS # BLD AUTO: 0.9 % (ref 0–1.8)
BASOPHILS # BLD: 0.07 K/UL (ref 0–0.12)
BILIRUB SERPL-MCNC: 0.3 MG/DL (ref 0.1–1.5)
BUN SERPL-MCNC: 20 MG/DL (ref 8–22)
CALCIUM SERPL-MCNC: 9.3 MG/DL (ref 8.5–10.5)
CHLORIDE SERPL-SCNC: 103 MMOL/L (ref 96–112)
CO2 SERPL-SCNC: 22 MMOL/L (ref 20–33)
CREAT SERPL-MCNC: 1.31 MG/DL (ref 0.5–1.4)
EKG IMPRESSION: NORMAL
EOSINOPHIL # BLD AUTO: 0.26 K/UL (ref 0–0.51)
EOSINOPHIL NFR BLD: 3.3 % (ref 0–6.9)
ERYTHROCYTE [DISTWIDTH] IN BLOOD BY AUTOMATED COUNT: 43.7 FL (ref 35.9–50)
GFR SERPLBLD CREATININE-BSD FMLA CKD-EPI: 38 ML/MIN/1.73 M 2
GLOBULIN SER CALC-MCNC: 3.3 G/DL (ref 1.9–3.5)
GLUCOSE SERPL-MCNC: 117 MG/DL (ref 65–99)
HCT VFR BLD AUTO: 42 % (ref 37–47)
HGB BLD-MCNC: 14.2 G/DL (ref 12–16)
IMM GRANULOCYTES # BLD AUTO: 0.02 K/UL (ref 0–0.11)
IMM GRANULOCYTES NFR BLD AUTO: 0.3 % (ref 0–0.9)
LYMPHOCYTES # BLD AUTO: 3.09 K/UL (ref 1–4.8)
LYMPHOCYTES NFR BLD: 39.8 % (ref 22–41)
MCH RBC QN AUTO: 31.3 PG (ref 27–33)
MCHC RBC AUTO-ENTMCNC: 33.8 G/DL (ref 33.6–35)
MCV RBC AUTO: 92.5 FL (ref 81.4–97.8)
MONOCYTES # BLD AUTO: 0.64 K/UL (ref 0–0.85)
MONOCYTES NFR BLD AUTO: 8.2 % (ref 0–13.4)
NEUTROPHILS # BLD AUTO: 3.69 K/UL (ref 2–7.15)
NEUTROPHILS NFR BLD: 47.5 % (ref 44–72)
NRBC # BLD AUTO: 0 K/UL
NRBC BLD-RTO: 0 /100 WBC
PLATELET # BLD AUTO: 224 K/UL (ref 164–446)
PMV BLD AUTO: 9.4 FL (ref 9–12.9)
POTASSIUM SERPL-SCNC: 4.2 MMOL/L (ref 3.6–5.5)
PROT SERPL-MCNC: 7.4 G/DL (ref 6–8.2)
RBC # BLD AUTO: 4.54 M/UL (ref 4.2–5.4)
SODIUM SERPL-SCNC: 139 MMOL/L (ref 135–145)
WBC # BLD AUTO: 7.8 K/UL (ref 4.8–10.8)

## 2022-08-17 PROCEDURE — 80053 COMPREHEN METABOLIC PANEL: CPT

## 2022-08-17 PROCEDURE — 85025 COMPLETE CBC W/AUTO DIFF WBC: CPT

## 2022-08-17 PROCEDURE — 302449 STATCHG TRIAGE ONLY (STATISTIC)

## 2022-08-17 PROCEDURE — 93005 ELECTROCARDIOGRAM TRACING: CPT

## 2022-08-17 ASSESSMENT — FIBROSIS 4 INDEX: FIB4 SCORE: 2.68

## 2022-08-17 NOTE — ED TRIAGE NOTES
Pt to triage   Chief Complaint   Patient presents with    Weakness     Pt c/o generalized weakness/ loss of appetite since yesterday     Loss of Appetite    Dizziness   .

## 2022-08-18 NOTE — ED NOTES
Pt LWBS. Pt's family member approached the desk very frustrated about the wait times. The triage process was explained to the pt and family member. Pt decided to LWBS. Pt signed AMA form and wristband was removed. Encouraged pt to return to ED if symptoms worsen. Pt to be dismissed

## 2023-04-19 ENCOUNTER — TELEPHONE (OUTPATIENT)
Dept: HEALTH INFORMATION MANAGEMENT | Facility: OTHER | Age: 88
End: 2023-04-19

## 2023-04-28 ENCOUNTER — HOSPITAL ENCOUNTER (OUTPATIENT)
Facility: MEDICAL CENTER | Age: 88
End: 2023-04-29
Attending: EMERGENCY MEDICINE | Admitting: INTERNAL MEDICINE
Payer: MEDICARE

## 2023-04-28 ENCOUNTER — APPOINTMENT (OUTPATIENT)
Dept: RADIOLOGY | Facility: MEDICAL CENTER | Age: 88
End: 2023-04-28
Payer: MEDICARE

## 2023-04-28 ENCOUNTER — APPOINTMENT (OUTPATIENT)
Dept: RADIOLOGY | Facility: MEDICAL CENTER | Age: 88
End: 2023-04-28
Attending: EMERGENCY MEDICINE
Payer: MEDICARE

## 2023-04-28 PROBLEM — N17.9 AKI (ACUTE KIDNEY INJURY) (HCC): Status: ACTIVE | Noted: 2023-04-28

## 2023-04-28 PROBLEM — N18.9 ACUTE-ON-CHRONIC KIDNEY INJURY (HCC): Status: ACTIVE | Noted: 2017-07-03

## 2023-04-28 LAB
ALBUMIN SERPL BCP-MCNC: 3.8 G/DL (ref 3.2–4.9)
ALBUMIN/GLOB SERPL: 1.3 G/DL
ALP SERPL-CCNC: 93 U/L (ref 30–99)
ALT SERPL-CCNC: 7 U/L (ref 2–50)
ANION GAP SERPL CALC-SCNC: 15 MMOL/L (ref 7–16)
APPEARANCE UR: CLEAR
AST SERPL-CCNC: 23 U/L (ref 12–45)
BASOPHILS # BLD AUTO: 1.2 % (ref 0–1.8)
BASOPHILS # BLD: 0.09 K/UL (ref 0–0.12)
BILIRUB SERPL-MCNC: 0.3 MG/DL (ref 0.1–1.5)
BILIRUB UR QL STRIP.AUTO: NEGATIVE
BUN SERPL-MCNC: 25 MG/DL (ref 8–22)
CALCIUM ALBUM COR SERPL-MCNC: 9 MG/DL (ref 8.5–10.5)
CALCIUM SERPL-MCNC: 8.8 MG/DL (ref 8.5–10.5)
CHLORIDE SERPL-SCNC: 105 MMOL/L (ref 96–112)
CO2 SERPL-SCNC: 19 MMOL/L (ref 20–33)
COLOR UR: YELLOW
CREAT SERPL-MCNC: 1.41 MG/DL (ref 0.5–1.4)
EKG IMPRESSION: NORMAL
EOSINOPHIL # BLD AUTO: 0.13 K/UL (ref 0–0.51)
EOSINOPHIL NFR BLD: 1.7 % (ref 0–6.9)
ERYTHROCYTE [DISTWIDTH] IN BLOOD BY AUTOMATED COUNT: 42.2 FL (ref 35.9–50)
GFR SERPLBLD CREATININE-BSD FMLA CKD-EPI: 35 ML/MIN/1.73 M 2
GLOBULIN SER CALC-MCNC: 3 G/DL (ref 1.9–3.5)
GLUCOSE SERPL-MCNC: 100 MG/DL (ref 65–99)
GLUCOSE UR STRIP.AUTO-MCNC: NEGATIVE MG/DL
HCT VFR BLD AUTO: 40.3 % (ref 37–47)
HGB BLD-MCNC: 13.6 G/DL (ref 12–16)
IMM GRANULOCYTES # BLD AUTO: 0.03 K/UL (ref 0–0.11)
IMM GRANULOCYTES NFR BLD AUTO: 0.4 % (ref 0–0.9)
KETONES UR STRIP.AUTO-MCNC: NEGATIVE MG/DL
LEUKOCYTE ESTERASE UR QL STRIP.AUTO: NEGATIVE
LYMPHOCYTES # BLD AUTO: 1.68 K/UL (ref 1–4.8)
LYMPHOCYTES NFR BLD: 22 % (ref 22–41)
MCH RBC QN AUTO: 31.9 PG (ref 27–33)
MCHC RBC AUTO-ENTMCNC: 33.7 G/DL (ref 33.6–35)
MCV RBC AUTO: 94.6 FL (ref 81.4–97.8)
MICRO URNS: NORMAL
MONOCYTES # BLD AUTO: 0.45 K/UL (ref 0–0.85)
MONOCYTES NFR BLD AUTO: 5.9 % (ref 0–13.4)
NEUTROPHILS # BLD AUTO: 5.26 K/UL (ref 2–7.15)
NEUTROPHILS NFR BLD: 68.8 % (ref 44–72)
NITRITE UR QL STRIP.AUTO: NEGATIVE
NRBC # BLD AUTO: 0 K/UL
NRBC BLD-RTO: 0 /100 WBC
NT-PROBNP SERPL IA-MCNC: 373 PG/ML (ref 0–125)
PH UR STRIP.AUTO: 5.5 [PH] (ref 5–8)
PLATELET # BLD AUTO: 185 K/UL (ref 164–446)
PMV BLD AUTO: 10.1 FL (ref 9–12.9)
POTASSIUM SERPL-SCNC: 4.4 MMOL/L (ref 3.6–5.5)
PROCALCITONIN SERPL-MCNC: 0.05 NG/ML
PROT SERPL-MCNC: 6.8 G/DL (ref 6–8.2)
PROT UR QL STRIP: NEGATIVE MG/DL
RBC # BLD AUTO: 4.26 M/UL (ref 4.2–5.4)
RBC UR QL AUTO: NEGATIVE
SODIUM SERPL-SCNC: 139 MMOL/L (ref 135–145)
SP GR UR STRIP.AUTO: 1.01
TROPONIN T SERPL-MCNC: 20 NG/L (ref 6–19)
TROPONIN T SERPL-MCNC: 20 NG/L (ref 6–19)
UROBILINOGEN UR STRIP.AUTO-MCNC: 0.2 MG/DL
WBC # BLD AUTO: 7.6 K/UL (ref 4.8–10.8)

## 2023-04-28 PROCEDURE — 83880 ASSAY OF NATRIURETIC PEPTIDE: CPT

## 2023-04-28 PROCEDURE — G0378 HOSPITAL OBSERVATION PER HR: HCPCS

## 2023-04-28 PROCEDURE — 99285 EMERGENCY DEPT VISIT HI MDM: CPT

## 2023-04-28 PROCEDURE — 71045 X-RAY EXAM CHEST 1 VIEW: CPT

## 2023-04-28 PROCEDURE — 81003 URINALYSIS AUTO W/O SCOPE: CPT

## 2023-04-28 PROCEDURE — 700105 HCHG RX REV CODE 258: Mod: UD | Performed by: NURSE PRACTITIONER

## 2023-04-28 PROCEDURE — 36415 COLL VENOUS BLD VENIPUNCTURE: CPT

## 2023-04-28 PROCEDURE — 84484 ASSAY OF TROPONIN QUANT: CPT

## 2023-04-28 PROCEDURE — 85025 COMPLETE CBC W/AUTO DIFF WBC: CPT

## 2023-04-28 PROCEDURE — 87086 URINE CULTURE/COLONY COUNT: CPT

## 2023-04-28 PROCEDURE — 84145 PROCALCITONIN (PCT): CPT

## 2023-04-28 PROCEDURE — 700105 HCHG RX REV CODE 258: Mod: UD | Performed by: EMERGENCY MEDICINE

## 2023-04-28 PROCEDURE — 93005 ELECTROCARDIOGRAM TRACING: CPT | Performed by: EMERGENCY MEDICINE

## 2023-04-28 PROCEDURE — 80053 COMPREHEN METABOLIC PANEL: CPT

## 2023-04-28 PROCEDURE — 93005 ELECTROCARDIOGRAM TRACING: CPT

## 2023-04-28 PROCEDURE — 99223 1ST HOSP IP/OBS HIGH 75: CPT | Performed by: INTERNAL MEDICINE

## 2023-04-28 PROCEDURE — 700111 HCHG RX REV CODE 636 W/ 250 OVERRIDE (IP): Mod: UD | Performed by: NURSE PRACTITIONER

## 2023-04-28 PROCEDURE — 96372 THER/PROPH/DIAG INJ SC/IM: CPT

## 2023-04-28 RX ORDER — HEPARIN SODIUM 5000 [USP'U]/ML
5000 INJECTION, SOLUTION INTRAVENOUS; SUBCUTANEOUS EVERY 12 HOURS
Status: DISCONTINUED | OUTPATIENT
Start: 2023-04-28 | End: 2023-04-29 | Stop reason: HOSPADM

## 2023-04-28 RX ORDER — LISINOPRIL 5 MG/1
5 TABLET ORAL EVERY MORNING
Status: ON HOLD | COMMUNITY
End: 2023-06-08

## 2023-04-28 RX ORDER — SODIUM CHLORIDE 9 MG/ML
1000 INJECTION, SOLUTION INTRAVENOUS ONCE
Status: COMPLETED | OUTPATIENT
Start: 2023-04-28 | End: 2023-04-28

## 2023-04-28 RX ORDER — POTASSIUM CHLORIDE 20 MEQ/1
20 TABLET, EXTENDED RELEASE ORAL EVERY MORNING
Status: ON HOLD | COMMUNITY
End: 2023-06-08

## 2023-04-28 RX ORDER — SODIUM CHLORIDE, SODIUM LACTATE, POTASSIUM CHLORIDE, CALCIUM CHLORIDE 600; 310; 30; 20 MG/100ML; MG/100ML; MG/100ML; MG/100ML
INJECTION, SOLUTION INTRAVENOUS CONTINUOUS
Status: DISCONTINUED | OUTPATIENT
Start: 2023-04-28 | End: 2023-04-29 | Stop reason: HOSPADM

## 2023-04-28 RX ADMIN — SODIUM CHLORIDE, POTASSIUM CHLORIDE, SODIUM LACTATE AND CALCIUM CHLORIDE: 600; 310; 30; 20 INJECTION, SOLUTION INTRAVENOUS at 17:36

## 2023-04-28 RX ADMIN — SODIUM CHLORIDE 1000 ML: 9 INJECTION, SOLUTION INTRAVENOUS at 13:41

## 2023-04-28 RX ADMIN — HEPARIN SODIUM 5000 UNITS: 5000 INJECTION, SOLUTION INTRAVENOUS; SUBCUTANEOUS at 17:36

## 2023-04-28 ASSESSMENT — PATIENT HEALTH QUESTIONNAIRE - PHQ9
SUM OF ALL RESPONSES TO PHQ9 QUESTIONS 1 AND 2: 0
2. FEELING DOWN, DEPRESSED, IRRITABLE, OR HOPELESS: NOT AT ALL
1. LITTLE INTEREST OR PLEASURE IN DOING THINGS: NOT AT ALL
SUM OF ALL RESPONSES TO PHQ9 QUESTIONS 1 AND 2: 0
1. LITTLE INTEREST OR PLEASURE IN DOING THINGS: NOT AT ALL
2. FEELING DOWN, DEPRESSED, IRRITABLE, OR HOPELESS: NOT AT ALL

## 2023-04-28 ASSESSMENT — FIBROSIS 4 INDEX
FIB4 SCORE: 3.03
FIB4 SCORE: 4.42

## 2023-04-28 ASSESSMENT — PAIN DESCRIPTION - PAIN TYPE: TYPE: ACUTE PAIN

## 2023-04-28 ASSESSMENT — LIFESTYLE VARIABLES: DO YOU DRINK ALCOHOL: NO

## 2023-04-28 NOTE — ED PROVIDER NOTES
ED Provider Note    CHIEF COMPLAINT  Chief Complaint   Patient presents with    Syncope       EXTERNAL RECORDS REVIEWED  External ED Note -patient was seen in an outside facility in August 2022 for weakness.  She also had a near syncope episode 2 days prior to that visit but did not lose consciousness.  She had apparently been seen at [facility] the same day but left AGAINST MEDICAL ADVICE after her labs were drawn.  She declined additional blood draw at [facility] so [facility] labs were reviewed and were unremarkable.  EKG was unremarkable.  UA showed signs of infection.  She was discharged on oral antibiotics and instructed to follow-up with her primary care physician.    HPI/ROS  LIMITATION TO HISTORY   Select: : None  OUTSIDE HISTORIAN(S):  Family - daughter at bedside notes that Ms. Lobo did not lose consciousness but almost passed out and was caught by her son. There was no head trauma.    Sabrina Lobo is a 89 y.o. female who presents with a chief complaint of pre-syncope. Patient reports she was in her baseline state of health when she all of a sudden felt as though she was going to pass out. She began to fall but luckily her son was standing next to her and caught her. She remembers the entire incident and denies loss of consciousness of head trauma. She notes some mild chest discomfort right after the incident which has resolved but she continues to feel generally weak. She has not vomited and denies shortness of breath. She has not had fevers, leg swelling, abdominal pain, dysuria, or diarrhea.    PAST MEDICAL HISTORY   has a past medical history of H/O bladder repair surgery, H/O: hysterectomy, Hypertension, and Vertigo.    SURGICAL HISTORY   has a past surgical history that includes hysterectomy radical.    FAMILY HISTORY  Family History   Problem Relation Age of Onset    Heart Disease Neg Hx     Hypertension Neg Hx     Hyperlipidemia Neg Hx     Psychiatric Illness Neg Hx     Diabetes Neg Hx        SOCIAL  "HISTORY  Social History     Tobacco Use    Smoking status: Never    Smokeless tobacco: Never   Vaping Use    Vaping Use: Never used   Substance and Sexual Activity    Alcohol use: No    Drug use: No    Sexual activity: Not on file       CURRENT MEDICATIONS  Home Medications    **Home medications have not yet been reviewed for this encounter**         ALLERGIES  No Known Allergies    PHYSICAL EXAM  VITAL SIGNS: BP (!) 167/69   Pulse 68   Temp 37.2 °C (99 °F) (Temporal)   Resp 18   Ht 1.575 m (5' 2\")   Wt 49.9 kg (110 lb)   SpO2 95%   BMI 20.12 kg/m²    Physical Exam  Vitals and nursing note reviewed.   Constitutional:       Appearance: Normal appearance.   HENT:      Head: Normocephalic and atraumatic.      Right Ear: External ear normal.      Left Ear: External ear normal.      Nose: Nose normal.      Mouth/Throat:      Mouth: Mucous membranes are dry.   Eyes:      Extraocular Movements: Extraocular movements intact.      Conjunctiva/sclera: Conjunctivae normal.      Pupils: Pupils are equal, round, and reactive to light.   Cardiovascular:      Rate and Rhythm: Normal rate and regular rhythm.   Pulmonary:      Effort: Pulmonary effort is normal.      Breath sounds: Normal breath sounds.   Abdominal:      General: Abdomen is flat.      Palpations: Abdomen is soft.      Tenderness: There is no abdominal tenderness.   Musculoskeletal:         General: Normal range of motion.      Cervical back: Normal range of motion and neck supple.      Right lower leg: No edema.      Left lower leg: No edema.   Skin:     General: Skin is warm and dry.   Neurological:      General: No focal deficit present.      Mental Status: She is alert and oriented to person, place, and time.   Psychiatric:         Mood and Affect: Mood normal.         Behavior: Behavior normal.     DIAGNOSTIC STUDIES / PROCEDURES  LABS  Results for orders placed or performed during the hospital encounter of 04/28/23   CBC with Differential   Result Value " Ref Range    WBC 7.6 4.8 - 10.8 K/uL    RBC 4.26 4.20 - 5.40 M/uL    Hemoglobin 13.6 12.0 - 16.0 g/dL    Hematocrit 40.3 37.0 - 47.0 %    MCV 94.6 81.4 - 97.8 fL    MCH 31.9 27.0 - 33.0 pg    MCHC 33.7 33.6 - 35.0 g/dL    RDW 42.2 35.9 - 50.0 fL    Platelet Count 185 164 - 446 K/uL    MPV 10.1 9.0 - 12.9 fL    Neutrophils-Polys 68.80 44.00 - 72.00 %    Lymphocytes 22.00 22.00 - 41.00 %    Monocytes 5.90 0.00 - 13.40 %    Eosinophils 1.70 0.00 - 6.90 %    Basophils 1.20 0.00 - 1.80 %    Immature Granulocytes 0.40 0.00 - 0.90 %    Nucleated RBC 0.00 /100 WBC    Neutrophils (Absolute) 5.26 2.00 - 7.15 K/uL    Lymphs (Absolute) 1.68 1.00 - 4.80 K/uL    Monos (Absolute) 0.45 0.00 - 0.85 K/uL    Eos (Absolute) 0.13 0.00 - 0.51 K/uL    Baso (Absolute) 0.09 0.00 - 0.12 K/uL    Immature Granulocytes (abs) 0.03 0.00 - 0.11 K/uL    NRBC (Absolute) 0.00 K/uL   Complete Metabolic Panel (CMP)   Result Value Ref Range    Sodium 139 135 - 145 mmol/L    Potassium 4.4 3.6 - 5.5 mmol/L    Chloride 105 96 - 112 mmol/L    Co2 19 (L) 20 - 33 mmol/L    Anion Gap 15.0 7.0 - 16.0    Glucose 100 (H) 65 - 99 mg/dL    Bun 25 (H) 8 - 22 mg/dL    Creatinine 1.41 (H) 0.50 - 1.40 mg/dL    Calcium 8.8 8.5 - 10.5 mg/dL    AST(SGOT) 23 12 - 45 U/L    ALT(SGPT) 7 2 - 50 U/L    Alkaline Phosphatase 93 30 - 99 U/L    Total Bilirubin 0.3 0.1 - 1.5 mg/dL    Albumin 3.8 3.2 - 4.9 g/dL    Total Protein 6.8 6.0 - 8.2 g/dL    Globulin 3.0 1.9 - 3.5 g/dL    A-G Ratio 1.3 g/dL   Troponins NOW   Result Value Ref Range    Troponin T 20 (H) 6 - 19 ng/L   Troponins in two (2) hours   Result Value Ref Range    Troponin T 20 (H) 6 - 19 ng/L   Urinalysis    Specimen: Urine, Clean Catch   Result Value Ref Range    Color Yellow     Character Clear     Specific Gravity 1.008 <1.035    Ph 5.5 5.0 - 8.0    Glucose Negative Negative mg/dL    Ketones Negative Negative mg/dL    Protein Negative Negative mg/dL    Bilirubin Negative Negative    Urobilinogen, Urine 0.2 Negative     Nitrite Negative Negative    Leukocyte Esterase Negative Negative    Occult Blood Negative Negative    Micro Urine Req see below    CORRECTED CALCIUM   Result Value Ref Range    Correct Calcium 9.0 8.5 - 10.5 mg/dL   ESTIMATED GFR   Result Value Ref Range    GFR (CKD-EPI) 35 (A) >60 mL/min/1.73 m 2   proBrain Natriuretic Peptide, NT   Result Value Ref Range    NT-proBNP 373 (H) 0 - 125 pg/mL   PROCALCITONIN   Result Value Ref Range    Procalcitonin 0.05 <0.25 ng/mL   EKG   Result Value Ref Range    Report       [facility]    Test Date:  2023  Pt Name:    EMILY HONG                 Department: ER  MRN:        [id number]                      Room:        24  Gender:     Female                       Technician: [id number]  :        [id number]                   Requested By:ER TRIAGE PROTOCOL  Order #:    [id number]                    Reading MD: Guero Garcia MD    Measurements  Intervals                                Axis  Rate:       63                           P:          41  PA:         237                          QRS:        -5  QRSD:       83                           T:          37  QT:         419  QTc:        429    Interpretive Statements  Sinus rhythm  Prolonged PA interval  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   My preliminary interpretation is as follows: No lobar pneumonia or obvious pulmonary edema    Radiologist interpretation:   DX-CHEST-PORTABLE (1 VIEW)   Final Result      Mild interstitial opacities in the right mid lung, similar to prior, could relate to chronic change. Superimposed edema or atypical infection cannot be excluded.       EC-ECHOCARDIOGRAM COMPLETE W/O CONT    (Results Pending)     COURSE & MEDICAL DECISION MAKING    ED Observation Status? No; Patient does not meet criteria for ED Observation.     INITIAL ASSESSMENT, COURSE AND PLAN  Care Narrative: This is a 89 year old female who had a presyncopal episode while standing and talking with her son today. DDx includes, but is not limited to, dehydration, infection, arrhythmia, ACS, hypo/hyperglycemia, anemia.    Arrives AF with normal VS. Appears dehydrated but non-toxic. No focal neurologic deficits. Feels generally weak but moving all 4 extremities equally. Alert, oriented. Remainder of exam is reassuring. No lower extremity edema. No increased work of breathing, rales, or wheezes.    IV fluids started for clinical evidence of dehydration.    EKG reassuring without signs of acute ischemia but initial troponin is slightly elevated at 20. CBC is without leukocytosis or anemia. Metabolic panel reveals normal electrolytes with CO2 of 19 and mild JEOVANNY with BUN/Cr of 25/1.41 likely pre-renal. Most recent creatinine was normal. UA does not suggest infection.     CXR with some stable and chronic appearing opacities but superimposed infection/edema not ruled out by radiologist. Patient denies cough, fever, shortness of breath. No leukocytosis. Will defer antibiotics presently.    Ms. Lobo lives at home with her family who are not comfortable bringing her home tonight due to concerns for her safety. I concur with this assessment and given her JEOVANNY, elevated troponin, and HEART score of 5 I spoke with on-call hospitalist and patient was admitted in guarded condition.    HYDRATION: Based on the patient's presentation of Dehydration the patient was given IV fluids. IV Hydration was used because oral hydration was not adequate alone. Upon recheck following hydration, the patient was improved.    ADDITIONAL PROBLEM LIST  None    DISPOSITION AND DISCUSSIONS  I have discussed management of the  patient with the following physicians and CIPRIANO's:  WILLIAM Grady    Discussion of management with other QHP or appropriate source(s): None     Escalation of care considered, and ultimately not performed: N/A.    Barriers to care at this time, including but not limited to:  None .     Decision tools and prescription drugs considered including, but not limited to: HEART Score 5 .    FINAL DIAGNOSIS  Presyncope  Elevated troponin  JEOVANNY    Electronically signed by: Guero Garcia M.D., 4/28/2023 1:03 PM

## 2023-04-28 NOTE — ED NOTES
Pharmacy Medication Reconciliation      ~Medication reconciliation updated and complete per patient at bedside & patient home pharmacy   ~Allergies have been verified  ~No oral ABX within the last 30 days  ~Patient home pharmacy :  [facility]

## 2023-04-28 NOTE — H&P
Hospital Medicine History & Physical Note    Date of Service  4/28/2023    Primary Care Physician  Ash Whiting M.D.    Code Status  Full Code    Chief Complaint  Chief Complaint   Patient presents with    Syncope       History of Presenting Illness  Sabrina Lobo is a 89 y.o. female with past medical history of hypertension, vertigo, who presented 4/28/2023 with short syncopal episode at home, when patient stands up and suddenly passed out, caught up by patient's son, without fall or head injury.  Denies postictal confusion.  Reported mild chest discomfort after syncope.  Denies shortness of breath or leg swelling.  In ED patient appeared dry, with JEOVANNY creatinine 1.41, BUN 25, mildly elevated troponin 20.  Chest x-ray showed mild interstitial opacities in the right midlung, which seem to be chronic change.  Patient was given IV hydration and currently feeling back to baseline, Sheba was feeling generally weak..  Requested admission for observation    I discussed the plan of care with patient.    Review of Systems  Review of Systems   Constitutional:  Negative for chills, fever and weight loss.   HENT:  Negative for ear pain, hearing loss and tinnitus.    Eyes:  Negative for blurred vision, double vision and photophobia.   Respiratory:  Negative for cough, hemoptysis and sputum production.    Cardiovascular:  Negative for chest pain, palpitations and orthopnea.   Gastrointestinal:  Negative for heartburn, nausea and vomiting.   Genitourinary:  Negative for dysuria, flank pain, frequency and hematuria.   Musculoskeletal:  Negative for back pain, joint pain and neck pain.   Skin:  Negative for itching and rash.   Neurological:  Positive for loss of consciousness. Negative for tremors, speech change, focal weakness and headaches.   Endo/Heme/Allergies:  Negative for environmental allergies and polydipsia. Does not bruise/bleed easily.   Psychiatric/Behavioral:  Negative for hallucinations and substance abuse.  The patient is not nervous/anxious.      Past Medical History   has a past medical history of H/O bladder repair surgery, H/O: hysterectomy, Hypertension, and Vertigo.    Surgical History   has a past surgical history that includes hysterectomy radical.     Family History     Family history reviewed with patient. There is no family history that is pertinent to the chief complaint.     Social History   reports that she has never smoked. She has never used smokeless tobacco. She reports that she does not drink alcohol and does not use drugs.    Allergies  No Known Allergies    Medications  Prior to Admission Medications   Prescriptions Last Dose Informant Patient Reported? Taking?   amLODIPine (NORVASC) 5 MG Tab 4/28/2023 at AM Patient's Home Pharmacy Yes No   Sig: Take 5 mg by mouth every morning.   furosemide (LASIX) 40 MG Tab 4/28/2023 at AM Patient's Home Pharmacy Yes No   Sig: Take 40 mg by mouth every morning.   lisinopril (PRINIVIL) 5 MG Tab 4/28/2023 at AM Patient's Home Pharmacy Yes Yes   Sig: Take 5 mg by mouth every morning.   potassium chloride SA (KDUR) 20 MEQ Tab CR 4/28/2023 at AM Patient's Home Pharmacy Yes Yes   Sig: Take 20 mEq by mouth every morning.      Facility-Administered Medications: None       Physical Exam  Temp:  [37.2 °C (99 °F)] 37.2 °C (99 °F)  Pulse:  [66-71] 71  Resp:  [18] 18  BP: (126-167)/(58-69) 141/61  SpO2:  [94 %-97 %] 94 %  Blood Pressure : (!) 141/61   Temperature: 37.2 °C (99 °F)   Pulse: 71   Respiration: 18   Pulse Oximetry: 94 %       Physical Exam  Vitals and nursing note reviewed.   Constitutional:       General: She is not in acute distress.     Appearance: Normal appearance.   HENT:      Head: Normocephalic and atraumatic.      Nose: Nose normal.      Mouth/Throat:      Mouth: Mucous membranes are moist.   Eyes:      Extraocular Movements: Extraocular movements intact.      Pupils: Pupils are equal, round, and reactive to light.   Cardiovascular:      Rate and Rhythm:  Normal rate and regular rhythm.   Pulmonary:      Effort: Pulmonary effort is normal.      Breath sounds: Normal breath sounds.   Abdominal:      General: Abdomen is flat. There is no distension.      Tenderness: There is no abdominal tenderness.   Musculoskeletal:         General: No swelling or deformity. Normal range of motion.      Cervical back: Normal range of motion and neck supple.   Skin:     General: Skin is warm and dry.   Neurological:      General: No focal deficit present.      Mental Status: She is alert and oriented to person, place, and time.   Psychiatric:         Mood and Affect: Mood normal.         Behavior: Behavior normal.       Laboratory:  Recent Labs     04/28/23  1250   WBC 7.6   RBC 4.26   HEMOGLOBIN 13.6   HEMATOCRIT 40.3   MCV 94.6   MCH 31.9   MCHC 33.7   RDW 42.2   PLATELETCT 185   MPV 10.1     Recent Labs     04/28/23  1250   SODIUM 139   POTASSIUM 4.4   CHLORIDE 105   CO2 19*   GLUCOSE 100*   BUN 25*   CREATININE 1.41*   CALCIUM 8.8     Recent Labs     04/28/23  1250   ALTSGPT 7   ASTSGOT 23   ALKPHOSPHAT 93   TBILIRUBIN 0.3   GLUCOSE 100*         No results for input(s): NTPROBNP in the last 72 hours.      Recent Labs     04/28/23  1250 04/28/23  1510   TROPONINT 20* 20*       Imaging:  DX-CHEST-PORTABLE (1 VIEW)   Final Result      Mild interstitial opacities in the right mid lung, similar to prior, could relate to chronic change. Superimposed edema or atypical infection cannot be excluded.      EC-ECHOCARDIOGRAM COMPLETE W/O CONT    (Results Pending)       X-Ray:  I have personally reviewed the images and compared with prior images.    Assessment/Plan:  Justification for Admission Status  I anticipate this patient is appropriate for observation status at this time because syncope    Patient will need a Telemetry bed on MEDICAL service .  The need is secondary to syncope.    * Syncope and collapse- (present on admission)  Assessment & Plan  Vasovagal versus secondary to  hypovolemia.  Will hold Lasix, lisinopril and amlodipine for now  Will evaluate patient with telemetry and transthoracic echo  Obtain orthostatic vitals after rehydration  PT OT evaluation    Coronary artery disease involving native coronary artery of native heart without angina pectoris- (present on admission)  Assessment & Plan  Denies chest pain    Acute-on-chronic kidney injury (HCC)  Assessment & Plan  It appears patient has history of CKD stage III.    Worsening of kidney function is probably secondary to hypovolemia  We will hold Lasix, lisinopril  Continue IV hydration  Avoid nephrotoxins    Essential hypertension- (present on admission)  Assessment & Plan  We will hold lisinopril and amlodipine for now  Monitor blood pressure        VTE prophylaxis: heparin ppx

## 2023-04-28 NOTE — ED NOTES
Pt BIB EMS from home.  Pt lives with son.  Report pt was standing talking to son and pt had syncopal episode, pt didn't fall, was assisted to ground.  Only hx HTN and take lisinopril.   for EMS.  ERP to see.

## 2023-04-29 ENCOUNTER — APPOINTMENT (OUTPATIENT)
Dept: CARDIOLOGY | Facility: MEDICAL CENTER | Age: 88
End: 2023-04-29
Attending: INTERNAL MEDICINE
Payer: MEDICARE

## 2023-04-29 VITALS
RESPIRATION RATE: 17 BRPM | SYSTOLIC BLOOD PRESSURE: 119 MMHG | HEIGHT: 62 IN | TEMPERATURE: 97.7 F | OXYGEN SATURATION: 97 % | HEART RATE: 86 BPM | WEIGHT: 104.72 LBS | BODY MASS INDEX: 19.27 KG/M2 | DIASTOLIC BLOOD PRESSURE: 57 MMHG

## 2023-04-29 LAB
ANION GAP SERPL CALC-SCNC: 12 MMOL/L (ref 7–16)
BUN SERPL-MCNC: 23 MG/DL (ref 8–22)
CALCIUM SERPL-MCNC: 8.8 MG/DL (ref 8.5–10.5)
CHLORIDE SERPL-SCNC: 108 MMOL/L (ref 96–112)
CO2 SERPL-SCNC: 22 MMOL/L (ref 20–33)
CORTIS SERPL-MCNC: 18.2 UG/DL (ref 0–23)
CREAT SERPL-MCNC: 1.31 MG/DL (ref 0.5–1.4)
ERYTHROCYTE [DISTWIDTH] IN BLOOD BY AUTOMATED COUNT: 41.7 FL (ref 35.9–50)
GFR SERPLBLD CREATININE-BSD FMLA CKD-EPI: 38 ML/MIN/1.73 M 2
GLUCOSE SERPL-MCNC: 91 MG/DL (ref 65–99)
HCT VFR BLD AUTO: 37.4 % (ref 37–47)
HGB BLD-MCNC: 12.7 G/DL (ref 12–16)
LV EJECT FRACT  99904: 70
LV EJECT FRACT MOD 2C 99903: 67.13
LV EJECT FRACT MOD 4C 99902: 69.07
LV EJECT FRACT MOD BP 99901: 68.95
MAGNESIUM SERPL-MCNC: 2 MG/DL (ref 1.5–2.5)
MCH RBC QN AUTO: 31.7 PG (ref 27–33)
MCHC RBC AUTO-ENTMCNC: 34 G/DL (ref 33.6–35)
MCV RBC AUTO: 93.3 FL (ref 81.4–97.8)
PLATELET # BLD AUTO: 184 K/UL (ref 164–446)
PMV BLD AUTO: 9.9 FL (ref 9–12.9)
POTASSIUM SERPL-SCNC: 4.2 MMOL/L (ref 3.6–5.5)
RBC # BLD AUTO: 4.01 M/UL (ref 4.2–5.4)
SODIUM SERPL-SCNC: 142 MMOL/L (ref 135–145)
TSH SERPL DL<=0.005 MIU/L-ACNC: 1.65 UIU/ML (ref 0.38–5.33)
WBC # BLD AUTO: 4.9 K/UL (ref 4.8–10.8)

## 2023-04-29 PROCEDURE — 80048 BASIC METABOLIC PNL TOTAL CA: CPT

## 2023-04-29 PROCEDURE — 99239 HOSP IP/OBS DSCHRG MGMT >30: CPT | Performed by: STUDENT IN AN ORGANIZED HEALTH CARE EDUCATION/TRAINING PROGRAM

## 2023-04-29 PROCEDURE — 84443 ASSAY THYROID STIM HORMONE: CPT

## 2023-04-29 PROCEDURE — 93306 TTE W/DOPPLER COMPLETE: CPT

## 2023-04-29 PROCEDURE — 96372 THER/PROPH/DIAG INJ SC/IM: CPT

## 2023-04-29 PROCEDURE — 83735 ASSAY OF MAGNESIUM: CPT

## 2023-04-29 PROCEDURE — 36415 COLL VENOUS BLD VENIPUNCTURE: CPT

## 2023-04-29 PROCEDURE — G0378 HOSPITAL OBSERVATION PER HR: HCPCS

## 2023-04-29 PROCEDURE — 700105 HCHG RX REV CODE 258: Mod: UD | Performed by: NURSE PRACTITIONER

## 2023-04-29 PROCEDURE — 700111 HCHG RX REV CODE 636 W/ 250 OVERRIDE (IP): Mod: UD | Performed by: NURSE PRACTITIONER

## 2023-04-29 PROCEDURE — 82533 TOTAL CORTISOL: CPT

## 2023-04-29 PROCEDURE — 93306 TTE W/DOPPLER COMPLETE: CPT | Mod: 26 | Performed by: INTERNAL MEDICINE

## 2023-04-29 PROCEDURE — 85027 COMPLETE CBC AUTOMATED: CPT

## 2023-04-29 PROCEDURE — 82652 VIT D 1 25-DIHYDROXY: CPT

## 2023-04-29 RX ADMIN — HEPARIN SODIUM 5000 UNITS: 5000 INJECTION, SOLUTION INTRAVENOUS; SUBCUTANEOUS at 05:58

## 2023-04-29 RX ADMIN — SODIUM CHLORIDE, POTASSIUM CHLORIDE, SODIUM LACTATE AND CALCIUM CHLORIDE 1000 ML: 600; 310; 30; 20 INJECTION, SOLUTION INTRAVENOUS at 06:00

## 2023-04-29 ASSESSMENT — ENCOUNTER SYMPTOMS
COUGH: 0
PALPITATIONS: 0
POLYDIPSIA: 0
SPUTUM PRODUCTION: 0
TREMORS: 0
FLANK PAIN: 0
NECK PAIN: 0
ORTHOPNEA: 0
WEIGHT LOSS: 0
CHILLS: 0
HEMOPTYSIS: 0
VOMITING: 0
BRUISES/BLEEDS EASILY: 0
HALLUCINATIONS: 0
HEARTBURN: 0
BLURRED VISION: 0
HEADACHES: 0
NERVOUS/ANXIOUS: 0
PHOTOPHOBIA: 0
NAUSEA: 0
FEVER: 0
LOSS OF CONSCIOUSNESS: 1
FOCAL WEAKNESS: 0
BACK PAIN: 0
SPEECH CHANGE: 0
DOUBLE VISION: 0

## 2023-04-29 ASSESSMENT — COGNITIVE AND FUNCTIONAL STATUS - GENERAL
CLIMB 3 TO 5 STEPS WITH RAILING: A LITTLE
DRESSING REGULAR UPPER BODY CLOTHING: A LITTLE
MOVING TO AND FROM BED TO CHAIR: A LITTLE
SUGGESTED CMS G CODE MODIFIER DAILY ACTIVITY: CJ
MOBILITY SCORE: 18
TOILETING: A LITTLE
WALKING IN HOSPITAL ROOM: A LITTLE
DRESSING REGULAR LOWER BODY CLOTHING: A LITTLE
STANDING UP FROM CHAIR USING ARMS: A LITTLE
TURNING FROM BACK TO SIDE WHILE IN FLAT BAD: A LITTLE
MOVING FROM LYING ON BACK TO SITTING ON SIDE OF FLAT BED: A LITTLE
SUGGESTED CMS G CODE MODIFIER MOBILITY: CK
DAILY ACTIVITIY SCORE: 21

## 2023-04-29 ASSESSMENT — LIFESTYLE VARIABLES: SUBSTANCE_ABUSE: 0

## 2023-04-29 NOTE — ASSESSMENT & PLAN NOTE
Vasovagal versus secondary to hypovolemia.  Will hold Lasix, lisinopril and amlodipine for now  Will evaluate patient with telemetry and transthoracic echo  Obtain orthostatic vitals after rehydration  PT OT evaluation

## 2023-04-29 NOTE — PROGRESS NOTES
Received bedside report from ESTEFANY Ayon, pt care assumed. VS stable, pt assessment complete. Pt A&Ox4, no c/o 0/10 level pain at this time. Pt denies any additional needs at this time. Bed locked and in lowest position, bed alarm on. Call light within reach.

## 2023-04-29 NOTE — DISCHARGE PLANNING
[facility] TCN chart review completed. Collaborated with CARRIE Robles prior to meeting with the pt. The most current review of medical record, knowledge of pt's PLOF and social support, LACE+ score of 16, 6 clicks scores of 18 mobility were considered.      Pt seen at bedside. Introduced TCN program. Provided education regarding post acute levels of care. Discussed [facility] plan benefits (Meds to Beds, medical uber and [facility] transitional care). Pt verbalizes understanding. Pt agreeable to [facility] referral and TCN sent via email.     Pt reports anticipating to dc to home later today with outpatient follow ups and/or [facility] services if indicated post dc. She reports no functional concerns with dc to home and reports to this TCN she feels back to her baseline at this time. She reports no concerns with transportation for dc to home or outpatient follow ups as well.     Given aforementioned, no additional provider requests and no choice needed at this time.     TCN will continue to follow and collaborate with discharge planning team as additional post acute needs arise. Thank you.     Completed today:  Choice obtained: none needed; see above  [facility] referral sent 4/29

## 2023-04-29 NOTE — CARE PLAN
The patient is Stable - Low risk of patient condition declining or worsening    Shift Goals  Clinical Goals: no falls  Patient Goals: no pain    Progress made toward(s) clinical / shift goals:  patient will have no falls during hospital stay. Patient understands to use call light whenever needing assistance.       Problem: Fall Risk  Goal: Patient will remain free from falls  Outcome: Progressing       Patient is not progressing towards the following goals:

## 2023-04-29 NOTE — HOSPITAL COURSE
Sabrina Lobo is a 89 y.o. female with past medical history of hypertension, vertigo, who presented 4/28/2023 with short syncopal episode at home, when patient stands up and suddenly passed out, caught up by patient's son, without fall or head injury.  Denies postictal confusion.  Reported mild chest discomfort after syncope.  Denies shortness of breath or leg swelling.  In ED patient appeared dry, with JEOVANNY creatinine 1.41, BUN 25, mildly elevated troponin 20.  Chest x-ray showed mild interstitial opacities in the right midlung, which seem to be chronic change.  Patient was given IV hydration and currently feeling back to baseline, Sheba was feeling generally weak..  Requested admission for observation

## 2023-04-29 NOTE — ED NOTES
Admitting MD at bedside.    Tele RN at bedside for transfer of pt to T7.    All belongings with pt.

## 2023-04-29 NOTE — ASSESSMENT & PLAN NOTE
It appears patient has history of CKD stage III.    Worsening of kidney function is probably secondary to hypovolemia  We will hold Lasix, lisinopril  Continue IV hydration  Avoid nephrotoxins

## 2023-04-29 NOTE — DISCHARGE SUMMARY
Discharge Summary    CHIEF COMPLAINT ON ADMISSION  Chief Complaint   Patient presents with    Syncope       Reason for Admission  EMS     Admission Date  4/28/2023    CODE STATUS  Full Code    HPI & HOSPITAL COURSE  This is a 89 y.o. female here with syncope  Sabrina Lobo is a 89 y.o. female with past medical history of hypertension, vertigo, who presented 4/28/2023 with short syncopal episode at home, when patient stands up and suddenly passed out, caught up by patient's son, without fall or head injury.  Denies postictal confusion.  Reported mild chest discomfort after syncope.  Denies shortness of breath or leg swelling.  In ED patient appeared dry, with JEOVANNY creatinine 1.41, BUN 25, mildly elevated troponin 20.  Chest x-ray showed mild interstitial opacities in the right midlung, which seem to be chronic change.  Patient was given IV hydration and currently feeling back to baseline, Sheba was feeling generally weak..  Requested admission for observation  Patient had echocardiogram performed which showed moderate aortic regurgitation patient was on 2 home blood pressure medications amlodipine and his lisinopril.  And I have opted to hold patient's amlodipine at present.  Patient will continue on lisinopril.  Informed patient's daughter to continue to monitor blood pressure at the same time every day for at least 7 days and take these information to patient's primary care provider for further adjustment as needed.  Patient is 89 years old discussion was had with patient daughter at bedside regarding goals of care and quality of life.  Patient's daughter is in agreement with providing symptomatic support however she wishes to her mother to have the most enjoyment of the remainder of her life that she has.  I am in the complete support of this plan.  Therefore, she is discharged in good and stable condition to home with close outpatient follow-up.    The patient recovered much more quickly than anticipated on  admission.    Discharge Date  4/29/2023    FOLLOW UP ITEMS POST DISCHARGE  Take all medication as prescribed  Go to all follow-up appointments as indicated    DISCHARGE DIAGNOSES  Principal Problem:    Syncope and collapse POA: Yes  Active Problems:    Essential hypertension POA: Yes    Acute-on-chronic kidney injury (HCC) POA: Unknown    Coronary artery disease involving native coronary artery of native heart without angina pectoris POA: Yes    JEOVANNY (acute kidney injury) (HCC) POA: Yes  Resolved Problems:    * No resolved hospital problems. *      FOLLOW UP    Ash Whiting M.D.  [address] [place] [address]  [phone]    Follow up      Ash Whiting M.D.  [address] [place] [id number] [phone]            MEDICATIONS ON DISCHARGE     Medication List        CONTINUE taking these medications        Instructions   furosemide 40 MG Tabs  Commonly known as: LASIX   Take 40 mg by mouth every morning.  Dose: 40 mg     lisinopril 5 MG Tabs  Commonly known as: PRINIVIL   Take 5 mg by mouth every morning.  Dose: 5 mg     potassium chloride SA 20 MEQ Tbcr  Commonly known as: Kdur   Take 20 mEq by mouth every morning.  Dose: 20 mEq            STOP taking these medications      amLODIPine 5 MG Tabs  Commonly known as: NORVASC              Allergies  No Known Allergies    DIET  Orders Placed This Encounter   Procedures    Diet Order Diet: Regular     Standing Status:   Standing     Number of Occurrences:   1     Order Specific Question:   Diet:     Answer:   Regular [1]       ACTIVITY  As tolerated.  Weight bearing as tolerated    CONSULTATIONS  None    PROCEDURES  None    LABORATORY  Lab Results   Component Value Date    SODIUM 142 04/29/2023    POTASSIUM 4.2 04/29/2023    CHLORIDE 108 04/29/2023    CO2 22 04/29/2023    GLUCOSE 91 04/29/2023    BUN 23 (H) 04/29/2023    CREATININE 1.31 04/29/2023    CREATININE 0.9 01/15/2009        Lab Results   Component Value Date    WBC 4.9 04/29/2023    HEMOGLOBIN 12.7  04/29/2023    HEMATOCRIT 37.4 04/29/2023    PLATELETCT 184 04/29/2023      Please note that this dictation was created using voice recognition software. I have made every reasonable attempt to correct obvious errors, but I expect that there are errors of grammar and possibly context that I did not discover before finalizing the note.    Total time of the discharge process exceeds 35 minutes.

## 2023-04-29 NOTE — PROGRESS NOTES
Received bedside report from RN rica, pt care assumed. VS stable, pt assessment complete. Pt A&Ox4, no c/o 0/10 level pain at this time. POC discussed with pt and verbalizes no questions. Pt denies any additional needs at this time. Bed locked and in lowest position, bed alarm on. Pt educated on fall risk and verbalized understanding, call light within reach, hourly rounding initiated.

## 2023-04-29 NOTE — CARE PLAN
The patient is Stable - Low risk of patient condition declining or worsening    Shift Goals  Clinical Goals: comfort, informed  Patient Goals: rest, informed    Progress made toward(s) clinical / shift goals:  yes    Patient is not progressing towards the following goals:

## 2023-04-30 LAB
BACTERIA UR CULT: NORMAL
SIGNIFICANT IND 70042: NORMAL
SITE SITE: NORMAL
SOURCE SOURCE: NORMAL

## 2023-05-01 LAB — 1,25(OH)2D3 SERPL-MCNC: 49.9 PG/ML (ref 19.9–79.3)

## 2023-06-07 ENCOUNTER — APPOINTMENT (OUTPATIENT)
Dept: RADIOLOGY | Facility: MEDICAL CENTER | Age: 88
End: 2023-06-07
Attending: EMERGENCY MEDICINE
Payer: MEDICARE

## 2023-06-07 ENCOUNTER — HOSPITAL ENCOUNTER (OUTPATIENT)
Facility: MEDICAL CENTER | Age: 88
End: 2023-06-08
Attending: EMERGENCY MEDICINE | Admitting: INTERNAL MEDICINE
Payer: MEDICARE

## 2023-06-07 DIAGNOSIS — I10 ESSENTIAL HYPERTENSION: ICD-10-CM

## 2023-06-07 DIAGNOSIS — F41.9 ANXIETY: ICD-10-CM

## 2023-06-07 DIAGNOSIS — I16.0 HYPERTENSIVE URGENCY: ICD-10-CM

## 2023-06-07 LAB
ALBUMIN SERPL BCP-MCNC: 4.1 G/DL (ref 3.2–4.9)
ALBUMIN/GLOB SERPL: 1.4 G/DL
ALP SERPL-CCNC: 105 U/L (ref 30–99)
ALT SERPL-CCNC: 16 U/L (ref 2–50)
ANION GAP SERPL CALC-SCNC: 13 MMOL/L (ref 7–16)
AST SERPL-CCNC: 20 U/L (ref 12–45)
BASOPHILS # BLD AUTO: 0.8 % (ref 0–1.8)
BASOPHILS # BLD: 0.06 K/UL (ref 0–0.12)
BILIRUB SERPL-MCNC: 0.5 MG/DL (ref 0.1–1.5)
BUN SERPL-MCNC: 13 MG/DL (ref 8–22)
CALCIUM ALBUM COR SERPL-MCNC: 9.1 MG/DL (ref 8.5–10.5)
CALCIUM SERPL-MCNC: 9.2 MG/DL (ref 8.5–10.5)
CHLORIDE SERPL-SCNC: 103 MMOL/L (ref 96–112)
CO2 SERPL-SCNC: 24 MMOL/L (ref 20–33)
CREAT SERPL-MCNC: 1.16 MG/DL (ref 0.5–1.4)
EKG IMPRESSION: NORMAL
EOSINOPHIL # BLD AUTO: 0.15 K/UL (ref 0–0.51)
EOSINOPHIL NFR BLD: 2 % (ref 0–6.9)
ERYTHROCYTE [DISTWIDTH] IN BLOOD BY AUTOMATED COUNT: 43.2 FL (ref 35.9–50)
GFR SERPLBLD CREATININE-BSD FMLA CKD-EPI: 44 ML/MIN/1.73 M 2
GLOBULIN SER CALC-MCNC: 2.9 G/DL (ref 1.9–3.5)
GLUCOSE SERPL-MCNC: 107 MG/DL (ref 65–99)
HCT VFR BLD AUTO: 42.7 % (ref 37–47)
HGB BLD-MCNC: 13.9 G/DL (ref 12–16)
IMM GRANULOCYTES # BLD AUTO: 0.01 K/UL (ref 0–0.11)
IMM GRANULOCYTES NFR BLD AUTO: 0.1 % (ref 0–0.9)
LYMPHOCYTES # BLD AUTO: 1.37 K/UL (ref 1–4.8)
LYMPHOCYTES NFR BLD: 18.4 % (ref 22–41)
MAGNESIUM SERPL-MCNC: 2.2 MG/DL (ref 1.5–2.5)
MCH RBC QN AUTO: 31.2 PG (ref 27–33)
MCHC RBC AUTO-ENTMCNC: 32.6 G/DL (ref 32.2–35.5)
MCV RBC AUTO: 95.7 FL (ref 81.4–97.8)
MONOCYTES # BLD AUTO: 0.45 K/UL (ref 0–0.85)
MONOCYTES NFR BLD AUTO: 6 % (ref 0–13.4)
NEUTROPHILS # BLD AUTO: 5.41 K/UL (ref 1.82–7.42)
NEUTROPHILS NFR BLD: 72.7 % (ref 44–72)
NRBC # BLD AUTO: 0 K/UL
NRBC BLD-RTO: 0 /100 WBC (ref 0–0.2)
PLATELET # BLD AUTO: 197 K/UL (ref 164–446)
PMV BLD AUTO: 9.9 FL (ref 9–12.9)
POTASSIUM SERPL-SCNC: 4.2 MMOL/L (ref 3.6–5.5)
PROT SERPL-MCNC: 7 G/DL (ref 6–8.2)
RBC # BLD AUTO: 4.46 M/UL (ref 4.2–5.4)
SODIUM SERPL-SCNC: 140 MMOL/L (ref 135–145)
TROPONIN T SERPL-MCNC: 22 NG/L (ref 6–19)
TROPONIN T SERPL-MCNC: 24 NG/L (ref 6–19)
TROPONIN T SERPL-MCNC: 28 NG/L (ref 6–19)
WBC # BLD AUTO: 7.5 K/UL (ref 4.8–10.8)

## 2023-06-07 PROCEDURE — 700101 HCHG RX REV CODE 250: Mod: UD | Performed by: INTERNAL MEDICINE

## 2023-06-07 PROCEDURE — 36415 COLL VENOUS BLD VENIPUNCTURE: CPT

## 2023-06-07 PROCEDURE — 70450 CT HEAD/BRAIN W/O DYE: CPT

## 2023-06-07 PROCEDURE — 93005 ELECTROCARDIOGRAM TRACING: CPT | Performed by: EMERGENCY MEDICINE

## 2023-06-07 PROCEDURE — 85025 COMPLETE CBC W/AUTO DIFF WBC: CPT

## 2023-06-07 PROCEDURE — 700102 HCHG RX REV CODE 250 W/ 637 OVERRIDE(OP): Mod: UD | Performed by: INTERNAL MEDICINE

## 2023-06-07 PROCEDURE — 700111 HCHG RX REV CODE 636 W/ 250 OVERRIDE (IP): Mod: UD | Performed by: EMERGENCY MEDICINE

## 2023-06-07 PROCEDURE — A9270 NON-COVERED ITEM OR SERVICE: HCPCS | Mod: UD | Performed by: INTERNAL MEDICINE

## 2023-06-07 PROCEDURE — 96374 THER/PROPH/DIAG INJ IV PUSH: CPT

## 2023-06-07 PROCEDURE — 84484 ASSAY OF TROPONIN QUANT: CPT

## 2023-06-07 PROCEDURE — 96375 TX/PRO/DX INJ NEW DRUG ADDON: CPT

## 2023-06-07 PROCEDURE — 93005 ELECTROCARDIOGRAM TRACING: CPT

## 2023-06-07 PROCEDURE — G0378 HOSPITAL OBSERVATION PER HR: HCPCS

## 2023-06-07 PROCEDURE — 80053 COMPREHEN METABOLIC PANEL: CPT

## 2023-06-07 PROCEDURE — 96376 TX/PRO/DX INJ SAME DRUG ADON: CPT

## 2023-06-07 PROCEDURE — 83735 ASSAY OF MAGNESIUM: CPT

## 2023-06-07 PROCEDURE — 99285 EMERGENCY DEPT VISIT HI MDM: CPT

## 2023-06-07 PROCEDURE — 94760 N-INVAS EAR/PLS OXIMETRY 1: CPT

## 2023-06-07 PROCEDURE — 99223 1ST HOSP IP/OBS HIGH 75: CPT | Mod: AI,FS | Performed by: INTERNAL MEDICINE

## 2023-06-07 PROCEDURE — 71045 X-RAY EXAM CHEST 1 VIEW: CPT

## 2023-06-07 RX ORDER — LISINOPRIL 10 MG/1
5 TABLET ORAL EVERY MORNING
Status: DISCONTINUED | OUTPATIENT
Start: 2023-06-07 | End: 2023-06-07

## 2023-06-07 RX ORDER — BISACODYL 10 MG
10 SUPPOSITORY, RECTAL RECTAL
Status: DISCONTINUED | OUTPATIENT
Start: 2023-06-07 | End: 2023-06-08 | Stop reason: HOSPADM

## 2023-06-07 RX ORDER — LISINOPRIL 10 MG/1
5 TABLET ORAL ONCE
Status: COMPLETED | OUTPATIENT
Start: 2023-06-07 | End: 2023-06-07

## 2023-06-07 RX ORDER — AMOXICILLIN 250 MG
2 CAPSULE ORAL 2 TIMES DAILY
Status: DISCONTINUED | OUTPATIENT
Start: 2023-06-07 | End: 2023-06-08 | Stop reason: HOSPADM

## 2023-06-07 RX ORDER — ONDANSETRON 2 MG/ML
4 INJECTION INTRAMUSCULAR; INTRAVENOUS EVERY 4 HOURS PRN
Status: DISCONTINUED | OUTPATIENT
Start: 2023-06-07 | End: 2023-06-08 | Stop reason: HOSPADM

## 2023-06-07 RX ORDER — ACETAMINOPHEN 325 MG/1
650 TABLET ORAL EVERY 6 HOURS PRN
Status: DISCONTINUED | OUTPATIENT
Start: 2023-06-07 | End: 2023-06-08 | Stop reason: HOSPADM

## 2023-06-07 RX ORDER — AMLODIPINE BESYLATE 5 MG/1
5 TABLET ORAL DAILY
Status: DISCONTINUED | OUTPATIENT
Start: 2023-06-07 | End: 2023-06-08 | Stop reason: HOSPADM

## 2023-06-07 RX ORDER — FUROSEMIDE 40 MG/1
40 TABLET ORAL EVERY MORNING
Status: DISCONTINUED | OUTPATIENT
Start: 2023-06-07 | End: 2023-06-07

## 2023-06-07 RX ORDER — POTASSIUM CHLORIDE 20 MEQ/1
20 TABLET, EXTENDED RELEASE ORAL EVERY MORNING
Status: DISCONTINUED | OUTPATIENT
Start: 2023-06-08 | End: 2023-06-07

## 2023-06-07 RX ORDER — POLYETHYLENE GLYCOL 3350 17 G/17G
1 POWDER, FOR SOLUTION ORAL
Status: DISCONTINUED | OUTPATIENT
Start: 2023-06-07 | End: 2023-06-08 | Stop reason: HOSPADM

## 2023-06-07 RX ORDER — LABETALOL HYDROCHLORIDE 5 MG/ML
10 INJECTION, SOLUTION INTRAVENOUS EVERY 4 HOURS PRN
Status: DISCONTINUED | OUTPATIENT
Start: 2023-06-07 | End: 2023-06-08 | Stop reason: HOSPADM

## 2023-06-07 RX ORDER — HEPARIN SODIUM 5000 [USP'U]/ML
5000 INJECTION, SOLUTION INTRAVENOUS; SUBCUTANEOUS EVERY 8 HOURS
Status: DISCONTINUED | OUTPATIENT
Start: 2023-06-08 | End: 2023-06-08 | Stop reason: HOSPADM

## 2023-06-07 RX ORDER — ENOXAPARIN SODIUM 100 MG/ML
40 INJECTION SUBCUTANEOUS DAILY
Status: DISCONTINUED | OUTPATIENT
Start: 2023-06-07 | End: 2023-06-07

## 2023-06-07 RX ORDER — AMLODIPINE BESYLATE 5 MG/1
5 TABLET ORAL DAILY
COMMUNITY
End: 2023-07-19

## 2023-06-07 RX ORDER — HYDRALAZINE HYDROCHLORIDE 20 MG/ML
10 INJECTION INTRAMUSCULAR; INTRAVENOUS ONCE
Status: COMPLETED | OUTPATIENT
Start: 2023-06-07 | End: 2023-06-07

## 2023-06-07 RX ORDER — ONDANSETRON 4 MG/1
4 TABLET, ORALLY DISINTEGRATING ORAL EVERY 4 HOURS PRN
Status: DISCONTINUED | OUTPATIENT
Start: 2023-06-07 | End: 2023-06-08 | Stop reason: HOSPADM

## 2023-06-07 RX ORDER — LISINOPRIL 10 MG/1
10 TABLET ORAL EVERY MORNING
Status: DISCONTINUED | OUTPATIENT
Start: 2023-06-08 | End: 2023-06-08 | Stop reason: HOSPADM

## 2023-06-07 RX ADMIN — HYDRALAZINE HYDROCHLORIDE 10 MG: 20 INJECTION, SOLUTION INTRAMUSCULAR; INTRAVENOUS at 10:43

## 2023-06-07 RX ADMIN — LABETALOL HYDROCHLORIDE 10 MG: 5 INJECTION INTRAVENOUS at 17:12

## 2023-06-07 RX ADMIN — AMLODIPINE BESYLATE 5 MG: 5 TABLET ORAL at 13:57

## 2023-06-07 RX ADMIN — LISINOPRIL 5 MG: 10 TABLET ORAL at 13:56

## 2023-06-07 RX ADMIN — HYDRALAZINE HYDROCHLORIDE 10 MG: 20 INJECTION, SOLUTION INTRAMUSCULAR; INTRAVENOUS at 12:06

## 2023-06-07 ASSESSMENT — HEART SCORE
HISTORY: SLIGHTLY SUSPICIOUS
RISK FACTORS: 1-2 RISK FACTORS
HEART SCORE: 5
AGE: 65+
ECG: NON-SPECIFIC REPOLARIZATION DISTURBANCE
TROPONIN: 1-3 TIMES NORMAL LIMIT

## 2023-06-07 ASSESSMENT — COPD QUESTIONNAIRES
DO YOU EVER COUGH UP ANY MUCUS OR PHLEGM?: NO/ONLY WITH OCCASIONAL COLDS OR INFECTIONS
COPD SCREENING SCORE: 3
HAVE YOU SMOKED AT LEAST 100 CIGARETTES IN YOUR ENTIRE LIFE: NO/DON'T KNOW
DURING THE PAST 4 WEEKS HOW MUCH DID YOU FEEL SHORT OF BREATH: SOME OF THE TIME

## 2023-06-07 ASSESSMENT — PAIN DESCRIPTION - PAIN TYPE: TYPE: ACUTE PAIN

## 2023-06-07 ASSESSMENT — FIBROSIS 4 INDEX
FIB4 SCORE: 4.44
FIB4 SCORE: 2.39

## 2023-06-07 NOTE — H&P
Hospital Medicine History & Physical Note    Date of Service  6/7/2023    Primary Care Physician  Ash Whiting M.D.    Consultants  None    Patient code Status  Full Code    Chief Complaint  Chief Complaint   Patient presents with    Dizziness     Began this AM. Hypertension. Compliant w/ BP meds. Denies CP/SOB.        History of Presenting Illness  Sabrina Lobo is a 89 y.o. female with hypertension, CKD stage III, who had a recent admission in April 2023 for syncope and at that time her Norvasc was held.  Patient now presented 6/7/2023 with acute onset of dizziness and right shoulder pain which woke her up from sleep.  Her blood pressure was notably significantly elevated in the 200s.  She denies any nausea, vomiting, or chest pain.  She states she was doing well last night when she went to bed.    ED course:  On evaluation, her blood pressure was 223/95.  She had no leukocytosis.  Hemoglobin is stable.  Electrolytes are normal.  Creatinine is stable at 1.16.  Troponin was 22.  Head CT (personally reviewed) did not show any intracranial hemorrhage, and did not show anything acute.  Chest x-ray (my read) showed mild interstitial opacities in the right midlung which was minimally increased from prior.  EKG (personally reviewed and read) did not show any dynamic ischemic changes, with normal sinus rhythm.  Patient was given hydralazine x2 IV which improved her blood pressure into the 190s.  Patient subsequently admitted to the hospitalist service.    I personally reviewed all lab results mentioned above. Prior medical records from this institution and outside facilities were independently reviewed as noted. I also personally reviewed all ER physician and consultant recommendations and plans as documented above. History was independently obtained by myself. I discussed the plan of care with patient, bedside RN, charge RN, , and ERP .    Review of Systems  ROS    Pertinent positives/negatives  as mentioned above.     A complete review of systems was personally done by me. All other systems were negative.       Past Medical History   has a past medical history of H/O bladder repair surgery, H/O: hysterectomy, Hypertension, and Vertigo.    Surgical History   has a past surgical history that includes hysterectomy radical.     Family History  Family history reviewed with patient. There is no family history that is pertinent to the chief complaint.     Social History   reports that she has never smoked. She has never used smokeless tobacco. She reports that she does not drink alcohol and does not use drugs.    Allergies  No Known Allergies    Medications  Prior to Admission Medications   Prescriptions Last Dose Informant Patient Reported? Taking?   amLODIPine (NORVASC) 5 MG Tab 6/7/2023 at am Patient, Patient's Home Pharmacy Yes Yes   Sig: Take 5 mg by mouth every day.   furosemide (LASIX) 40 MG Tab unk at [facility] Patient's Home Pharmacy Yes No   Sig: Take 40 mg by mouth every morning.   lisinopril (PRINIVIL) 5 MG Tab 6/7/2023 at am Patient's Home Pharmacy, Patient Yes No   Sig: Take 5 mg by mouth every morning.   potassium chloride SA (KDUR) 20 MEQ Tab CR unk at [facility] Patient's Home Pharmacy Yes No   Sig: Take 20 mEq by mouth every morning.      Facility-Administered Medications: None       Physical Exam  Temp:  [36.6 °C (97.8 °F)] 36.6 °C (97.8 °F)  Pulse:  [] 109  Resp:  [18-24] 20  BP: (165-223)/(72-95) 165/72  SpO2:  [97 %-99 %] 98 %  Blood Pressure : (!) 192/80   Temperature: 36.6 °C (97.8 °F)   Pulse: 98   Respiration: (!) 24   Pulse Oximetry: 98 %       Physical Exam    Laboratory:  Recent Labs     06/07/23  1035   WBC 7.5   RBC 4.46   HEMOGLOBIN 13.9   HEMATOCRIT 42.7   MCV 95.7   MCH 31.2   MCHC 32.6   RDW 43.2   PLATELETCT 197   MPV 9.9     Recent Labs     06/07/23  1035   SODIUM 140   POTASSIUM 4.2   CHLORIDE 103   CO2 24   GLUCOSE 107*   BUN 13   CREATININE 1.16   CALCIUM 9.2     Recent Labs      06/07/23  1035   ALTSGPT 16   ASTSGOT 20   ALKPHOSPHAT 105*   TBILIRUBIN 0.5   GLUCOSE 107*         No results for input(s): NTPROBNP in the last 72 hours.      Recent Labs     06/07/23  1035   TROPONINT 22*       Imaging:  CT-HEAD W/O   Final Result      1.  No acute intracranial abnormality.   2.  Diffuse cortical atrophy.         DX-CHEST-PORTABLE (1 VIEW)   Final Result      Mild interstitial opacities in the right mid lung, minimally increased from prior, could relate to chronic change. Superimposed edema or atypical infection cannot be excluded.            Imaging studies and EKG results were independently reviewed as above.      Assessment/Plan:  Justification for Admission Status  I anticipate this patient is appropriate for observation status at this time because hypertensive urgency, with need to optimize blood pressure medications.    Patient will need a Telemetry bed on MEDICAL service .  The need is secondary to hypertensive urgency, borderline elevation in troponins..    * Hypertensive urgency- (present on admission)  Assessment & Plan  - Blood pressure running as high as 223/95, with symptoms of dizziness, right shoulder pain.  Notably, she had recent admission during which her Norvasc was discontinued.  No evidence of endorgan damage currently.  -Admit to telemetry.  -Will need to optimize blood pressure control.  I will resume resume the Norvasc 5 mg daily which was discontinued on last admission.  I will increase her lisinopril to 10 mg daily.  Lasix is also listed in her home medications, but with her normal EF I will hold off on diuretics.  Monitor blood pressure trend closely with as needed IV labetalol for significant hypertension.  -Monitor on telemetry.  Troponins only mildly elevated, likely demand ischemia due to hypertensive urgency.  Trend troponins.    CKD (chronic kidney disease), stage III (HCC)- (present on admission)  Assessment & Plan  - Renal function appears to be within her  baseline.  Continue to monitor.  - avoid nephrotoxins, and continue to renally dose all medications.    Essential hypertension- (present on admission)  Assessment & Plan  - Management as above.        VTE prophylaxis: heparin ppx

## 2023-06-07 NOTE — PROGRESS NOTES
Assumed care of patient. Daughter at bedside and patient is very tearful, speaking with siblings on the phone. Both patient and daughter are well situated, with no needs at this time.

## 2023-06-07 NOTE — ED NOTES
Med rec complete per pt and [facility].   Pt did not know medication names. When I re-interviewed Pt, she did not know last time she had Lasix or Potassium.  Allergies reviewed.

## 2023-06-07 NOTE — ASSESSMENT & PLAN NOTE
- Renal function appears to be within her baseline.  Continue to monitor.  - avoid nephrotoxins, and continue to renally dose all medications.

## 2023-06-07 NOTE — ED TRIAGE NOTES
Chief Complaint   Patient presents with    Dizziness     Began this AM. Hypertension. Compliant w/ BP meds. Denies CP/SOB.         BP (!) 223/95   Pulse 80   Temp 36.6 °C (97.8 °F) (Temporal)   Resp 18   Wt 45.4 kg (100 lb)   SpO2 97%   BMI 18.29 kg/m²

## 2023-06-07 NOTE — PROGRESS NOTES
Patient resting in bed at this time. Bed in lowest position and locked with call light within reach. Patient called her daughter and updated family on location. Patient currently anxious; therefore, this RN is unable to complete the admission profile at this time.

## 2023-06-07 NOTE — ASSESSMENT & PLAN NOTE
- Blood pressure running as high as 223/95, with symptoms of dizziness, right shoulder pain.  Notably, she had recent admission during which her Norvasc was discontinued.  No evidence of endorgan damage currently.  -Admit to telemetry.  -Will need to optimize blood pressure control.  I will resume resume the Norvasc 5 mg daily which was discontinued on last admission.  I will increase her lisinopril to 10 mg daily.  Lasix is also listed in her home medications, but with her normal EF I will hold off on diuretics.  Monitor blood pressure trend closely with as needed IV labetalol for significant hypertension.  -Monitor on telemetry.  Troponins only mildly elevated, likely demand ischemia due to hypertensive urgency.  Trend troponins.

## 2023-06-07 NOTE — ED NOTES
Pt taken to inpatient room with Floor RN on cardiac monitor on spike, pt left ED GCS 15. Pt belongings with floor RN.

## 2023-06-07 NOTE — ED PROVIDER NOTES
ED Provider Note    CHIEF COMPLAINT  Chief Complaint   Patient presents with    Dizziness     Began this AM. Hypertension. Compliant w/ BP meds. Denies CP/SOB.        EXTERNAL RECORDS REVIEWED  Inpatient Notes the patient was admitted to the hospital 4/28/2023 to 4/29/2023 after having a syncopal episode she had an echocardiogram performed at that time of admission showed moderate aortic regurgitation.  She was taken off her amlodipine and was told to continue lisinopril and rehydrated.    HPI/ROS  LIMITATION TO HISTORY   Select: Language [place]an,  Used   OUTSIDE HISTORIAN(S):  Family spoke with the patient's daughter who states that her blood pressure has not been under good control the last couple of days    Sabrina Lobo is a 89 y.o. female who presents by ambulance complaining of dizziness right shoulder pain and elevated blood pressure that started this morning.  She states she did take her blood pressure medication this morning.  She is feels slightly dizzy with a mild headache.  She denies any pain in her chest or shortness of breath but has had a lot of pain in her right shoulder for the last 3 hours.  She has not had any previous falls or injury to her shoulder.  She denies any loss of  strength.  She has no leg swelling no abdominal pain no coughing or fevers or cold symptoms.    PAST MEDICAL HISTORY   has a past medical history of H/O bladder repair surgery, H/O: hysterectomy, Hypertension, and Vertigo.    SURGICAL HISTORY   has a past surgical history that includes hysterectomy radical.    FAMILY HISTORY  Family History   Problem Relation Age of Onset    Heart Disease Neg Hx     Hypertension Neg Hx     Hyperlipidemia Neg Hx     Psychiatric Illness Neg Hx     Diabetes Neg Hx        SOCIAL HISTORY  Social History     Tobacco Use    Smoking status: Never    Smokeless tobacco: Never   Vaping Use    Vaping Use: Never used   Substance and Sexual Activity    Alcohol use: No    Drug use:  No    Sexual activity: Not on file       CURRENT MEDICATIONS  Home Medications       Reviewed by Jenifer Henderson (Pharmacy Tech) on 06/07/23 at 1106  Med List Status: Complete     Medication Last Dose Status   amLODIPine (NORVASC) 5 MG Tab 6/7/2023 Active   furosemide (LASIX) 40 MG Tab unk Active   lisinopril (PRINIVIL) 5 MG Tab 6/7/2023 Active   potassium chloride SA (KDUR) 20 MEQ Tab CR unk Active                    ALLERGIES  No Known Allergies    PHYSICAL EXAM  VITAL SIGNS: BP (!) 223/95   Pulse 80   Temp 36.6 °C (97.8 °F) (Temporal)   Resp 18   Wt 45.4 kg (100 lb)   SpO2 97%   BMI 18.29 kg/m²      Constitutional: Well developed, Well nourished, No acute distress, Non-toxic appearance.   HEENT: Normocephalic, Atraumatic,  external ears normal, pharynx pink,  Mucous  Membranes moist, No rhinorrhea or mucosal edema  Eyes: PERRL, EOMI, Conjunctiva normal, No discharge.   Neck: Normal range of motion, No tenderness, Supple, No stridor.  No C-spine tenderness to palpation no step-offs or crepitance  Lymphatic: No lymphadenopathy    Cardiovascular: Regular Rate and Rhythm, No murmurs,  rubs, or gallops.   Thorax & Lungs: Lungs clear to auscultation bilaterally, No respiratory distress, No wheezes, rhales or rhonchi, No chest wall tenderness.   Abdomen: Bowel sounds normal, Soft, non tender, non distended,  No pulsatile masses., no rebound guarding or peritoneal signs.   Skin: Warm, Dry, No erythema, No rash,   Back:  No CVA tenderness,  No spinal tenderness, bony crepitance step offs or instability.   Extremities: Equal, intact distal pulses, No cyanosis, clubbing or edema,  No tenderness.  Numbness to the right trapezius muscle and shoulder area without bony step-offs or crepitance  Musculoskeletal: Good range of motion in all major joints. No tenderness to palpation or major deformities noted.   Neurologic: Alert & oriented x 3, Cranial nerves II-XII intact, Equal strength and sensation upper and lower  extremities bilaterally,  No focal deficits noted.  NIH is 0  Psychiatric: Affect normal, Judgment normal, Mood normal.       DIAGNOSTIC STUDIES / PROCEDURES  EKG  I have independently interpreted this EKG  See below    LABS  Results for orders placed or performed during the hospital encounter of 06/07/23   CBC WITH DIFFERENTIAL   Result Value Ref Range    WBC 7.5 4.8 - 10.8 K/uL    RBC 4.46 4.20 - 5.40 M/uL    Hemoglobin 13.9 12.0 - 16.0 g/dL    Hematocrit 42.7 37.0 - 47.0 %    MCV 95.7 81.4 - 97.8 fL    MCH 31.2 27.0 - 33.0 pg    MCHC 32.6 32.2 - 35.5 g/dL    RDW 43.2 35.9 - 50.0 fL    Platelet Count 197 164 - 446 K/uL    MPV 9.9 9.0 - 12.9 fL    Neutrophils-Polys 72.70 (H) 44.00 - 72.00 %    Lymphocytes 18.40 (L) 22.00 - 41.00 %    Monocytes 6.00 0.00 - 13.40 %    Eosinophils 2.00 0.00 - 6.90 %    Basophils 0.80 0.00 - 1.80 %    Immature Granulocytes 0.10 0.00 - 0.90 %    Nucleated RBC 0.00 0.00 - 0.20 /100 WBC    Neutrophils (Absolute) 5.41 1.82 - 7.42 K/uL    Lymphs (Absolute) 1.37 1.00 - 4.80 K/uL    Monos (Absolute) 0.45 0.00 - 0.85 K/uL    Eos (Absolute) 0.15 0.00 - 0.51 K/uL    Baso (Absolute) 0.06 0.00 - 0.12 K/uL    Immature Granulocytes (abs) 0.01 0.00 - 0.11 K/uL    NRBC (Absolute) 0.00 K/uL   COMP METABOLIC PANEL   Result Value Ref Range    Sodium 140 135 - 145 mmol/L    Potassium 4.2 3.6 - 5.5 mmol/L    Chloride 103 96 - 112 mmol/L    Co2 24 20 - 33 mmol/L    Anion Gap 13.0 7.0 - 16.0    Glucose 107 (H) 65 - 99 mg/dL    Bun 13 8 - 22 mg/dL    Creatinine 1.16 0.50 - 1.40 mg/dL    Calcium 9.2 8.5 - 10.5 mg/dL    AST(SGOT) 20 12 - 45 U/L    ALT(SGPT) 16 2 - 50 U/L    Alkaline Phosphatase 105 (H) 30 - 99 U/L    Total Bilirubin 0.5 0.1 - 1.5 mg/dL    Albumin 4.1 3.2 - 4.9 g/dL    Total Protein 7.0 6.0 - 8.2 g/dL    Globulin 2.9 1.9 - 3.5 g/dL    A-G Ratio 1.4 g/dL   TROPONIN   Result Value Ref Range    Troponin T 22 (H) 6 - 19 ng/L   CORRECTED CALCIUM   Result Value Ref Range    Correct Calcium 9.1 8.5 -  10.5 mg/dL   ESTIMATED GFR   Result Value Ref Range    GFR (CKD-EPI) 44 (A) >60 mL/min/1.73 m 2   EKG   Result Value Ref Range    Report       [facility]    Test Date:  2023  Pt Name:    EMILY HONG                 Department: ER  MRN:        [id number]                      Room:       [facility]  Gender:     Female                       Technician: [id number]  :        [id number]                   Requested By:ER TRIAGE PROTOCOL  Order #:    [id number]                    Reading MD: TYSHAWN ZAMUDIO MD    Measurements  Intervals                                Axis  Rate:       78                           P:          36  OR:         237                          QRS:        102  QRSD:       82                           T:          12  QT:         399  QTc:        455    Interpretive Statements  Sinus rhythm  Prolonged OR interval  Right axis deviation  Compared to ECG 2023 12:49:20  Right-axis deviation now present  Myocardial infarct finding no longer present  Electronically Signed On 2023 10:42:41 PDT by TYSHAWN ZAMUDIO MD          RADIOLOGY  I have independently interpreted the diagnostic imaging associated with this visit and am waiting the final reading from the radiologist.   My preliminary interpretation is as follows: Chest x-ray interstitial edema chronic  Radiologist interpretation:   CT-HEAD W/O   Final Result      1.  No acute intracranial abnormality.   2.  Diffuse cortical atrophy.         DX-CHEST-PORTABLE (1 VIEW)   Final Result      Mild interstitial opacities in the right mid lung, minimally increased from prior, could relate to chronic change. Superimposed edema or atypical infection cannot be excluded.            COURSE & MEDICAL DECISION MAKING    ED Observation Status? Yes; I am placing the patient in to an observation status due to a diagnostic uncertainty as well as therapeutic intensity. Patient placed in observation status at 10:30 AM, 2023.      Observation plan is as follows: CT head, chest x-ray, EKG and labs    Upon Reevaluation, the patient's condition has: not improved; and will be escalated to hospitalization.    Patient discharged from ED Observation status at 12:38 PM  (Time) 6/7/23 (Date).     INITIAL ASSESSMENT, COURSE AND PLAN  Care Narrative: Sabrina Lobo is a 89 y.o. female who presents by ambulance complaining of dizziness right shoulder pain and elevated blood pressure that started this morning.  She states she did take her blood pressure medication this morning.  She is feels slightly dizzy with a mild headache.  She denies any pain in her chest or shortness of breath but has had a lot of pain in her right shoulder for the last 3 hours.  She has not had any previous falls or injury to her shoulder.  She denies any loss of  strength.  She has no leg swelling no abdominal pain no coughing or fevers or cold symptoms.  On physical exam she has no JVD and no lower extremity edema.  She is neurologically intact with an NIH of 0 and she has not hypoxic and her lungs are clear heart is regular rate and rhythm.  At this time I ordered a CT of the head EKG chest x-ray and troponin as well as CBC CMP to further evaluate her symptoms.  I gave her a dose of hydralazine to treat her elevated blood pressure.    Differential diagnosis: Hypertensive urgency, intracranial hemorrhage, TIA, electrolyte disturbance, hypertensive response from shoulder arthritis, radicular pain        ADDITIONAL PROBLEM LIST  Hypertension  Vertigo  Aortic regurgitation  Chronic kidney disease  Coronary artery disease  DISPOSITION AND DISCUSSIONS  The patient's comprehensive metabolic panel has a slightly elevated glucose of 107 but normal kidney function normal electrolytes and normal liver function test.  CBC is normal with a normal white count no evidence of anemia or low platelets.  Her differential is normal.  Troponin is slightly evaded at 22 but this is at  the patient's baseline.  Her EKG does not show ischemic changes.  The patient's chest x-ray shows chronic interstitial edema and her CT head does not show intracranial hemorrhage or mass effect.    I gave the patient IV hydralazine but her blood pressure only went down to 201/83.  I will give her another dose.  After repeat dose of hydralazine her blood pressure is still elevated at 192/80.  The patient is still feeling uncomfortable and is now more anxious.  I explained to her and her daughter over the phone that I would admit her to the CDU for blood pressure management and further care.  I have discussed management of the patient with the following physicians and CIPRIANO's: Hospitalist Dr. De La Vega who will admit the patient to the CDU for blood pressure management    Discussion of management with other QHP or appropriate source(s): None     Escalation of care considered, and ultimately not performed:none    Barriers to care at this time, including but not limited to:  none .     Decision tools and prescription drugs considered including, but not limited to: NIH Stroke Scale 0 and HEART Score 5 .  The patient will be admitted in guarded condition  FINAL DIAGNOSIS  1. Hypertensive urgency    2. Anxiety           Electronically signed by: Gricel Robins M.D., 6/7/2023 10:26 AM

## 2023-06-08 ENCOUNTER — PHARMACY VISIT (OUTPATIENT)
Dept: PHARMACY | Facility: MEDICAL CENTER | Age: 88
End: 2023-06-08
Payer: COMMERCIAL

## 2023-06-08 VITALS
TEMPERATURE: 98.2 F | SYSTOLIC BLOOD PRESSURE: 141 MMHG | WEIGHT: 104.94 LBS | HEIGHT: 62 IN | BODY MASS INDEX: 19.31 KG/M2 | HEART RATE: 66 BPM | OXYGEN SATURATION: 93 % | DIASTOLIC BLOOD PRESSURE: 65 MMHG | RESPIRATION RATE: 16 BRPM

## 2023-06-08 PROBLEM — I16.0 HYPERTENSIVE URGENCY: Status: RESOLVED | Noted: 2023-06-07 | Resolved: 2023-06-08

## 2023-06-08 LAB
ANION GAP SERPL CALC-SCNC: 12 MMOL/L (ref 7–16)
BASOPHILS # BLD AUTO: 1.1 % (ref 0–1.8)
BASOPHILS # BLD: 0.08 K/UL (ref 0–0.12)
BUN SERPL-MCNC: 13 MG/DL (ref 8–22)
CALCIUM SERPL-MCNC: 8.9 MG/DL (ref 8.5–10.5)
CHLORIDE SERPL-SCNC: 108 MMOL/L (ref 96–112)
CO2 SERPL-SCNC: 23 MMOL/L (ref 20–33)
CREAT SERPL-MCNC: 1.09 MG/DL (ref 0.5–1.4)
EOSINOPHIL # BLD AUTO: 0.29 K/UL (ref 0–0.51)
EOSINOPHIL NFR BLD: 3.9 % (ref 0–6.9)
ERYTHROCYTE [DISTWIDTH] IN BLOOD BY AUTOMATED COUNT: 43.8 FL (ref 35.9–50)
GFR SERPLBLD CREATININE-BSD FMLA CKD-EPI: 47 ML/MIN/1.73 M 2
GLUCOSE SERPL-MCNC: 99 MG/DL (ref 65–99)
HCT VFR BLD AUTO: 41 % (ref 37–47)
HGB BLD-MCNC: 13.2 G/DL (ref 12–16)
IMM GRANULOCYTES # BLD AUTO: 0.01 K/UL (ref 0–0.11)
IMM GRANULOCYTES NFR BLD AUTO: 0.1 % (ref 0–0.9)
LYMPHOCYTES # BLD AUTO: 2.06 K/UL (ref 1–4.8)
LYMPHOCYTES NFR BLD: 27.9 % (ref 22–41)
MCH RBC QN AUTO: 30.9 PG (ref 27–33)
MCHC RBC AUTO-ENTMCNC: 32.2 G/DL (ref 32.2–35.5)
MCV RBC AUTO: 96 FL (ref 81.4–97.8)
MONOCYTES # BLD AUTO: 0.67 K/UL (ref 0–0.85)
MONOCYTES NFR BLD AUTO: 9.1 % (ref 0–13.4)
NEUTROPHILS # BLD AUTO: 4.28 K/UL (ref 1.82–7.42)
NEUTROPHILS NFR BLD: 57.9 % (ref 44–72)
NRBC # BLD AUTO: 0 K/UL
NRBC BLD-RTO: 0 /100 WBC (ref 0–0.2)
PLATELET # BLD AUTO: 211 K/UL (ref 164–446)
PMV BLD AUTO: 10.3 FL (ref 9–12.9)
POTASSIUM SERPL-SCNC: 3.8 MMOL/L (ref 3.6–5.5)
RBC # BLD AUTO: 4.27 M/UL (ref 4.2–5.4)
SODIUM SERPL-SCNC: 143 MMOL/L (ref 135–145)
WBC # BLD AUTO: 7.4 K/UL (ref 4.8–10.8)

## 2023-06-08 PROCEDURE — 700111 HCHG RX REV CODE 636 W/ 250 OVERRIDE (IP): Mod: UD | Performed by: INTERNAL MEDICINE

## 2023-06-08 PROCEDURE — 700102 HCHG RX REV CODE 250 W/ 637 OVERRIDE(OP): Mod: UD | Performed by: INTERNAL MEDICINE

## 2023-06-08 PROCEDURE — G0378 HOSPITAL OBSERVATION PER HR: HCPCS

## 2023-06-08 PROCEDURE — 85025 COMPLETE CBC W/AUTO DIFF WBC: CPT

## 2023-06-08 PROCEDURE — RXMED WILLOW AMBULATORY MEDICATION CHARGE: Performed by: NURSE PRACTITIONER

## 2023-06-08 PROCEDURE — 80048 BASIC METABOLIC PNL TOTAL CA: CPT

## 2023-06-08 PROCEDURE — 96372 THER/PROPH/DIAG INJ SC/IM: CPT

## 2023-06-08 PROCEDURE — 99239 HOSP IP/OBS DSCHRG MGMT >30: CPT | Mod: FS | Performed by: INTERNAL MEDICINE

## 2023-06-08 PROCEDURE — A9270 NON-COVERED ITEM OR SERVICE: HCPCS | Mod: UD | Performed by: INTERNAL MEDICINE

## 2023-06-08 RX ORDER — LISINOPRIL 10 MG/1
10 TABLET ORAL EVERY MORNING
Qty: 30 TABLET | Refills: 0 | Status: SHIPPED | OUTPATIENT
Start: 2023-06-09 | End: 2023-07-09

## 2023-06-08 RX ADMIN — LISINOPRIL 10 MG: 10 TABLET ORAL at 05:59

## 2023-06-08 RX ADMIN — HEPARIN SODIUM 5000 UNITS: 5000 INJECTION, SOLUTION INTRAVENOUS; SUBCUTANEOUS at 06:00

## 2023-06-08 RX ADMIN — AMLODIPINE BESYLATE 5 MG: 5 TABLET ORAL at 06:00

## 2023-06-08 RX ADMIN — DOCUSATE SODIUM 50 MG AND SENNOSIDES 8.6 MG 2 TABLET: 8.6; 5 TABLET, FILM COATED ORAL at 06:00

## 2023-06-08 ASSESSMENT — PATIENT HEALTH QUESTIONNAIRE - PHQ9
SUM OF ALL RESPONSES TO PHQ9 QUESTIONS 1 AND 2: 1
7. TROUBLE CONCENTRATING ON THINGS, SUCH AS READING THE NEWSPAPER OR WATCHING TELEVISION: NOT AT ALL
9. THOUGHTS THAT YOU WOULD BE BETTER OFF DEAD, OR OF HURTING YOURSELF: NOT AT ALL
SUM OF ALL RESPONSES TO PHQ QUESTIONS 1-9: 2
4. FEELING TIRED OR HAVING LITTLE ENERGY: NOT AT ALL
3. TROUBLE FALLING OR STAYING ASLEEP OR SLEEPING TOO MUCH: NOT AT ALL
1. LITTLE INTEREST OR PLEASURE IN DOING THINGS: NOT AT ALL
5. POOR APPETITE OR OVEREATING: NOT AT ALL
8. MOVING OR SPEAKING SO SLOWLY THAT OTHER PEOPLE COULD HAVE NOTICED. OR THE OPPOSITE, BEING SO FIGETY OR RESTLESS THAT YOU HAVE BEEN MOVING AROUND A LOT MORE THAN USUAL: NOT AT ALL
6. FEELING BAD ABOUT YOURSELF - OR THAT YOU ARE A FAILURE OR HAVE LET YOURSELF OR YOUR FAMILY DOWN: SEVERAL DAYS
2. FEELING DOWN, DEPRESSED, IRRITABLE, OR HOPELESS: SEVERAL DAYS

## 2023-06-08 ASSESSMENT — LIFESTYLE VARIABLES
DOES PATIENT WANT TO STOP DRINKING: NO
ALCOHOL_USE: NO
AVERAGE NUMBER OF DAYS PER WEEK YOU HAVE A DRINK CONTAINING ALCOHOL: 0
CONSUMPTION TOTAL: NEGATIVE
TOTAL SCORE: 0
TOTAL SCORE: 0
HAVE YOU EVER FELT YOU SHOULD CUT DOWN ON YOUR DRINKING: NO
TOTAL SCORE: 0
EVER FELT BAD OR GUILTY ABOUT YOUR DRINKING: NO
ON A TYPICAL DAY WHEN YOU DRINK ALCOHOL HOW MANY DRINKS DO YOU HAVE: 0
HAVE PEOPLE ANNOYED YOU BY CRITICIZING YOUR DRINKING: NO
HOW MANY TIMES IN THE PAST YEAR HAVE YOU HAD 5 OR MORE DRINKS IN A DAY: 0
EVER HAD A DRINK FIRST THING IN THE MORNING TO STEADY YOUR NERVES TO GET RID OF A HANGOVER: NO

## 2023-06-08 NOTE — HOSPITAL COURSE
Ms. Sabrina Lobo is a 89 y.o. female with hypertension, CKD stage III, who had a recent admission in April 2023 for syncope and at that time her Norvasc was held.  Patient now presented 6/7/2023 with acute onset of dizziness and right shoulder pain which woke her up from sleep.      Patient noted blood pressure was notably significantly elevated in the 200s.  She denies any nausea, vomiting, or chest pain.  She states she was doing well last night when she went to bed.     In ER, her blood pressure was 223/95.  She had no leukocytosis.  Hemoglobin is stable.  Electrolytes are normal.  Creatinine is stable at 1.16.  Troponin was 22.  Head CT did not show any intracranial hemorrhage, and did not show anything acute.  Chest x-ray showed mild interstitial opacities in the right midlung which was minimally increased from prior.  EKG (personally reviewed and read) did not show any dynamic ischemic changes, with normal sinus rhythm.  Patient was given hydralazine x2 IV which improved her blood pressure into the 190s.  Patient subsequently admitted to the hospitalist service.    Patient monitored overnight with no events noted.  Adjustment in her blood pressure medication was made by admitting provider.  Patient restarted back on amlodipine 5 mg daily, increase lisinopril to 10 mg daily.  We will discontinue Lasix 40 mg along with potassium replacement as patient's previous admission noted syncope.  Discussed with patient and family member over the phone, daughter Bernadette regarding these changes.  All questions and concerns answered prior to being discharged.  Patient discharged home.

## 2023-06-08 NOTE — DISCHARGE PLANNING
TCN following. HTH/SCP chart review completed. Note that pt has already dc'd to home prior to TCN ability to visit with pt. Discussed with OPAL Reaves and APRN. Note that pt was visited by this TCN prior admission with [facility] referral sent (note that does not appear pt obtained [facility] services post prior admission). Given prior discussion with pt and discussion with APRN, pt would likely benefit from [facility] referral for assistance with med reconciliation and TCN sent referral/update via email. Pt appeared to be at functional baseline per review/discussion with team as well. No additional TCN needs as pt has dc'd to home.     Completed:  [facility] referral sent 6/8 (see above)

## 2023-06-08 NOTE — DISCHARGE INSTRUCTIONS
Discharge Instructions    Discharged to home by car with relative. Discharged via wheelchair, hospital escort: Yes.  Special equipment needed: Not Applicable    Be sure to schedule a follow-up appointment with your primary care doctor or any specialists as instructed.     Discharge Plan:   Diet Plan: Discussed  Activity Level: Discussed  Confirmed Symptoms Management: Discussed  Medication Reconciliation Updated: Yes    I understand that a diet low in cholesterol, fat, and sodium is recommended for good health. Unless I have been given specific instructions below for another diet, I accept this instruction as my diet prescription.   Other diet: cardiac      Special Instructions: None    -Is this patient being discharged with medication to prevent blood clots?  No    Is patient discharged on Warfarin / Coumadin?   No     Hypertension, Adult  Hypertension is another name for high blood pressure. High blood pressure forces your heart to work harder to pump blood. This can cause problems over time.  There are two numbers in a blood pressure reading. There is a top number (systolic) over a bottom number (diastolic). It is best to have a blood pressure that is below 120/80. Healthy choices can help lower your blood pressure, or you may need medicine to help lower it.  What are the causes?  The cause of this condition is not known. Some conditions may be related to high blood pressure.  What increases the risk?  Smoking.  Having type 2 diabetes mellitus, high cholesterol, or both.  Not getting enough exercise or physical activity.  Being overweight.  Having too much fat, sugar, calories, or salt (sodium) in your diet.  Drinking too much alcohol.  Having long-term (chronic) kidney disease.  Having a family history of high blood pressure.  Age. Risk increases with age.  Race. You may be at higher risk if you are .  Gender. Men are at higher risk than women before age 45. After age 65, women are at higher risk  than men.  Having obstructive sleep apnea.  Stress.  What are the signs or symptoms?  High blood pressure may not cause symptoms. Very high blood pressure (hypertensive crisis) may cause:  Headache.  Feelings of worry or nervousness (anxiety).  Shortness of breath.  Nosebleed.  A feeling of being sick to your stomach (nausea).  Throwing up (vomiting).  Changes in how you see.  Very bad chest pain.  Seizures.  How is this treated?  This condition is treated by making healthy lifestyle changes, such as:  Eating healthy foods.  Exercising more.  Drinking less alcohol.  Your health care provider may prescribe medicine if lifestyle changes are not enough to get your blood pressure under control, and if:  Your top number is above 130.  Your bottom number is above 80.  Your personal target blood pressure may vary.  Follow these instructions at home:  Eating and drinking    If told, follow the DASH eating plan. To follow this plan:  Fill one half of your plate at each meal with fruits and vegetables.  Fill one fourth of your plate at each meal with whole grains. Whole grains include whole-wheat pasta, brown rice, and whole-grain bread.  Eat or drink low-fat dairy products, such as skim milk or low-fat yogurt.  Fill one fourth of your plate at each meal with low-fat (lean) proteins. Low-fat proteins include fish, chicken without skin, eggs, beans, and tofu.  Avoid fatty meat, cured and processed meat, or chicken with skin.  Avoid pre-made or processed food.  Eat less than 1,500 mg of salt each day.  Do not drink alcohol if:  Your doctor tells you not to drink.  You are pregnant, may be pregnant, or are planning to become pregnant.  If you drink alcohol:  Limit how much you use to:  0-1 drink a day for women.  0-2 drinks a day for men.  Be aware of how much alcohol is in your drink. In the [place] one drink equals one 12 oz bottle of beer (355 mL), one 5 oz glass of wine (148 mL), or one 1½ oz glass of hard liquor (44  mL).  Lifestyle    Work with your doctor to stay at a healthy weight or to lose weight. Ask your doctor what the best weight is for you.  Get at least 30 minutes of exercise most days of the week. This may include walking, swimming, or biking.  Get at least 30 minutes of exercise that strengthens your muscles (resistance exercise) at least 3 days a week. This may include lifting weights or doing Pilates.  Do not use any products that contain nicotine or tobacco, such as cigarettes, e-cigarettes, and chewing tobacco. If you need help quitting, ask your doctor.  Check your blood pressure at home as told by your doctor.  Keep all follow-up visits as told by your doctor. This is important.  Medicines  Take over-the-counter and prescription medicines only as told by your doctor. Follow directions carefully.  Do not skip doses of blood pressure medicine. The medicine does not work as well if you skip doses. Skipping doses also puts you at risk for problems.  Ask your doctor about side effects or reactions to medicines that you should watch for.  Contact a doctor if you:  Think you are having a reaction to the medicine you are taking.  Have headaches that keep coming back (recurring).  Feel dizzy.  Have swelling in your ankles.  Have trouble with your vision.  Get help right away if you:  Get a very bad headache.  Start to feel mixed up (confused).  Feel weak or numb.  Feel faint.  Have very bad pain in your:  Chest.  Belly (abdomen).  Throw up more than once.  Have trouble breathing.  Summary  Hypertension is another name for high blood pressure.  High blood pressure forces your heart to work harder to pump blood.  For most people, a normal blood pressure is less than 120/80.  Making healthy choices can help lower blood pressure. If your blood pressure does not get lower with healthy choices, you may need to take medicine.  This information is not intended to replace advice given to you by your health care provider. Make  sure you discuss any questions you have with your health care provider.  Document Released: 06/05/2009 Document Revised: 08/28/2019 Document Reviewed: 08/28/2019  [employer] Patient Education © 2020 [employer]     FOLLOW UP ITEMS POST DISCHARGE  Please call [phone] to schedule PCP appointment for patient.    Required specialty appointments include:         Discharge Instructions per LUCERO CortezRMauraNMaura     -Follow-up with PCP s/p hospitalization  -Start taking amlodipine 5 mg daily along with increased dose of lisinopril 10 mg daily  -Stop taking Lasix 40 mg along with potassium replacement  -Continue all other home medications  -Continue to monitor blood pressure twice daily morning and night     DIET: Cardiac, renal     ACTIVITY: As tolerated     DIAGNOSIS: Dizziness, elevated blood pressure     Return to ER if symptoms persist, chest pain, palpitations, shortness of breath, numbness, tingling, weakness, and high fevers.

## 2023-06-08 NOTE — PROGRESS NOTES
4 Eyes Skin Assessment Completed by ESTEFANY Zapata and ESTEFANY Garcia.    Head WDL  Ears WDL  Nose WDL  Mouth WDL  Neck WDL  Breast/Chest WDL  Shoulder Blades WDL  Spine WDL  (R) Arm/Elbow/Hand WDL  (L) Arm/Elbow/Hand WDL  Abdomen WDL  Groin WDL  Scrotum/Coccyx/Buttocks Redness and Blanching  (R) Leg WDL  (L) Leg WDL  (R) Heel/Foot/Toe WDL  (L) Heel/Foot/Toe WDL          Devices In Places Tele Box, Blood Pressure Cuff, and Pulse Ox      Interventions In Place Pillows    Possible Skin Injury No    Pictures Uploaded Into Epic N/A  Wound Consult Placed N/A  RN Wound Prevention Protocol Ordered No

## 2023-06-08 NOTE — PROGRESS NOTES
DC instructions reviewed w/ pt, verbalized understanding. Instructions given in Tagalog. Meds to bed pending. No piv on arrival to [facility]. Armband removed.

## 2023-06-08 NOTE — CARE PLAN
The patient is Stable - Low risk of patient condition declining or worsening    Shift Goals  Clinical Goals: monitor BP  Patient Goals: rest  Family Goals: NA    Progress made toward(s) clinical / shift goals:  patient states understanding of POC.  Continued education.    Patient is not progressing towards the following goals:

## 2023-06-08 NOTE — DISCHARGE SUMMARY
Discharge Summary    CHIEF COMPLAINT ON ADMISSION  Chief Complaint   Patient presents with    Dizziness     Began this AM. Hypertension. Compliant w/ BP meds. Denies CP/SOB.        Reason for Admission  EMS     Admission Date  6/7/2023    CODE STATUS  Full Code    HPI & HOSPITAL COURSE    Ms. Sabrina Lobo is a 89 y.o. female with hypertension, CKD stage III, who had a recent admission in April 2023 for syncope and at that time her Norvasc was held.  Patient now presented 6/7/2023 with acute onset of dizziness and right shoulder pain which woke her up from sleep.      Patient noted blood pressure was notably significantly elevated in the 200s.  She denies any nausea, vomiting, or chest pain.  She states she was doing well last night when she went to bed.     In ER, her blood pressure was 223/95.  She had no leukocytosis.  Hemoglobin is stable.  Electrolytes are normal.  Creatinine is stable at 1.16.  Troponin was 22.  Head CT did not show any intracranial hemorrhage, and did not show anything acute.  Chest x-ray showed mild interstitial opacities in the right midlung which was minimally increased from prior.  EKG (personally reviewed and read) did not show any dynamic ischemic changes, with normal sinus rhythm.  Patient was given hydralazine x2 IV which improved her blood pressure into the 190s.  Patient subsequently admitted to the hospitalist service.    Patient monitored overnight with no events noted.  Adjustment in her blood pressure medication was made by admitting provider.  Patient restarted back on amlodipine 5 mg daily, increase lisinopril to 10 mg daily.  We will discontinue Lasix 40 mg along with potassium replacement as patient's previous admission noted syncope.  Discussed with patient and family member over the phone, daughter Bernadette regarding these changes.  All questions and concerns answered prior to being discharged.  Patient discharged home.         Therefore, she is discharged in good and  stable condition to home with close outpatient follow-up.    The patient recovered much more quickly than anticipated on admission.    Discharge Date  06/08/23      FOLLOW UP ITEMS POST DISCHARGE  Please call [phone] to schedule PCP appointment for patient.    Required specialty appointments include:       Discharge Instructions per SUKHDEV Cortez    -Follow-up with PCP s/p hospitalization  -Start taking amlodipine 5 mg daily along with increased dose of lisinopril 10 mg daily  -Stop taking Lasix 40 mg along with potassium replacement  -Continue all other home medications  -Continue to monitor blood pressure twice daily morning and night    DIET: Cardiac, renal    ACTIVITY: As tolerated    DIAGNOSIS: Dizziness, elevated blood pressure    Return to ER if symptoms persist, chest pain, palpitations, shortness of breath, numbness, tingling, weakness, and high fevers.      DISCHARGE DIAGNOSES  Principal Problem (Resolved):    Hypertensive urgency (POA: Yes)  Active Problems:    Essential hypertension (POA: Yes)    CKD (chronic kidney disease), stage III (HCC) (POA: Yes)      FOLLOW UP  No future appointments.  Ash Whiting M.D.  [address] [place] [address] [address] [place] [address]  [phone]    Schedule an appointment as soon as possible for a visit in 1 week(s)        MEDICATIONS ON DISCHARGE     Medication List        CHANGE how you take these medications        Instructions   lisinopril 10 MG Tabs  Start taking on: June 9, 2023  What changed:   medication strength  how much to take  Commonly known as: PRINIVIL   Take 1 Tablet by mouth every morning for 30 days.  Dose: 10 mg            CONTINUE taking these medications        Instructions   amLODIPine 5 MG Tabs  Commonly known as: NORVASC   Take 5 mg by mouth every day.  Dose: 5 mg            STOP taking these medications      furosemide 40 MG Tabs  Commonly known as: LASIX     potassium chloride SA 20 MEQ Tbcr  Commonly known as: Kdur               Allergies  No Known Allergies    DIET  Orders Placed This Encounter   Procedures    Diet Order Diet: Cardiac     Standing Status:   Standing     Number of Occurrences:   1     Order Specific Question:   Diet:     Answer:   Cardiac [6]       ACTIVITY  As tolerated.  Weight bearing as tolerated    CONSULTATIONS  NONE    PROCEDURES  NONE    IMAGING  CT-HEAD W/O   Final Result      1.  No acute intracranial abnormality.   2.  Diffuse cortical atrophy.         DX-CHEST-PORTABLE (1 VIEW)   Final Result      Mild interstitial opacities in the right mid lung, minimally increased from prior, could relate to chronic change. Superimposed edema or atypical infection cannot be excluded.            LABORATORY  Lab Results   Component Value Date    SODIUM 143 06/08/2023    POTASSIUM 3.8 06/08/2023    CHLORIDE 108 06/08/2023    CO2 23 06/08/2023    GLUCOSE 99 06/08/2023    BUN 13 06/08/2023    CREATININE 1.09 06/08/2023    CREATININE 0.9 01/15/2009        Lab Results   Component Value Date    WBC 7.4 06/08/2023    HEMOGLOBIN 13.2 06/08/2023    HEMATOCRIT 41.0 06/08/2023    PLATELETCT 211 06/08/2023        =============================================================================================================================================================================================================================================================================================  Please note that this dictation was created using voice recognition software. I have made every reasonable attempt to correct obvious errors, but there may be errors of grammar and possibly content that I did not discover before finalizing the note.    Electronically signed by:  Dr. CHAO Balbuena, DNP, APRN, FNP-C  Hospitalist Services  [facility]  [phone]  [email or web address]  06/08/23                  0926

## 2023-06-09 ENCOUNTER — PATIENT OUTREACH (OUTPATIENT)
Dept: MEDICAL GROUP | Facility: PHYSICIAN GROUP | Age: 88
End: 2023-06-09
Payer: MEDICARE

## 2023-06-09 NOTE — PROGRESS NOTES
Patient outreach for TCM.  LVM with contact information.   Patient outreach for TCM.  LVM with contact information. Had [facility] appt today 6/12/23

## 2023-07-10 ENCOUNTER — PATIENT OUTREACH (OUTPATIENT)
Dept: HEALTH INFORMATION MANAGEMENT | Facility: OTHER | Age: 88
End: 2023-07-10
Payer: MEDICARE

## 2023-07-10 DIAGNOSIS — I10 ESSENTIAL HYPERTENSION: ICD-10-CM

## 2023-07-10 NOTE — PROGRESS NOTES
Received referral from [facility]. Contacted pt to introduce program and provide contact information. Declined services, and stated that the patient has a non-[facility] PCP. Will not continue to follow.

## 2023-07-19 ENCOUNTER — APPOINTMENT (OUTPATIENT)
Dept: RADIOLOGY | Facility: MEDICAL CENTER | Age: 88
DRG: 305 | End: 2023-07-19
Attending: EMERGENCY MEDICINE
Payer: MEDICARE

## 2023-07-19 ENCOUNTER — HOSPITAL ENCOUNTER (INPATIENT)
Facility: MEDICAL CENTER | Age: 88
LOS: 2 days | DRG: 305 | End: 2023-07-21
Attending: EMERGENCY MEDICINE | Admitting: HOSPITALIST
Payer: MEDICARE

## 2023-07-19 DIAGNOSIS — Z86.73 H/O: STROKE: ICD-10-CM

## 2023-07-19 DIAGNOSIS — I16.0 HYPERTENSIVE URGENCY: ICD-10-CM

## 2023-07-19 DIAGNOSIS — R42 DIZZINESS: ICD-10-CM

## 2023-07-19 DIAGNOSIS — I45.9 HEART BLOCK: ICD-10-CM

## 2023-07-19 DIAGNOSIS — H10.32 ACUTE CONJUNCTIVITIS OF LEFT EYE, UNSPECIFIED ACUTE CONJUNCTIVITIS TYPE: ICD-10-CM

## 2023-07-19 DIAGNOSIS — E78.5 DYSLIPIDEMIA: ICD-10-CM

## 2023-07-19 DIAGNOSIS — I10 HTN (HYPERTENSION), MALIGNANT: ICD-10-CM

## 2023-07-19 LAB
ALBUMIN SERPL BCP-MCNC: 3.9 G/DL (ref 3.2–4.9)
ALBUMIN/GLOB SERPL: 1.3 G/DL
ALP SERPL-CCNC: 99 U/L (ref 30–99)
ALT SERPL-CCNC: 18 U/L (ref 2–50)
ANION GAP SERPL CALC-SCNC: 12 MMOL/L (ref 7–16)
AST SERPL-CCNC: 23 U/L (ref 12–45)
BASOPHILS # BLD AUTO: 1.2 % (ref 0–1.8)
BASOPHILS # BLD: 0.06 K/UL (ref 0–0.12)
BILIRUB SERPL-MCNC: 0.4 MG/DL (ref 0.1–1.5)
BUN SERPL-MCNC: 18 MG/DL (ref 8–22)
CALCIUM ALBUM COR SERPL-MCNC: 9 MG/DL (ref 8.5–10.5)
CALCIUM SERPL-MCNC: 8.9 MG/DL (ref 8.5–10.5)
CHLORIDE SERPL-SCNC: 105 MMOL/L (ref 96–112)
CO2 SERPL-SCNC: 21 MMOL/L (ref 20–33)
CREAT SERPL-MCNC: 1.24 MG/DL (ref 0.5–1.4)
EKG IMPRESSION: NORMAL
EOSINOPHIL # BLD AUTO: 0.15 K/UL (ref 0–0.51)
EOSINOPHIL NFR BLD: 3 % (ref 0–6.9)
ERYTHROCYTE [DISTWIDTH] IN BLOOD BY AUTOMATED COUNT: 43 FL (ref 35.9–50)
GFR SERPLBLD CREATININE-BSD FMLA CKD-EPI: 40 ML/MIN/1.73 M 2
GLOBULIN SER CALC-MCNC: 3 G/DL (ref 1.9–3.5)
GLUCOSE SERPL-MCNC: 99 MG/DL (ref 65–99)
HCT VFR BLD AUTO: 42.9 % (ref 37–47)
HGB BLD-MCNC: 13.9 G/DL (ref 12–16)
IMM GRANULOCYTES # BLD AUTO: 0.02 K/UL (ref 0–0.11)
IMM GRANULOCYTES NFR BLD AUTO: 0.4 % (ref 0–0.9)
LIPASE SERPL-CCNC: 62 U/L (ref 11–82)
LYMPHOCYTES # BLD AUTO: 1.13 K/UL (ref 1–4.8)
LYMPHOCYTES NFR BLD: 22.6 % (ref 22–41)
MCH RBC QN AUTO: 30.5 PG (ref 27–33)
MCHC RBC AUTO-ENTMCNC: 32.4 G/DL (ref 32.2–35.5)
MCV RBC AUTO: 94.3 FL (ref 81.4–97.8)
MONOCYTES # BLD AUTO: 0.37 K/UL (ref 0–0.85)
MONOCYTES NFR BLD AUTO: 7.4 % (ref 0–13.4)
NEUTROPHILS # BLD AUTO: 3.27 K/UL (ref 1.82–7.42)
NEUTROPHILS NFR BLD: 65.4 % (ref 44–72)
NRBC # BLD AUTO: 0 K/UL
NRBC BLD-RTO: 0 /100 WBC (ref 0–0.2)
PLATELET # BLD AUTO: 176 K/UL (ref 164–446)
PMV BLD AUTO: 10.2 FL (ref 9–12.9)
POTASSIUM SERPL-SCNC: 4.8 MMOL/L (ref 3.6–5.5)
PROT SERPL-MCNC: 6.9 G/DL (ref 6–8.2)
RBC # BLD AUTO: 4.55 M/UL (ref 4.2–5.4)
SODIUM SERPL-SCNC: 138 MMOL/L (ref 135–145)
TROPONIN T SERPL-MCNC: 19 NG/L (ref 6–19)
WBC # BLD AUTO: 5 K/UL (ref 4.8–10.8)

## 2023-07-19 PROCEDURE — 83690 ASSAY OF LIPASE: CPT

## 2023-07-19 PROCEDURE — 700102 HCHG RX REV CODE 250 W/ 637 OVERRIDE(OP): Performed by: HOSPITALIST

## 2023-07-19 PROCEDURE — 70450 CT HEAD/BRAIN W/O DYE: CPT

## 2023-07-19 PROCEDURE — 99285 EMERGENCY DEPT VISIT HI MDM: CPT

## 2023-07-19 PROCEDURE — 96374 THER/PROPH/DIAG INJ IV PUSH: CPT

## 2023-07-19 PROCEDURE — 93005 ELECTROCARDIOGRAM TRACING: CPT

## 2023-07-19 PROCEDURE — 700111 HCHG RX REV CODE 636 W/ 250 OVERRIDE (IP): Performed by: EMERGENCY MEDICINE

## 2023-07-19 PROCEDURE — 99223 1ST HOSP IP/OBS HIGH 75: CPT | Mod: AI | Performed by: HOSPITALIST

## 2023-07-19 PROCEDURE — 84484 ASSAY OF TROPONIN QUANT: CPT

## 2023-07-19 PROCEDURE — 700111 HCHG RX REV CODE 636 W/ 250 OVERRIDE (IP): Performed by: HOSPITALIST

## 2023-07-19 PROCEDURE — 36415 COLL VENOUS BLD VENIPUNCTURE: CPT

## 2023-07-19 PROCEDURE — A9270 NON-COVERED ITEM OR SERVICE: HCPCS | Performed by: HOSPITALIST

## 2023-07-19 PROCEDURE — 93005 ELECTROCARDIOGRAM TRACING: CPT | Performed by: EMERGENCY MEDICINE

## 2023-07-19 PROCEDURE — 71045 X-RAY EXAM CHEST 1 VIEW: CPT

## 2023-07-19 PROCEDURE — 85025 COMPLETE CBC W/AUTO DIFF WBC: CPT

## 2023-07-19 PROCEDURE — 80053 COMPREHEN METABOLIC PANEL: CPT

## 2023-07-19 PROCEDURE — 770020 HCHG ROOM/CARE - TELE (206)

## 2023-07-19 RX ORDER — POTASSIUM CHLORIDE 20 MEQ/1
20 TABLET, EXTENDED RELEASE ORAL DAILY
Status: ON HOLD | COMMUNITY
End: 2023-07-21

## 2023-07-19 RX ORDER — LISINOPRIL 5 MG/1
5 TABLET ORAL DAILY
Status: DISCONTINUED | OUTPATIENT
Start: 2023-07-20 | End: 2023-07-21 | Stop reason: HOSPADM

## 2023-07-19 RX ORDER — BISACODYL 10 MG
10 SUPPOSITORY, RECTAL RECTAL
Status: DISCONTINUED | OUTPATIENT
Start: 2023-07-19 | End: 2023-07-21 | Stop reason: HOSPADM

## 2023-07-19 RX ORDER — HEPARIN SODIUM 5000 [USP'U]/ML
5000 INJECTION, SOLUTION INTRAVENOUS; SUBCUTANEOUS EVERY 12 HOURS
Status: DISCONTINUED | OUTPATIENT
Start: 2023-07-19 | End: 2023-07-21 | Stop reason: HOSPADM

## 2023-07-19 RX ORDER — LABETALOL HYDROCHLORIDE 5 MG/ML
10 INJECTION, SOLUTION INTRAVENOUS ONCE
Status: COMPLETED | OUTPATIENT
Start: 2023-07-19 | End: 2023-07-19

## 2023-07-19 RX ORDER — HYDRALAZINE HYDROCHLORIDE 20 MG/ML
10 INJECTION INTRAMUSCULAR; INTRAVENOUS EVERY 4 HOURS PRN
Status: DISCONTINUED | OUTPATIENT
Start: 2023-07-19 | End: 2023-07-21 | Stop reason: HOSPADM

## 2023-07-19 RX ORDER — FUROSEMIDE 40 MG/1
40 TABLET ORAL DAILY
Status: ON HOLD | COMMUNITY
End: 2023-07-21

## 2023-07-19 RX ORDER — LISINOPRIL 5 MG/1
5 TABLET ORAL DAILY
COMMUNITY

## 2023-07-19 RX ORDER — AMOXICILLIN 250 MG
2 CAPSULE ORAL 2 TIMES DAILY
Status: DISCONTINUED | OUTPATIENT
Start: 2023-07-19 | End: 2023-07-21 | Stop reason: HOSPADM

## 2023-07-19 RX ORDER — POLYETHYLENE GLYCOL 3350 17 G/17G
1 POWDER, FOR SOLUTION ORAL
Status: DISCONTINUED | OUTPATIENT
Start: 2023-07-19 | End: 2023-07-21 | Stop reason: HOSPADM

## 2023-07-19 RX ORDER — AMLODIPINE BESYLATE 10 MG/1
10 TABLET ORAL DAILY
Status: DISCONTINUED | OUTPATIENT
Start: 2023-07-20 | End: 2023-07-21 | Stop reason: HOSPADM

## 2023-07-19 RX ORDER — AMLODIPINE BESYLATE 5 MG/1
5 TABLET ORAL DAILY
Status: DISCONTINUED | OUTPATIENT
Start: 2023-07-19 | End: 2023-07-19

## 2023-07-19 RX ORDER — AMLODIPINE BESYLATE 5 MG/1
5 TABLET ORAL DAILY
Status: ON HOLD | COMMUNITY
End: 2023-07-21 | Stop reason: SDUPTHER

## 2023-07-19 RX ADMIN — AMLODIPINE BESYLATE 5 MG: 5 TABLET ORAL at 14:54

## 2023-07-19 RX ADMIN — SENNOSIDES AND DOCUSATE SODIUM 2 TABLET: 50; 8.6 TABLET ORAL at 18:03

## 2023-07-19 RX ADMIN — HEPARIN SODIUM 5000 UNITS: 5000 INJECTION, SOLUTION INTRAVENOUS; SUBCUTANEOUS at 18:03

## 2023-07-19 RX ADMIN — LABETALOL HYDROCHLORIDE 10 MG: 5 INJECTION INTRAVENOUS at 14:54

## 2023-07-19 ASSESSMENT — COGNITIVE AND FUNCTIONAL STATUS - GENERAL
EATING MEALS: A LITTLE
PERSONAL GROOMING: A LITTLE
WALKING IN HOSPITAL ROOM: A LOT
DRESSING REGULAR UPPER BODY CLOTHING: A LITTLE
CLIMB 3 TO 5 STEPS WITH RAILING: A LOT
STANDING UP FROM CHAIR USING ARMS: A LOT
DAILY ACTIVITIY SCORE: 17
DRESSING REGULAR LOWER BODY CLOTHING: A LOT
MOVING TO AND FROM BED TO CHAIR: A LITTLE
SUGGESTED CMS G CODE MODIFIER MOBILITY: CL
TOILETING: A LITTLE
TURNING FROM BACK TO SIDE WHILE IN FLAT BAD: A LITTLE
SUGGESTED CMS G CODE MODIFIER DAILY ACTIVITY: CK
HELP NEEDED FOR BATHING: A LITTLE
MOVING FROM LYING ON BACK TO SITTING ON SIDE OF FLAT BED: A LOT
MOBILITY SCORE: 14

## 2023-07-19 ASSESSMENT — LIFESTYLE VARIABLES
HOW MANY TIMES IN THE PAST YEAR HAVE YOU HAD 5 OR MORE DRINKS IN A DAY: 0
HAVE PEOPLE ANNOYED YOU BY CRITICIZING YOUR DRINKING: NO
EVER HAD A DRINK FIRST THING IN THE MORNING TO STEADY YOUR NERVES TO GET RID OF A HANGOVER: NO
TOTAL SCORE: 0
AVERAGE NUMBER OF DAYS PER WEEK YOU HAVE A DRINK CONTAINING ALCOHOL: 0
TOTAL SCORE: 0
TOTAL SCORE: 0
DO YOU DRINK ALCOHOL: NO
ON A TYPICAL DAY WHEN YOU DRINK ALCOHOL HOW MANY DRINKS DO YOU HAVE: 0
EVER FELT BAD OR GUILTY ABOUT YOUR DRINKING: NO
DOES PATIENT WANT TO STOP DRINKING: NO
ALCOHOL_USE: NO
HAVE YOU EVER FELT YOU SHOULD CUT DOWN ON YOUR DRINKING: NO
SUBSTANCE_ABUSE: 0
CONSUMPTION TOTAL: NEGATIVE

## 2023-07-19 ASSESSMENT — FIBROSIS 4 INDEX
FIB4 SCORE: 2.23
FIB4 SCORE: 2.9

## 2023-07-19 ASSESSMENT — ENCOUNTER SYMPTOMS
MEMORY LOSS: 1
SORE THROAT: 0
EYES NEGATIVE: 1
CHILLS: 0
BRUISES/BLEEDS EASILY: 0
SHORTNESS OF BREATH: 0
CARDIOVASCULAR NEGATIVE: 1
FOCAL WEAKNESS: 0
COUGH: 0
DIZZINESS: 1
VOMITING: 0
CONSTITUTIONAL NEGATIVE: 1
HEADACHES: 0
GASTROINTESTINAL NEGATIVE: 1
DEPRESSION: 0
FEVER: 0
WEAKNESS: 0
WEIGHT LOSS: 0
NAUSEA: 0
ABDOMINAL PAIN: 0
BLURRED VISION: 0
PALPITATIONS: 0
DIAPHORESIS: 0
MUSCULOSKELETAL NEGATIVE: 1
MYALGIAS: 0
RESPIRATORY NEGATIVE: 1

## 2023-07-19 ASSESSMENT — PATIENT HEALTH QUESTIONNAIRE - PHQ9
SUM OF ALL RESPONSES TO PHQ9 QUESTIONS 1 AND 2: 0
2. FEELING DOWN, DEPRESSED, IRRITABLE, OR HOPELESS: NOT AT ALL
1. LITTLE INTEREST OR PLEASURE IN DOING THINGS: NOT AT ALL
SUM OF ALL RESPONSES TO PHQ9 QUESTIONS 1 AND 2: 0
2. FEELING DOWN, DEPRESSED, IRRITABLE, OR HOPELESS: NOT AT ALL
1. LITTLE INTEREST OR PLEASURE IN DOING THINGS: NOT AT ALL

## 2023-07-19 NOTE — H&P
"Hospital Medicine History & Physical Note    Date of Service  7/19/2023    Primary Care Physician  Ash Whiting M.D.    Consultants      Code Status  Full Code    Chief Complaint  Chief Complaint   Patient presents with    Hypertension    Weakness    Dizziness       History of Presenting Illness  Sabrina Lobo is a 89 y.o. female with history htn, ckd 3 and vertigo. She presented to [facility] on 7/19/2023 with dizziness and was found to have a markedly elevated blood pressure at 205/84.  She reports mild dizziness, which is also reportedly chronic, she denies headache, no chest pain, no sob. ROS otherwise negative. Patient is axox2, she states she \"forgets the date\". She is a poor historian and hard of hearing, states she left her hearing aid at home. I was able to reach her daughter Bernadette by phone. She reports that cognitively her mother is at her baseline and that she will bring in the hearing aid.  She reports her mother reported increased dizziness this am, no associated falls. She was admitted for similar issues last month at which time her dose of lisinopril was increased from 5 mg to 10 mg.     ROS otherwise negative. CT head showed no acute changes. I addressed code status with both patient and her daughter, patient would like to remain full code for now. Her daughter reports that she was on hospice after a covid infection last year but came off it when she improved, she is considering changing her code status to dnr but is not interested in going back on hospice.     I discussed the plan of care with patient, erp and family    Review of Systems  Review of Systems   Constitutional: Negative.  Negative for chills, diaphoresis, fever, malaise/fatigue and weight loss.   HENT: Negative.  Negative for sore throat.    Eyes: Negative.  Negative for blurred vision.   Respiratory: Negative.  Negative for cough and shortness of breath.    Cardiovascular: Negative.  Negative for chest pain, palpitations and " leg swelling.   Gastrointestinal: Negative.  Negative for abdominal pain, nausea and vomiting.   Genitourinary: Negative.  Negative for dysuria.   Musculoskeletal: Negative.  Negative for myalgias.   Skin: Negative.  Negative for itching and rash.   Neurological:  Positive for dizziness. Negative for focal weakness, weakness and headaches.   Endo/Heme/Allergies: Negative.  Does not bruise/bleed easily.   Psychiatric/Behavioral:  Positive for memory loss. Negative for depression, substance abuse and suicidal ideas.    All other systems reviewed and are negative.      Past Medical History   has a past medical history of H/O bladder repair surgery, H/O: hysterectomy, Hypertension, and Vertigo.ckd 3    Surgical History   has a past surgical history that includes hysterectomy radical.     Family History  family history is not on file.   Family history reviewed with patient. There is no family history that is pertinent to the chief complaint.     Social History   reports that she has never smoked. She has never used smokeless tobacco. She reports that she does not drink alcohol and does not use drugs.    Allergies  No Known Allergies    Medications  Prior to Admission Medications   Prescriptions Last Dose Informant Patient Reported? Taking?   amLODIPine (NORVASC) 5 MG Tab Not taking Patient, Patient's Home Pharmacy Yes No   Sig: Take 5 mg by mouth every day.   lisinopril (PRINIVIL) 5 MG Tab 7/19/2023 at 1030  Yes Yes   Sig: Take 5 mg by mouth every day.      Facility-Administered Medications: None       Physical Exam  Temp:  [36.7 °C (98 °F)] 36.7 °C (98 °F)  Pulse:  [54-66] 54  Resp:  [16-35] 16  BP: (191-221)/() 204/110  SpO2:  [95 %-96 %] 95 %  Blood Pressure : (!) 205/84   Temperature: 36.7 °C (98 °F)   Pulse: (!) 54   Respiration: 16   Pulse Oximetry: 95 %       Physical Exam  Vitals and nursing note reviewed. Exam conducted with a chaperone present.   Constitutional:       General: She is not in acute  distress.     Appearance: Normal appearance. She is underweight. She is not diaphoretic.      Comments: Appears younger than stated age   HENT:      Head: Normocephalic.      Nose: Nose normal.      Mouth/Throat:      Mouth: Mucous membranes are moist.   Eyes:      Pupils: Pupils are equal, round, and reactive to light.   Cardiovascular:      Rate and Rhythm: Normal rate and regular rhythm.      Pulses: Normal pulses.      Heart sounds: Normal heart sounds.   Pulmonary:      Effort: Pulmonary effort is normal.      Breath sounds: Normal breath sounds.   Abdominal:      General: Abdomen is flat. Bowel sounds are normal.      Palpations: Abdomen is soft.   Musculoskeletal:         General: No swelling or deformity. Normal range of motion.   Skin:     General: Skin is warm and dry.      Capillary Refill: Capillary refill takes less than 2 seconds.   Neurological:      General: No focal deficit present.      Mental Status: She is alert.      Cranial Nerves: No cranial nerve deficit.   Psychiatric:         Mood and Affect: Mood normal.         Behavior: Behavior normal.         Laboratory:  Recent Labs     07/19/23  1144   WBC 5.0   RBC 4.55   HEMOGLOBIN 13.9   HEMATOCRIT 42.9   MCV 94.3   MCH 30.5   MCHC 32.4   RDW 43.0   PLATELETCT 176   MPV 10.2     Recent Labs     07/19/23  1144   SODIUM 138   POTASSIUM 4.8   CHLORIDE 105   CO2 21   GLUCOSE 99   BUN 18   CREATININE 1.24   CALCIUM 8.9     Recent Labs     07/19/23  1144   ALTSGPT 18   ASTSGOT 23   ALKPHOSPHAT 99   TBILIRUBIN 0.4   LIPASE 62   GLUCOSE 99         No results for input(s): NTPROBNP in the last 72 hours.      Recent Labs     07/19/23  1144   TROPONINT 19       Imaging:  CT-HEAD W/O   Final Result      1.  Diffuse atrophy.   2.  No acute intracranial hemorrhage or territorial infarct   3.  Small chronic LEFT cerebellar lacunar infarcts.         DX-CHEST-PORTABLE (1 VIEW)   Final Result      1.  Improved interstitial opacities from prior exam. This is likely  chronic. Superimposed edema or atypical infection cannot be excluded.          EKG:  I have personally reviewed the images and compared with prior images.  Pr interval is prolonged at 235, non specific changes    Assessment/Plan:  Justification for Admission Status  I anticipate this patient will require at least two midnights for appropriate medical management, necessitating inpatient admission because of need to slowly decreased bp     Patient will need a Telemetry bed on CARDIOLOGY service .  The need is secondary to htn urgency.    * HTN (hypertension), malignant- (present on admission)  Assessment & Plan  See above  Close monitoring and titration of meds, to decrease at a safe rate    Essential hypertension- (present on admission)  Assessment & Plan  With acute htn urgency  Will increase norvasc  Use iv hydralazine iv to slowly decrease bp at safe rate  Following closely  Tele  Will avoid bb due to prolonged pr  CT head no acute findings    Decreased hearing- (present on admission)  Assessment & Plan  Daughter bringing in hearing aid    H/O: stroke- (present on admission)  Assessment & Plan  Chronic, no acute findings  Supportive care  Continue chronic meds    CKD (chronic kidney disease), stage III (HCC)- (present on admission)  Assessment & Plan  At baseline  Avoid nephrotoxins  following        VTE prophylaxis: heparin ppx

## 2023-07-19 NOTE — ED NOTES
Pharmacy Medication Reconciliation      ~Medication reconciliation updated and complete per patient at bedside & patient home pharmacy   ~Allergies have been verified   ~No oral ABX within the last 30 days  ~Patient home pharmacy :  [facility]

## 2023-07-19 NOTE — ASSESSMENT & PLAN NOTE
Acute on chronic  Possibly related to hypertensive urgency and history of lacunar cerebellar infarcts  PT OT  Blood pressure control

## 2023-07-19 NOTE — ED PROVIDER NOTES
"ED Provider Note    CHIEF COMPLAINT  Chief Complaint   Patient presents with    Hypertension    Weakness    Dizziness       EXTERNAL RECORDS REVIEWED  Labs reviewed recent ED visit was hospitalization for similar presentation hypertensive urgency and dizziness.    HPI/ROS  LIMITATION TO HISTORY   Patient is a poor historian.  OUTSIDE HISTORIAN(S):  None    Sabrina Lobo is a 89 y.o. female who presents to the emergency department for evaluation of high blood pressure and feeling dizzy and weak.  The patient states she felt dizzy and weak this morning.  Really unable to articulate the symptoms any further than that.  The patient is a high blood pressure.  States her blood pressure is.  Denies any other acute concerns or complaints history is limited because the patient is a poor historian.        PAST MEDICAL HISTORY   has a past medical history of H/O bladder repair surgery, H/O: hysterectomy, Hypertension, and Vertigo.    SURGICAL HISTORY   has a past surgical history that includes hysterectomy radical.    FAMILY HISTORY  Family History   Problem Relation Age of Onset    Heart Disease Neg Hx     Hypertension Neg Hx     Hyperlipidemia Neg Hx     Psychiatric Illness Neg Hx     Diabetes Neg Hx        SOCIAL HISTORY  Social History     Tobacco Use    Smoking status: Never    Smokeless tobacco: Never   Vaping Use    Vaping Use: Never used   Substance and Sexual Activity    Alcohol use: No    Drug use: No    Sexual activity: Not on file       CURRENT MEDICATIONS  Home Medications       Reviewed by Daija Vee R.N. (Registered Nurse) on 07/19/23 at 1109  Med List Status: Partial     Medication Last Dose Status   amLODIPine (NORVASC) 5 MG Tab Not taking Active   lisinopril (PRINIVIL) 5 MG Tab 7/19/2023 Active                    ALLERGIES  No Known Allergies    PHYSICAL EXAM  VITAL SIGNS: BP (!) 221/101   Pulse 66   Temp 36.7 °C (98 °F) (Temporal)   Resp 19   Ht 1.575 m (5' 2\")   Wt 47.5 kg (104 lb 11.5 " oz)   SpO2 96%   BMI 19.15 kg/m²    Constitutional: Well developed, Well nourished, No acute distress, Non-toxic appearance.   HENT: Normocephalic, Atraumatic, Bilateral external ears normal, Oropharynx moist, No oral exudates, Nose normal.   Eyes: PERRL, EOMI  Neck: Normal range of motion, No tenderness, Supple, No stridor.   Cardiovascular: Normal heart rate, Normal rhythm, No murmurs,  Thorax & Lungs: Normal breath sounds, No respiratory distress,   Abdomen: Bowel sounds normal, Soft, No tenderness  Skin: Warm, Dry, No erythema, No rash.     Musculoskeletal: Good range of motion in all major joints.  Neurologic: Alert, No focal deficits noted.   Psychiatric: Affect normal      DIAGNOSTIC STUDIES / PROCEDURES  Results for orders placed or performed during the hospital encounter of 07/19/23   CBC WITH DIFFERENTIAL   Result Value Ref Range    WBC 5.0 4.8 - 10.8 K/uL    RBC 4.55 4.20 - 5.40 M/uL    Hemoglobin 13.9 12.0 - 16.0 g/dL    Hematocrit 42.9 37.0 - 47.0 %    MCV 94.3 81.4 - 97.8 fL    MCH 30.5 27.0 - 33.0 pg    MCHC 32.4 32.2 - 35.5 g/dL    RDW 43.0 35.9 - 50.0 fL    Platelet Count 176 164 - 446 K/uL    MPV 10.2 9.0 - 12.9 fL    Neutrophils-Polys 65.40 44.00 - 72.00 %    Lymphocytes 22.60 22.00 - 41.00 %    Monocytes 7.40 0.00 - 13.40 %    Eosinophils 3.00 0.00 - 6.90 %    Basophils 1.20 0.00 - 1.80 %    Immature Granulocytes 0.40 0.00 - 0.90 %    Nucleated RBC 0.00 0.00 - 0.20 /100 WBC    Neutrophils (Absolute) 3.27 1.82 - 7.42 K/uL    Lymphs (Absolute) 1.13 1.00 - 4.80 K/uL    Monos (Absolute) 0.37 0.00 - 0.85 K/uL    Eos (Absolute) 0.15 0.00 - 0.51 K/uL    Baso (Absolute) 0.06 0.00 - 0.12 K/uL    Immature Granulocytes (abs) 0.02 0.00 - 0.11 K/uL    NRBC (Absolute) 0.00 K/uL   COMP METABOLIC PANEL   Result Value Ref Range    Sodium 138 135 - 145 mmol/L    Potassium 4.8 3.6 - 5.5 mmol/L    Chloride 105 96 - 112 mmol/L    Co2 21 20 - 33 mmol/L    Anion Gap 12.0 7.0 - 16.0    Glucose 99 65 - 99 mg/dL    Bun  18 8 - 22 mg/dL    Creatinine 1.24 0.50 - 1.40 mg/dL    Calcium 8.9 8.5 - 10.5 mg/dL    AST(SGOT) 23 12 - 45 U/L    ALT(SGPT) 18 2 - 50 U/L    Alkaline Phosphatase 99 30 - 99 U/L    Total Bilirubin 0.4 0.1 - 1.5 mg/dL    Albumin 3.9 3.2 - 4.9 g/dL    Total Protein 6.9 6.0 - 8.2 g/dL    Globulin 3.0 1.9 - 3.5 g/dL    A-G Ratio 1.3 g/dL   LIPASE   Result Value Ref Range    Lipase 62 11 - 82 U/L   TROPONIN   Result Value Ref Range    Troponin T 19 6 - 19 ng/L   CORRECTED CALCIUM   Result Value Ref Range    Correct Calcium 9.0 8.5 - 10.5 mg/dL   ESTIMATED GFR   Result Value Ref Range    GFR (CKD-EPI) 40 (A) >60 mL/min/1.73 m 2   EKG (NOW)   Result Value Ref Range    Report       [facility]    Test Date:  2023  Pt Name:    EMILY HONG                 Department: ER  MRN:        [id number]                      Room:       [facility]  Gender:     Female                       Technician: [id number]  :        [id number]                   Requested By:ER TRIAGE PROTOCOL  Order #:    [id number]                    Reading MD: JOSÉ LUIS DE JESUS. [facility]    Measurements  Intervals                                Axis  Rate:       69                           P:          -2  ND:         235                          QRS:        11  QRSD:       82                           T:          37  QT:         407  QTc:        436    Interpretive Statements  Sinus rhythm  Prolonged ND interval  Anterior infarct, old  Compared to ECG 2023 10:24:41  Myocardial infarct finding now present  Right-axis deviation no longer present  Electronically Signed On 2023 13:59:17 PDT by JSOÉ LUIS DE JESUS. [facility]          RADIOLOGY  I have independently interpreted the diagnostic imaging associated with this visit and am waiting the final reading from the radiologist.   My preliminary interpretation is as follows: Do not see an intracranial hemorrhage.  Radiologist interpretation:     CT-HEAD W/O   Final Result      1.  Diffuse  atrophy.   2.  No acute intracranial hemorrhage or territorial infarct   3.  Small chronic LEFT cerebellar lacunar infarcts.         DX-CHEST-PORTABLE (1 VIEW)   Final Result      1.  Improved interstitial opacities from prior exam. This is likely chronic. Superimposed edema or atypical infection cannot be excluded.            COURSE & MEDICAL DECISION MAKING    ED Observation Status?  No the patient is significantly abnormal vital signs and abnormal neurologic symptoms she will likely require hospitalization.    INITIAL ASSESSMENT, COURSE AND PLAN  Care Narrative:     Patient presents to the emergency department for evaluation of dizziness and hypertension.  The patient has a history of hypertension.  She states she feels very dizzy this morning her blood pressures been very high.    The patient reports taking her blood pressure medication as prescribed.  She is unable to articulate anything more about her dizziness other than states she is just dizzy.    At this point there is no hemorrhage.  She is able to move all of her legs but she really unable to comply with the detailed cerebellar exam stroke and hypertensive urgency and emergency room in the differential diagnosis.  She is given some labetalol.  I have reviewed her chart fairly extensively.    Patient is a recent hospitalization here for the same process and she was managing no new or different blood pressure medications were started and the patient was discharged home.  At this point since she still symptomatic with her high blood pressure and she was recently hospitalized think is reasonable to put her back in the hospital and adjust her medicines to be significant better blood pressure control.    Case discussed with the hospitalist Dr. Maria and care is transferred at that time.  She is hospitalized in guarded condition.            ADDITIONAL PROBLEM LIST    Hypertension    DISPOSITION AND DISCUSSIONS  I have discussed management of the patient with  the following physicians and CIPRIANO's: [facility] Hospitalist Dr. Maria        FINAL DIAGNOSIS  1. Hypertensive urgency    2. Dizziness           Electronically signed by: Nicola Sotelo M.D., 7/19/2023 11:40 AM

## 2023-07-19 NOTE — ED TRIAGE NOTES
BIB EMS from home with   Chief Complaint   Patient presents with    Hypertension    Weakness    Dizziness     Above complaints upon waking today. Feels dizzy and BP was 201/64 at home. States she took an extra 5 mg lisinopril for high BP (total of 10 mg today).     Per EMS, /70.     Denies fever, chills, cough, SOB, or pain.     EKG complete. Chart up for evaluation by ERP.

## 2023-07-19 NOTE — PROGRESS NOTES
4 Eyes Skin Assessment Completed by Andi Morales, RN and Attila Camp, ESTEFANY.    Head WDL  Ears WDL  Nose WDL  Mouth WDL  Neck WDL  Breast/Chest WDL  Shoulder Blades WDL  Spine WDL  (R) Arm/Elbow/Hand WDL  (L) Arm/Elbow/Hand WDL  Abdomen WDL  Groin WDL  Scrotum/Coccyx/Buttocks WDL  (R) Leg WDL  (L) Leg WDL  (R) Heel/Foot/Toe WDL  (L) Heel/Foot/Toe WDL          Devices In Places Tele Box and Nasal Cannula      Interventions In Place Gray Ear Foams    Possible Skin Injury No    Pictures Uploaded Into Epic N/A  Wound Consult Placed N/A  RN Wound Prevention Protocol Ordered No

## 2023-07-20 ENCOUNTER — APPOINTMENT (OUTPATIENT)
Dept: CARDIOLOGY | Facility: MEDICAL CENTER | Age: 88
DRG: 305 | End: 2023-07-20
Attending: HOSPITALIST
Payer: MEDICARE

## 2023-07-20 PROBLEM — I45.9 HEART BLOCK: Status: ACTIVE | Noted: 2023-07-20

## 2023-07-20 LAB
ANION GAP SERPL CALC-SCNC: 11 MMOL/L (ref 7–16)
BUN SERPL-MCNC: 14 MG/DL (ref 8–22)
CALCIUM SERPL-MCNC: 9 MG/DL (ref 8.5–10.5)
CHLORIDE SERPL-SCNC: 105 MMOL/L (ref 96–112)
CO2 SERPL-SCNC: 22 MMOL/L (ref 20–33)
CREAT SERPL-MCNC: 0.94 MG/DL (ref 0.5–1.4)
EKG IMPRESSION: NORMAL
GFR SERPLBLD CREATININE-BSD FMLA CKD-EPI: 56 ML/MIN/1.73 M 2
GLUCOSE SERPL-MCNC: 92 MG/DL (ref 65–99)
LV EJECT FRACT  99904: 60
LV EJECT FRACT MOD 2C 99903: 83.06
LV EJECT FRACT MOD 4C 99902: 83.72
LV EJECT FRACT MOD BP 99901: 83.15
MAGNESIUM SERPL-MCNC: 2.1 MG/DL (ref 1.5–2.5)
POTASSIUM SERPL-SCNC: 4.1 MMOL/L (ref 3.6–5.5)
SODIUM SERPL-SCNC: 138 MMOL/L (ref 135–145)

## 2023-07-20 PROCEDURE — 770020 HCHG ROOM/CARE - TELE (206)

## 2023-07-20 PROCEDURE — 36415 COLL VENOUS BLD VENIPUNCTURE: CPT

## 2023-07-20 PROCEDURE — 700102 HCHG RX REV CODE 250 W/ 637 OVERRIDE(OP): Performed by: HOSPITALIST

## 2023-07-20 PROCEDURE — 99221 1ST HOSP IP/OBS SF/LOW 40: CPT | Mod: 25 | Performed by: NURSE PRACTITIONER

## 2023-07-20 PROCEDURE — 97163 PT EVAL HIGH COMPLEX 45 MIN: CPT

## 2023-07-20 PROCEDURE — 93308 TTE F-UP OR LMTD: CPT

## 2023-07-20 PROCEDURE — 93005 ELECTROCARDIOGRAM TRACING: CPT | Performed by: HOSPITALIST

## 2023-07-20 PROCEDURE — 700111 HCHG RX REV CODE 636 W/ 250 OVERRIDE (IP): Performed by: HOSPITALIST

## 2023-07-20 PROCEDURE — 93308 TTE F-UP OR LMTD: CPT | Mod: 26 | Performed by: INTERNAL MEDICINE

## 2023-07-20 PROCEDURE — 99497 ADVNCD CARE PLAN 30 MIN: CPT | Performed by: NURSE PRACTITIONER

## 2023-07-20 PROCEDURE — 99232 SBSQ HOSP IP/OBS MODERATE 35: CPT | Performed by: HOSPITALIST

## 2023-07-20 PROCEDURE — A9270 NON-COVERED ITEM OR SERVICE: HCPCS | Performed by: HOSPITALIST

## 2023-07-20 PROCEDURE — 83735 ASSAY OF MAGNESIUM: CPT

## 2023-07-20 PROCEDURE — 99221 1ST HOSP IP/OBS SF/LOW 40: CPT | Performed by: INTERNAL MEDICINE

## 2023-07-20 PROCEDURE — 93010 ELECTROCARDIOGRAM REPORT: CPT | Mod: 26 | Performed by: INTERNAL MEDICINE

## 2023-07-20 PROCEDURE — 80048 BASIC METABOLIC PNL TOTAL CA: CPT

## 2023-07-20 RX ORDER — ASPIRIN 81 MG/1
81 TABLET, CHEWABLE ORAL DAILY
Status: DISCONTINUED | OUTPATIENT
Start: 2023-07-20 | End: 2023-07-21 | Stop reason: HOSPADM

## 2023-07-20 RX ORDER — ATORVASTATIN CALCIUM 40 MG/1
40 TABLET, FILM COATED ORAL
Status: DISCONTINUED | OUTPATIENT
Start: 2023-07-20 | End: 2023-07-21 | Stop reason: HOSPADM

## 2023-07-20 RX ADMIN — HEPARIN SODIUM 5000 UNITS: 5000 INJECTION, SOLUTION INTRAVENOUS; SUBCUTANEOUS at 04:24

## 2023-07-20 RX ADMIN — SENNOSIDES AND DOCUSATE SODIUM 2 TABLET: 50; 8.6 TABLET ORAL at 04:24

## 2023-07-20 RX ADMIN — HYDRALAZINE HYDROCHLORIDE 10 MG: 20 INJECTION, SOLUTION INTRAMUSCULAR; INTRAVENOUS at 04:24

## 2023-07-20 RX ADMIN — ATORVASTATIN CALCIUM 40 MG: 40 TABLET, FILM COATED ORAL at 20:52

## 2023-07-20 RX ADMIN — HEPARIN SODIUM 5000 UNITS: 5000 INJECTION, SOLUTION INTRAVENOUS; SUBCUTANEOUS at 17:22

## 2023-07-20 RX ADMIN — ASPIRIN 81 MG 81 MG: 81 TABLET ORAL at 17:22

## 2023-07-20 ASSESSMENT — PATIENT HEALTH QUESTIONNAIRE - PHQ9
SUM OF ALL RESPONSES TO PHQ9 QUESTIONS 1 AND 2: 0
2. FEELING DOWN, DEPRESSED, IRRITABLE, OR HOPELESS: NOT AT ALL
1. LITTLE INTEREST OR PLEASURE IN DOING THINGS: NOT AT ALL

## 2023-07-20 ASSESSMENT — COGNITIVE AND FUNCTIONAL STATUS - GENERAL
MOBILITY SCORE: 11
SUGGESTED CMS G CODE MODIFIER MOBILITY: CL
TURNING FROM BACK TO SIDE WHILE IN FLAT BAD: A LOT
MOVING FROM LYING ON BACK TO SITTING ON SIDE OF FLAT BED: A LOT
STANDING UP FROM CHAIR USING ARMS: A LITTLE
MOVING TO AND FROM BED TO CHAIR: UNABLE
CLIMB 3 TO 5 STEPS WITH RAILING: TOTAL
WALKING IN HOSPITAL ROOM: A LOT

## 2023-07-20 ASSESSMENT — ENCOUNTER SYMPTOMS
WEAKNESS: 1
CLAUDICATION: 0
DIZZINESS: 1
PALPITATIONS: 0
SHORTNESS OF BREATH: 0
PND: 0
EYE DISCHARGE: 1
FEVER: 0
COUGH: 0
ORTHOPNEA: 0

## 2023-07-20 ASSESSMENT — PAIN DESCRIPTION - PAIN TYPE: TYPE: ACUTE PAIN

## 2023-07-20 ASSESSMENT — FIBROSIS 4 INDEX: FIB4 SCORE: 2.9

## 2023-07-20 ASSESSMENT — GAIT ASSESSMENTS: GAIT LEVEL OF ASSIST: UNABLE TO PARTICIPATE

## 2023-07-20 NOTE — HOSPITAL COURSE
"Sabrina Lobo is a 89 y.o. female with history htn, ckd 3 and vertigo. She presented to [facility] on 7/19/2023 with dizziness and was found to have a markedly elevated blood pressure at 205/84.  She reports mild dizziness, which is also reportedly chronic, she denies headache, no chest pain, no sob. ROS otherwise negative. Patient is axox2, she states she \"forgets the date\". She is a poor historian and hard of hearing, states she left her hearing aid at home. I was able to reach her daughter Bernadette by phone. She reports that cognitively her mother is at her baseline and that she will bring in the hearing aid.  She reports her mother reported increased dizziness this am, no associated falls. She was admitted for similar issues last month at which time her dose of lisinopril was increased from 5 mg to 10 mg.   "

## 2023-07-20 NOTE — CONSULTS
Palliative Care   Patient seen in room. She is confused and unable to discuss goals of care. Telephone call placed to daughter, Bernadette. Voice mail left for return call at her convenience. Full consult to be completed after discussion with same.     Thank you for allowing Palliative Care in support of this patient and family. Please contact  with any changes in status, questions, or concerns.     Erika Morrissey, MSN, APRN, ACNPC-AG.  Palliative Care Nurse Practitioner  [phone]

## 2023-07-20 NOTE — THERAPY
Physical Therapy   Initial Evaluation     Patient Name: Sabrina Lobo  Age:  89 y.o., Sex:  female  Medical Record #: [id number]  Today's Date: 7/20/2023     Precautions  Precautions: Fall Risk    Assessment  Patient is a 89 y.o. female with hx of HTN, CKD, vertigo, and stroke admitted with hypertensive urgency and dizziness. PT eval complete, pt likely presents below her functional baseline, however she was not able to accurately detail her PLOF or living hx. Pt is generally at Min A for bed mobility and STS with FWW, however she was orthostatic with values noted below and ambulation was not tested. Will need to talk to pt's family to accurately assess her situation as it is unclear where the pt lives and what her baseline is. Pt will benefit from post acute placement if she is returning home with her family, however this will need to be clarified for proper DC recommendations. Will follow while admitted for skilled PT intervention.     Plan    Physical Therapy Initial Treatment Plan   Treatment Plan : Bed Mobility, Gait Training, Neuro Re-Education / Balance, Self Care / Home Evaluation, Stair Training, Therapeutic Activities, Therapeutic Exercise  Treatment Frequency: 3 Times per Week  Duration: Until Therapy Goals Met    DC Equipment Recommendations: Unable to determine at this time  Discharge Recommendations: Recommend post-acute placement for additional physical therapy services prior to discharge home         Vitals   Patient BP Position Supine   Blood Pressure  (!) 153/58  (122/54 in sitting, 111/55, in standing)   O2 (LPM) 0   O2 Delivery Device None - Room Air   Vitals Comments pt reporting dizziness upon sitting up, positive orthostatic hypotension   Pain 0 - 10 Group   Therapist Pain Assessment   (no c/o pain)   Prior Living Situation   Equipment Owned Front-Wheel Walker   Lives with - Patient's Self Care Capacity Adult Children   Comments pt confused and stating she lives in a house or care home.  says she lives with her son who works   Prior Level of Functional Mobility   Comments unable to clearly state her PLOF, but does say she uses a FWW consistently   Cognition    Cognition / Consciousness X   Level of Consciousness Alert   Ability To Follow Commands 1 Step  (75% of the time)   Safety Awareness Impaired   New Learning Impaired   Attention Impaired   Sequencing Impaired   Comments pleasantly confused, not able to answer questions consistently   Active ROM Lower Body    Active ROM Lower Body  X   Comments limited by weakness   Strength Lower Body   Lower Body Strength  X   Comments generalized weakness B, functional for standing. unsure if this is baseline   Vision   Vision Comments pt either with very poor visual tracking or lack of attention.   Balance Assessment   Sitting Balance (Static) Fair   Sitting Balance (Dynamic) Fair -   Standing Balance (Static) Poor +   Standing Balance (Dynamic) Poor +   Weight Shift Sitting Fair   Weight Shift Standing Poor   Comments FWW   Bed Mobility    Supine to Sit Minimal Assist   Sit to Supine Standby Assist   Scooting Moderate Assist   Comments HOB slightly elevated   Gait Analysis   Gait Level Of Assist Unable to Participate   Comments ambulation NT due to dizziness and weakness   Functional Mobility   Sit to Stand Contact Guard Assist   Mobility FWW   Comments cues for hand positioning and sequencing   How much difficulty does the patient currently have...   Turning over in bed (including adjusting bedclothes, sheets and blankets)? 2   Sitting down on and standing up from a chair with arms (e.g., wheelchair, bedside commode, etc.) 2   Moving from lying on back to sitting on the side of the bed? 1   How much help from another person does the patient currently need...   Moving to and from a bed to a chair (including a wheelchair)? 3   Need to walk in a hospital room? 2   Climbing 3-5 steps with a railing? 1   6 clicks Mobility Score 11   Activity Tolerance    Sitting Edge of Bed 10 min   Standing 1 min   Comments limited by dizziness and cog deficits   Short Term Goals    Short Term Goal # 1 pt will move supine<>eob with spv in 6 tx for bed mobility.   Short Term Goal # 2 pt will complete spt with fww and spv in 6 tx for functional mobility.   Short Term Goal # 3 pt will ambulate 150 ft with fww and spv in 6 tx for household distances.   Education Group   Education Provided Role of Physical Therapist   Role of Physical Therapist Patient Response Patient;Acceptance;Explanation;Reinforcement Needed;Verbal Demonstration   Physical Therapy Initial Treatment Plan    Treatment Plan  Bed Mobility;Gait Training;Neuro Re-Education / Balance;Self Care / Home Evaluation;Stair Training;Therapeutic Activities;Therapeutic Exercise   Treatment Frequency 3 Times per Week   Duration Until Therapy Goals Met   Problem List    Problems Impaired Bed Mobility;Impaired Transfers;Impaired Ambulation;Functional Strength Deficit;Impaired Balance;Decreased Activity Tolerance;Safety Awareness Deficits / Cognition   Anticipated Discharge Equipment and Recommendations   DC Equipment Recommendations Unable to determine at this time   Discharge Recommendations Recommend post-acute placement for additional physical therapy services prior to discharge home   Interdisciplinary Plan of Care Collaboration   IDT Collaboration with  Nursing   Patient Position at End of Therapy In Bed;Bed Alarm On;Call Light within Reach;Tray Table within Reach;Phone within Reach   Collaboration Comments RN updated   Session Information   Date / Session Number  7/20- 1 (1/3, 7/26)

## 2023-07-20 NOTE — PROGRESS NOTES
"Hospital Medicine Daily Progress Note    Date of Service  7/20/2023    Chief Complaint  Sabrina Lobo is a 89 y.o. female admitted 7/19/2023 with dizziness    Hospital Course  Sabrina Lobo is a 89 y.o. female with history htn, ckd 3 and vertigo. She presented to [facility] on 7/19/2023 with dizziness and was found to have a markedly elevated blood pressure at 205/84.  She reports mild dizziness, which is also reportedly chronic, she denies headache, no chest pain, no sob. ROS otherwise negative. Patient is axox2, she states she \"forgets the date\". She is a poor historian and hard of hearing, states she left her hearing aid at home. I was able to reach her daughter Bernadette by phone. She reports that cognitively her mother is at her baseline and that she will bring in the hearing aid.  She reports her mother reported increased dizziness this am, no associated falls. She was admitted for similar issues last month at which time her dose of lisinopril was increased from 5 mg to 10 mg.     Interval Problem Update    Patient is alert oriented x2  Complaining of generalized weakness and dizziness  Telemetry strips reviewed she had episode of third-degree block while mobilizing to the bathroom this morning associated with dizziness and drop in blood pressure  I consulted cardiology and discussed with Dr. De La Fuente  Blood pressure is improved  CT head reviewed negative for acute pathology revealed small chronic left cerebellar lacunar infarcts      I have discussed this patient's plan of care and discharge plan at IDT rounds today with Case Management, Nursing, Nursing leadership, and other members of the IDT team.    Consultants/Specialty  cardiology    Code Status  Full Code    Disposition  The patient is not medically cleared for discharge to home or a post-acute facility.      I have placed the appropriate orders for post-discharge needs.    Review of Systems  Review of Systems   Constitutional:  Positive for " malaise/fatigue. Negative for fever.   Respiratory:  Negative for shortness of breath.    Cardiovascular:  Negative for chest pain.   Neurological:  Positive for dizziness and weakness.        Physical Exam  Temp:  [36.1 °C (97 °F)-37.2 °C (99 °F)] 37.2 °C (99 °F)  Pulse:  [65-81] 81  Resp:  [16-32] 16  BP: ()/(44-66) 126/44  SpO2:  [92 %-96 %] 95 %    Physical Exam  Vitals and nursing note reviewed.   Constitutional:       General: She is not in acute distress.  HENT:      Head: Normocephalic and atraumatic.      Nose: Nose normal. No rhinorrhea.      Mouth/Throat:      Pharynx: No oropharyngeal exudate or posterior oropharyngeal erythema.   Eyes:      General: No scleral icterus.        Right eye: No discharge.         Left eye: No discharge.   Cardiovascular:      Rate and Rhythm: Normal rate and regular rhythm.      Heart sounds: Normal heart sounds. No murmur heard.     No friction rub. No gallop.   Pulmonary:      Effort: Pulmonary effort is normal. No respiratory distress.      Breath sounds: Normal breath sounds. No stridor. No wheezing, rhonchi or rales.   Chest:      Chest wall: No tenderness.   Abdominal:      General: Bowel sounds are normal. There is no distension.      Palpations: Abdomen is soft. There is no mass.      Tenderness: There is no abdominal tenderness. There is no rebound.   Musculoskeletal:         General: No swelling or tenderness.      Cervical back: Neck supple. No rigidity.   Skin:     General: Skin is warm and dry.      Coloration: Skin is not cyanotic or jaundiced.      Nails: There is no clubbing.   Neurological:      General: No focal deficit present.      Mental Status: She is alert and oriented to person, place, and time.      Cranial Nerves: No cranial nerve deficit.      Motor: No weakness.   Psychiatric:         Mood and Affect: Mood normal.         Behavior: Behavior normal.         Fluids  No intake or output data in the 24 hours ending 07/20/23  1629    Laboratory  Recent Labs     07/19/23  1144   WBC 5.0   RBC 4.55   HEMOGLOBIN 13.9   HEMATOCRIT 42.9   MCV 94.3   MCH 30.5   MCHC 32.4   RDW 43.0   PLATELETCT 176   MPV 10.2     Recent Labs     07/19/23  1144 07/20/23  0320   SODIUM 138 138   POTASSIUM 4.8 4.1   CHLORIDE 105 105   CO2 21 22   GLUCOSE 99 92   BUN 18 14   CREATININE 1.24 0.94   CALCIUM 8.9 9.0                   Imaging  CT-HEAD W/O   Final Result      1.  Diffuse atrophy.   2.  No acute intracranial hemorrhage or territorial infarct   3.  Small chronic LEFT cerebellar lacunar infarcts.         DX-CHEST-PORTABLE (1 VIEW)   Final Result      1.  Improved interstitial opacities from prior exam. This is likely chronic. Superimposed edema or atypical infection cannot be excluded.      EC-ECHOCARDIOGRAM COMPLETE W/ CONT    (Results Pending)        Assessment/Plan  * HTN (hypertension), malignant- (present on admission)  Assessment & Plan  Improved control    Heart block  Assessment & Plan  I consulted cardiology discussed with Dr. De La Fuente  Follow-up on echocardiogram    Decreased hearing- (present on admission)  Assessment & Plan  Daughter bringing in hearing aid    H/O: stroke- (present on admission)  Assessment & Plan  Chronic, no acute findings on CT    Start aspirin and statin    CKD (chronic kidney disease), stage III (HCC)- (present on admission)  Assessment & Plan  Reviewed chemistry panel serum creatinine improved continue to monitor    Dizziness  Assessment & Plan  Acute on chronic  Possibly related to hypertensive urgency and history of lacunar cerebellar infarcts  PT OT  Blood pressure control    Essential hypertension- (present on admission)  Assessment & Plan  Continue amlodipine and lisinopril  Monitor blood pressure adjust accordingly         VTE prophylaxis: SCDs/TEDs    I have performed a physical exam and reviewed and updated ROS and Plan today (7/20/2023). In review of yesterday's note (7/19/2023), there are no changes except as  documented above.

## 2023-07-20 NOTE — DISCHARGE PLANNING
HTH/SCP TCN chart review completed. Collaborated with OPAL Elliott prior to meeting with the pt. The most current review of medical record, knowledge of pt's PLOF and social support, LACE+ score of 16, 6 clicks scores of 14 mobility were considered.      Pt seen at bedside. Introduced TCN program. Provided education regarding post acute levels of care. Discussed HTH/SCP plan benefits (Meds to Beds, medical uber and [facility] transitional care). Pt appears to have baseline cognitive deficits/confused and per pt request, TCN placed call to her son, Prabhjot (noted in chart able to discuss tx/billing), to discuss dc planning. Note son declines [facility] services and reports prefers follow ups with pt's PCP (Dr. García).     Per discussion with pt's son, he appears essentially available to assist pt 24/7. He reports physically assisting pt at times and is anticipating pt to return to home under his care with HH as well if possible. He would like [facility]  if able (for continuity of care as pt/family have utilized [facility] in past). Would note that palliative also appears to be involved for dc planning and will monitor for updates/recs from palliative as well.    Note that pt has PT and OT consults with PT currently recommending post acute placement. However as noted above, family prefers HH at this time and pt defers to family (see above). Given aforementioned, choice proactively obtained for HH preference, faxed to [facility] and given to OPAL. TCN will continue to follow and collaborate with discharge planning team as additional post acute needs arise. Thank you.     Completed today:  PT with recommendations for post acute placement on 7/20; see above  OT consult in place; TCN will monitor for recs  Palliative consult in place -> noted reached out to Palliative who spoke with pt's daughter as well -> will monitor for palliative recommendations as well for updates/dc planning  Choice obtained: HH (see above; [facility] is choice if HH indicated)  [facility] [place]  referral not sent; see above

## 2023-07-20 NOTE — CONSULTS
"MRN: [id number]  Date of initial palliative care consult: July 19  Reason for palliative medicine consultation/visit: Advance care planning, order indicates patient has been on hospice but revoked now full code patient and her daughter would like to discuss CODE STATUS and possibly complete a POLST.  Referring provider: Cyndie  Location of consult: [place] [id number] bed 2  Additional consulting services: Not applicable    HPI:   Sabrina Lobo is a 89 y.o. female with a past medical history of hypertension, CKD 3, and vertigo.  She presented to [facility] on July 19 and was found to have markedly elevated blood pressure of 205/84.  Upon admission patient denied headache, chest pain, shortness of breath.  She was found to be alert and oriented x2 stating that she \"forgets the date.\"  Really a poor historian and hard of hearing.  Her daughter reports cognitive status at baseline.  She was rate admitted for similar issues last month at which time her dose of lisinopril was increased from 5 to 10 mg.    Palliative care is consulted to assist with advanced care planning, history of hospice, previously DNR now full code.  Patient is seen lying in bed she is alert is cognitively impaired and difficult to obtain history from.  Daughter, Bernadette, contacted for additional information.    Pertinent PMHx: Bladder repair surgery, hysterectomy, osteoporosis, intractable back pain, dysphagia, anxiety, CVA/lacunar..    Pain History:none  Onset:   Location:   Duration:   Characteristics:   Aggravating factors:   Alleviating factors:   Radiation:   Treatments:   Severity:     Additional symptoms: Acute on chronic dizziness.       Interval History: As per HPI    Medication Allergy/Sensitivities:  Allergies   Allergen Reactions    Beef-Derived Products Nausea       Pertinent Family History:  None pertinent    ROS:    Review of Systems   Unable to perform ROS: Mental status change       PE:   Recent vital signs  BMI: Body mass index is 20.58 " kg/m².    Temp (24hrs), Av.5 °C (97.7 °F), Min:36.1 °C (97 °F), Max:37.1 °C (98.8 °F)  Temperature: 36.3 °C (97.3 °F), Monitored Temp: 36.3 °C (97.3 °F)  Pulse  Av.6  Min: 54  Max: 80   Blood Pressure : (!) 147/56       Physical Exam  Vitals and nursing note reviewed.   Constitutional:       Appearance: She is underweight. She is ill-appearing. She is not toxic-appearing.   HENT:      Head: Normocephalic.   Cardiovascular:      Rate and Rhythm: Normal rate and regular rhythm.   Pulmonary:      Effort: Pulmonary effort is normal.   Abdominal:      General: Abdomen is flat.   Musculoskeletal:         General: Normal range of motion.      Cervical back: Normal range of motion.      Right lower leg: No edema.      Left lower leg: No edema.   Skin:     General: Skin is warm.      Coloration: Skin is pale.   Neurological:      Mental Status: She is alert. She is confused.      Cranial Nerves: Cranial nerves 2-12 are intact.   Psychiatric:         Mood and Affect: Mood is anxious.         Speech: Speech is rapid and pressured.         Cognition and Memory: Memory is impaired.         Judgment: Judgment is impulsive.       Recent Labs     23  1144 23  0320   SODIUM 138 138   POTASSIUM 4.8 4.1   CHLORIDE 105 105   CO2 21 22   GLUCOSE 99 92   BUN 18 14   CREATININE 1.24 0.94   CALCIUM 8.9 9.0     Recent Labs     23  1144   WBC 5.0   RBC 4.55   HEMOGLOBIN 13.9   HEMATOCRIT 42.9   MCV 94.3   MCH 30.5   MCHC 32.4   RDW 43.0   PLATELETCT 176   MPV 10.2       ASSESSMENT/PLAN WITH SHARED DECISION MAKING:   Review  Pertinent imaging reviewed.  CT scan of head from  indicates diffuse atrophy, no intracranial hemorrhage or territorial infarct, small left chronic cerebellar lacunar infarcts.  ECHOcardiogram on 2023 indicates compared to prior study on 2020, there is now presence of moderate aortic regurgitation, regular systolic function, moderate aortic insufficiency half-time is  388 ms, estimated right ventricular systolic pressure is 33 mm per mercury.    PHYSICAL ASPECTS OF CARE  Palliative Performance Scale: 40%    #Hypertension  #Acute on chronic dizziness  #Chronic kidney disease stage III  #Geriatric syndrome with frailty  #Hypertension, intractable.      SOCIAL ASPECTS OF CARE  Per daughter, Bernadette, patient resides with either herself or her brother Prabhjot.  Bernadette reports her mom is still independent with her ADLs but occasionally needs assistance with her shower.  In addition, she has some urinary incontinence but cares for her briefs without difficulty.  No issues with bowel continence.  Either Bernadette or Prabhjot perform her IADLs.  Baseline cognitive function somewhat forgetful and confused at times. Previously on hospice 2 years ago for COVID from which she recovered and was taken off of hospice.    SPIRITUAL ASPECTS OF CARE   Not addressed during this phone call.    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  She reports that her mom does not have an advanced directive that she has told her children that decision making in absence of her ability to do so will fall on them.  Bernadette was unable to elaborate Sabrina's particular goals of care should her illness become worse.  We did discuss her advanced age and frail condition and the likely trauma and poor outcome associated with chest compressions as she is a full code currently.  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  I explained that DNR is not synonymous with do not treat.  She relayed to me that when EMS comes to her group home and sees that the patient is a DNR they often refuse to take the patient to the hospital.  I did clarify with Bernadette that even though patient to choose DNR status, they will still receive treatment if they  are brought to this hospital.  Bernadette verbalizes understanding of this discussion and states she will talk with her brother regarding CODE STATUS.  Outcome: Patient remains full code, family to contact palliative care or primary team regarding CODE STATUS after discussing amongst themselves.  Palliative care to continue to follow for ongoing goals of care discussions, please notify for immediate needs..    Code Status: Full code     ACP Documents: None on file, needs a POLST before discharge.    17 minutes spent discussing advanced care planning, this time excludes any other billed services.    I spent a total of 45 minutes reviewing medical records, direct face-to-face time with the patient and/or family, documentation and coordination of care.This is separate from the time spent on advance care planning, which is documented above.    Erika Morrissey, MSN, APRN, ACNPC-AG.  Palliative Care Nurse Practitioner  [phone]

## 2023-07-20 NOTE — CONSULTS
Cardiology Consultation    Date of Service  7/20/2023    Referring Physician  Kurt Alarcon M.D.    Consulting Physician  CHERYLE Loco    Reason for Consultation    Evaluate the need for pacemaker implantation    History of Presenting Illness  89 y.o. female with hypertension, vertigo, CKD stage III, lacunar infarcts who presented 7/19/2023 with dizziness, found elevated blood pressure SBP 200s.  CTA head 7/19/2023 showed no acute intracranial hemorrhage or territorial infarct, small chronic left cerebellar lacunar infarcts.    Reports dizziness during the interview, telemetry -SR    Review of Systems  Review of Systems   Constitutional:  Positive for malaise/fatigue.   Eyes:  Positive for discharge.   Respiratory:  Negative for cough.    Cardiovascular:  Negative for chest pain, palpitations, orthopnea, claudication, leg swelling and PND.   Neurological:  Positive for dizziness.       Past Medical History   has a past medical history of H/O bladder repair surgery, H/O: hysterectomy, Hypertension, and Vertigo.    Surgical History   has a past surgical history that includes hysterectomy radical.    Family History  family history is not on file.    Social History   reports that she has never smoked. She has never used smokeless tobacco. She reports that she does not drink alcohol and does not use drugs.    Medications  Prior to Admission Medications   Prescriptions Last Dose Informant Patient Reported? Taking?   amLODIPine (NORVASC) 5 MG Tab 7/19/2023 at AM Patient's Home Pharmacy Yes Yes   Sig: Take 5 mg by mouth every day.   furosemide (LASIX) 40 MG Tab 7/19/2023 at AM Patient's Home Pharmacy Yes Yes   Sig: Take 40 mg by mouth every day.   lisinopril (PRINIVIL) 5 MG Tab 7/19/2023 at AM Patient's Home Pharmacy Yes Yes   Sig: Take 5 mg by mouth every day.   potassium chloride SA (KDUR) 20 MEQ Tab CR 7/19/2023 at AM Patient's Home Pharmacy Yes Yes   Sig: Take 20 mEq by mouth every day.       Facility-Administered Medications: None       Allergies  Allergies   Allergen Reactions    Beef-Derived Products Nausea       Physical Exam  Temp:  [36.1 °C (97 °F)-37.2 °C (99 °F)] 37.2 °C (99 °F)  Pulse:  [57-81] 81  Resp:  [14-32] 16  BP: ()/() 126/44  SpO2:  [92 %-97 %] 95 %    Physical Exam  Cardiovascular:      Rate and Rhythm: Normal rate and regular rhythm.      Pulses: Normal pulses.   Pulmonary:      Effort: Pulmonary effort is normal.   Skin:     General: Skin is warm.   Neurological:      Mental Status: She is alert. Mental status is at baseline.   Psychiatric:         Mood and Affect: Mood normal.         Fluids      Laboratory  Recent Labs     07/19/23  1144   WBC 5.0   RBC 4.55   HEMOGLOBIN 13.9   HEMATOCRIT 42.9   MCV 94.3   MCH 30.5   MCHC 32.4   RDW 43.0   PLATELETCT 176   MPV 10.2     Recent Labs     07/19/23  1144 07/20/23  0320   SODIUM 138 138   POTASSIUM 4.8 4.1   CHLORIDE 105 105   CO2 21 22   GLUCOSE 99 92   BUN 18 14   CREATININE 1.24 0.94   CALCIUM 8.9 9.0                     Imaging  CT-HEAD W/O   Final Result      1.  Diffuse atrophy.   2.  No acute intracranial hemorrhage or territorial infarct   3.  Small chronic LEFT cerebellar lacunar infarcts.         DX-CHEST-PORTABLE (1 VIEW)   Final Result      1.  Improved interstitial opacities from prior exam. This is likely chronic. Superimposed edema or atypical infection cannot be excluded.      EC-ECHOCARDIOGRAM COMPLETE W/ CONT    (Results Pending)       Assessment/Plan    -Hypertensive urgency  -Dizziness, recurrent- chronic  -Prior history of CVA  -Moderate AI based on echo 4/29/2023  -LVEF 70%, echo 4/29/2023      Recommendations  -Currently BP well controlled on amlodipine 10 & lisinopril 5 mg.  -Approximately 5 minutes of junctional/bradycardia/? High degree AVB  this morning at 5:30 AM 7/20/2023 while using the commode.Query vagal.  -Positive orthostatic hypotension this am while up with PT.  -Follow-up on  echocardiogram  -Agree with palliative care consult  -Not a clear cut case, currently no strong indication for pacemaker implantation based on current strips, continue telemetry monitoring and review echo.    Cardiology will follow    I personally spent a total of 30  minutes which includes face-to-face time and non-face-to-face time spent on preparing to see the patient, reviewing hospital notes and tests, obtaining history from the patient, performing a medically appropriate exam, counseling and educating the patient, ordering medications/tests/procedures/referrals as clinically indicated, and documenting information in the electronic medical record.

## 2023-07-20 NOTE — PROGRESS NOTES
Provider Communication:     @ [phone] Pt up to commode, notified by tele tech of pt in 3rd HB. Pt symptomatic with dizziness. BP 85/47.   Pt returned to bed, BP recheck 158/55, 157/60. Tele Tech report spontaneous conversion to SR 60's, followed by 9 beat run of Vtach.   EKG SR 72.

## 2023-07-20 NOTE — PROGRESS NOTES
Monitor Summary:  Rhythm: SR 1 HB  Rate: 63-71  Measurements: .24/.09/.40  Ectopy: 9 beat vtach, 3rd HB

## 2023-07-20 NOTE — DIETARY
"Nutrition services: Day 1 of admit.  Sabrina Lobo is a 89 y.o. female with admitting DX of HTN, malignant    Consult received for MST 3 on nutrition screening. Pt w/ unsure weight loss in 6 months. RD met with pt at bedside. RD unable to interview pt d/t pt appeared to be confused and hard of hearing. Pt was eating her breakfast at time of RD visit. She also had a bag other food that appeared to be brought in for her including some soup.    Assessment:  Height: 154.9 cm (5' 1\")  Weight: 49.4 kg (108 lb 14.5 oz)- stand up scale  Body mass index is 20.58 kg/m²., BMI classification: normal weight  Diet/Intake: Cardiac Diet. PO intake 50-75% x1 snack recorded so far.    Evaluation:   Pt admitted for HTN with decreased hearing, h/o stroke, CKD stage 3, dizziness  Per MD note, pt is AxO x2, and hard of heading d/t leaving heading aid at home.  Based on weight history in chart, pt's weight appears stable in >1 year. Weight trending up since 4/28/23.    Wt Readings from Last 4 Encounters:   07/19/23 49.4 kg (108 lb 14.5 oz)   06/07/23 47.6 kg (104 lb 15 oz)   04/28/23 47.5 kg (104 lb 11.5 oz)   02/15/22 50.2 kg (110 lb 10.7 oz)     Labs: GFR 56  Meds: senna-docusate, bowel regimen  Skin: No wounds noted  GI: Last BM 7/20    Malnutrition Risk: Unable to fully assess at this time.     Recommendations/Plan:  Continue current PO diet   Encourage intake of meals  Document intake of all meals as % taken in ADL's to provide interdisciplinary communication across all shifts.   Monitor weight.  Nutrition rep will continue to see patient for ongoing meal and snack preferences.     RD following.     "

## 2023-07-20 NOTE — PROGRESS NOTES
MONITOR SUMMARY    .15/.11/.55    SB/SR 52-70 I/O Afib 130-150's     Ectopy: 4 beats VT, I/O junctional

## 2023-07-20 NOTE — PROGRESS NOTES
Assumed care of patient from Andi ALLISON at 1905, Patient AOX4, VSS. Pt sitting up in bed.   Fall education reinforced call bell within reach, bed locked and in lowest position

## 2023-07-20 NOTE — FACE TO FACE
Face to Face Supporting Documentation - Home Health    The encounter with this patient was in whole or in part the primary reason for home health admission.    Date of encounter:   Patient:                    MRN:                       YOB: 2023  Sabrina Lobo  [id number]  4/13/1929     Home health to see patient for:  Skilled Nursing care for assessment, interventions & education, Physical Therapy evaluation and treatment, and Occupational therapy evaluation and treatment    Skilled need for:  Exacerbation of Chronic Disease State HTN    Skilled nursing interventions to include:  Comment: Med management and monitoring    Homebound status evidenced by:  Needs the assistance of another person in order to leave the home. Leaving home requires a considerable and taxing effort. There is a normal inability to leave the home.    Community Physician to provide follow up care: Ash Whiting M.D.     Optional Interventions? No      I certify the face to face encounter for this home health care referral meets the CMS requirements and the encounter/clinical assessment with the patient was, in whole, or in part, for the medical condition(s) listed above, which is the primary reason for home health care. Based on my clinical findings: the service(s) are medically necessary, support the need for home health care, and the homebound criteria are met.  I certify that this patient has had a face to face encounter by myself.  Kurt Alarcon M.D. - NPI: [id number]

## 2023-07-21 ENCOUNTER — HOME HEALTH ADMISSION (OUTPATIENT)
Dept: HOME HEALTH SERVICES | Facility: HOME HEALTHCARE | Age: 88
End: 2023-07-21
Payer: MEDICARE

## 2023-07-21 ENCOUNTER — PHARMACY VISIT (OUTPATIENT)
Dept: PHARMACY | Facility: MEDICAL CENTER | Age: 88
End: 2023-07-21
Payer: COMMERCIAL

## 2023-07-21 ENCOUNTER — NON-PROVIDER VISIT (OUTPATIENT)
Dept: CARDIOLOGY | Facility: MEDICAL CENTER | Age: 88
DRG: 305 | End: 2023-07-21
Attending: EMERGENCY MEDICINE
Payer: MEDICARE

## 2023-07-21 ENCOUNTER — TELEPHONE (OUTPATIENT)
Dept: HOSPITALIST | Facility: MEDICAL CENTER | Age: 88
End: 2023-07-21
Payer: MEDICARE

## 2023-07-21 VITALS
HEART RATE: 71 BPM | HEIGHT: 61 IN | RESPIRATION RATE: 16 BRPM | SYSTOLIC BLOOD PRESSURE: 141 MMHG | TEMPERATURE: 98.8 F | BODY MASS INDEX: 20.52 KG/M2 | WEIGHT: 108.69 LBS | OXYGEN SATURATION: 92 % | DIASTOLIC BLOOD PRESSURE: 50 MMHG

## 2023-07-21 DIAGNOSIS — I49.3 PVCS (PREMATURE VENTRICULAR CONTRACTIONS): ICD-10-CM

## 2023-07-21 DIAGNOSIS — I44.1 WENCKEBACH SECOND DEGREE AV BLOCK: ICD-10-CM

## 2023-07-21 DIAGNOSIS — R55 VASOVAGAL SYNCOPE: ICD-10-CM

## 2023-07-21 DIAGNOSIS — I47.10 SVT (SUPRAVENTRICULAR TACHYCARDIA) (HCC): ICD-10-CM

## 2023-07-21 DIAGNOSIS — I44.0 FIRST DEGREE AV BLOCK: ICD-10-CM

## 2023-07-21 PROCEDURE — A9270 NON-COVERED ITEM OR SERVICE: HCPCS | Performed by: HOSPITALIST

## 2023-07-21 PROCEDURE — 700111 HCHG RX REV CODE 636 W/ 250 OVERRIDE (IP): Performed by: HOSPITALIST

## 2023-07-21 PROCEDURE — RXMED WILLOW AMBULATORY MEDICATION CHARGE: Performed by: HOSPITALIST

## 2023-07-21 PROCEDURE — 700102 HCHG RX REV CODE 250 W/ 637 OVERRIDE(OP): Performed by: HOSPITALIST

## 2023-07-21 PROCEDURE — 99239 HOSP IP/OBS DSCHRG MGMT >30: CPT | Performed by: HOSPITALIST

## 2023-07-21 PROCEDURE — 99232 SBSQ HOSP IP/OBS MODERATE 35: CPT | Performed by: INTERNAL MEDICINE

## 2023-07-21 RX ORDER — ASPIRIN 81 MG/1
81 TABLET, CHEWABLE ORAL DAILY
Qty: 100 TABLET | COMMUNITY
Start: 2023-07-22

## 2023-07-21 RX ORDER — ERYTHROMYCIN 5 MG/G
1 OINTMENT OPHTHALMIC EVERY 6 HOURS
Qty: 3.5 G | Refills: 0 | Status: ACTIVE | OUTPATIENT
Start: 2023-07-21

## 2023-07-21 RX ORDER — ERYTHROMYCIN 5 MG/G
1 OINTMENT OPHTHALMIC EVERY 6 HOURS
Status: DISCONTINUED | OUTPATIENT
Start: 2023-07-21 | End: 2023-07-21 | Stop reason: HOSPADM

## 2023-07-21 RX ORDER — ATORVASTATIN CALCIUM 40 MG/1
40 TABLET, FILM COATED ORAL
Qty: 30 TABLET | Refills: 0 | Status: SHIPPED | OUTPATIENT
Start: 2023-07-21

## 2023-07-21 RX ORDER — AMLODIPINE BESYLATE 10 MG/1
10 TABLET ORAL DAILY
Qty: 30 TABLET | Refills: 0 | Status: SHIPPED | OUTPATIENT
Start: 2023-07-21

## 2023-07-21 RX ADMIN — HEPARIN SODIUM 5000 UNITS: 5000 INJECTION, SOLUTION INTRAVENOUS; SUBCUTANEOUS at 05:13

## 2023-07-21 RX ADMIN — ASPIRIN 81 MG 81 MG: 81 TABLET ORAL at 05:13

## 2023-07-21 RX ADMIN — AMLODIPINE BESYLATE 10 MG: 10 TABLET ORAL at 05:13

## 2023-07-21 RX ADMIN — LISINOPRIL 5 MG: 5 TABLET ORAL at 05:13

## 2023-07-21 ASSESSMENT — ENCOUNTER SYMPTOMS
MYALGIAS: 0
HEADACHES: 0
PALPITATIONS: 0
CARDIOVASCULAR NEGATIVE: 1
DIZZINESS: 0
COUGH: 0
CONSTITUTIONAL NEGATIVE: 1
BRUISES/BLEEDS EASILY: 0
ABDOMINAL PAIN: 0
VOMITING: 0
MUSCULOSKELETAL NEGATIVE: 1
EYES NEGATIVE: 1
NAUSEA: 0
NEUROLOGICAL NEGATIVE: 1
SHORTNESS OF BREATH: 0
NERVOUS/ANXIOUS: 0
CHILLS: 0
RESPIRATORY NEGATIVE: 1
GASTROINTESTINAL NEGATIVE: 1
WEAKNESS: 0
FEVER: 0
PSYCHIATRIC NEGATIVE: 1

## 2023-07-21 NOTE — DISCHARGE PLANNING
Update from RN that pt/family is not in agreement with [facility] and wish for [facility] to be the provider as they chose with the TCN. Call to [facility] and provided update. Lizbeth at [facility] states she will forward to [facility] for them to resume care. Update to pt/family and BS RN.   Patient/Caregiver provided printed discharge information.

## 2023-07-21 NOTE — CARE PLAN
The patient is Stable - Low risk of patient condition declining or worsening    Shift Goals  Clinical Goals: hemodynamic stability  Patient Goals: rest, go home    Progress made toward(s) clinical / shift goals:  Pt verbalized adequate pain control and appropriately communicated needs to staff, demonstrated understanding of POC through teachback.      Problem: Pain - Standard  Goal: Alleviation of pain or a reduction in pain to the patient’s comfort goal  7/21/2023 0525 by AGUSTÍN TorresN.  Outcome: Progressing    Problem: Fall Risk  Goal: Patient will remain free from falls  7/21/2023 0525 by Karen Pardo R.YESENIA.  Outcome: Progressing     Problem: Knowledge Deficit - Standard  Goal: Patient and family/care givers will demonstrate understanding of plan of care, disease process/condition, diagnostic tests and medications  Outcome: Progressing

## 2023-07-21 NOTE — PROGRESS NOTES
Assumed care of patient from Pipo ALLISON at 1900. Patient AOX4, VSS. Pt sitting up in bed and eating, calm and not in distress.   Fall education reinforced, call bell within reach, bed locked and in lowest position.

## 2023-07-21 NOTE — DISCHARGE SUMMARY
Discharge Summary    CHIEF COMPLAINT ON ADMISSION  Chief Complaint   Patient presents with    Hypertension    Weakness    Dizziness       Reason for Admission  EMS     Admission Date  7/19/2023    CODE STATUS  Full Code    HPI & HOSPITAL COURSE     Sabrina Lobo is a 89 y.o. female with history htn, ckd 3 and vertigo. She presented to [facility] on 7/19/2023 with dizziness and was found to have a markedly elevated blood pressure at 205/84.  She reports mild dizziness, which is also reportedly chronic, she denies headache, no chest pain, no sob. ROS otherwise negative.  The patient was admitted and monitored on telemetry.  Her blood pressure was treated and is improved.  While being monitored on telemetry she had a transient third-degree heart block while going to the bathroom.  She was evaluated by cardiology who felt that this could be the result of a vasovagal event.  They recommended Zio patch which will be placed prior to discharge and outpatient follow-up with cardiology.  CT of the head was negative for acute pathology revealed cerebellar lacunar infarcts the patient was started on aspirin and statin.  Patient is otherwise clinically stable for discharge with close outpatient follow-up on hypertension and results of Zio patch      Therefore, she is discharged in good and stable condition to home with close outpatient follow-up.    The patient met 2-midnight criteria for an inpatient stay at the time of discharge.    Discharge Date  7/21/2023    FOLLOW UP ITEMS POST DISCHARGE  Follow-up with PCP for blood pressure recheck  Follow-up with cardiology    DISCHARGE DIAGNOSES  Principal Problem:    HTN (hypertension), malignant (POA: Yes)  Active Problems:    Essential hypertension (POA: Yes)    Dizziness (POA: Unknown)    CKD (chronic kidney disease), stage III (HCC) (POA: Yes)    H/O: stroke (POA: Yes)    Decreased hearing (POA: Yes)    Heart block (POA: Unknown)  Resolved Problems:    * No resolved hospital  problems. *      FOLLOW UP  No future appointments.  Julio De La Fuente D.O.  [address]  [address]  [place] [id number] [phone]    Follow up      Ash Whiting M.D.  [address] [address] [phone]    Follow up      Ash Whiting M.D.  [address] [address] [phone]          [facility]  [address]  [place] [address]  [phone]          MEDICATIONS ON DISCHARGE     Medication List        START taking these medications        Instructions   aspirin 81 MG Chew chewable tablet  Start taking on: July 22, 2023  Commonly known as: Asa   Chew 1 Tablet every day.  Dose: 81 mg     atorvastatin 40 MG Tabs  Commonly known as: Lipitor   Take 1 Tablet by mouth at bedtime.  Dose: 40 mg     erythromycin 5 MG/GM Oint   Apply 1 Application to left eye every 6 hours for 5 days  Dose: 1 Application            CHANGE how you take these medications        Instructions   amLODIPine 10 MG Tabs  What changed:   medication strength  how much to take  Commonly known as: Norvasc   Take 1 Tablet by mouth every day.  Dose: 10 mg            CONTINUE taking these medications        Instructions   lisinopril 5 MG Tabs  Commonly known as: Prinivil   Take 5 mg by mouth every day.  Dose: 5 mg            STOP taking these medications      furosemide 40 MG Tabs  Commonly known as: Lasix     potassium chloride SA 20 MEQ Tbcr  Commonly known as: Kdur              Allergies  Allergies   Allergen Reactions    Beef-Derived Products Nausea       DIET  Orders Placed This Encounter   Procedures    Diet Order Diet: Cardiac     Standing Status:   Standing     Number of Occurrences:   1     Order Specific Question:   Diet:     Answer:   Cardiac [6]       ACTIVITY  As tolerated.  Weight bearing as tolerated    CONSULTATIONS  Cardiology         LABORATORY  Lab Results   Component Value Date    SODIUM 138 07/20/2023    POTASSIUM 4.1 07/20/2023    CHLORIDE 105 07/20/2023    CO2 22 07/20/2023     GLUCOSE 92 07/20/2023    BUN 14 07/20/2023    CREATININE 0.94 07/20/2023    CREATININE 0.9 01/15/2009        Lab Results   Component Value Date    WBC 5.0 07/19/2023    HEMOGLOBIN 13.9 07/19/2023    HEMATOCRIT 42.9 07/19/2023    PLATELETCT 176 07/19/2023        Total time of the discharge process exceeds 35 minutes.

## 2023-07-21 NOTE — DISCHARGE PLANNING
"HTH/SCP TCN chart review completed. Current discharge considerations are now to dc to home with [facility]. Noted from prior TCN visit that family \"would like [facility]  if able (for continuity of care as pt/family have utilized [facility]  in past)\". However per review/DPA note from today @8:46AM, [facility]  rep \"left v-mail informing DPA that because Pt has [facility] insurance, referral must be sent to [facility] first for approval or denial. If denied, then [facility] can consider. [facility] says they forwarded referral to [facility]\". Noted [facility] has accepted at this time well. TCN will continue to follow and collaborate with discharge planning team as additional post acute needs arise. Thank you.    Completed:  PT with recommendations for post acute placement on 7/20; see TCN note from 7/20 for details if needed  OT consult in place; TCN will monitor for recs  Palliative consult completed; see consult on 7/20 for details  Choice obtained:  (see above)  [facility] referral not sent; see TCN note from 7/20  "

## 2023-07-21 NOTE — PROGRESS NOTES
Patient enrolled in the 14 day ePatch Holter monitoring program per Julio De La Fuente DO.  >Hospital hook-up by [facility], serial # [id number].  >Currently pending EOS.

## 2023-07-21 NOTE — PROGRESS NOTES
Monitor Summary:   Rhythm: Sinus Rhythm   Rate: 71-83  Measurement: .22/.05/.38  Ectopy: NA

## 2023-07-21 NOTE — DISCHARGE PLANNING
ATTN: Case Management  RE: Referral for Home Health    Reason for referral denial: patient requested [facility].              Unfortunately, we are not able to accept this referral for the reason listed above. If further clarity is needed, our Transitional Care Specialists are available to discuss any barriers to service at [phone].      We look forward to collaborating with you in the future,  [facility]

## 2023-07-21 NOTE — DISCHARGE INSTRUCTIONS
Diet    Avoid Alcohol Use    Excessive alcohol use can cause poor nutrition and a lack of nutrients  which can lead to more health problems.  The best way to avoid malnutrition is to avoid alcohol.  If you must drink, limit your intake to no more than 1 drink a day for nonpregnant women and 2 drinks a day for men. One drink equals 12 oz (355 mL) of beer, 5 oz (148 mL) of wine, or 1½ oz (44 mL) of hard liquor.    Heart Healthy Diet    A Heart-Healthy diet includes increasing healthy fats and limiting unhealthy fats.  Focus on eating a balance of foods, including fruits and vegetables, low-fat or nonfat dairy, lean protein, nuts and legumes, whole grains, and heart-healthy oils and fats.  Monitor your salt (sodium) intake, especially if you have high blood pressure.  Lose weight if you are overweight. Losing just 5-10% of your body weight can help your overall health and prevent diseases such as diabetes and heart disease.    MAIL in ZIO PATCH! LAST DAY OF WEARING IS AUGUST 4TH, 2023. MAIL IN PATCH ON AUGUST 4TH, 2023. PLACE ZIO PATCH IN BOX AND PLACE IN MAIL.

## 2023-07-21 NOTE — PROGRESS NOTES
Cardiology Follow Up Progress Note    Date of Service  7/21/2023    Attending Physician  Kurt Alarcon M.D.    Chief Complaint   Vasovagal syncope, hypertension.    HPI  Sabrina Lobo is a 89 y.o. female admitted 7/19/2023 with above.    Interim Events  No significant bradycardia noted within the past 24 hours.      Review of Systems  Review of Systems   Constitutional: Negative.  Negative for chills and fever.   HENT: Negative.  Negative for hearing loss.    Eyes: Negative.    Respiratory: Negative.  Negative for cough and shortness of breath.    Cardiovascular: Negative.  Negative for chest pain, palpitations and leg swelling.   Gastrointestinal: Negative.  Negative for abdominal pain, nausea and vomiting.   Genitourinary: Negative.  Negative for dysuria and urgency.   Musculoskeletal: Negative.  Negative for myalgias.   Skin: Negative.  Negative for rash.   Neurological: Negative.  Negative for dizziness, weakness and headaches.   Hematological:  Does not bruise/bleed easily.   Psychiatric/Behavioral: Negative.  The patient is not nervous/anxious.        Vital signs in last 24 hours  Temp:  [36.8 °C (98.2 °F)-37.5 °C (99.5 °F)] 37.1 °C (98.8 °F)  Pulse:  [65-83] 71  Resp:  [16-18] 16  BP: (141-167)/(50-66) 141/50  SpO2:  [92 %-96 %] 92 %    Physical Exam  Physical Exam  Constitutional:       Appearance: Normal appearance. She is well-developed and normal weight.   HENT:      Head: Normocephalic and atraumatic.      Mouth/Throat:      Mouth: Mucous membranes are moist.   Eyes:      Extraocular Movements: Extraocular movements intact.      Conjunctiva/sclera: Conjunctivae normal.   Cardiovascular:      Rate and Rhythm: Normal rate and regular rhythm.      Pulses: Normal pulses.      Heart sounds: Normal heart sounds.   Pulmonary:      Effort: Pulmonary effort is normal.      Breath sounds: Normal breath sounds.   Abdominal:      General: Bowel sounds are normal.      Palpations: Abdomen is soft.    Musculoskeletal:         General: Normal range of motion.      Cervical back: Normal range of motion and neck supple.   Skin:     General: Skin is warm and dry.   Neurological:      General: No focal deficit present.      Mental Status: She is alert and oriented to person, place, and time. Mental status is at baseline.   Psychiatric:         Mood and Affect: Mood normal.         Behavior: Behavior normal.         Thought Content: Thought content normal.         Judgment: Judgment normal.         Lab Review  Lab Results   Component Value Date/Time    WBC 5.0 07/19/2023 11:44 AM    RBC 4.55 07/19/2023 11:44 AM    HEMOGLOBIN 13.9 07/19/2023 11:44 AM    HEMATOCRIT 42.9 07/19/2023 11:44 AM    MCV 94.3 07/19/2023 11:44 AM    MCH 30.5 07/19/2023 11:44 AM    MCHC 32.4 07/19/2023 11:44 AM    MPV 10.2 07/19/2023 11:44 AM      Lab Results   Component Value Date/Time    SODIUM 138 07/20/2023 03:20 AM    POTASSIUM 4.1 07/20/2023 03:20 AM    CHLORIDE 105 07/20/2023 03:20 AM    CO2 22 07/20/2023 03:20 AM    GLUCOSE 92 07/20/2023 03:20 AM    BUN 14 07/20/2023 03:20 AM    CREATININE 0.94 07/20/2023 03:20 AM    CREATININE 0.9 01/15/2009 06:34 AM      Lab Results   Component Value Date/Time    ASTSGOT 23 07/19/2023 11:44 AM    ALTSGPT 18 07/19/2023 11:44 AM     Lab Results   Component Value Date/Time    CHOLSTRLTOT 205 (H) 05/19/2020 09:24 AM     (H) 05/19/2020 09:24 AM    HDL 64 05/19/2020 09:24 AM    TRIGLYCERIDE 165 (H) 05/19/2020 09:24 AM    TROPONINT 19 07/19/2023 11:44 AM       No results for input(s): NTPROBNP in the last 72 hours.  (Above labs reviewed.)       Current Facility-Administered Medications:     erythromycin ophthalmic ointment 1 Application, 1 Application, Left Eye, Q6HRS, Kurt Alarcon M.D.    aspirin (Asa) chewable tab 81 mg, 81 mg, Oral, DAILY, Kurt Alarcon M.D., 81 mg at 07/21/23 0513    atorvastatin (Lipitor) tablet 40 mg, 40 mg, Oral, QHS, Kurt Alarcon M.D., 40 mg at 07/20/23  2052    lisinopril (Prinivil) tablet 5 mg, 5 mg, Oral, DAILY, Carrie Agee M.D., 5 mg at 07/21/23 0513    senna-docusate (Pericolace Or Senokot S) 8.6-50 MG per tablet 2 Tablet, 2 Tablet, Oral, BID, 2 Tablet at 07/20/23 0424 **AND** polyethylene glycol/lytes (Miralax) PACKET 1 Packet, 1 Packet, Oral, QDAY PRN **AND** magnesium hydroxide (Milk Of Magnesia) suspension 30 mL, 30 mL, Oral, QDAY PRN **AND** bisacodyl (Dulcolax) suppository 10 mg, 10 mg, Rectal, QDAY PRN, Carrie Agee M.D.    hydrALAZINE (Apresoline) injection 10 mg, 10 mg, Intravenous, Q4HRS PRN, Carrie Agee M.D., 10 mg at 07/20/23 0424    heparin injection 5,000 Units, 5,000 Units, Subcutaneous, Q12HRS, Carrie Agee M.D., 5,000 Units at 07/21/23 0513    amLODIPine (Norvasc) tablet 10 mg, 10 mg, Oral, DAILY, Carrie Agee M.D., 10 mg at 07/21/23 0513  (Medications reviewed.)    Cardiac Imaging and Procedures Review  CARDIAC STUDIES/PROCEDURES:    ECHOCARDIOGRAM CONCLUSIONS (07/20/23)  Compared to the images of the prior study 04/29/2023, there has been no   significant change.   Normal left ventricular size, thickness, and systolic function.  The left ventricular ejection fraction is visually estimated to be 60%.  Normal right ventricular size and systolic function.  (study result reviewed)     EKG performed on (07/20/23) was reviewed: EKG personally interpreted shows sinus rhythm with non-specific intra-ventricular conduction delay, non-specific ST segment abnormalities .     Assessment/Plan  Vasovagal phenomenon: She is clinically doing well. We will perform Zio Patch as outpatinet.  Hypertension: Blood pressure has been labile. We will continue with medical therapy and assess also in clinic.      Thank you for allowing me to participate in the care of this patient.  Cardiology will sign off on this patient    Please contact me with any questions.    Sai Benson M.D.   Cardiologist, [facility]  [phone]  [phone]

## 2023-07-21 NOTE — DISCHARGE PLANNING
Agency/Facility Name: [facility]   Referral sent per Choice form @ 0846    1025-  Agency/Facility Name: [facility]   Outcome: Selene left v-mail informing DPA that because Pt has SCP insurance, referral must be sent to [facility]  first for approval or denial. If denied, then [facility] can consider. [facility] says they forwarded referral to [facility].     RN CM notified.     1042-  Agency/Facility Name: [facility]  Referral sent per Choice form @ 1042    1108-  Agency/Facility Name: [facility]   Spoke To: Selene  Outcome: [facility] still waiting on response from [facility], they will call again now.     1120-  Agency/Facility Name: [facility]   Spoke To: Selene  Outcome: HH informed DPA that [facility]  accepted.     1122-  Agency/Facility Name: [facility]  Spoke To: Lizbeth  Outcome: DPA confirmed [facility]  accepted.     RN CM notified.     1437-  Agency/Facility Name: [facility]   Outcome: DPA left v-mail informing [facility] that [facility]  declined, referral now pending again with [facility].     RN OPAL notified.

## 2023-07-21 NOTE — DISCHARGE PLANNING
ATTN: Case Management  RE: Referral for Home Health    As of 7/21/2023, we have accepted the Home Health referral for the patient listed above.    A [facility] clinician will be out to see the patient within 48 hours. If you have any questions or concerns regarding the patient's transition to Home Health, please do not hesitate to contact us at [phone].      We look forward to collaborating with you,  [facility] Health Team

## 2023-07-21 NOTE — PROGRESS NOTES
Pt was educated with son Prabhjot. Pt and family switched from [facility]  to [facility] with the help of case management. Pt was instructed to take ZIO patch off on August 4th and mail back in box. Pt's son Prabhjot was instructed on the ZIO patch and mail in process. They declined any questions and are ready to leave.

## 2023-07-27 ENCOUNTER — TELEPHONE (OUTPATIENT)
Dept: HEALTH INFORMATION MANAGEMENT | Facility: OTHER | Age: 88
End: 2023-07-27
Payer: MEDICARE

## 2023-08-07 ENCOUNTER — APPOINTMENT (OUTPATIENT)
Dept: CARDIOLOGY | Facility: MEDICAL CENTER | Age: 88
End: 2023-08-07
Attending: INTERNAL MEDICINE
Payer: MEDICARE

## 2023-08-07 DIAGNOSIS — R55 VASOVAGAL SYNCOPE: ICD-10-CM

## 2023-08-22 ENCOUNTER — TELEPHONE (OUTPATIENT)
Dept: CARDIOLOGY | Facility: MEDICAL CENTER | Age: 88
End: 2023-08-22
Payer: MEDICARE

## 2023-08-23 PROCEDURE — 93248 EXT ECG>7D<15D REV&INTERPJ: CPT | Performed by: INTERNAL MEDICINE

## 2023-11-16 ENCOUNTER — TELEPHONE (OUTPATIENT)
Dept: HEALTH INFORMATION MANAGEMENT | Facility: OTHER | Age: 88
End: 2023-11-16
Payer: MEDICARE

## 2023-12-10 NOTE — DISCHARGE SUMMARY
Discharge summary    Admission date: 7/2/2017    Discharge date: 7/3/2016    Patient's Name: Sabrina Lobo  MRN: [id number]    Discharge diagnosis  Syncope: likely from dehydration: improved  Essential hypertension  Lactic acidosis: resolved  Acute kidney injury:     Consultant:   none  Condition on discharge: stable    Disposition: home     Diet: regular     Hospital summary:  Please refer to H&P for details. In short 89 y/o F came in with syncope episode. She was also found to have lactic acidosis likely related to dehydration. She was treated with IVF and her acidosis resolved. Also she was found to have hypertension and started on amlodipine. Educated to check her BP 3 times a day with record and showed to PCP on follow up. Also due to her syncope, MRI brain, echo, carotid duplex were done with no acute findings. Please see below for details report. She responded to medical treatment suprisingly well and She said she is feeling well and like to be discharged home today. Stress test was done and negative.    MRI  1.  No evidence of acute territorial infarct, intracranial hemorrhage or mass lesion.  2.  Mild diffuse cerebral and cerebellar substance loss.  3.  Moderate microangiopathic ischemic change versus demyelination or gliosis.  4.  Chronic bilateral cerebellar lacunar infarcts.  5.  Chronic ischemic pontine gliosis.           Echo  Normal left ventricular systolic function.   Mild to Moderate aortic insufficiency.  Estimated right ventricular systolic pressure  is 40 mmHg.  Compared to the images of the study done - there has been worsening of   aortic regurgiation but still not in severe range.    Carotid duplex   Right carotid.    Plaque of the bifurcation extending into the internal carotid. Velocities    are consistent with < 50% stenosis of the internal carotid artery.    Subclavian and vertebral artery waveforms are antegrade and waveforms are    normal in character and velocity.      Left carotid.  "ED CONSULTATION  Date/Time:12/10/2023 9:25 AM    I am seeing this patient along with SANDEE Santana.  I, Yeny Hernandez MD have reviewed the documentation, personally taken the patient's history, performed an exam and agree with the physical finds, diagnosis and management plan.    HPI: Lai Amin is a 75 year old male who presents to the ED with onset of blurry vision, more than double vision, and multiple visions in both eyes this morning upon waking up at 6:20 AM. Patient states these symptoms have been ongoing for two hours.     The creation of this record is based on the scribe s observations of the work being performed by Yeny Hernandez MD and the provider s statements to them. It was created on her behalf by Bernadine Jacobo a trained medical scribe. This document has been checked and approved by the attending provider.    ED Course:  9:25 AM I discussed the patient case with SANDEE Santana.  9:28 AM I introduced myself to the patient. Patient had initially had double vision from 6:20 am - 8:30 am.  It was a wake-up event.  He was last known well at 10 last evening.  His symptoms completely resolved.  However, while I was assessing the patient, he reports that his double vision return.  On my evaluation, he has double vision with left lateral gaze, upward gaze.  He reports that it is difficult to accommodate.  He otherwise has no other focal neurologic deficits.  Stroke code was called given return of symptoms acutely.  We spoke with stroke neurology, agree with continuing with CTA of the head and neck.    Physical Exam:BP (!) 171/77   Pulse 82   Resp 21   Ht 1.905 m (6' 3\")   Wt 110.8 kg (244 lb 4.8 oz)   SpO2 99%   BMI 30.54 kg/m    Gen:  Alert, awake, NAD  HENT:  Head atraumatic, PERRL, EOMI, no meningismus  Respiratory:  CTA, normal respiratory rate  Cardiovascular:  Regular rate and rhythm, no murmur  Abdomen:  Soft, nontender, normoactive bowel sounds  Musculoskeletal:  "    Plaque of the bifurcation extending into the internal carotid. Velocities    are consistent with < 50% stenosis of the internal carotid artery.    Antegrade left vertebral flow.    Subclavian artery stenosis with elevated velocities consistent with    stenosis >50%.       Medication List      START taking these medications       Instructions    amlodipine 10 MG Tabs   Commonly known as:  NORVASC    Take 1 Tab by mouth every day.   Dose:  10 mg         CONTINUE taking these medications       Instructions    aspirin 81 MG tablet    Take 1 Tab by mouth every morning.   Dose:  81 mg       simvastatin 20 MG Tabs   Commonly known as:  ZOCOR    Take 20 mg by mouth every morning.   Dose:  20 mg       VASOTEC 20 MG tablet   Generic drug:  enalapril    Take 20 mg by mouth every morning.   Dose:  20 mg             Lab Results   Component Value Date/Time    SODIUM 140 07/02/2017 02:16 PM    POTASSIUM 3.8 07/02/2017 02:16 PM    CHLORIDE 107 07/02/2017 02:16 PM    CO2 24 07/02/2017 02:16 PM    GLUCOSE 100* 07/02/2017 02:16 PM    BUN 20 07/02/2017 02:16 PM    CREATININE 1.22 07/02/2017 02:16 PM        Lab Results   Component Value Date/Time    WBC 9.4 07/02/2017 02:16 PM    RBC 4.15* 07/02/2017 02:16 PM    HEMOGLOBIN 13.0 07/02/2017 02:16 PM    HEMATOCRIT 38.5 07/02/2017 02:16 PM    MCV 92.8 07/02/2017 02:16 PM    MCH 31.3 07/02/2017 02:16 PM    MCHC 33.8 07/02/2017 02:16 PM    MPV 11.3 07/02/2017 02:16 PM    NEUTROPHILS-POLYS 45.60 07/02/2017 02:16 PM    LYMPHOCYTES 41.60* 07/02/2017 02:16 PM    MONOCYTES 7.10 07/02/2017 02:16 PM    EOSINOPHILS 4.20 07/02/2017 02:16 PM    BASOPHILS 1.10 07/02/2017 02:16 PM        Procedure      Imaging        INSTRUCTIONS:     The patient was instructed to return to the ER in the event of worsening symptoms. I have counseled the patient on the importance of compliance and the patient has agreed to proceed with all medical recommendations and follow up plan indicated above.   The patient  FROM in all extremities with no tenderness  Integument:  No rash, warm, dry  Neuro:  GCS 15, A & O x 3, double vision with left lateral gaze and downward gaze, otherwise CN II - XII intact, no dysdiadochokinesia, negative Romberg, no pronator drift, no nystagmus, visual fields full to confrontation, normal gait, +5/5 strength in all extremities      MDM: Double vision.  He was last known well at 10 last evening.  He reports that his symptoms started upon awakening at 620 this morning and resolved at 830 this morning.  While I was evaluating the patient at 930 this morning, he had return of his double vision.  He otherwise had no focal neurologic deficits.  Initial stroke code was called, tier 1 stroke.  We discussed with stroke neurology.  CTA of the head and neck is unremarkable.  MRI brain demonstrates acute infarct in the posterior right cerebellar hemisphere with additional acute infarct in the anterior left frontal lobe, no acute intracranial hemorrhage.  Stroke neurology was consulted who recommended aspirin and admission for stroke workup and management.    Results for orders placed or performed during the hospital encounter of 12/10/23   CT Head w/o Contrast    Impression    IMPRESSION:  1.  No CT evidence for acute intracranial process.   2.  Brain atrophy and presumed chronic microvascular ischemic changes as above.     CTA Head Neck with Contrast    Impression    IMPRESSION:     HEAD CTA:   1.  No significant stenosis, aneurysm, or high flow vascular malformation identified.    NECK CTA:  1.  Mild atherosclerotic changes at the left greater than right carotid bifurcations without hemodynamically significant stenosis in the neck vessels by NASCET criteria.  2.  No evidence for dissection.    Preliminary findings were called to SANDEE Pelletier at 12/10/2023 10:07 AM CST by Dr. TERESA Hauser.   MR Brain and Orbits w/o & w Contrast    Impression    IMPRESSION:  HEAD MRI:  1.  Small focus of acute infarct in the  posterior right cerebellar hemisphere with an additional small linear focus of acute infarct in the anterior left frontal lobe predominantly involving white matter. Additional punctate focus of acute infarct in the   subcortical white matter of the lateral right temporo-occipital region.    2.  No intracranial hemorrhage. No abnormal enhancement.    3.  Mild to moderate burden scattered chronic small vessel ischemic change with a moderate diffuse parenchymal volume loss.    ORBIT MRI:  1.  Normal MRI of the orbits.      Result Value Ref Range    Troponin T, High Sensitivity 32 (H) <=22 ng/L   Basic metabolic panel   Result Value Ref Range    Sodium 139 135 - 145 mmol/L    Potassium 4.1 3.4 - 5.3 mmol/L    Chloride 103 98 - 107 mmol/L    Carbon Dioxide (CO2) 26 22 - 29 mmol/L    Anion Gap 10 7 - 15 mmol/L    Urea Nitrogen 14.2 8.0 - 23.0 mg/dL    Creatinine 0.99 0.67 - 1.17 mg/dL    GFR Estimate 79 >60 mL/min/1.73m2    Calcium 9.4 8.8 - 10.2 mg/dL    Glucose 123 (H) 70 - 99 mg/dL   Result Value Ref Range    INR 1.21 (H) 0.85 - 1.15   Partial thromboplastin time   Result Value Ref Range    aPTT 35 22 - 38 Seconds   CBC with platelets and differential   Result Value Ref Range    WBC Count 7.9 4.0 - 11.0 10e3/uL    RBC Count 4.73 4.40 - 5.90 10e6/uL    Hemoglobin 14.8 13.3 - 17.7 g/dL    Hematocrit 43.1 40.0 - 53.0 %    MCV 91 78 - 100 fL    MCH 31.3 26.5 - 33.0 pg    MCHC 34.3 31.5 - 36.5 g/dL    RDW 12.6 10.0 - 15.0 %    Platelet Count 257 150 - 450 10e3/uL    % Neutrophils 79 %    % Lymphocytes 9 %    % Monocytes 11 %    % Eosinophils 1 %    % Basophils 0 %    % Immature Granulocytes 0 %    NRBCs per 100 WBC 0 <1 /100    Absolute Neutrophils 6.2 1.6 - 8.3 10e3/uL    Absolute Lymphocytes 0.7 (L) 0.8 - 5.3 10e3/uL    Absolute Monocytes 0.9 0.0 - 1.3 10e3/uL    Absolute Eosinophils 0.1 0.0 - 0.7 10e3/uL    Absolute Basophils 0.0 0.0 - 0.2 10e3/uL    Absolute Immature Granulocytes 0.0 <=0.4 10e3/uL    Absolute NRBCs 0.0  understands that all medications come with benefits and risks. Risks may include permanent injury or death and these risks can be minimized with close reassessment and monitoring.        PCP in 2 weeks      Time spent on discharge day patient visit, preparing discharge paperwork and arranging for patient follow up 35 mins.   10e3/uL   Troponin T, High Sensitivity (now)   Result Value Ref Range    Troponin T, High Sensitivity 29 (H) <=22 ng/L   Glucose by meter   Result Value Ref Range    GLUCOSE BY METER POCT 125 (H) 70 - 99 mg/dL   Glucose by meter   Result Value Ref Range    GLUCOSE BY METER POCT 111 (H) 70 - 99 mg/dL   Result Value Ref Range    Hemoglobin A1C 6.2 (H) <5.7 %   ECG 12-LEAD WITH MUSE ([facility])   Result Value Ref Range    Systolic Blood Pressure 160 mmHg    Diastolic Blood Pressure 71 mmHg    Ventricular Rate 77 BPM    Atrial Rate 77 BPM    TN Interval 176 ms    QRS Duration 150 ms     ms    QTc 488 ms    P Axis 54 degrees    R AXIS 83 degrees    T Axis 19 degrees    Interpretation ECG       Sinus rhythm  Right bundle branch block  Abnormal ECG  No previous ECGs available  Confirmed by SEE ED PROVIDER NOTE FOR, ECG INTERPRETATION ([id number]),  NIRAJ GOMES ([id number]) on 12/10/2023 10:16:10 AM         1. Acute ischemic stroke (H)          New Prescriptions    No medications on file       Final disposition will be per the depending diagnostic studies and patient's clinical trajectory.    This is an accurate record of my words and actions during this visit as documented by the scribe.        Yeny Hernandez MD  12/10/23 1739

## 2024-06-05 ENCOUNTER — HOSPITAL ENCOUNTER (OUTPATIENT)
Facility: MEDICAL CENTER | Age: 89
End: 2024-06-06
Attending: EMERGENCY MEDICINE | Admitting: STUDENT IN AN ORGANIZED HEALTH CARE EDUCATION/TRAINING PROGRAM
Payer: MEDICAID

## 2024-06-05 ENCOUNTER — APPOINTMENT (OUTPATIENT)
Dept: RADIOLOGY | Facility: MEDICAL CENTER | Age: 89
End: 2024-06-05
Attending: EMERGENCY MEDICINE
Payer: MEDICAID

## 2024-06-05 DIAGNOSIS — R07.9 CHEST PAIN, UNSPECIFIED TYPE: ICD-10-CM

## 2024-06-05 DIAGNOSIS — I16.0 HYPERTENSIVE URGENCY: ICD-10-CM

## 2024-06-05 PROBLEM — Z86.73 HISTORY OF LACUNAR CEREBROVASCULAR ACCIDENT (CVA): Status: ACTIVE | Noted: 2024-06-05

## 2024-06-05 PROBLEM — Z71.89 ACP (ADVANCE CARE PLANNING): Status: ACTIVE | Noted: 2024-06-05

## 2024-06-05 LAB
ALBUMIN SERPL BCP-MCNC: 4 G/DL (ref 3.2–4.9)
ALBUMIN/GLOB SERPL: 1.3 G/DL
ALP SERPL-CCNC: 103 U/L (ref 30–99)
ALT SERPL-CCNC: 14 U/L (ref 2–50)
ANION GAP SERPL CALC-SCNC: 15 MMOL/L (ref 7–16)
AST SERPL-CCNC: 31 U/L (ref 12–45)
BASOPHILS # BLD AUTO: 1 % (ref 0–1.8)
BASOPHILS # BLD: 0.06 K/UL (ref 0–0.12)
BILIRUB SERPL-MCNC: 0.4 MG/DL (ref 0.1–1.5)
BUN SERPL-MCNC: 17 MG/DL (ref 8–22)
CALCIUM ALBUM COR SERPL-MCNC: 8.9 MG/DL (ref 8.5–10.5)
CALCIUM SERPL-MCNC: 8.9 MG/DL (ref 8.5–10.5)
CHLORIDE SERPL-SCNC: 105 MMOL/L (ref 96–112)
CO2 SERPL-SCNC: 18 MMOL/L (ref 20–33)
CREAT SERPL-MCNC: 1.02 MG/DL (ref 0.5–1.4)
EKG IMPRESSION: NORMAL
EOSINOPHIL # BLD AUTO: 0.36 K/UL (ref 0–0.51)
EOSINOPHIL NFR BLD: 6.1 % (ref 0–6.9)
ERYTHROCYTE [DISTWIDTH] IN BLOOD BY AUTOMATED COUNT: 43.3 FL (ref 35.9–50)
GFR SERPLBLD CREATININE-BSD FMLA CKD-EPI: 51 ML/MIN/1.73 M 2
GLOBULIN SER CALC-MCNC: 3.2 G/DL (ref 1.9–3.5)
GLUCOSE SERPL-MCNC: 110 MG/DL (ref 65–99)
HCT VFR BLD AUTO: 41.6 % (ref 37–47)
HGB BLD-MCNC: 13.8 G/DL (ref 12–16)
IMM GRANULOCYTES # BLD AUTO: 0.01 K/UL (ref 0–0.11)
IMM GRANULOCYTES NFR BLD AUTO: 0.2 % (ref 0–0.9)
LYMPHOCYTES # BLD AUTO: 1.63 K/UL (ref 1–4.8)
LYMPHOCYTES NFR BLD: 27.8 % (ref 22–41)
MCH RBC QN AUTO: 31.1 PG (ref 27–33)
MCHC RBC AUTO-ENTMCNC: 33.2 G/DL (ref 32.2–35.5)
MCV RBC AUTO: 93.7 FL (ref 81.4–97.8)
MONOCYTES # BLD AUTO: 0.57 K/UL (ref 0–0.85)
MONOCYTES NFR BLD AUTO: 9.7 % (ref 0–13.4)
NEUTROPHILS # BLD AUTO: 3.23 K/UL (ref 1.82–7.42)
NEUTROPHILS NFR BLD: 55.2 % (ref 44–72)
NRBC # BLD AUTO: 0 K/UL
NRBC BLD-RTO: 0 /100 WBC (ref 0–0.2)
PLATELET # BLD AUTO: 185 K/UL (ref 164–446)
PMV BLD AUTO: 9.8 FL (ref 9–12.9)
POTASSIUM SERPL-SCNC: 4 MMOL/L (ref 3.6–5.5)
PROT SERPL-MCNC: 7.2 G/DL (ref 6–8.2)
RBC # BLD AUTO: 4.44 M/UL (ref 4.2–5.4)
SODIUM SERPL-SCNC: 138 MMOL/L (ref 135–145)
TROPONIN T SERPL-MCNC: 12 NG/L (ref 6–19)
TROPONIN T SERPL-MCNC: 16 NG/L (ref 6–19)
WBC # BLD AUTO: 5.9 K/UL (ref 4.8–10.8)

## 2024-06-05 PROCEDURE — G0378 HOSPITAL OBSERVATION PER HR: HCPCS

## 2024-06-05 PROCEDURE — 84484 ASSAY OF TROPONIN QUANT: CPT

## 2024-06-05 PROCEDURE — 99285 EMERGENCY DEPT VISIT HI MDM: CPT

## 2024-06-05 PROCEDURE — 99223 1ST HOSP IP/OBS HIGH 75: CPT | Performed by: STUDENT IN AN ORGANIZED HEALTH CARE EDUCATION/TRAINING PROGRAM

## 2024-06-05 PROCEDURE — A9270 NON-COVERED ITEM OR SERVICE: HCPCS | Mod: UD | Performed by: STUDENT IN AN ORGANIZED HEALTH CARE EDUCATION/TRAINING PROGRAM

## 2024-06-05 PROCEDURE — 85025 COMPLETE CBC W/AUTO DIFF WBC: CPT

## 2024-06-05 PROCEDURE — 700111 HCHG RX REV CODE 636 W/ 250 OVERRIDE (IP): Mod: UD | Performed by: STUDENT IN AN ORGANIZED HEALTH CARE EDUCATION/TRAINING PROGRAM

## 2024-06-05 PROCEDURE — 80053 COMPREHEN METABOLIC PANEL: CPT

## 2024-06-05 PROCEDURE — 93005 ELECTROCARDIOGRAM TRACING: CPT

## 2024-06-05 PROCEDURE — 700102 HCHG RX REV CODE 250 W/ 637 OVERRIDE(OP): Mod: UD | Performed by: STUDENT IN AN ORGANIZED HEALTH CARE EDUCATION/TRAINING PROGRAM

## 2024-06-05 PROCEDURE — 71045 X-RAY EXAM CHEST 1 VIEW: CPT

## 2024-06-05 PROCEDURE — 96372 THER/PROPH/DIAG INJ SC/IM: CPT

## 2024-06-05 PROCEDURE — 93005 ELECTROCARDIOGRAM TRACING: CPT | Performed by: EMERGENCY MEDICINE

## 2024-06-05 PROCEDURE — 36415 COLL VENOUS BLD VENIPUNCTURE: CPT

## 2024-06-05 RX ORDER — ENOXAPARIN SODIUM 100 MG/ML
30 INJECTION SUBCUTANEOUS DAILY
Status: DISCONTINUED | OUTPATIENT
Start: 2024-06-05 | End: 2024-06-06 | Stop reason: HOSPADM

## 2024-06-05 RX ORDER — ACETAMINOPHEN 325 MG/1
650 TABLET ORAL EVERY 6 HOURS PRN
Status: DISCONTINUED | OUTPATIENT
Start: 2024-06-05 | End: 2024-06-06 | Stop reason: HOSPADM

## 2024-06-05 RX ORDER — LABETALOL HYDROCHLORIDE 5 MG/ML
10 INJECTION, SOLUTION INTRAVENOUS EVERY 4 HOURS PRN
Status: DISCONTINUED | OUTPATIENT
Start: 2024-06-05 | End: 2024-06-06 | Stop reason: HOSPADM

## 2024-06-05 RX ORDER — NITROGLYCERIN 0.4 MG/1
0.4 TABLET SUBLINGUAL
Status: DISCONTINUED | OUTPATIENT
Start: 2024-06-05 | End: 2024-06-06 | Stop reason: HOSPADM

## 2024-06-05 RX ORDER — ATORVASTATIN CALCIUM 40 MG/1
40 TABLET, FILM COATED ORAL DAILY
Status: DISCONTINUED | OUTPATIENT
Start: 2024-06-06 | End: 2024-06-06 | Stop reason: HOSPADM

## 2024-06-05 RX ORDER — LISINOPRIL 10 MG/1
10 TABLET ORAL
Status: DISCONTINUED | OUTPATIENT
Start: 2024-06-05 | End: 2024-06-06 | Stop reason: HOSPADM

## 2024-06-05 RX ORDER — ASPIRIN 81 MG/1
81 TABLET, CHEWABLE ORAL DAILY
Status: DISCONTINUED | OUTPATIENT
Start: 2024-06-06 | End: 2024-06-06 | Stop reason: HOSPADM

## 2024-06-05 RX ADMIN — LISINOPRIL 10 MG: 10 TABLET ORAL at 21:35

## 2024-06-05 RX ADMIN — ENOXAPARIN SODIUM 30 MG: 100 INJECTION SUBCUTANEOUS at 21:34

## 2024-06-05 SDOH — ECONOMIC STABILITY: TRANSPORTATION INSECURITY
IN THE PAST 12 MONTHS, HAS LACK OF RELIABLE TRANSPORTATION KEPT YOU FROM MEDICAL APPOINTMENTS, MEETINGS, WORK OR FROM GETTING THINGS NEEDED FOR DAILY LIVING?: NO

## 2024-06-05 SDOH — ECONOMIC STABILITY: TRANSPORTATION INSECURITY
IN THE PAST 12 MONTHS, HAS THE LACK OF TRANSPORTATION KEPT YOU FROM MEDICAL APPOINTMENTS OR FROM GETTING MEDICATIONS?: NO

## 2024-06-05 ASSESSMENT — ENCOUNTER SYMPTOMS
MUSCULOSKELETAL NEGATIVE: 1
PSYCHIATRIC NEGATIVE: 1
EYES NEGATIVE: 1
NEUROLOGICAL NEGATIVE: 1
GASTROINTESTINAL NEGATIVE: 1
RESPIRATORY NEGATIVE: 1

## 2024-06-05 ASSESSMENT — LIFESTYLE VARIABLES
CONSUMPTION TOTAL: NEGATIVE
DOES PATIENT WANT TO STOP DRINKING: NO
EVER FELT BAD OR GUILTY ABOUT YOUR DRINKING: NO
AVERAGE NUMBER OF DAYS PER WEEK YOU HAVE A DRINK CONTAINING ALCOHOL: 0
ALCOHOL_USE: NO
HAVE PEOPLE ANNOYED YOU BY CRITICIZING YOUR DRINKING: NO
TOTAL SCORE: 0
EVER HAD A DRINK FIRST THING IN THE MORNING TO STEADY YOUR NERVES TO GET RID OF A HANGOVER: NO
ON A TYPICAL DAY WHEN YOU DRINK ALCOHOL HOW MANY DRINKS DO YOU HAVE: 0
TOTAL SCORE: 0
HOW MANY TIMES IN THE PAST YEAR HAVE YOU HAD 5 OR MORE DRINKS IN A DAY: 0
HAVE YOU EVER FELT YOU SHOULD CUT DOWN ON YOUR DRINKING: NO
TOTAL SCORE: 0

## 2024-06-05 ASSESSMENT — PATIENT HEALTH QUESTIONNAIRE - PHQ9
1. LITTLE INTEREST OR PLEASURE IN DOING THINGS: NOT AT ALL
2. FEELING DOWN, DEPRESSED, IRRITABLE, OR HOPELESS: NOT AT ALL
SUM OF ALL RESPONSES TO PHQ9 QUESTIONS 1 AND 2: 0

## 2024-06-05 ASSESSMENT — SOCIAL DETERMINANTS OF HEALTH (SDOH)
WITHIN THE PAST 12 MONTHS, YOU WORRIED THAT YOUR FOOD WOULD RUN OUT BEFORE YOU GOT THE MONEY TO BUY MORE: NEVER TRUE
WITHIN THE LAST YEAR, HAVE YOU BEEN HUMILIATED OR EMOTIONALLY ABUSED IN OTHER WAYS BY YOUR PARTNER OR EX-PARTNER?: NO
WITHIN THE LAST YEAR, HAVE TO BEEN RAPED OR FORCED TO HAVE ANY KIND OF SEXUAL ACTIVITY BY YOUR PARTNER OR EX-PARTNER?: NO
IN THE PAST 12 MONTHS, HAS THE ELECTRIC, GAS, OIL, OR WATER COMPANY THREATENED TO SHUT OFF SERVICE IN YOUR HOME?: NO
WITHIN THE LAST YEAR, HAVE YOU BEEN KICKED, HIT, SLAPPED, OR OTHERWISE PHYSICALLY HURT BY YOUR PARTNER OR EX-PARTNER?: NO
WITHIN THE LAST YEAR, HAVE YOU BEEN AFRAID OF YOUR PARTNER OR EX-PARTNER?: NO
WITHIN THE PAST 12 MONTHS, THE FOOD YOU BOUGHT JUST DIDN'T LAST AND YOU DIDN'T HAVE MONEY TO GET MORE: NEVER TRUE

## 2024-06-05 ASSESSMENT — PAIN DESCRIPTION - DESCRIPTORS: DESCRIPTORS: ACHING;BURNING

## 2024-06-05 ASSESSMENT — FIBROSIS 4 INDEX
FIB4 SCORE: 4.25
FIB4 SCORE: 4.25

## 2024-06-06 ENCOUNTER — PHARMACY VISIT (OUTPATIENT)
Dept: PHARMACY | Facility: MEDICAL CENTER | Age: 89
End: 2024-06-06
Payer: COMMERCIAL

## 2024-06-06 VITALS
HEIGHT: 62 IN | OXYGEN SATURATION: 93 % | SYSTOLIC BLOOD PRESSURE: 139 MMHG | DIASTOLIC BLOOD PRESSURE: 76 MMHG | BODY MASS INDEX: 19.96 KG/M2 | TEMPERATURE: 98.1 F | RESPIRATION RATE: 14 BRPM | WEIGHT: 108.47 LBS | HEART RATE: 65 BPM

## 2024-06-06 LAB
B PARAP IS1001 DNA NPH QL NAA+NON-PROBE: NOT DETECTED
B PERT.PT PRMT NPH QL NAA+NON-PROBE: NOT DETECTED
C PNEUM DNA NPH QL NAA+NON-PROBE: NOT DETECTED
EKG IMPRESSION: NORMAL
FLUAV RNA NPH QL NAA+NON-PROBE: NOT DETECTED
FLUAV RNA SPEC QL NAA+PROBE: NEGATIVE
FLUBV RNA NPH QL NAA+NON-PROBE: NOT DETECTED
FLUBV RNA SPEC QL NAA+PROBE: NEGATIVE
HADV DNA NPH QL NAA+NON-PROBE: NOT DETECTED
HCOV 229E RNA NPH QL NAA+NON-PROBE: NOT DETECTED
HCOV HKU1 RNA NPH QL NAA+NON-PROBE: NOT DETECTED
HCOV NL63 RNA NPH QL NAA+NON-PROBE: NOT DETECTED
HCOV OC43 RNA NPH QL NAA+NON-PROBE: NOT DETECTED
HMPV RNA NPH QL NAA+NON-PROBE: NOT DETECTED
HPIV1 RNA NPH QL NAA+NON-PROBE: NOT DETECTED
HPIV2 RNA NPH QL NAA+NON-PROBE: NOT DETECTED
HPIV3 RNA NPH QL NAA+NON-PROBE: NOT DETECTED
HPIV4 RNA NPH QL NAA+NON-PROBE: NOT DETECTED
M PNEUMO DNA NPH QL NAA+NON-PROBE: NOT DETECTED
RSV RNA NPH QL NAA+NON-PROBE: NOT DETECTED
RSV RNA SPEC QL NAA+PROBE: NEGATIVE
RV+EV RNA NPH QL NAA+NON-PROBE: NOT DETECTED
SARS-COV-2 RNA NPH QL NAA+NON-PROBE: NOTDETECTED
SARS-COV-2 RNA RESP QL NAA+PROBE: NOTDETECTED
SPECIMEN SOURCE: NORMAL

## 2024-06-06 PROCEDURE — A9270 NON-COVERED ITEM OR SERVICE: HCPCS | Mod: UD | Performed by: INTERNAL MEDICINE

## 2024-06-06 PROCEDURE — A9270 NON-COVERED ITEM OR SERVICE: HCPCS | Mod: UD | Performed by: STUDENT IN AN ORGANIZED HEALTH CARE EDUCATION/TRAINING PROGRAM

## 2024-06-06 PROCEDURE — 87633 RESP VIRUS 12-25 TARGETS: CPT | Mod: XU

## 2024-06-06 PROCEDURE — 700102 HCHG RX REV CODE 250 W/ 637 OVERRIDE(OP): Mod: UD | Performed by: INTERNAL MEDICINE

## 2024-06-06 PROCEDURE — G0378 HOSPITAL OBSERVATION PER HR: HCPCS

## 2024-06-06 PROCEDURE — 87581 M.PNEUMON DNA AMP PROBE: CPT

## 2024-06-06 PROCEDURE — 87486 CHLMYD PNEUM DNA AMP PROBE: CPT

## 2024-06-06 PROCEDURE — 99239 HOSP IP/OBS DSCHRG MGMT >30: CPT | Performed by: INTERNAL MEDICINE

## 2024-06-06 PROCEDURE — 700111 HCHG RX REV CODE 636 W/ 250 OVERRIDE (IP): Mod: UD | Performed by: STUDENT IN AN ORGANIZED HEALTH CARE EDUCATION/TRAINING PROGRAM

## 2024-06-06 PROCEDURE — 87798 DETECT AGENT NOS DNA AMP: CPT | Mod: 91

## 2024-06-06 PROCEDURE — 0241U HCHG SARS-COV-2 COVID-19 NFCT DS RESP RNA 4 TRGT MIC: CPT

## 2024-06-06 PROCEDURE — 93010 ELECTROCARDIOGRAM REPORT: CPT | Performed by: INTERNAL MEDICINE

## 2024-06-06 PROCEDURE — 700102 HCHG RX REV CODE 250 W/ 637 OVERRIDE(OP): Mod: UD | Performed by: STUDENT IN AN ORGANIZED HEALTH CARE EDUCATION/TRAINING PROGRAM

## 2024-06-06 PROCEDURE — RXMED WILLOW AMBULATORY MEDICATION CHARGE: Performed by: INTERNAL MEDICINE

## 2024-06-06 PROCEDURE — 93005 ELECTROCARDIOGRAM TRACING: CPT

## 2024-06-06 PROCEDURE — 96374 THER/PROPH/DIAG INJ IV PUSH: CPT

## 2024-06-06 RX ORDER — AMLODIPINE BESYLATE 10 MG/1
10 TABLET ORAL
Status: DISCONTINUED | OUTPATIENT
Start: 2024-06-07 | End: 2024-06-06 | Stop reason: HOSPADM

## 2024-06-06 RX ORDER — AMLODIPINE BESYLATE 5 MG/1
5 TABLET ORAL
Status: DISCONTINUED | OUTPATIENT
Start: 2024-06-06 | End: 2024-06-06

## 2024-06-06 RX ORDER — LISINOPRIL 10 MG/1
10 TABLET ORAL
Qty: 90 TABLET | Refills: 0 | Status: ON HOLD | OUTPATIENT
Start: 2024-06-06 | End: 2024-08-12

## 2024-06-06 RX ORDER — AMLODIPINE BESYLATE 10 MG/1
10 TABLET ORAL DAILY
COMMUNITY
End: 2024-08-11

## 2024-06-06 RX ORDER — AMLODIPINE BESYLATE 5 MG/1
5 TABLET ORAL ONCE
Status: COMPLETED | OUTPATIENT
Start: 2024-06-06 | End: 2024-06-06

## 2024-06-06 RX ADMIN — ATORVASTATIN CALCIUM 40 MG: 40 TABLET, FILM COATED ORAL at 05:34

## 2024-06-06 RX ADMIN — AMLODIPINE BESYLATE 5 MG: 5 TABLET ORAL at 07:28

## 2024-06-06 RX ADMIN — ASPIRIN 81 MG: 81 TABLET, CHEWABLE ORAL at 05:33

## 2024-06-06 RX ADMIN — LABETALOL HYDROCHLORIDE 10 MG: 5 INJECTION INTRAVENOUS at 04:18

## 2024-06-06 RX ADMIN — AMLODIPINE BESYLATE 5 MG: 5 TABLET ORAL at 11:32

## 2024-06-06 NOTE — PROGRESS NOTES
Report received from night shift RN. Patient A&O 2-3, in bed resting comfortably. VSS, denies pain, bed in lowest position and locked, call light in reach.

## 2024-06-06 NOTE — PROGRESS NOTES
Contacted [facility] pharmacy in [place]. Confirmed med rec with them. MD notified. Pharmacy updated in chart.

## 2024-06-06 NOTE — PROGRESS NOTES
Pt discharged. IV removed, discharge instructions provided to patient, pt verbalizes understanding. Pt states all questions have been answered. Copy of discharge paperwork provided to pt, signed copy in chart. Pt states all belongings in possession. Pt left unit via

## 2024-06-06 NOTE — DISCHARGE PLANNING
Called [facility] @@ [phone] and spoke with Prabhjot who is patients son. [place] and  has no transportation. Verified address [address] [place]. Patient has Medicaid FFS and has MTM.

## 2024-06-06 NOTE — PROGRESS NOTES
Telemetry Shift Summary     Rhythm: SB-SR / first degree HB / BBB  HR: 56-77  Ectopy:     Pt had brief run in 2nd degree type1. X2 overnight    Measurements: 0.33/0.12/0.41

## 2024-06-06 NOTE — CARE PLAN
The patient is Watcher - Medium risk of patient condition declining or worsening    Problem: Pain - Standard  Goal: Alleviation of pain or a reduction in pain to the patient’s comfort goal  Outcome: Progressing     Problem: Knowledge Deficit - Standard  Goal: Patient and family/care givers will demonstrate understanding of plan of care, disease process/condition, diagnostic tests and medications  Outcome: Progressing     Shift Goals  Clinical Goals: VSS, cardia workup  Patient Goals: to go home    Progress made toward(s) clinical / shift goals:  POC discussed, verbalized understanding, needs reinforcement education. No c/o pain overnight    Patient is not progressing towards the following goals:

## 2024-06-06 NOTE — ED TRIAGE NOTES
"Chief Complaint   Patient presents with    Chest Pain     X2 days that is on the L side of pts chest. Pt states 5/10 CP. No SOB. Pt has associated dizziness       Pt is visiting from [place] seeing her son. Pt denies any fevers, but states having a cough as well. Pt aox4, gcs 15.      BP (!) 197/89   Pulse 84   Resp 18   Ht 1.575 m (5' 2\")   Wt 59 kg (130 lb)   SpO2 95%   BMI 23.78 kg/m²     "

## 2024-06-06 NOTE — DISCHARGE PLANNING
Updated by DR. De La Vega that patient D/C'd. Message sent to Cari @ [facility] to update. Ride line order placed. Nicolette ALLISON updated.

## 2024-06-06 NOTE — DISCHARGE INSTRUCTIONS
Diet    Resume your normal diet as tolerated.  A diet low in cholesterol, fat, and sodium is recommended for good health.     Activity    Resume Your Normal Activity    You may resume your normal activity as tolerated.  Rest as needed.

## 2024-06-06 NOTE — H&P
Hospital Medicine History & Physical Note    Date of Service  6/5/2024    Primary Care Physician  Pcp Pt States None    Consultants  None    Code Status  Full Code    Chief Complaint  Chief Complaint   Patient presents with    Chest Pain     X2 days that is on the L side of pts chest. Pt states 5/10 CP. No SOB. Pt has associated dizziness       History of Presenting Illness  Sabrina Payne is a 89 y.o. female who presented 6/5/2024 with chest pain.  Patient is a resident at a living facility and states that for the last 48 hours, she has been having on and off intermittent sharp chest pain.  Patient is a poor historian, and Tagalog speaking. She presented to ED for chest pain.    In ED, pt hypertensive.   Chest x-ray negative for acute cardiopulmonary abnormality.  EKG showing normal sinus rhythm with left bundle branch block with T wave inversion in lead aVL.  Initial troponin x 2 negative    I discussed the plan of care with patient.    Review of Systems  Review of Systems   Constitutional:  Positive for malaise/fatigue.   HENT: Negative.     Eyes: Negative.    Respiratory: Negative.     Cardiovascular:  Positive for chest pain.   Gastrointestinal: Negative.    Genitourinary: Negative.    Musculoskeletal: Negative.    Skin: Negative.    Neurological: Negative.    Endo/Heme/Allergies: Negative.    Psychiatric/Behavioral: Negative.         Past Medical History   has no past medical history on file.    Surgical History   has no past surgical history on file.     Family History  family history is not on file.   Family history reviewed with patient. There is no family history that is pertinent to the chief complaint.     Social History   reports that she has never smoked. She has never used smokeless tobacco.    Allergies  No Known Allergies    Medications  None       Physical Exam  Temp:  [36.1 °C (97 °F)] 36.1 °C (97 °F)  Pulse:  [74-84] 75  Resp:  [15-20] 15  BP: (168-198)/(77-89) 168/77  SpO2:  [94 %-96 %] 96  "%  Blood Pressure : (!) 168/77   Temperature: 36.1 °C (97 °F)   Pulse: 75   Respiration: 15   Pulse Oximetry: 96 %       Physical Exam  Constitutional:       Appearance: Normal appearance.   HENT:      Head: Normocephalic.      Nose: Nose normal.      Mouth/Throat:      Mouth: Mucous membranes are moist.   Eyes:      Pupils: Pupils are equal, round, and reactive to light.   Cardiovascular:      Rate and Rhythm: Normal rate and regular rhythm.      Pulses: Normal pulses.   Pulmonary:      Effort: Pulmonary effort is normal.      Breath sounds: Normal breath sounds.   Abdominal:      General: Abdomen is flat. Bowel sounds are normal. There is no distension.      Palpations: Abdomen is soft.   Musculoskeletal:         General: Normal range of motion.      Cervical back: Neck supple.   Skin:     General: Skin is warm.   Neurological:      Mental Status: She is alert and oriented to person, place, and time. Mental status is at baseline.      Comments: AAO x 1, False Pass   Psychiatric:         Mood and Affect: Mood normal.         Behavior: Behavior normal.         Thought Content: Thought content normal.         Judgment: Judgment normal.         Laboratory:  Recent Labs     06/05/24  1850   WBC 5.9   RBC 4.44   HEMOGLOBIN 13.8   HEMATOCRIT 41.6   MCV 93.7   MCH 31.1   MCHC 33.2   RDW 43.3   PLATELETCT 185   MPV 9.8     Recent Labs     06/05/24  1850   SODIUM 138   POTASSIUM 4.0   CHLORIDE 105   CO2 18*   GLUCOSE 110*   BUN 17   CREATININE 1.02   CALCIUM 8.9     Recent Labs     06/05/24  1850   ALTSGPT 14   ASTSGOT 31   ALKPHOSPHAT 103*   TBILIRUBIN 0.4   GLUCOSE 110*         No results for input(s): \"NTPROBNP\" in the last 72 hours.      Recent Labs     06/05/24  1850   TROPONINT 16       Imaging:  DX-CHEST-PORTABLE (1 VIEW)   Final Result      No acute cardiopulmonary abnormality identified.          EKG:  I have personally reviewed the images and compared with prior images.    Assessment/Plan:  Justification for Admission " Status  I anticipate this patient is appropriate for observation status at this time because pt has htn urgency    Patient will need a Telemetry bed on MEDICAL service .  The need is secondary to hypertensive urgency.    Hypertensive urgency  Assessment & Plan  Presents with chest pain in setting of uncontrolled htn. Troponin 16 -> 12. Would refrain from stress test for now as 2 troponins are negative.  I attempted to ask patient if she would be willing to have a stress test in the morning in the presence of the  who has had to ask patient repeatedly about the stress test, to which patient does not appear to understand.  Optimize blood pressure. Resume home meds and IV labetalol prn  Nitroglycerin prn  Had normal echocardiogram in July 2023    ACP (advance care planning)  Assessment & Plan  Attempted to reach daughter Bernadette [phone] unsuccessful.  Patient has no advanced directives and is a poor historian, confirmed by  ([id number]).  Would recommend trying to contact Bernadette again in the morning to discuss CODE STATUS.  Patient to be full code for now.    History of lacunar cerebrovascular accident (CVA)  Assessment & Plan  Had CT head in the past which showed cerebellar lacunar infarcts  Resume aspirin and statin        VTE prophylaxis: enoxaparin ppx

## 2024-06-06 NOTE — ED NOTES
Bedside report received from off going RN/tech: Sarah RN, assumed care of patient.  POC discussed with patient. Call light within reach, all needs addressed at this time.       Fall risk interventions in place: Move the patient closer to the nurse's station, Patient's personal possessions are with in their safe reach, Place socks on patient, Place fall risk sign on patient's door, and Keep floor surfaces clean and dry (all applicable per [place] Fall risk assessment)   Continuous monitoring: Cardiac Leads, Pulse Ox, or Blood Pressure  IVF/IV medications: Not Applicable   Oxygen: Room Air  Bedside sitter: Not Applicable   Isolation: Not Applicable

## 2024-06-06 NOTE — ASSESSMENT & PLAN NOTE
Attempted to reach daughter Bernadette [phone] unsuccessful.  Patient has no advanced directives and is a poor historian, confirmed by  ([id number]).  Would recommend trying to contact Bernadette again in the morning to discuss CODE STATUS.  Patient to be full code for now.

## 2024-06-06 NOTE — PROGRESS NOTES
0549- monitor tech called stated pt went ron to 48 and appeared to be in 2nd degree type 1 HB, pt now back in pt's usual rhythm SB-SR with first degree HB. Pt asymptomatic, BP stable. Shaun TREJO made aware. No further orders at this time    06:12- pt now going in and out of 2nd degree type 1, Shaun TREJO notified, stat EKG ordered

## 2024-06-06 NOTE — PROGRESS NOTES
4 Eyes Skin Assessment Completed by ESTEFANY Disla and Nicolas.    Head WDL  Ears WDL  Nose WDL  Mouth WDL  Neck WDL  Breast/Chest WDL  Shoulder Blades WDL  Spine WDL  (R) Arm/Elbow/Hand Bruising  (L) Arm/Elbow/Hand Bruising  Abdomen WDL  Groin WDL  Scrotum/Coccyx/Buttocks Blanching  (R) Leg Bruising  (L) Leg Bruising  (R) Heel/Foot/Toe WDL  (L) Heel/Foot/Toe WDL    Generalized thin, fragile, dry skin      Devices In Places Tele Box, Blood Pressure Cuff, and Pulse Ox      Interventions In Place Pillows    Possible Skin Injury No    Pictures Uploaded Into Epic N/A  Wound Consult Placed N/A  RN Wound Prevention Protocol Ordered No

## 2024-06-06 NOTE — DISCHARGE PLANNING
DC Transport Scheduled    Transport Company Scheduled:  [employer]  Spoke with Pat at [facility] to schedule transport.  [facility] Trip #: [id number]    Scheduled Date: 6/6/2024  Scheduled Time: 1600    Destination: [facility]   Destination address: [address] [place]    Notified care team of scheduled transport via Voalte.     If there are any changes needed to the DC transportation scheduled, please contact [facility] at ext. [phone] between the hours of 3207-1776 Mon-Fri. If outside those hours, contact the ED Case Manager at ext. [phone].

## 2024-06-06 NOTE — DISCHARGE PLANNING
Just updated by Nicolette ALLISON that daughter in law here and will drive patient home. Daughter in law Luda is taking patient to her home.

## 2024-06-06 NOTE — ASSESSMENT & PLAN NOTE
Presents with chest pain in setting of uncontrolled htn. Troponin 16 -> 12. EKG showing normal sinus rhythm with left bundle branch block and T wave inversion in lead aVL (known left bundle branch block seen on previous EKG in July 2023)  Optimize blood pressure. Resume home meds and IV labetalol prn  Stress test in a.m.  Had normal echocardiogram in July 2023

## 2024-06-06 NOTE — ED PROVIDER NOTES
"ER Provider Note    Scribed for Francisco Nguyen M.D. by Brenden Arroyo. 6/5/2024   6:30 PM    Primary Care Provider: No primary care provider noted.    CHIEF COMPLAINT  Chief Complaint   Patient presents with    Chest Pain     X2 days that is on the L side of pts chest. Pt states 5/10 CP. No SOB. Pt has associated dizziness     EXTERNAL RECORDS REVIEWED  Inpatient Notes Patient's last admission was 7/19/2023 for hypertensive emergency.     HPI/ROS  LIMITATION TO HISTORY   Select: Language Tagalog,  Used   OUTSIDE HISTORIAN(S):  None    Sabrina Payne is a 89 y.o. female who presents to the ED via EMS for evaluation of chest pain onset 1 day ago. She describes the chest pain as \"achy.\" She states the pain is constant, and denies any exacerbating or alleviating factors. She denies the pain radiates up her neck or bilateral upper extremities, ever having pain like this previously, abdominal pain, vomiting. She also reports associated dizziness, onset earlier today. She states usually her dizzy spells resolve themselves after a short amount of time, but today's has persisted.  She reports she takes medications for high blood pressure currently. The patient reports she lives with her children, but both are away on vacation right now and she has a care giver there.     PAST MEDICAL HISTORY  Patient has a history of hypertension.     SURGICAL HISTORY  No past surgical history noted.    FAMILY HISTORY  No family history noted.    SOCIAL HISTORY   reports that she has never smoked. She has never used smokeless tobacco.    CURRENT MEDICATIONS  Previous Medications    No medications noted.        ALLERGIES  No Known Allergies     PHYSICAL EXAM  BP (!) 178/73   Pulse 90   Temp 36.8 °C (98.2 °F) (Temporal)   Resp (!) 24   Ht 1.575 m (5' 2\")   Wt 49.2 kg (108 lb 7.5 oz)   SpO2 95%   BMI 19.84 kg/m²    Constitutional: Elderly-appearing, mild distress,    HENT: Normocephalic, Atraumatic   Eyes: PERRLA EOMI, " Conjunctiva normal, No discharge.   Neck: No tenderness, Supple, No stridor.   Cardiovascular: Normal heart rate, Normal rhythm.   Thorax & Lungs: Clear to auscultation bilaterally, No respiratory distress. No chest wall tenderness.   Abdomen: Soft, No tenderness, No masses.   Skin: Warm, Dry, No rash.    Musculoskeletal: No major deformities noted.  Neurologic: Awake, alert. Moves all extremities spontaneously.  Psychiatric: Affect normal, Judgment normal, Mood normal.       DIAGNOSTIC STUDIES    Labs:   Results for orders placed or performed during the hospital encounter of 06/05/24   CBC with Differential   Result Value Ref Range    WBC 5.9 4.8 - 10.8 K/uL    RBC 4.44 4.20 - 5.40 M/uL    Hemoglobin 13.8 12.0 - 16.0 g/dL    Hematocrit 41.6 37.0 - 47.0 %    MCV 93.7 81.4 - 97.8 fL    MCH 31.1 27.0 - 33.0 pg    MCHC 33.2 32.2 - 35.5 g/dL    RDW 43.3 35.9 - 50.0 fL    Platelet Count 185 164 - 446 K/uL    MPV 9.8 9.0 - 12.9 fL    Neutrophils-Polys 55.20 44.00 - 72.00 %    Lymphocytes 27.80 22.00 - 41.00 %    Monocytes 9.70 0.00 - 13.40 %    Eosinophils 6.10 0.00 - 6.90 %    Basophils 1.00 0.00 - 1.80 %    Immature Granulocytes 0.20 0.00 - 0.90 %    Nucleated RBC 0.00 0.00 - 0.20 /100 WBC    Neutrophils (Absolute) 3.23 1.82 - 7.42 K/uL    Lymphs (Absolute) 1.63 1.00 - 4.80 K/uL    Monos (Absolute) 0.57 0.00 - 0.85 K/uL    Eos (Absolute) 0.36 0.00 - 0.51 K/uL    Baso (Absolute) 0.06 0.00 - 0.12 K/uL    Immature Granulocytes (abs) 0.01 0.00 - 0.11 K/uL    NRBC (Absolute) 0.00 K/uL   Complete Metabolic Panel (CMP)   Result Value Ref Range    Sodium 138 135 - 145 mmol/L    Potassium 4.0 3.6 - 5.5 mmol/L    Chloride 105 96 - 112 mmol/L    Co2 18 (L) 20 - 33 mmol/L    Anion Gap 15.0 7.0 - 16.0    Glucose 110 (H) 65 - 99 mg/dL    Bun 17 8 - 22 mg/dL    Creatinine 1.02 0.50 - 1.40 mg/dL    Calcium 8.9 8.5 - 10.5 mg/dL    Correct Calcium 8.9 8.5 - 10.5 mg/dL    AST(SGOT) 31 12 - 45 U/L    ALT(SGPT) 14 2 - 50 U/L    Alkaline  Phosphatase 103 (H) 30 - 99 U/L    Total Bilirubin 0.4 0.1 - 1.5 mg/dL    Albumin 4.0 3.2 - 4.9 g/dL    Total Protein 7.2 6.0 - 8.2 g/dL    Globulin 3.2 1.9 - 3.5 g/dL    A-G Ratio 1.3 g/dL   Troponins NOW   Result Value Ref Range    Troponin T 16 6 - 19 ng/L   Troponins in two (2) hours   Result Value Ref Range    Troponin T 12 6 - 19 ng/L   ESTIMATED GFR   Result Value Ref Range    GFR (CKD-EPI) 51 (A) >60 mL/min/1.73 m 2   EKG   Result Value Ref Range    Report       [facility] Emergency Dept.    Test Date:  2024  Pt Name:    EMILY DFUFY                Department: ER  MRN:        [id number]                      Room:       [facility]  Gender:     Female                       Technician: [id number]  :        [id number]                   Requested By:ER TRIAGE PROTOCOL  Order #:    [id number]                    Reading MD: SAKINA MARIN MD    Measurements  Intervals                                Axis  Rate:       86                           P:          238  PA:         271                          QRS:        -57  QRSD:       123                          T:          72  QT:         410  QTc:        491    Interpretive Statements  Sinus or ectopic atrial rhythm  Prolonged PA interval  Left bundle branch block  No previous ECG available for comparison  Electronically Signed On 2024 18:27:11 PDT by SAKINA MARIN MD         Radiology:   This attending emergency physician has independently interpreted the diagnostic imaging associated with this visit and is awaiting the final reading from the radiologist.   Preliminary interpretation is a follows: No pneumonia  Radiologist interpretation:   DX-CHEST-PORTABLE (1 VIEW)   Final Result      No acute cardiopulmonary abnormality identified.             COURSE & MEDICAL DECISION MAKING     ED OBS: No; Patient does not meet criteria for ED Observation.     INITIAL ASSESSMENT, COURSE AND PLAN  Differential diagnoses include but not limited  to: ACS, hypertensive urgency    Care Narrative: Patient is coming in secondary to left-sided chest pain, very difficult historian.  The patient's blood pressure is elevated.  Discussed case with hospitalist for hospitalization.    6:30 PM - Patient was evaluated at bedside. Ordered for EKG, Troponins, CMP, CBC w/ diff, and DX-Chest to evaluate. Patient verbalizes understanding and support with my plan of care.     - I discussed the patient's case and the above findings with Dr. Stevens (Hospitalist) who agreed to hospitalize the patient. Patient's care was transferred at this time.      DISPOSITION AND DISCUSSIONS    I have discussed management of the patient with the following physicians and CIPRIANO's:  Dr. Stevens (Hospitalist)    Discussion of management with other [facility] or appropriate source(s): None       DISPOSITION:  Patient will be hospitalized by Dr. Stevens in guarded condition.      FINAL DIAGNOSIS  1. Chest pain, unspecified type         I, Brenden Arroyo (Edmondibjuan j), am scribing for, and in the presence of, Francisco Nguyen M.D..    Electronically signed by: Brenden Arroyo (Trudy), 6/5/2024    IFrancisco M.D. personally performed the services described in this documentation, as scribed by Brenden Arroyo in my presence, and it is both accurate and complete.      The note accurately reflects work and decisions made by me.  Francisco Nguyen M.D.  6/5/2024  10:20 PM

## 2024-06-06 NOTE — ASSESSMENT & PLAN NOTE
Presents with chest pain in setting of uncontrolled htn. Troponin 16 -> 12. Would refrain from stress test for now as 2 troponins are negative.  I attempted to ask patient if she would be willing to have a stress test in the morning in the presence of the  who has had to ask patient repeatedly about the stress test, to which patient does not appear to understand.  Optimize blood pressure. Resume home meds and IV labetalol prn  Nitroglycerin prn  Had normal echocardiogram in July 2023

## 2024-06-06 NOTE — ED NOTES
Med Rec partial complete per pt's daughter via phone call   Allergies reviewed  Antibiotics in the past 30 days:no  Anticoagulant in past 14 days:no  Ph[facility] patient utilizes:[phone]    Used an interpretor to interview pt (Tosha) pt stated she doesn't know where she lives, states she lives in a group home, stated her children are at the [facility], stated she did not know how she got her, unable to verify medications     contacted daughter ([phone]). Daughter verified and confirmed pt lives at home with son in [place] not at a group home, Pt is not visiting from [place]. Daughter unable to verify strengths of medication. Daughter states pt is pretty good at taking her medications every AM     Pharmacy closed at this time

## 2024-06-06 NOTE — DISCHARGE SUMMARY
Discharge Summary    CHIEF COMPLAINT ON ADMISSION  Chief Complaint   Patient presents with    Chest Pain     X2 days that is on the L side of pts chest. Pt states 5/10 CP. No SOB. Pt has associated dizziness       Reason for Admission  EMS     Admission Date  6/5/2024    CODE STATUS  Full Code    HPI & HOSPITAL COURSE  Sabrina Payne is a 89 y.o. female with hypertension, who presented 6/5/2024 with intermittent achy chest pain without radiation, along with dizzy spells for 48 hours.  On evaluation, blood pressures were elevated in the 160s to 190s.  Labs showed no leukocytosis, with normal electrolytes and renal function, and normal transaminases and bilirubin.  Troponins were negative x 2.  EKG showed nothing acute except for LBBB. Chest x-ray showed nothing acute.  Patient was felt to have hypertensive urgency.  Patient was restarted on her home Norvasc and her lisinopril dose was increased to 10 mg daily.  With adjustment in blood pressure medications, her blood pressure trend improved and normalized.  Her presenting symptoms also resolved.  She remained hemodynamically stable and afebrile.    I have personally seen and examined the patient on the day of discharge. With her clinical improvement, she was deemed ready to discharge from the hospital as she did not have any further hospitalization needs. Patient felt comfortable going home. The discharge plan was discussed with the patient, with which she was agreeable to.      Therefore, she is discharged in good and stable condition to home with close outpatient follow-up.      Discharge Date  6/6/2024      FOLLOW UP ITEMS POST DISCHARGE  -She will continue on Norvasc 10 mg daily, and lisinopril 10 mg daily.  -Follow-up with PCP for ongoing outpatient blood pressure management.  - counseled to seek immediate medical attention, or return to the ED for recurrent or worsening symptoms.      DISCHARGE DIAGNOSES  Principal Problem:    Hypertensive urgency (POA:  Yes)  Active Problems:    History of lacunar cerebrovascular accident (CVA) (POA: Yes)    ACP (advance care planning) (POA: Yes)  Resolved Problems:    Chest pain, rule out acute myocardial infarction (POA: Yes)      FOLLOW UP  No future appointments.  No follow-up provider specified.    MEDICATIONS ON DISCHARGE     Medication List        CHANGE how you take these medications        Instructions   lisinopril 10 MG Tabs  What changed:   medication strength  how much to take  Commonly known as: Prinivil   Take 1 Tablet by mouth every day.  Dose: 10 mg            CONTINUE taking these medications        Instructions   amLODIPine 10 MG Tabs  Commonly known as: Norvasc   Take 10 mg by mouth every day. Indications: High Blood Pressure Disorder  Dose: 10 mg     ATORVASTATIN CALCIUM PO   Take 40 mg by mouth every day.  Dose: 40 mg              Allergies  No Known Allergies    DIET  Orders Placed This Encounter   Procedures    Diet Order Diet: Cardiac     Standing Status:   Standing     Number of Occurrences:   1     Order Specific Question:   Diet:     Answer:   Cardiac [6]       ACTIVITY  As tolerated.  Weight bearing as tolerated    CONSULTATIONS  None    PROCEDURES  None    LABORATORY  Lab Results   Component Value Date    SODIUM 138 06/05/2024    POTASSIUM 4.0 06/05/2024    CHLORIDE 105 06/05/2024    CO2 18 (L) 06/05/2024    GLUCOSE 110 (H) 06/05/2024    BUN 17 06/05/2024    CREATININE 1.02 06/05/2024        Lab Results   Component Value Date    WBC 5.9 06/05/2024    HEMOGLOBIN 13.8 06/05/2024    HEMATOCRIT 41.6 06/05/2024    PLATELETCT 185 06/05/2024        Total time of the discharge process = 42 minutes.

## 2024-07-10 ENCOUNTER — HOME HEALTH ADMISSION (OUTPATIENT)
Dept: HOME HEALTH SERVICES | Facility: HOME HEALTHCARE | Age: 89
End: 2024-07-10
Payer: MEDICARE

## 2024-08-11 ENCOUNTER — HOSPITAL ENCOUNTER (OUTPATIENT)
Facility: MEDICAL CENTER | Age: 89
End: 2024-08-12
Attending: EMERGENCY MEDICINE | Admitting: HOSPITALIST
Payer: MEDICARE

## 2024-08-11 ENCOUNTER — APPOINTMENT (OUTPATIENT)
Dept: RADIOLOGY | Facility: MEDICAL CENTER | Age: 89
End: 2024-08-11
Attending: EMERGENCY MEDICINE
Payer: MEDICARE

## 2024-08-11 DIAGNOSIS — I16.0 HYPERTENSIVE URGENCY: ICD-10-CM

## 2024-08-11 DIAGNOSIS — R53.1 RIGHT SIDED WEAKNESS: ICD-10-CM

## 2024-08-11 DIAGNOSIS — N39.0 ACUTE UTI: ICD-10-CM

## 2024-08-11 DIAGNOSIS — M10.9 ACUTE GOUT OF RIGHT FOOT, UNSPECIFIED CAUSE: ICD-10-CM

## 2024-08-11 DIAGNOSIS — R53.1 WEAKNESS: ICD-10-CM

## 2024-08-11 PROBLEM — N30.00 ACUTE CYSTITIS WITHOUT HEMATURIA: Status: ACTIVE | Noted: 2020-08-12

## 2024-08-11 LAB
ABO GROUP BLD: NORMAL
ALBUMIN SERPL BCP-MCNC: 3.5 G/DL (ref 3.2–4.9)
ALBUMIN/GLOB SERPL: 1.1 G/DL
ALP SERPL-CCNC: 101 U/L (ref 30–99)
ALT SERPL-CCNC: 11 U/L (ref 2–50)
ANION GAP SERPL CALC-SCNC: 12 MMOL/L (ref 7–16)
APPEARANCE UR: CLEAR
APTT PPP: 22 SEC (ref 24.7–36)
AST SERPL-CCNC: 31 U/L (ref 12–45)
BACTERIA #/AREA URNS HPF: ABNORMAL /HPF
BASOPHILS # BLD AUTO: 0.7 % (ref 0–1.8)
BASOPHILS # BLD: 0.05 K/UL (ref 0–0.12)
BILIRUB SERPL-MCNC: 0.6 MG/DL (ref 0.1–1.5)
BILIRUB UR QL STRIP.AUTO: NEGATIVE
BLD GP AB SCN SERPL QL: NORMAL
BUN SERPL-MCNC: 18 MG/DL (ref 8–22)
CALCIUM ALBUM COR SERPL-MCNC: 9.4 MG/DL (ref 8.5–10.5)
CALCIUM SERPL-MCNC: 9 MG/DL (ref 8.5–10.5)
CHLORIDE SERPL-SCNC: 109 MMOL/L (ref 96–112)
CO2 SERPL-SCNC: 19 MMOL/L (ref 20–33)
COLOR UR: YELLOW
CREAT SERPL-MCNC: 1.03 MG/DL (ref 0.5–1.4)
CRP SERPL HS-MCNC: <0.3 MG/DL (ref 0–0.75)
EKG IMPRESSION: NORMAL
EOSINOPHIL # BLD AUTO: 0.2 K/UL (ref 0–0.51)
EOSINOPHIL NFR BLD: 2.8 % (ref 0–6.9)
EPI CELLS #/AREA URNS HPF: NEGATIVE /HPF
ERYTHROCYTE [DISTWIDTH] IN BLOOD BY AUTOMATED COUNT: 43.8 FL (ref 35.9–50)
ERYTHROCYTE [SEDIMENTATION RATE] IN BLOOD BY WESTERGREN METHOD: 46 MM/HOUR (ref 0–25)
GFR SERPLBLD CREATININE-BSD FMLA CKD-EPI: 50 ML/MIN/1.73 M 2
GLOBULIN SER CALC-MCNC: 3.2 G/DL (ref 1.9–3.5)
GLUCOSE SERPL-MCNC: 101 MG/DL (ref 65–99)
GLUCOSE UR STRIP.AUTO-MCNC: NEGATIVE MG/DL
HCT VFR BLD AUTO: 41 % (ref 37–47)
HGB BLD-MCNC: 13.5 G/DL (ref 12–16)
HYALINE CASTS #/AREA URNS LPF: ABNORMAL /LPF
IMM GRANULOCYTES # BLD AUTO: 0.02 K/UL (ref 0–0.11)
IMM GRANULOCYTES NFR BLD AUTO: 0.3 % (ref 0–0.9)
INR PPP: 0.98 (ref 0.87–1.13)
KETONES UR STRIP.AUTO-MCNC: NEGATIVE MG/DL
LEUKOCYTE ESTERASE UR QL STRIP.AUTO: ABNORMAL
LYMPHOCYTES # BLD AUTO: 1.48 K/UL (ref 1–4.8)
LYMPHOCYTES NFR BLD: 21 % (ref 22–41)
MCH RBC QN AUTO: 31.1 PG (ref 27–33)
MCHC RBC AUTO-ENTMCNC: 32.9 G/DL (ref 32.2–35.5)
MCV RBC AUTO: 94.5 FL (ref 81.4–97.8)
MICRO URNS: ABNORMAL
MONOCYTES # BLD AUTO: 0.4 K/UL (ref 0–0.85)
MONOCYTES NFR BLD AUTO: 5.7 % (ref 0–13.4)
NEUTROPHILS # BLD AUTO: 4.89 K/UL (ref 1.82–7.42)
NEUTROPHILS NFR BLD: 69.5 % (ref 44–72)
NITRITE UR QL STRIP.AUTO: POSITIVE
NRBC # BLD AUTO: 0 K/UL
NRBC BLD-RTO: 0 /100 WBC (ref 0–0.2)
PH UR STRIP.AUTO: 6.5 [PH] (ref 5–8)
PLATELET # BLD AUTO: 203 K/UL (ref 164–446)
PMV BLD AUTO: 9.9 FL (ref 9–12.9)
POTASSIUM SERPL-SCNC: 4.8 MMOL/L (ref 3.6–5.5)
PROT SERPL-MCNC: 6.7 G/DL (ref 6–8.2)
PROT UR QL STRIP: NEGATIVE MG/DL
PROTHROMBIN TIME: 13.1 SEC (ref 12–14.6)
RBC # BLD AUTO: 4.34 M/UL (ref 4.2–5.4)
RBC # URNS HPF: ABNORMAL /HPF
RBC UR QL AUTO: NEGATIVE
RH BLD: NORMAL
SODIUM SERPL-SCNC: 140 MMOL/L (ref 135–145)
SP GR UR STRIP.AUTO: 1.02
TROPONIN T SERPL-MCNC: 20 NG/L (ref 6–19)
URATE SERPL-MCNC: 6.6 MG/DL (ref 1.9–8.2)
UROBILINOGEN UR STRIP.AUTO-MCNC: 0.2 MG/DL
WBC # BLD AUTO: 7 K/UL (ref 4.8–10.8)
WBC #/AREA URNS HPF: ABNORMAL /HPF

## 2024-08-11 PROCEDURE — 96374 THER/PROPH/DIAG INJ IV PUSH: CPT | Mod: XU

## 2024-08-11 PROCEDURE — G0378 HOSPITAL OBSERVATION PER HR: HCPCS

## 2024-08-11 PROCEDURE — 80053 COMPREHEN METABOLIC PANEL: CPT

## 2024-08-11 PROCEDURE — 87186 SC STD MICRODIL/AGAR DIL: CPT

## 2024-08-11 PROCEDURE — 70496 CT ANGIOGRAPHY HEAD: CPT

## 2024-08-11 PROCEDURE — 86900 BLOOD TYPING SEROLOGIC ABO: CPT

## 2024-08-11 PROCEDURE — 85730 THROMBOPLASTIN TIME PARTIAL: CPT

## 2024-08-11 PROCEDURE — 84484 ASSAY OF TROPONIN QUANT: CPT

## 2024-08-11 PROCEDURE — 85610 PROTHROMBIN TIME: CPT

## 2024-08-11 PROCEDURE — 36415 COLL VENOUS BLD VENIPUNCTURE: CPT

## 2024-08-11 PROCEDURE — 700111 HCHG RX REV CODE 636 W/ 250 OVERRIDE (IP): Mod: JZ,UD | Performed by: HOSPITALIST

## 2024-08-11 PROCEDURE — 84550 ASSAY OF BLOOD/URIC ACID: CPT

## 2024-08-11 PROCEDURE — 73630 X-RAY EXAM OF FOOT: CPT | Mod: LT

## 2024-08-11 PROCEDURE — 700111 HCHG RX REV CODE 636 W/ 250 OVERRIDE (IP): Mod: JG,UD | Performed by: EMERGENCY MEDICINE

## 2024-08-11 PROCEDURE — 0042T CT-CEREBRAL PERFUSION ANALYSIS: CPT

## 2024-08-11 PROCEDURE — 99223 1ST HOSP IP/OBS HIGH 75: CPT | Performed by: HOSPITALIST

## 2024-08-11 PROCEDURE — 70498 CT ANGIOGRAPHY NECK: CPT

## 2024-08-11 PROCEDURE — 71045 X-RAY EXAM CHEST 1 VIEW: CPT

## 2024-08-11 PROCEDURE — 93005 ELECTROCARDIOGRAM TRACING: CPT | Performed by: EMERGENCY MEDICINE

## 2024-08-11 PROCEDURE — 87086 URINE CULTURE/COLONY COUNT: CPT

## 2024-08-11 PROCEDURE — 96375 TX/PRO/DX INJ NEW DRUG ADDON: CPT | Mod: XU

## 2024-08-11 PROCEDURE — 87077 CULTURE AEROBIC IDENTIFY: CPT

## 2024-08-11 PROCEDURE — 700102 HCHG RX REV CODE 250 W/ 637 OVERRIDE(OP): Mod: UD | Performed by: HOSPITALIST

## 2024-08-11 PROCEDURE — 93005 ELECTROCARDIOGRAM TRACING: CPT

## 2024-08-11 PROCEDURE — 85652 RBC SED RATE AUTOMATED: CPT

## 2024-08-11 PROCEDURE — 86901 BLOOD TYPING SEROLOGIC RH(D): CPT

## 2024-08-11 PROCEDURE — 86850 RBC ANTIBODY SCREEN: CPT

## 2024-08-11 PROCEDURE — 99285 EMERGENCY DEPT VISIT HI MDM: CPT

## 2024-08-11 PROCEDURE — A9270 NON-COVERED ITEM OR SERVICE: HCPCS | Mod: UD | Performed by: HOSPITALIST

## 2024-08-11 PROCEDURE — 81001 URINALYSIS AUTO W/SCOPE: CPT

## 2024-08-11 PROCEDURE — 86140 C-REACTIVE PROTEIN: CPT

## 2024-08-11 PROCEDURE — 85025 COMPLETE CBC W/AUTO DIFF WBC: CPT

## 2024-08-11 PROCEDURE — 700117 HCHG RX CONTRAST REV CODE 255: Mod: UD | Performed by: EMERGENCY MEDICINE

## 2024-08-11 PROCEDURE — 94760 N-INVAS EAR/PLS OXIMETRY 1: CPT

## 2024-08-11 RX ORDER — ONDANSETRON 4 MG/1
4 TABLET, ORALLY DISINTEGRATING ORAL EVERY 4 HOURS PRN
Status: DISCONTINUED | OUTPATIENT
Start: 2024-08-11 | End: 2024-08-12 | Stop reason: HOSPADM

## 2024-08-11 RX ORDER — ATORVASTATIN CALCIUM 40 MG/1
40 TABLET, FILM COATED ORAL
Status: DISCONTINUED | OUTPATIENT
Start: 2024-08-11 | End: 2024-08-12 | Stop reason: HOSPADM

## 2024-08-11 RX ORDER — CEFTRIAXONE 2 G/1
2000 INJECTION, POWDER, FOR SOLUTION INTRAMUSCULAR; INTRAVENOUS ONCE
Status: COMPLETED | OUTPATIENT
Start: 2024-08-11 | End: 2024-08-11

## 2024-08-11 RX ORDER — HEPARIN SODIUM 5000 [USP'U]/ML
5000 INJECTION, SOLUTION INTRAVENOUS; SUBCUTANEOUS EVERY 8 HOURS
Status: DISCONTINUED | OUTPATIENT
Start: 2024-08-12 | End: 2024-08-12 | Stop reason: HOSPADM

## 2024-08-11 RX ORDER — CARVEDILOL 6.25 MG/1
6.25 TABLET ORAL 2 TIMES DAILY WITH MEALS
Status: DISCONTINUED | OUTPATIENT
Start: 2024-08-11 | End: 2024-08-12 | Stop reason: HOSPADM

## 2024-08-11 RX ORDER — LABETALOL HYDROCHLORIDE 5 MG/ML
10 INJECTION, SOLUTION INTRAVENOUS ONCE
Status: COMPLETED | OUTPATIENT
Start: 2024-08-11 | End: 2024-08-11

## 2024-08-11 RX ORDER — HYDRALAZINE HYDROCHLORIDE 20 MG/ML
20 INJECTION INTRAMUSCULAR; INTRAVENOUS ONCE
Status: COMPLETED | OUTPATIENT
Start: 2024-08-11 | End: 2024-08-11

## 2024-08-11 RX ORDER — ONDANSETRON 2 MG/ML
4 INJECTION INTRAMUSCULAR; INTRAVENOUS EVERY 4 HOURS PRN
Status: DISCONTINUED | OUTPATIENT
Start: 2024-08-11 | End: 2024-08-12 | Stop reason: HOSPADM

## 2024-08-11 RX ORDER — ASPIRIN 81 MG/1
81 TABLET ORAL DAILY
Status: DISCONTINUED | OUTPATIENT
Start: 2024-08-11 | End: 2024-08-12 | Stop reason: HOSPADM

## 2024-08-11 RX ORDER — HYDRALAZINE HYDROCHLORIDE 20 MG/ML
10 INJECTION INTRAMUSCULAR; INTRAVENOUS EVERY 4 HOURS PRN
Status: DISCONTINUED | OUTPATIENT
Start: 2024-08-11 | End: 2024-08-12 | Stop reason: HOSPADM

## 2024-08-11 RX ORDER — ACETAMINOPHEN 325 MG/1
650 TABLET ORAL EVERY 6 HOURS PRN
Status: DISCONTINUED | OUTPATIENT
Start: 2024-08-11 | End: 2024-08-12 | Stop reason: HOSPADM

## 2024-08-11 RX ORDER — ENOXAPARIN SODIUM 100 MG/ML
40 INJECTION SUBCUTANEOUS DAILY
Status: DISCONTINUED | OUTPATIENT
Start: 2024-08-11 | End: 2024-08-11

## 2024-08-11 RX ORDER — AMLODIPINE BESYLATE 10 MG/1
10 TABLET ORAL DAILY
Status: DISCONTINUED | OUTPATIENT
Start: 2024-08-12 | End: 2024-08-12 | Stop reason: HOSPADM

## 2024-08-11 RX ORDER — LISINOPRIL 20 MG/1
20 TABLET ORAL
Status: DISCONTINUED | OUTPATIENT
Start: 2024-08-12 | End: 2024-08-12

## 2024-08-11 RX ADMIN — LABETALOL HYDROCHLORIDE 10 MG: 5 INJECTION, SOLUTION INTRAVENOUS at 13:43

## 2024-08-11 RX ADMIN — IOHEXOL 40 ML: 350 INJECTION, SOLUTION INTRAVENOUS at 13:15

## 2024-08-11 RX ADMIN — IOHEXOL 80 ML: 350 INJECTION, SOLUTION INTRAVENOUS at 13:15

## 2024-08-11 RX ADMIN — CEFTRIAXONE SODIUM 2000 MG: 2 INJECTION, POWDER, FOR SOLUTION INTRAMUSCULAR; INTRAVENOUS at 14:34

## 2024-08-11 RX ADMIN — HYDRALAZINE HYDROCHLORIDE 20 MG: 20 INJECTION, SOLUTION INTRAMUSCULAR; INTRAVENOUS at 14:34

## 2024-08-11 ASSESSMENT — ENCOUNTER SYMPTOMS
MUSCULOSKELETAL NEGATIVE: 1
HEADACHES: 1
EYES NEGATIVE: 1
PSYCHIATRIC NEGATIVE: 1
CARDIOVASCULAR NEGATIVE: 1
DIZZINESS: 1
WEAKNESS: 1
RESPIRATORY NEGATIVE: 1

## 2024-08-11 ASSESSMENT — FIBROSIS 4 INDEX
FIB4 SCORE: 4.37
FIB4 SCORE: 4.25

## 2024-08-11 ASSESSMENT — PAIN DESCRIPTION - PAIN TYPE: TYPE: ACUTE PAIN

## 2024-08-11 NOTE — ED NOTES
Medication history reviewed with patients daughter via phone (Bernadette [phone]).   Med rec is complete  Allergies reviewed.     Patient has not had any outpatient antibiotics in the last 30 days.   Anticoagulants: No    Jenifer Jeronimo

## 2024-08-11 NOTE — ED NOTES
Leon RM at bedside. Assist ESTEFANY Elliott at bedside for help with translation. Patient given ceftriaxone and hydralazine for HTN. Patient denies HA/CP at this time   Cyclophosphamide Counseling:  I discussed with the patient the risks of cyclophosphamide including but not limited to hair loss, hormonal abnormalities, decreased fertility, abdominal pain, diarrhea, nausea and vomiting, bone marrow suppression and infection. The patient understands that monitoring is required while taking this medication.

## 2024-08-11 NOTE — PROGRESS NOTES
Pt anxious and continuously requesting for her son Prabhjot to be called. Voicemail left for Prabhjot; Awaiting a returned call.

## 2024-08-11 NOTE — H&P
Hospital Medicine History & Physical Note    Date of Service  8/11/2024    Primary Care Physician  Pcp Pt States None    Consultants      Code Status  DNAR/DNI    Chief Complaint  Chief Complaint   Patient presents with    Dizziness    Possible Stroke     Right sided weakness, dizziness, since 7pm last night.        History of Presenting Illness    History obtained via     Sabrina Lobo is a 89 y.o. female who presented 8/11/2024 with pmx HTN presents with headache and weakness    Transient right side weakness that has since resolved    Bp markedly elevated . --> 224/88    Patient given iv labetolol in ed      I orderedv iv hydralazine and bp is now 136/65    Patient referred to me for further care    I discussed the plan of care with patient.    Review of Systems  Review of Systems   Constitutional:  Positive for malaise/fatigue.   Eyes: Negative.    Respiratory: Negative.     Cardiovascular: Negative.    Genitourinary: Negative.    Musculoskeletal: Negative.    Neurological:  Positive for dizziness, weakness and headaches.   Psychiatric/Behavioral: Negative.         Past Medical History   has a past medical history of H/O bladder repair surgery, H/O: hysterectomy, Hypertension, and Vertigo.    Surgical History   has a past surgical history that includes hysterectomy radical.     Family History    Family history reviewed with patient. There is no family history that is pertinent to the chief complaint.     Social History   reports that she has never smoked. She has never used smokeless tobacco. She reports that she does not drink alcohol and does not use drugs.    Allergies  Allergies   Allergen Reactions    Beef-Derived Products Nausea       Medications  Prior to Admission Medications   Prescriptions Last Dose Informant Patient Reported? Taking?   VITAMIN D PO 8/11/2024 at am Patient Yes Yes   Sig: Take 1 Tablet by mouth every day.   amLODIPine (NORVASC) 10 MG Tab 8/11/2024 at am Patient No Yes    Sig: Take 1 Tablet by mouth every day.   atorvastatin (LIPITOR) 40 MG Tab 8/10/2024 at hs Patient No Yes   Sig: Take 1 Tablet by mouth at bedtime.   lisinopril (PRINIVIL) 10 MG Tab 8/11/2024 at am Patient No Yes   Sig: Take 1 Tablet by mouth every day.   multivitamin Tab 8/11/2024 at am Patient Yes Yes   Sig: Take 1 Tablet by mouth every day.      Facility-Administered Medications: None       Physical Exam  Temp:  [36.6 °C (97.8 °F)] 36.6 °C (97.8 °F)  Pulse:  [66-81] 79  Resp:  [16-18] 18  BP: (143-224)/(60-93) 143/60  SpO2:  [92 %-97 %] 96 %  Blood Pressure : (!) 143/60   Temperature: 36.6 °C (97.8 °F)   Pulse: 79   Respiration: 18   Pulse Oximetry: 96 %       Physical Exam  Constitutional:       General: She is not in acute distress.     Appearance: She is not ill-appearing.   HENT:      Mouth/Throat:      Mouth: Mucous membranes are moist.   Eyes:      Extraocular Movements: Extraocular movements intact.   Cardiovascular:      Rate and Rhythm: Normal rate and regular rhythm.      Heart sounds: No murmur heard.     No gallop.   Pulmonary:      Effort: Pulmonary effort is normal. No respiratory distress.      Breath sounds: No stridor. No wheezing or rhonchi.   Abdominal:      General: Abdomen is flat. There is no distension.      Palpations: There is no mass.      Tenderness: There is no abdominal tenderness. There is no guarding.      Hernia: No hernia is present.   Musculoskeletal:      Cervical back: Normal range of motion.   Skin:     General: Skin is warm.      Capillary Refill: Capillary refill takes 2 to 3 seconds.      Coloration: Skin is not jaundiced or pale.      Findings: No bruising or erythema.   Neurological:      General: No focal deficit present.      Mental Status: She is oriented to person, place, and time.      Cranial Nerves: No cranial nerve deficit.      Motor: No weakness.   Psychiatric:         Mood and Affect: Mood normal.         Behavior: Behavior normal.         Laboratory:  Recent  "Labs     08/11/24  1153   WBC 7.0   RBC 4.34   HEMOGLOBIN 13.5   HEMATOCRIT 41.0   MCV 94.5   MCH 31.1   MCHC 32.9   RDW 43.8   PLATELETCT 203   MPV 9.9     Recent Labs     08/11/24  1153   SODIUM 140   POTASSIUM 4.8   CHLORIDE 109   CO2 19*   GLUCOSE 101*   BUN 18   CREATININE 1.03   CALCIUM 9.0     Recent Labs     08/11/24  1153   ALTSGPT 11   ASTSGOT 31   ALKPHOSPHAT 101*   TBILIRUBIN 0.6   GLUCOSE 101*     Recent Labs     08/11/24  1153   APTT 22.0*   INR 0.98     No results for input(s): \"NTPROBNP\" in the last 72 hours.      Recent Labs     08/11/24  1153   TROPONINT 20*       Imaging:  CT-CTA NECK WITH & W/O-POST PROCESSING   Final Result      1.  Minimal atherosclerotic plaquing of the common carotid artery bifurcations without evidence of significant stenosis.      2.  Patent vertebrobasilar system.      CT-CTA HEAD WITH & W/O-POST PROCESS   Final Result      CT angiogram of the [place] within normal limits.      DX-FOOT-COMPLETE 3+ LEFT   Final Result         1.  No radiographic evidence of acute injury.      DX-CHEST-PORTABLE (1 VIEW)   Final Result      No acute cardiac or pulmonary abnormalities are identified.      CT-CEREBRAL PERFUSION ANALYSIS    (Results Pending)   MR-BRAIN-W/O    (Results Pending)       X-Ray:  I have personally reviewed the images and compared with prior images.    Assessment/Plan:  Justification for Admission Status  I anticipate this patient is appropriate for observation status at this time         * Weakness- (present on admission)  Assessment & Plan  Neuro checks    Check echo  Check mri brain    Control bp      Hypertensive urgency- (present on admission)  Assessment & Plan  Prn iv hydralazine    Cont same home meds    Acute cystitis without hematuria- (present on admission)  Assessment & Plan  Po bactrim x 3 days        VTE prophylaxis: SCDs/TEDs  "

## 2024-08-11 NOTE — ED PROVIDER NOTES
ER Provider Note    Scribed for Carolynn Chapa M.d. by Gail Flaherty. 8/11/2024  11:24 AM    Primary Care Provider: Pcp Pt States None    CHIEF COMPLAINT   Chief Complaint   Patient presents with    Dizziness    Possible Stroke     Right sided weakness, dizziness, since 7pm last night.      EXTERNAL RECORDS REVIEWED  Inpatient Notes The patient was admitted June 5th through the 7th of this year for chest pain with dizzy spells. Blood pressures were in the 160s to 190s. They thought that her symptoms were due to hypertensive urgency. She has history of high blood pressure, CKD and vertigo. On admission in July for high blood pressure, she reported mild dizziness, which is chronic. She was told to follow up with cardiology for a zio patch in July.     HPI/ROS  LIMITATION TO HISTORY   Select: Language Tagalog,  Used   OUTSIDE HISTORIAN(S):  None.     Sabrina Lobo is a 89 y.o. female who presents to the ED via EMS for evaluation of right sided weakness onset 7 PM last night. The patient reports that she was just sitting when the right sided weakness started last night. The patient states that recently has been getting dizzy with movement.  This has been going on for several weeks.  Denies any numbness or tingling to extremities, slurred speech, diplopia, facial drooping, chest pain, shortness of breath, palpitations, or changes in vision. When she looks at something far away, she notes that this also causes dizziness. She also reports that she has been having intermittent difficulty gripping things with her left hand for about a month now. The patient states that she has been taking her medications as prescribed and denies any missed doses.  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 There is noted redness and swelling to the area, which she notes has been there for 3 days.    PAST  MEDICAL HISTORY  Past Medical History:   Diagnosis Date    H/O bladder repair surgery     not sure per patient daughter     H/O: hysterectomy     Hypertension     Vertigo        SURGICAL HISTORY  Past Surgical History:   Procedure Laterality Date    HYSTERECTOMY RADICAL         FAMILY HISTORY  Family History   Problem Relation Age of Onset    Heart Disease Neg Hx     Hypertension Neg Hx     Hyperlipidemia Neg Hx     Psychiatric Illness Neg Hx     Diabetes Neg Hx        SOCIAL HISTORY   reports that she has never smoked. She has never used smokeless tobacco. She reports that she does not drink alcohol and does not use drugs.      CURRENT MEDICATIONS  Current Discharge Medication List        CONTINUE these medications which have NOT CHANGED    Details   multivitamin Tab Take 1 Tablet by mouth every day.      VITAMIN D PO Take 1 Tablet by mouth every day.      lisinopril (PRINIVIL) 10 MG Tab Take 1 Tablet by mouth every day.  Qty: 90 Tablet, Refills: 0    Associated Diagnoses: Hypertensive urgency      atorvastatin (LIPITOR) 40 MG Tab Take 1 Tablet by mouth at bedtime.  Qty: 30 Tablet, Refills: 0    Associated Diagnoses: Dyslipidemia; H/O: stroke      amLODIPine (NORVASC) 10 MG Tab Take 1 Tablet by mouth every day.  Qty: 30 Tablet, Refills: 0    Associated Diagnoses: Hypertensive urgency             ALLERGIES  Beef-derived products    PHYSICAL EXAM  BP (!) 218/93   Pulse 77   Temp 36.6 °C (97.8 °F) (Temporal)   Ht 1.524 m (5')   Wt 45.4 kg (100 lb)   SpO2 94%   BMI 19.53 kg/m²     Constitutional: Well developed, well nourished; No acute distress; Non-toxic appearance.   HENT: Normocephalic, atraumatic; Bilateral external ears normal; Oropharynx with moist mucous membranes;   Eyes: PERRL, EOMI, Conjunctiva normal. No discharge. No nystagmus.   Neck:  Supple, nontender midline; No stridor; No nuchal rigidity.   Lymphatic: No cervical lymphadenopathy noted.   Cardiovascular: Heart sounds distant. Regular rate and  rhythm without murmurs, rubs, or gallop.   Thorax & Lungs: No respiratory distress, breath sounds clear to auscultation bilaterally without wheezing, rales or rhonchi. Nontender chest wall. No crepitus or subcutaneous air  Abdomen: Soft, nontender, bowel sounds normal. No obvious masses; No pulsatile masses; no rebound, guarding, or peritoneal signs.   Skin: Good color; warm and dry without rash or petechia.  Back: Nontender, No CVA tenderness.   Extremities: Distal radial, dorsalis pedis, posterior tibial pulses are equal bilaterally; No edema; Nontender calves or saphenous, No cyanosis, No clubbing. Tenderness at the first MTP with some redness around that area.   Musculoskeletal: Good range of motion in all major joints. No tenderness to palpation or major deformities noted.   Neurologic: Alert & oriented x 4, clear speech, cranial nerves II through XII intact without facial asymmetry, patient had 4 out of 5  strength which were equal bilaterally.  Patient had a difficult time understanding instructions for dorsiflexor and plantar flexors testing but appeared to have symmetric movements and strength bilaterally with what she was able to do.  Sensory grossly intact, no drift of upper or lower extremities.  No ataxia with finger to nose.  No nystagmus.  NIH score is 0      DIAGNOSTIC STUDIES    EKG/LABS  Results for orders placed or performed during the hospital encounter of 08/11/24   CBC WITH DIFFERENTIAL   Result Value Ref Range    WBC 7.0 4.8 - 10.8 K/uL    RBC 4.34 4.20 - 5.40 M/uL    Hemoglobin 13.5 12.0 - 16.0 g/dL    Hematocrit 41.0 37.0 - 47.0 %    MCV 94.5 81.4 - 97.8 fL    MCH 31.1 27.0 - 33.0 pg    MCHC 32.9 32.2 - 35.5 g/dL    RDW 43.8 35.9 - 50.0 fL    Platelet Count 203 164 - 446 K/uL    MPV 9.9 9.0 - 12.9 fL    Neutrophils-Polys 69.50 44.00 - 72.00 %    Lymphocytes 21.00 (L) 22.00 - 41.00 %    Monocytes 5.70 0.00 - 13.40 %    Eosinophils 2.80 0.00 - 6.90 %    Basophils 0.70 0.00 - 1.80 %     Immature Granulocytes 0.30 0.00 - 0.90 %    Nucleated RBC 0.00 0.00 - 0.20 /100 WBC    Neutrophils (Absolute) 4.89 1.82 - 7.42 K/uL    Lymphs (Absolute) 1.48 1.00 - 4.80 K/uL    Monos (Absolute) 0.40 0.00 - 0.85 K/uL    Eos (Absolute) 0.20 0.00 - 0.51 K/uL    Baso (Absolute) 0.05 0.00 - 0.12 K/uL    Immature Granulocytes (abs) 0.02 0.00 - 0.11 K/uL    NRBC (Absolute) 0.00 K/uL   COMP METABOLIC PANEL   Result Value Ref Range    Sodium 140 135 - 145 mmol/L    Potassium 4.8 3.6 - 5.5 mmol/L    Chloride 109 96 - 112 mmol/L    Co2 19 (L) 20 - 33 mmol/L    Anion Gap 12.0 7.0 - 16.0    Glucose 101 (H) 65 - 99 mg/dL    Bun 18 8 - 22 mg/dL    Creatinine 1.03 0.50 - 1.40 mg/dL    Calcium 9.0 8.5 - 10.5 mg/dL    Correct Calcium 9.4 8.5 - 10.5 mg/dL    AST(SGOT) 31 12 - 45 U/L    ALT(SGPT) 11 2 - 50 U/L    Alkaline Phosphatase 101 (H) 30 - 99 U/L    Total Bilirubin 0.6 0.1 - 1.5 mg/dL    Albumin 3.5 3.2 - 4.9 g/dL    Total Protein 6.7 6.0 - 8.2 g/dL    Globulin 3.2 1.9 - 3.5 g/dL    A-G Ratio 1.1 g/dL   PROTHROMBIN TIME   Result Value Ref Range    PT 13.1 12.0 - 14.6 sec    INR 0.98 0.87 - 1.13   APTT   Result Value Ref Range    APTT 22.0 (L) 24.7 - 36.0 sec   COD (ADULT)   Result Value Ref Range    ABO Grouping Only A     Rh Grouping Only POS     Antibody Screen-Cod NEG    TROPONIN   Result Value Ref Range    Troponin T 20 (H) 6 - 19 ng/L   URINALYSIS (UA)    Specimen: Urine   Result Value Ref Range    Color Yellow     Character Clear     Specific Gravity 1.024 <1.035    Ph 6.5 5.0 - 8.0    Glucose Negative Negative mg/dL    Ketones Negative Negative mg/dL    Protein Negative Negative mg/dL    Bilirubin Negative Negative    Urobilinogen, Urine 0.2 Negative    Nitrite Positive (A) Negative    Leukocyte Esterase Trace (A) Negative    Occult Blood Negative Negative    Micro Urine Req Microscopic    Sed Rate   Result Value Ref Range    Sed Rate Westergren 46 (H) 0 - 25 mm/hour   ESTIMATED GFR   Result Value Ref Range    GFR  (CKD-EPI) 50 (A) >60 mL/min/1.73 m 2   URIC ACID   Result Value Ref Range    Uric Acid 6.6 1.9 - 8.2 mg/dL   URINE MICROSCOPIC (W/UA)   Result Value Ref Range    WBC 2-5 /hpf    RBC 0-2 /hpf    Bacteria Many (A) None /hpf    Epithelial Cells Negative /hpf    Hyaline Cast 0-2 /lpf   CRP QUANTITIVE (NON-CARDIAC)   Result Value Ref Range    Stat C-Reactive Protein <0.30 0.00 - 0.75 mg/dL   EKG   Result Value Ref Range    Report       [facility] Emergency Dept.    Test Date:  2024  Pt Name:    EMILY HONG                 Department: ER  MRN:        [id number]                      Room:        28  Gender:     Female                       Technician: [id number]  :        [id number]                   Requested By:ER TRIAGE PROTOCOL  Order #:    [id number]                    Reading MD: Carolynn Chapa    Measurements  Intervals                                Axis  Rate:       77                           P:          93  IA:         283                          QRS:        -48  QRSD:       122                          T:          66  QT:         422  QTc:        478    Interpretive Statements  Sinus rhythm rate 77  Left axis deviation  Minimal ST depression I  No ST elevation  Prolonged IA interval  Left bundle branch block  Baseline wander in lead(s) V2  Compared to ECG 2024 06:35:50  First degree AV block now present  Electronically Signed On 2024 13:46:48 PDT by Carolynn Chpaa        I have independently interpreted this EKG        RADIOLOGY/PROCEDURES   The attending emergency physician has independently interpreted the diagnostic imaging associated with this visit and am waiting the final reading from the radiologist.     My preliminary interpretation is a follows: ER MD is reviewed the patient's chest x-ray.  No obvious infiltrates.    Radiologist interpretation:  CT-CTA NECK WITH & W/O-POST PROCESSING   Final Result      1.  Minimal atherosclerotic plaquing of the common carotid artery  bifurcations without evidence of significant stenosis.      2.  Patent vertebrobasilar system.      CT-CTA HEAD WITH & W/O-POST PROCESS   Final Result      CT angiogram of the [place] within normal limits.      DX-FOOT-COMPLETE 3+ LEFT   Final Result         1.  No radiographic evidence of acute injury.      DX-CHEST-PORTABLE (1 VIEW)   Final Result      No acute cardiac or pulmonary abnormalities are identified.      CT-CEREBRAL PERFUSION ANALYSIS    (Results Pending)   MR-BRAIN-W/O    (Results Pending)   EC-ECHOCARDIOGRAM COMPLETE W/O CONT    (Results Pending)       COURSE & MEDICAL DECISION MAKING     ASSESSMENT, COURSE AND PLAN  Care Narrative: Patient presents to the ER complaining of right sided weakness since 7 PM last night.  She says she was just sitting when the symptoms started.  She says over the last week or so she has been having intermittent episodes of dizziness that occur with movement.  She describes the dizziness as both a lightheadedness and a spinning sensation.  No diplopia.  She does not have any dizziness here in the ER.  No chest pains or shortness of breath.  She has no focal deficits on examination.  Blood pressure was quite high upon arrival.  She has history of hypertensive urgency and has been admitted for elevated blood pressure in the past.  She was most recently admitted in June 2024 for chest pain and the chest pain was thought to be related to her elevated blood pressure.  No known history of stroke.  The patient is a very poor historian despite having a [employer] .  Patient underwent CT/CTA of the head and neck.  No head bleed.  There is minimal atherosclerotic plaquing of the common carotid bifurcations without evidence of stenosis.  Her vertebrobasilar system is patent.  EKG reveals a left bundle branch block which is known.  No acute.  Troponin is 20 which is slightly higher than her baseline although she has been in the 20s before.  At this time no concern for  STEMI or non-STEMI.  In addition to her complaint of right-sided weakness, she also has some redness and tenderness over the left first MTP joint.  She has no history of gout but symptoms certainly are consistent with gout.  I gave her a dose of prednisone here in the ER to help with her foot symptoms.  Patient will need to be hospitalized for complaint of right sided weakness.  I spoke with Dr. Quintero, hospitalist on-call, and he will kindly evaluate the patient hospitalization for    1:29 PM - Patient seen and examined at bedside. This is a 89 year old woman who was brought in by EMS for evaluation of right arm weakness and dizziness that started around 7 PM yesterday. The patient stated that the weakness started when she was sitting and notes that her dizziness is exacerbated with movement. Denies any numbness or tingling, slurred speech, or changes in vision. Discussed plan of care, including performing lab work and imaging. Patient agrees to the plan of care.     1345: I spoke with Dr. mR neurologist on-call.  Given patient's lack of focal deficits on examination and onset of symptoms at 7 PM last night, she is not a tPA candidate and patient does not need to be called as a stroke alert at this time.    2:26 PM - Spoke with Dr. Quintero about the patient's condition. He will admit the patient for further care and evaluation.      ADDITIONAL PROBLEM LIST  Problem #1: Right-sided weakness since 7 PM last night.  Problem #2: Redness and pain to the left foot x 3 days    DISPOSITION AND DISCUSSIONS  I have discussed management of the patient with the following physicians and CIPRIANO's:  Dr. Quintero    Discussion of management with other [facility] or appropriate source(s): None     Escalation of care considered, and ultimately not performed: diagnostic imaging.  Patient will need an MRI scan to further evaluate her complaint of right sided weakness.  This can be done as an inpatient as she will be admitted.    Barriers to  care at this time, including but not limited to:  None known .     Decision tools and prescription drugs considered including, but not limited to:  Patient has no focal deficits on examination.  Symptoms started at 7 PM last night.  She is not a tPA candidate. .    DISPOSITION:  Patient will be hospitalized by Dr. Quintero in guarded condition.    FINAL DIAGNOSIS  1. Right sided weakness Acute   2. Hypertensive urgency Acute   3. Acute UTI Acute   4. Acute gout of right foot, unspecified cause Acute      IGail (Scribe), am scribing for, and in the presence of, Carolynn Chapa M.D..    Electronically signed by: Gail Flaherty (Scribe), 8/11/2024    ICarolynn M.D. personally performed the services described in this documentation, as scribed by Gail Flaherty in my presence, and it is both accurate and complete.     This dictation has been created using voice recognition software. The accuracy of the dictation is limited by the abilities of the software. I expect there may be some errors of grammar and possibly content. I made every attempt to manually correct the errors within my dictation. However, errors related to voice recognition software may still exist and should be interpreted within the appropriate context.      The note accurately reflects work and decisions made by me.  Carolynn Chapa M.D.  8/11/2024  9:07 PM

## 2024-08-11 NOTE — ED TRIAGE NOTES
..  Chief Complaint   Patient presents with    Dizziness    Possible Stroke     Right sided weakness, dizziness, since 7pm last night.        89 yo female brought in by [facility] for above complaint. Stroke assessment completed, protocol placed.     Patient was given no medications en route. Blood glucose 103    BP (!) 218/93   Pulse 77   Temp 36.6 °C (97.8 °F) (Temporal)   Ht 1.524 m (5')   Wt 45.4 kg (100 lb)   SpO2 94%   BMI 19.53 kg/m²

## 2024-08-11 NOTE — ED NOTES
Eduard DICKSON called to bedside for stroke assessment. Lelia RN brought to bedside to help translate with Tagalog. Patient reports right sided weakness, and dizziness since 7pm last night.

## 2024-08-12 ENCOUNTER — APPOINTMENT (OUTPATIENT)
Dept: RADIOLOGY | Facility: MEDICAL CENTER | Age: 89
End: 2024-08-12
Attending: HOSPITALIST
Payer: MEDICARE

## 2024-08-12 ENCOUNTER — PHARMACY VISIT (OUTPATIENT)
Dept: PHARMACY | Facility: MEDICAL CENTER | Age: 89
End: 2024-08-12
Payer: COMMERCIAL

## 2024-08-12 ENCOUNTER — APPOINTMENT (OUTPATIENT)
Dept: CARDIOLOGY | Facility: MEDICAL CENTER | Age: 89
End: 2024-08-12
Attending: HOSPITALIST
Payer: MEDICARE

## 2024-08-12 VITALS
RESPIRATION RATE: 16 BRPM | DIASTOLIC BLOOD PRESSURE: 66 MMHG | OXYGEN SATURATION: 96 % | WEIGHT: 110.01 LBS | TEMPERATURE: 97.9 F | HEIGHT: 60 IN | SYSTOLIC BLOOD PRESSURE: 155 MMHG | BODY MASS INDEX: 21.6 KG/M2 | HEART RATE: 68 BPM

## 2024-08-12 PROBLEM — I16.0 HYPERTENSIVE URGENCY: Status: RESOLVED | Noted: 2024-06-05 | Resolved: 2024-08-12

## 2024-08-12 PROBLEM — I35.0 AORTIC STENOSIS: Status: ACTIVE | Noted: 2024-08-12

## 2024-08-12 LAB
ABO + RH BLD: NORMAL
LV EJECT FRACT MOD 2C 99903: 69.67
LV EJECT FRACT MOD 4C 99902: 75.55
LV EJECT FRACT MOD BP 99901: 73.39

## 2024-08-12 PROCEDURE — 99239 HOSP IP/OBS DSCHRG MGMT >30: CPT | Performed by: HOSPITALIST

## 2024-08-12 PROCEDURE — A9270 NON-COVERED ITEM OR SERVICE: HCPCS | Mod: UD | Performed by: HOSPITALIST

## 2024-08-12 PROCEDURE — RXMED WILLOW AMBULATORY MEDICATION CHARGE: Performed by: HOSPITALIST

## 2024-08-12 PROCEDURE — 93306 TTE W/DOPPLER COMPLETE: CPT

## 2024-08-12 PROCEDURE — G0378 HOSPITAL OBSERVATION PER HR: HCPCS

## 2024-08-12 PROCEDURE — 92610 EVALUATE SWALLOWING FUNCTION: CPT

## 2024-08-12 PROCEDURE — 700111 HCHG RX REV CODE 636 W/ 250 OVERRIDE (IP): Mod: UD | Performed by: HOSPITALIST

## 2024-08-12 PROCEDURE — 93306 TTE W/DOPPLER COMPLETE: CPT | Mod: 26 | Performed by: STUDENT IN AN ORGANIZED HEALTH CARE EDUCATION/TRAINING PROGRAM

## 2024-08-12 PROCEDURE — 700102 HCHG RX REV CODE 250 W/ 637 OVERRIDE(OP): Mod: UD | Performed by: HOSPITALIST

## 2024-08-12 PROCEDURE — 70551 MRI BRAIN STEM W/O DYE: CPT

## 2024-08-12 PROCEDURE — 96372 THER/PROPH/DIAG INJ SC/IM: CPT

## 2024-08-12 RX ORDER — LISINOPRIL 20 MG/1
20 TABLET ORAL ONCE
Status: DISCONTINUED | OUTPATIENT
Start: 2024-08-12 | End: 2024-08-12 | Stop reason: HOSPADM

## 2024-08-12 RX ORDER — LISINOPRIL 40 MG/1
40 TABLET ORAL DAILY
Qty: 30 TABLET | Refills: 3 | Status: SHIPPED | OUTPATIENT
Start: 2024-08-12 | End: 2024-12-10

## 2024-08-12 RX ORDER — CARVEDILOL 6.25 MG/1
6.25 TABLET ORAL 2 TIMES DAILY WITH MEALS
Qty: 60 TABLET | Refills: 3 | Status: SHIPPED | OUTPATIENT
Start: 2024-08-12

## 2024-08-12 RX ORDER — ASPIRIN 81 MG/1
81 TABLET ORAL DAILY
Qty: 100 TABLET | Refills: 0 | Status: SHIPPED | OUTPATIENT
Start: 2024-08-13

## 2024-08-12 RX ORDER — LISINOPRIL 20 MG/1
40 TABLET ORAL
Status: DISCONTINUED | OUTPATIENT
Start: 2024-08-13 | End: 2024-08-12 | Stop reason: HOSPADM

## 2024-08-12 RX ADMIN — ASPIRIN 81 MG: 81 TABLET, COATED ORAL at 09:43

## 2024-08-12 RX ADMIN — HEPARIN SODIUM 5000 UNITS: 5000 INJECTION, SOLUTION INTRAVENOUS; SUBCUTANEOUS at 05:29

## 2024-08-12 RX ADMIN — AMLODIPINE BESYLATE 10 MG: 10 TABLET ORAL at 09:44

## 2024-08-12 RX ADMIN — CARVEDILOL 6.25 MG: 6.25 TABLET, FILM COATED ORAL at 09:43

## 2024-08-12 ASSESSMENT — PAIN DESCRIPTION - PAIN TYPE: TYPE: ACUTE PAIN

## 2024-08-12 NOTE — DISCHARGE PLANNING
HTH/SCP TCN chart review completed. Collaborated with [facility] [place] prior to meeting with the pt. The most current review of medical record, knowledge of pt's PLOF and social support, LACE+ score of 16 was considered.  No 6 clicks scores.       Pt seen at bedside. Introduced TCN program. Provided education regarding post acute levels of care. Education provided regarding case management policy for blanket SNF referrals. Discussed HTH/SCP plan benefits. Pt verbalizes understanding.     Patient seen at bedside with  #[id number] ([place]).  Patient states she is  and lives at her daughters caregiver/group home, one level/no steps in.  Her daughter assists as needed.  She states she was modified independent with ADL's, mobility (used cane) and cooking at her baseline.  She states her friends provide transportation as needed.  She states she is at her baseline level of function and has no functional concerns with discharge to home when medically cleared.      In collaboration with [facility], current discharge considerations are noted to be for home with close outpatient f/u when medically cleared.      TCN will continue to follow and collaborate with discharge planning team as additional post acute needs arise. Thank you.     Completed today:  ST Anticipate that the patient will have no further speech therapy needs after discharge from the hospital.    Choice obtained:  None.  See above.    Pt aware of [facility]'s blanket referral policy  SCP with [facility] PCP. Discussed possible outpatient transitional PCP follow up if indicated and pt states she will schedule her own PCP f/u appointment.

## 2024-08-12 NOTE — DISCHARGE SUMMARY
Discharge Summary    CHIEF COMPLAINT ON ADMISSION  Chief Complaint   Patient presents with    Dizziness    Possible Stroke     Right sided weakness, dizziness, since 7pm last night.        Reason for Admission  EMS     Admission Date  8/11/2024    CODE STATUS  DNAR/DNI    HPI & HOSPITAL COURSE  Sabrina Lobo is a 89 y.o. female who presented 8/11/2024 with pmx HTN presents with headache and weakness     Transient right side weakness that has since resolved     Bp markedly elevated . --> 224/88     Patient given iv labetolol in ed        I orderedv iv hydralazine and bp is now 136/65     Patient referred to me for further care    The patient underwent an MRI of the brain did not show any evidence of acute CVA.  Patient's blood pressure medications were titrated and adjusted during hospital stay.  The patient was cleared for discharge home with the below medications    Patient to follow-up with PCP as soon as possible for outpatient blood pressure checks    Therefore, she is discharged in good and stable condition to home with close outpatient follow-up.    The patient recovered much more quickly than anticipated on admission.    Discharge Date  8/12/2024    FOLLOW UP ITEMS POST DISCHARGE      DISCHARGE DIAGNOSES  Principal Problem:    Weakness (POA: Yes)  Active Problems:    Acute cystitis without hematuria (POA: Yes)    Aortic stenosis (POA: Yes)  Resolved Problems:    Hypertensive urgency (POA: Yes)      FOLLOW UP  No future appointments.  Ash Whiting M.D.  [address] [place] [address] [address] [place] [address]  [phone]    Follow up        MEDICATIONS ON DISCHARGE     Medication List        START taking these medications        Instructions   Aspirin Low Dose 81 MG EC tablet  Start taking on: August 13, 2024  Generic drug: aspirin   Take 1 Tablet by mouth every day.  Dose: 81 mg     carvedilol 6.25 MG Tabs  Commonly known as: Coreg   Take 1 Tablet by mouth 2 times a day with meals.  Dose: 6.25 mg            CHANGE how you  take these medications        Instructions   lisinopril 40 MG tablet  What changed:   medication strength  how much to take  when to take this  Commonly known as: Prinivil   Take 1 Tablet by mouth every day for 120 days.  Dose: 40 mg            CONTINUE taking these medications        Instructions   amLODIPine 10 MG Tabs  Commonly known as: Norvasc   Take 1 Tablet by mouth every day.  Dose: 10 mg     atorvastatin 40 MG Tabs  Commonly known as: Lipitor   Take 1 Tablet by mouth at bedtime.  Dose: 40 mg     multivitamin Tabs   Take 1 Tablet by mouth every day.  Dose: 1 Tablet     VITAMIN D PO   Take 1 Tablet by mouth every day.  Dose: 1 Tablet              Allergies  Allergies   Allergen Reactions    Beef-Derived Products Nausea       DIET  Orders Placed This Encounter   Procedures    Diet Order Diet: Level 6 - Soft and Bite Sized; Liquid level: Level 0 - Thin; Second Modifier: (optional): Cardiac     Standing Status:   Standing     Number of Occurrences:   1     Order Specific Question:   Diet:     Answer:   Level 6 - Soft and Bite Sized [23]     Order Specific Question:   Liquid level     Answer:   Level 0 - Thin     Order Specific Question:   Second Modifier: (optional)     Answer:   Cardiac [6]       ACTIVITY  As tolerated.  Weight bearing as tolerated    CONSULTATIONS      PROCEDURES    EC-ECHOCARDIOGRAM COMPLETE W/O CONT  Order: [id number]   Status: Final result       Visible to patient: Yes (not seen)       Next appt: None    0 Result Notes  Details    Reading Physician Reading Date Result Priority   No Reading Provider Prelim 8/12/2024    Irvin Parker M.D.  [phone] 8/12/2024      Narrative & Impression  Transthoracic  Echo Report        Echocardiography Laboratory     CONCLUSIONS  Hyperdynamic left ventricular systolic function. The ejection fraction   is measured to be 73% by Brandt's biplane.   Mild left ventricular hypertrophy.   Normal right ventricular size and systolic function.  Mild-moderate  aortic valve stenosis. Transvalvular gradients are -   Peak: 22 mmHg, Mean: 10 mmHg. Aortic valve area calculated from the   continuity equation is 1.0 cm2.   Moderate aortic insufficiency. Pressure half time is 365 msec.  Compared to the prior study on 2023, there is now mild to moderate   aortic stenosis.     EMILY HONG  Exam Date:         2024                      08:43  Exam Location:     Out Patient  Priority:          Routine     Ordering Physician:        SHILA BENITEZ  Referring Physician:       EMMANUEL Lim  Sonographer:               Devi Aragon [facility]     Age:    89     Gender:    F  MRN:    [id number]  :    [id number]  BSA:    1.45   Ht (in):    60     Wt (lb):    110  Exam Type:     Complete     Indications:     CVA  ICD Codes:       436     CPT Codes:       84233     BP:   167    /   72     HR:   64  Technical Quality:       Fair     MEASUREMENTS  (Male / Female) Normal Values  2D ECHO  LV Diastolic Diameter PLAX        4 cm                  4.2 - 5.9 / 3.9 - 5.3   cm  LV Systolic Diameter PLAX         2.3 cm                2.1 - 4.0 cm  IVS Diastolic Thickness           1.2 cm                  LVPW Diastolic Thickness          0.94 cm                 LVOT Diameter                     1.7 cm                  LV Ejection Fraction MOD BP       73.4 %                >= 55  %  LV Ejection Fraction MOD 4C       75.5 %                  LV Ejection Fraction MOD 2C       69.7 %                  IVC Diameter                      0.85 cm                    DOPPLER  AV Peak Velocity                  2.1 m/s                 AV Peak Gradient                  17.8 mmHg               AV Mean Gradient                  8.9 mmHg                AI Pressure Half Time             365 ms                  LVOT Peak Velocity                0.95 m/s                AV Area Cont Eq vti               1.3 cm2                 MV Velocity Time Integral         22 cm                   Mitral E Point Velocity            0.48 m/s                Mitral E to A Ratio               0.38                    MV Pressure Half Time             41.2 ms                 MV Area PHT                       5.3 cm2                 MV Deceleration Time              142 ms                  PV Peak Velocity                  1.3 m/s                 PV Peak Gradient                  6.6 mmHg                   * Indicates values subject to auto-interpretation  LV EF:        %     FINDINGS  Left Ventricle  Normal left ventricular chamber size. Mild left ventricular   hypertrophy. Hyperdynamic left ventricular systolic function. The   ejection fraction is measured to be 73 % by Brandt's biplane. No   regional wall motion abnormalities. Indeterminate diastolic function.     Right Ventricle  Normal right ventricular size and systolic function.     Right Atrium  Normal right atrial size. Normal inferior vena cava size and   inspiratory collapse.     Left Atrium  Normal left atrial size. Left atrial volume index is 27 mL/sq m.     Mitral Valve  Structurally normal mitral valve. Moderate mitral annular calcification   without significant stenosis. Trace mitral regurgitation.     Aortic Valve  Structurally normal aortic valve. Mild-moderate aortic valve stenosis.   Transvalvular gradients are - Peak: 22 mmHg, Mean: 10 mmHg. Aortic   valve area calculated from the continuity equation is 1.0 cm2. Moderate   aortic insufficiency. Pressure half time is 365 msec.     Tricuspid Valve  Structurally normal tricuspid valve. No tricuspid stenosis. Trace   tricuspid regurgitation. Unable to estimate pulmonary artery pressure   due to an inadequate tricuspid regurgitant jet.     Pulmonic Valve  The pulmonic valve is not well visualized. No pulmonic stenosis. No   pulmonic insufficiency.     Pericardium  No pericardial effusion.     Aorta  The ascending aorta diameter is 3.1 cm.                                Irvin Parker MD  (Electronically Signed)  Final Date:     12  August 2024    ===========================      mages     Show images for MR-BRAIN-W/O  MR-BRAIN-W/O  Order: [id number]   Status: Final result       Visible to patient: Yes (not seen)       Next appt: None    0 Result Notes  Details    Reading Physician Reading Date Result Priority   Andrea Tilley M.D.  [phone] 8/12/2024      Narrative & Impression     8/12/2024 10:09 AM     HISTORY/REASON FOR EXAM:  DIZZINESS     TECHNIQUE/EXAM DESCRIPTION :  MRI of the brain without contrast.     Multiplanar multisequence MR examination of the brain without contrast done on 1.5 MRI scanner.     COMPARISON:  None.     FINDINGS:  There is no restricted diffusion, acute hemorrhage, hydrocephalus, intracranial space-occupying lesion, abnormal volume loss, vasogenic edema, mass effect or abnormal CSF signal.     There are chronic lacunar infarcts in the bilateral cerebellar hemispheres. A few nonspecific T2 hyperintensities in the subcortical and periventricular white matter and brainstem. Mild cerebral volume loss is seen. There is no intra-axial   space-occupying lesion. The visualized flow voids of the cerebral vasculature are unremarkable.  There is no large lesion identified in the expected course of the intracranial portions of the cranial nerves.  The skull bones are unremarkable. The paranasal sinuses are clear. The extracranial soft tissue including orbits appear grossly normal.        IMPRESSION:     1.  No acute abnormality.  2.  Chronic lacunar infarcts in the bilateral cerebellar hemispheres.  3.  Mild chronic microvascular ischemic disease.  4.  Mild cerebral volume loss.           Exam Ended: 08/12/24 10:38 AM        LABORATORY  Lab Results   Component Value Date    SODIUM 140 08/11/2024    POTASSIUM 4.8 08/11/2024    CHLORIDE 109 08/11/2024    CO2 19 (L) 08/11/2024    GLUCOSE 101 (H) 08/11/2024    BUN 18 08/11/2024    CREATININE 1.03 08/11/2024    CREATININE 0.9 01/15/2009        Lab Results   Component Value  Date    WBC 7.0 08/11/2024    HEMOGLOBIN 13.5 08/11/2024    HEMATOCRIT 41.0 08/11/2024    PLATELETCT 203 08/11/2024        Total time of the discharge process exceeds 37 minutes.

## 2024-08-12 NOTE — THERAPY
"Speech Language Pathology   Clinical Swallow Evaluation     Patient Name: Sabrina Lobo  AGE:  89 y.o., SEX:  female  Medical Record #: [id number]  Date of Service: 8/12/2024      History of Present Illness  89 y/o female admitted 8/11 with headaches, weakness, transient R side weakness.    CMHx: Weakness,   PMHx: HTN, JEOVANNY, CKD, stroke, frailty     Last seen by SLP it [facility] in 2/2022 with recommendations for RG/TN. Per RN note from 5:06 am 8/11 \"Pt appears to pool water in mouth without swallowing.\"    CXR 8/11:  \"No acute cardiac or pulmonary abnormalities are identified.\"    CT Head 8/11:  \"CT angiogram of the [place] within normal limits.\"    MRI Brain pending.    General Information:  Vitals  O2 Delivery Device: None - Room Air  Level of Consciousness: Alert, Awake  Patient Behaviors: Anxious, Confused  Orientation: Oriented x 4  Follows Directives: Yes    Prior Living Situation & Level of Function:  Communication: WFL  Swallowing: Reports she sometimes avoids hard foods because \"I have high blood pressure.\"     Oral Mechanism Evaluation:  Dentition: Upper denture, Reportedly does not wear her lower denture all the time  Facial Symmetry: Equal  Facial Sensation: Equal     Labial Observations: WFL   Lingual Observations: Midline  Motor Speech: WFL    Laryngeal Function:  Secretion Management: Adequate  Voice Quality: WFL  Cough: Pt did not follow commands to assess  Comments: Reports \"I don't usually cough\" and had difficulty following SLP/ commands for cued cough      Subjective  Pt agreeable and cooperative with SLP evaluation tasks.  (Language Line #[id number] \"Farrah\") used to translate from Tagalog as necessary, although patient does speak English from her time \"working with the [employer].\"      Assessment  Current Method of Nutrition: NPO until cleared by speech pathology  Positioning: Hope's (60-90 degrees)  Bolus Administration: Patient    O2 Delivery Device: None - Room " Air  Factor(s) Affecting Performance: None  Tracheostomy : No      Swallowing Trials:  Thin Liquid (TN0): WFL  Liquidised (LQ3): WFL  Easy to Chew (EC7): WFL      Comments:   Pt w/ adequate self-feeding. Appropriate oral bolus stripping and containment. Presumed complete AP transit with effective bolus formation evidenced by complete clearance of bolus from oral cavity. Functional and timely mastication of solid consistencies. No overt s/sx of aspiration noted w/ single and sequential sips of TN0 water. No increase in WOB, no cough/clear, no change in vocal quality. One to two swallows appreciated per bolus.       Clinical Impressions  Pt presents without s/sx concerning for oropharyngeal dysphagia this date. Discussed IDDSI levels with pt who is agreeable to a SB6/TN0 diet to maximize comfort, efficiency, and intake. SLP will no longer follow; please re-consult for dysphagia re-evaluation with change in patient status.       Recommendations  Soft and bite-sized with thin liquids  Instrumentation: None indicated at this time  Medication: As tolerated  Supervision: Distant supervision - check on patient 2-3 times per meal  Positioning: Fully upright and midline during oral intake, Meals sitting upright in a chair, as tolerated  Risk Management : Small bites/sips, Slow rate of intake, Physical mobility, as tolerated, Alternate bites and sips  Oral Care: BID       SLP Treatment Plan  Treatment Plan: None Indicated  SLP Frequency: N/A - Evaluation Only  Estimated Duration: N/A - Evaluation Only      Anticipated Discharge Needs  Discharge Recommendations: Anticipate that the patient will have no further speech therapy needs after discharge from the hospital   Therapy Recommendations Upon DC: Not Indicated        Mirta Sagastume, SLP

## 2024-08-12 NOTE — DISCHARGE PLANNING
Discussed in AM rounds, Per report patient has caregiver and anticipate home when medically cleared.

## 2024-08-12 NOTE — CARE PLAN
The patient is Stable - Low risk of patient condition declining or worsening    Shift Goals  Clinical Goals: IV abx, echo, CT perfusion, MRI  Patient Goals: contact son, rest, comfort  Family Goals: TRANG    Progress made toward(s) clinical / shift goals:  N/A    Patient is not progressing towards the following goals: Waiting for speech evaluation       Problem: Neuro Status  Goal: Neuro status will remain stable or improve  Description: Target End Date:  Prior to discharge or change in level of care    1.  Assess and monitor neurologic status per provider order/protocol/unit policy  2.  Assess level of consciousness and orientation  3.  Assess for speech, dysarthria, dysphagia, facial symmetry  4.  Assess visual field, eye movements, gaze preference, pupil reaction and size  5.  Assess muscle strength and motor response in all four extremities  6.  Assess for sensation (numbness and tingling)  7.  Identify changes in neuro status and report to provider for testing/treatment orders  Outcome: Progressing       Problem: Nutritional  Goal: Dysphagia will improve  1.  Assess and monitor ability to swallow  2. Collaborate with SLP to determine appropriate adaptation for safe administration of medications and oral nutrition  3. Provide appropriate consistency, texture and type of food to avoid choking per SLP recommendations.  4. Encourage proper swallowing techniques.   Outcome: Not Progressing

## 2024-08-12 NOTE — PROGRESS NOTES
Discharge instructions, medications and follow-up reviewed with pt and family, pt verbalized understanding and denies questions. Discharge paperwork given to pt. Meds to bed delivered to pt bedside. PIV removed, TeleBox removed, armband removed. Pt son to be her ride home. Pt transported off unit via wheelchair with hospital escort.

## 2024-08-12 NOTE — CARE PLAN
Problem: Knowledge Deficit - Standard  Goal: Patient and family/care givers will demonstrate understanding of plan of care, disease process/condition, diagnostic tests and medications  Outcome: Progressing     Problem: Skin Integrity  Goal: Skin integrity is maintained or improved  Outcome: Progressing     Problem: Fall Risk  Goal: Patient will remain free from falls  Outcome: Progressing     Problem: Communication:  Goal: The ability to communicate needs accurately and effectively will improve  Outcome: Progressing     Problem: Safety:  Goal: Will remain free from injury  Outcome: Progressing     Problem: Psychosocial Needs:  Goal: Ability to identify and develop effective coping behavior will improve  Outcome: Progressing  Goal: Ability to identify appropriate support needs will improve  Outcome: Progressing     Problem: Nutritional:  Goal: Dysphagia will improve  Outcome: Progressing  Goal: Maintenance of adequate nutrition will improve  Outcome: Progressing     Problem: Knowledge Deficit:  Goal: Knowledge of disease process/condition, treatment plan, diagnostic tests, and medications will improve  Outcome: Progressing  Goal: Ability to state lifestyle or environmental changes necessary to maintain safety will improve  Outcome: Progressing     Problem: Neuro Status  Goal: Neuro status will remain stable or improve  Outcome: Progressing     Problem: Dysphagia  Goal: Dysphagia will improve  Outcome: Progressing   The patient is Stable - Low risk of patient condition declining or worsening    Shift Goals  Clinical Goals: MRI, echo  Patient Goals: contact son, rest  Family Goals: TRANG    Progress made toward(s) clinical / shift goals:  Pt and family updated on POC. Pt tolerating foods post swallow-eval. Pt denies pain during shift. Discharge paperwork reviewed with patient and her son. All questions answered.    Patient is not progressing towards the following goals:

## 2024-08-12 NOTE — PROGRESS NOTES
Bedside shift report received from Della ALLISON. Patient alert to self only, in bed resting comfortably. Pt reports pain at left PIV site. IV site assessed and pt reported pain with flushing. Left PIV was removed. Bed in lowest, locked position with call light and belongings within reach. Fall precautions reviewed with patient. Patient updated on POC.

## 2024-08-12 NOTE — PROGRESS NOTES
Spoke with pt's son, Prabhjot, on phone. MRI screening completed with son. POC discussed and all questions answered.

## 2024-08-12 NOTE — PROGRESS NOTES
4 Eyes Skin Assessment Completed by ESTEFANY Hull and ESTEFANY Garcia.    Head WDL  Ears WDL  Nose WDL  Mouth WDL Dry  Neck WDL  Breast/Chest WDL  Shoulder Blades WDL  Spine WDL  (R) Arm/Elbow/Hand WDL  (L) Arm/Elbow/Hand WDL  Abdomen WDL  Groin WDL  Scrotum/Coccyx/Buttocks Redness and Non-Blanching  (R) Leg WDL  (L) Leg WDL  (R) Heel/Foot/Toe WDL  (L) Heel/Foot/Toe WDL          Devices In Places Tele Box and Pulse Ox      Interventions In Place N/A    Possible Skin Injury No    Pictures Uploaded Into Epic N/A  Wound Consult Placed N/A  RN Wound Prevention Protocol Ordered No

## 2024-08-12 NOTE — PROGRESS NOTES
Attempted to use  to perform swallow eval and admit profile. Pt continue to talk over  stating she could not hear; volume of ipad turned all the way up. Attempted to do swallow eval with pt without . Pt appears to pool water in mouth without swallowing. Pt not following instructions and RN unable to complete swallow eval. WILLIAM Saenz notified. Swallow eval ordered.

## 2024-08-12 NOTE — DISCHARGE INSTRUCTIONS
Discharge Instructions    Discharged to home by car with relative. Discharged via wheelchair, hospital escort: Yes.  Special equipment needed: Not Applicable    Be sure to schedule a follow-up appointment with your primary care doctor or any specialists as instructed.     Discharge Plan:   Diet Plan: Discussed  Activity Level: Discussed  Confirmed Follow up Appointment: Patient to Call and Schedule Appointment  Confirmed Symptoms Management: Discussed  Medication Reconciliation Updated: Yes    I understand that a diet low in cholesterol, fat, and sodium is recommended for good health. Unless I have been given specific instructions below for another diet, I accept this instruction as my diet prescription.   Other diet: Soft bite sized    Special Instructions: None    -Is this patient being discharged with medication to prevent blood clots?  No    Is patient discharged on Warfarin / Coumadin?   No

## 2024-08-13 LAB
BACTERIA UR CULT: ABNORMAL
BACTERIA UR CULT: ABNORMAL
SIGNIFICANT IND 70042: ABNORMAL
SITE SITE: ABNORMAL
SOURCE SOURCE: ABNORMAL

## 2024-10-17 ENCOUNTER — PATIENT MESSAGE (OUTPATIENT)
Dept: HEALTH INFORMATION MANAGEMENT | Facility: OTHER | Age: 89
End: 2024-10-17

## 2024-10-17 ENCOUNTER — PATIENT OUTREACH (OUTPATIENT)
Dept: HEALTH INFORMATION MANAGEMENT | Facility: OTHER | Age: 89
End: 2024-10-17
Payer: MEDICARE

## 2024-11-07 ENCOUNTER — OFF SITE VISIT (OUTPATIENT)
Dept: FAMILY PLANNING/WOMEN'S HEALTH CLINIC | Facility: PHYSICIAN GROUP | Age: 89
End: 2024-11-07
Payer: MEDICARE

## 2024-11-07 VITALS
HEART RATE: 72 BPM | SYSTOLIC BLOOD PRESSURE: 134 MMHG | RESPIRATION RATE: 16 BRPM | DIASTOLIC BLOOD PRESSURE: 66 MMHG | OXYGEN SATURATION: 95 % | HEIGHT: 60 IN | WEIGHT: 110 LBS | BODY MASS INDEX: 21.6 KG/M2

## 2024-11-07 DIAGNOSIS — E78.5 DYSLIPIDEMIA: ICD-10-CM

## 2024-11-07 DIAGNOSIS — N18.31 STAGE 3A CHRONIC KIDNEY DISEASE: ICD-10-CM

## 2024-11-07 DIAGNOSIS — G31.9 CEREBRAL ATROPHY (HCC): ICD-10-CM

## 2024-11-07 DIAGNOSIS — I10 ESSENTIAL HYPERTENSION: ICD-10-CM

## 2024-11-07 DIAGNOSIS — I70.0 ATHEROSCLEROSIS OF AORTA (HCC): ICD-10-CM

## 2024-11-07 PROCEDURE — G0439 PPPS, SUBSEQ VISIT: HCPCS | Performed by: NURSE PRACTITIONER

## 2024-11-07 SDOH — ECONOMIC STABILITY: TRANSPORTATION INSECURITY: IN THE PAST 12 MONTHS, HAS LACK OF TRANSPORTATION KEPT YOU FROM MEDICAL APPOINTMENTS OR FROM GETTING MEDICATIONS?: NO

## 2024-11-07 SDOH — ECONOMIC STABILITY: FOOD INSECURITY: WITHIN THE PAST 12 MONTHS, YOU WORRIED THAT YOUR FOOD WOULD RUN OUT BEFORE YOU GOT THE MONEY TO BUY MORE.: NEVER TRUE

## 2024-11-07 SDOH — ECONOMIC STABILITY: FOOD INSECURITY: HOW HARD IS IT FOR YOU TO PAY FOR THE VERY BASICS LIKE FOOD, HOUSING, MEDICAL CARE, AND HEATING?: NOT VERY HARD

## 2024-11-07 SDOH — ECONOMIC STABILITY: HOUSING INSECURITY: AT ANY TIME IN THE PAST 12 MONTHS, WERE YOU HOMELESS OR LIVING IN A SHELTER (INCLUDING NOW)?: NO

## 2024-11-07 SDOH — HEALTH STABILITY: PHYSICAL HEALTH: ON AVERAGE, HOW MANY MINUTES DO YOU ENGAGE IN EXERCISE AT THIS LEVEL?: 0 MIN

## 2024-11-07 SDOH — ECONOMIC STABILITY: FOOD INSECURITY: WITHIN THE PAST 12 MONTHS, THE FOOD YOU BOUGHT JUST DIDN'T LAST AND YOU DIDN'T HAVE MONEY TO GET MORE.: NEVER TRUE

## 2024-11-07 SDOH — HEALTH STABILITY: PHYSICAL HEALTH: ON AVERAGE, HOW MANY DAYS PER WEEK DO YOU ENGAGE IN MODERATE TO STRENUOUS EXERCISE (LIKE A BRISK WALK)?: 0 DAYS

## 2024-11-07 SDOH — ECONOMIC STABILITY: HOUSING INSECURITY: IN THE LAST 12 MONTHS, WAS THERE A TIME WHEN YOU WERE NOT ABLE TO PAY THE MORTGAGE OR RENT ON TIME?: NO

## 2024-11-07 SDOH — ECONOMIC STABILITY: HOUSING INSECURITY: IN THE PAST 12 MONTHS, HOW MANY TIMES HAVE YOU MOVED WHERE YOU WERE LIVING?: 0

## 2024-11-07 ASSESSMENT — PATIENT HEALTH QUESTIONNAIRE - PHQ9: CLINICAL INTERPRETATION OF PHQ2 SCORE: 0

## 2024-11-07 ASSESSMENT — PAIN SCALES - GENERAL: PAINLEVEL_OUTOF10: NO PAIN

## 2024-11-07 ASSESSMENT — FIBROSIS 4 INDEX: FIB4 SCORE: 4.37

## 2024-11-07 ASSESSMENT — ENCOUNTER SYMPTOMS: GENERAL WELL-BEING: GOOD

## 2024-11-07 ASSESSMENT — ACTIVITIES OF DAILY LIVING (ADL)
SHOPPING_COMMENTS: FAMILY ASSISTS
BATHING_REQUIRES_ASSISTANCE: 0
LACK_OF_TRANSPORTATION: NO
TRANSPORTATION COMMENTS: FAMILY ASSISTS

## 2024-11-08 NOTE — ASSESSMENT & PLAN NOTE
Stable on atorvastatin.  Lipid panel not available for review . Cont heart healthy diet and regular exercise. Cont f/u with PCP for ongoing monitoring and medication management

## 2024-11-08 NOTE — PROGRESS NOTES
Pt identified for PCM enrollment. 3 total attempts were made to contact pt via telephone. Unable to reach. [employer] message with PCM details and contact information has been sent. This RN left VM encouraging pt or family to call [phone] if they are interested in enrollment in the future. Closing program at this time.

## 2024-11-08 NOTE — PROGRESS NOTES
Comprehensive Health Assessment Program     Sabrina Lobo is a 89 y.o. here for her comprehensive health assessment.    Patient Active Problem List    Diagnosis Date Noted    BMI 21.0-21.9, adult 11/07/2024    Cerebral atrophy (HCC) 11/07/2024    Atherosclerosis of aorta (HCC) 11/07/2024    Aortic stenosis 08/12/2024    Weakness 08/11/2024    History of lacunar cerebrovascular accident (CVA) 06/05/2024    ACP (advance care planning) 06/05/2024    Heart block 07/20/2023    HTN (hypertension), malignant 07/19/2023    JEOVANNY (acute kidney injury) (Formerly [facility]) 04/28/2023    Anxiety 02/16/2022    Shortness of breath 02/15/2022    Dysphagia 02/15/2022    Acute cystitis without hematuria 08/12/2020    Leg swelling 05/19/2020    Hypovitaminosis D 01/16/2020    Decreased hearing 01/16/2020    Senile osteoporosis 01/08/2020    Coronary artery disease involving native coronary artery of native heart without angina pectoris 01/08/2020    H/O: stroke 01/08/2020    Full code status 06/14/2019    Frailty syndrome in geriatric patient 06/14/2019    Intractable back pain 06/13/2019    Anemia 06/13/2019    Syncope and collapse 10/29/2018    Seasonal allergies 10/09/2018    Other osteoporosis without current pathological fracture 10/09/2018    Stage 3a chronic kidney disease 10/09/2018    Health care maintenance 04/26/2018    Acute-on-chronic kidney injury (HCC) 07/03/2017    Dizziness 12/13/2013    Essential hypertension 11/20/2012    Dyslipidemia 11/20/2012       Current Outpatient Medications   Medication Sig Dispense Refill    lisinopril (PRINIVIL) 40 MG tablet Take 1 Tablet by mouth every day for 120 days. 30 Tablet 3    aspirin 81 MG EC tablet Take 1 Tablet by mouth every day. 100 Tablet 0    carvedilol (COREG) 6.25 MG Tab Take 1 Tablet by mouth 2 times a day with meals. 60 Tablet 3    multivitamin Tab Take 1 Tablet by mouth every day.      VITAMIN D PO Take 1 Tablet by mouth every day.      atorvastatin (LIPITOR) 40 MG Tab  Take 1 Tablet by mouth at bedtime. 30 Tablet 0    amLODIPine (NORVASC) 10 MG Tab Take 1 Tablet by mouth every day. 30 Tablet 0     No current facility-administered medications for this visit.          Current supplements as per medication list.     Allergies:   Beef-derived products  Social History     Tobacco Use    Smoking status: Never    Smokeless tobacco: Never   Vaping Use    Vaping status: Never Used   Substance Use Topics    Alcohol use: No    Drug use: No     Family History   Problem Relation Age of Onset    Heart Disease Neg Hx     Hypertension Neg Hx     Hyperlipidemia Neg Hx     Psychiatric Illness Neg Hx     Diabetes Neg Hx      Sabrina  has a past medical history of H/O bladder repair surgery, H/O: hysterectomy, Hypertension, and Vertigo.   Past Surgical History:   Procedure Laterality Date    HYSTERECTOMY RADICAL         Screening:  In the last six months have you experienced any leakage of urine?  Yes wears brief/ no UTI     Depression Screening  Little interest or pleasure in doing things?  0 - not at all  Feeling down, depressed , or hopeless? 0 - not at all  Patient Health Questionnaire Score: 0     If depressive symptoms identified deferred to follow up visit unless specifically addressed in assessment and plan.    Interpretation of PHQ-9 Total Score   Score Severity   1-4 No Depression   5-9 Mild Depression   10-14 Moderate Depression   15-19 Moderately Severe Depression   20-27 Severe Depression    Screening for Cognitive Impairment  Do you or any of your friends or family members have any concern about your memory? No  Three Minute Recall (Leader, Season, Table) 2/3    Jero clock face with all 12 numbers and set the hands to show 10 minutes after 11.  No    Cognitive concerns identified deferred for follow up unless specifically addressed in assessment and plan.    Fall Risk Assessment  Has the patient had two or more falls in the last year or any fall with injury in the last year?  No    Safety  Assessment  Do you always wear your seatbelt?  Yes  Any changes to home needed to function safely? No  Difficulty hearing.  Yes  Patient counseled about all safety risks that were identified.    Functional Assessment ADLs  Are there any barriers preventing you from cooking for yourself or meeting nutritional needs?  No.    Are there any barriers preventing you from driving safely or obtaining transportation?  Yes. Family assists   Are there any barriers preventing you from using a telephone or calling for help?  No    Are there any barriers preventing you from shopping?  Yes. Family assists  Are there any barriers preventing you from taking care of your own finances?  Yes Family assists  Are there any barriers preventing you from managing your medications?  Yes Family assists   Are there any barriers preventing you from showering, bathing or dressing yourself? No    Are there any barriers preventing you from doing housework or laundry? Yes  Are there any barriers preventing you from using the toilet?No  Are you currently engaging in any exercise or physical activity?  Yes.      Self-Assessment of Health  What is your perception of your health? Good    Do you sleep more than six hours a night? No    In the past 7 days, how much did pain keep you from doing your normal work? None    Do you spend quality time with family or friends (virtually or in person)? Yes    Do you usually eat a heart healthy diet that constists of a variety of fruits, vegetables, whole grains and fiber? Yes    Do you eat foods high in fat and/or Fast Food more than three times per week? No    How concerned are you that your medical conditions are not being well managed? Not at all    Are you worried that in the next 2 months, you may not have stable housing that you own, rent, or stay in as part of a household? No      Advance Care Planning  Do you have an Advance Directive, Living Will, Durable Power of , or POLST? No                  Health Maintenance Summary            Overdue - IMM DTaP/Tdap/Td Vaccine (1 - Tdap) Never done      No completion history exists for this topic.              Overdue - Zoster (Shingles) Vaccines (1 of 2) Never done      No completion history exists for this topic.              Overdue - Pneumococcal Vaccine: 65+ Years (2 of 2 - PCV) Overdue since 9/1/2011 09/01/2010  Imm Admin: Pneumococcal polysaccharide vaccine (PPSV-23)              Overdue - Influenza Vaccine (1) Overdue since 9/1/2024 01/08/2020  Imm Admin: Influenza Vaccine Adult HD    10/09/2018  Imm Admin: Influenza Vaccine Adult HD    10/04/2016  Imm Admin: Influenza Vaccine Pediatric Split - Historical Data    10/04/2016  Imm Admin: Influenza Seasonal Injectable - Historical Data    09/16/2015  Imm Admin: Influenza Vaccine Pediatric Split - Historical Data    Only the first 5 history entries have been loaded, but more history exists.              Overdue - COVID-19 Vaccine (4 - 2024-25 season) Overdue since 9/1/2024 01/25/2022  Imm Admin: PFIZER PURPLE CAP SARS-COV-2 VACCINATION (12+)    02/11/2021  Imm Admin: PFIZER PURPLE CAP SARS-COV-2 VACCINATION (12+)    01/21/2021  Imm Admin: PFIZER PURPLE CAP SARS-COV-2 VACCINATION (12+)              Annual Wellness Visit (Yearly) Next due on 11/7/2025 11/07/2024  Level of Service: [place] ANNUAL WELLNESS VISIT-INCLUDES PPPS SUBSEQUE*    10/09/2018  Visit Dx: Medicare annual wellness visit, initial              Hepatitis A Vaccine (Hep A) (Series Information) Aged Out      No completion history exists for this topic.              Hepatitis B Vaccine (Hep B) (Series Information) Aged Out      No completion history exists for this topic.              HPV Vaccines (Series Information) Aged Out      No completion history exists for this topic.              Polio Vaccine (Inactivated Polio) (Series Information) Aged Out      No completion history exists for this topic.              Meningococcal  Immunization (Series Information) Aged Out      No completion history exists for this topic.              Discontinued - Bone Density Scan  Discontinued      06/01/2018  DS-BONE DENSITY STUDY (DEXA)                    Patient Care Team:  Pcp Pt States None as PCP - General (Family Medicine)  Ash Whiting M.D. as PCP - [facility] Paneled  Serina Ortega, PT, DPT as Physical Therapist (Physical Therapy)  Tierney Bright as Patient Advocate (Pharmacy)  Ash Whiting M.D. (Family Medicine)  Jose David Jonas R.N. as Registered Nurse      Financial Resource Strain: Low Risk  (11/7/2024)    Overall Financial Resource Strain (CARDIA)     Difficulty of Paying Living Expenses: Not very hard      Transportation Needs: No Transportation Needs (11/7/2024)    PRAPARE - Transportation     Lack of Transportation (Medical): No     Lack of Transportation (Non-Medical): No      Food Insecurity: No Food Insecurity (11/7/2024)    Hunger Vital Sign     Worried About Running Out of Food in the Last Year: Never true     Ran Out of Food in the Last Year: Never true        Encounter Vitals  Blood Pressure : 134/66  Pulse: 72  Respiration: 16  Pulse Oximetry: 95 %  Weight: 49.9 kg (110 lb)  Height: 152.4 cm (5')  BMI (Calculated): 21.48  Pain Score: No pain     ROS:  No fever, chills, nausea, vomiting, diarrhea, chest pain or shortness of breath. See HPI.    Physical Exam:  Constitutional: NAD  HENMT: NC/AT, PERRLA, EOMI, OP clear, TM's clear, no lymphadenopathy, no thyromegaly.  No JVD.  Cardiovascular: RRR, No m/r/g  Lungs: CTAB, no w/r/r  Extremities: 2+ DP, PT and Radial pulses bilaterally. No c/c/e  Skin: No legions notes  Neurologic: Alert & oriented x3, CN II-XII grossly intact    Assessment and Plan. The following treatment and monitoring plan is recommended:  BMI 21.0-21.9, adult  Stable BMI 21.48    Dyslipidemia  Stable on atorvastatin.  Lipid panel not available for review . Cont heart healthy diet and regular  exercise. Cont f/u with PCP for ongoing monitoring and medication management      Essential hypertension  Stable. Patient taking..lisinopril, carvedilol, amlodipine   BP today  134/  66 HR RRR No BLE edema. Cont f/u with PCP for ongoing monitoring and medication management     Cerebral atrophy (HCC)  Stable. Records review CT Head ( 7/19/23) reports cerebral atrophy.Patient able to recall 2/3 words/ Patient not able to draw the clock. Patient able to perform all ADLs independently. Family assists with IADLs. Cont f/u with PCP for ongoing monitoring.     Atherosclerosis of aorta (HCC)  Stable. Records review CT CTA neck (8/11/24) reports three is atherosclerotic plaque of the aorta. Patient is taking atorvastatin/ ASA. Cont f/u with PCP for ongoing monitoring     Stage 3a chronic kidney disease  Stable Records review CKD EPI reports 50 ( 8/11/24) 56 ( 7/20/23) 40 (7/19/23) Consider daily water hydration/ avoidance of OTC NSAIDS/ BP control. Cont f/u with PCP for ongoing monitoring       Services suggested: No services needed at this time  Health Care Screening: Age-appropriate preventive services recommended by USPTF and ACIP covered by Medicare were discussed today. Services ordered if indicated and agreed upon by the patient.  Referrals offered: Community-based lifestyle interventions to reduce health risks and promote self-management and wellness, fall prevention, nutrition, physical activity, tobacco-use cessation, weight loss, and mental health services as per orders if indicated.    Discussion today about general wellness and lifestyle habits:    Prevent falls and reduce trip hazards; Cautioned about securing or removing rugs.  Have a working fire alarm and carbon monoxide detector.  Engage in regular physical activity and social activities.    Follow-up: Return follow up with PCP as indicated.  HANDOUTS OFFERED     [facility] [place] [facility]   Senior Services Guide

## 2024-11-08 NOTE — ASSESSMENT & PLAN NOTE
Stable Records review CKD EPI reports 50 ( 8/11/24) 56 ( 7/20/23) 40 (7/19/23) Consider daily water hydration/ avoidance of OTC NSAIDS/ BP control. Cont f/u with PCP for ongoing monitoring

## 2024-11-08 NOTE — ASSESSMENT & PLAN NOTE
Stable. Records review CT Head ( 7/19/23) reports cerebral atrophy.Patient able to recall 2/3 words/ Patient not able to draw the clock. Patient able to perform all ADLs independently. Family assists with IADLs. Cont f/u with PCP for ongoing monitoring.

## 2024-11-08 NOTE — ASSESSMENT & PLAN NOTE
Stable. Patient taking..lisinopril, carvedilol, amlodipine   BP today  134/  66 HR RRR No BLE edema. Cont f/u with PCP for ongoing monitoring and medication management

## 2024-11-08 NOTE — ASSESSMENT & PLAN NOTE
Stable. Records review CT CTA neck (8/11/24) reports three is atherosclerotic plaque of the aorta. Patient is taking atorvastatin/ ASA. Cont f/u with PCP for ongoing monitoring

## 2024-11-15 ENCOUNTER — TELEPHONE (OUTPATIENT)
Dept: HEALTH INFORMATION MANAGEMENT | Facility: OTHER | Age: 89
End: 2024-11-15
Payer: MEDICARE

## 2024-12-07 ENCOUNTER — APPOINTMENT (OUTPATIENT)
Dept: RADIOLOGY | Facility: MEDICAL CENTER | Age: 89
End: 2024-12-07
Attending: EMERGENCY MEDICINE
Payer: MEDICARE

## 2024-12-07 ENCOUNTER — HOSPITAL ENCOUNTER (OUTPATIENT)
Facility: MEDICAL CENTER | Age: 89
End: 2024-12-08
Attending: EMERGENCY MEDICINE | Admitting: INTERNAL MEDICINE
Payer: MEDICARE

## 2024-12-07 DIAGNOSIS — Z74.09 IMPAIRED MOBILITY: ICD-10-CM

## 2024-12-07 DIAGNOSIS — R53.1 WEAKNESS: ICD-10-CM

## 2024-12-07 DIAGNOSIS — N39.0 URINARY TRACT INFECTION WITHOUT HEMATURIA, SITE UNSPECIFIED: ICD-10-CM

## 2024-12-07 DIAGNOSIS — R54 FRAILTY SYNDROME IN GERIATRIC PATIENT: ICD-10-CM

## 2024-12-07 DIAGNOSIS — E86.0 DEHYDRATION: ICD-10-CM

## 2024-12-07 LAB
ALBUMIN SERPL BCP-MCNC: 3.8 G/DL (ref 3.2–4.9)
ALBUMIN/GLOB SERPL: 1.2 G/DL
ALP SERPL-CCNC: 94 U/L (ref 30–99)
ALT SERPL-CCNC: 17 U/L (ref 2–50)
ANION GAP SERPL CALC-SCNC: 11 MMOL/L (ref 7–16)
APPEARANCE UR: CLEAR
AST SERPL-CCNC: 30 U/L (ref 12–45)
BACTERIA #/AREA URNS HPF: ABNORMAL /HPF
BASOPHILS # BLD AUTO: 0.3 % (ref 0–1.8)
BASOPHILS # BLD: 0.03 K/UL (ref 0–0.12)
BILIRUB SERPL-MCNC: 0.5 MG/DL (ref 0.1–1.5)
BILIRUB UR QL STRIP.AUTO: NEGATIVE
BUN SERPL-MCNC: 24 MG/DL (ref 8–22)
CALCIUM ALBUM COR SERPL-MCNC: 9.2 MG/DL (ref 8.5–10.5)
CALCIUM SERPL-MCNC: 9 MG/DL (ref 8.5–10.5)
CASTS URNS QL MICRO: ABNORMAL /LPF (ref 0–2)
CHLORIDE SERPL-SCNC: 107 MMOL/L (ref 96–112)
CO2 SERPL-SCNC: 19 MMOL/L (ref 20–33)
COLOR UR: ABNORMAL
CREAT SERPL-MCNC: 1.03 MG/DL (ref 0.5–1.4)
EKG IMPRESSION: NORMAL
EOSINOPHIL # BLD AUTO: 0.11 K/UL (ref 0–0.51)
EOSINOPHIL NFR BLD: 1 % (ref 0–6.9)
EPITHELIAL CELLS 1715: ABNORMAL /HPF (ref 0–5)
ERYTHROCYTE [DISTWIDTH] IN BLOOD BY AUTOMATED COUNT: 43.9 FL (ref 35.9–50)
FLUAV RNA SPEC QL NAA+PROBE: NEGATIVE
FLUBV RNA SPEC QL NAA+PROBE: NEGATIVE
GFR SERPLBLD CREATININE-BSD FMLA CKD-EPI: 50 ML/MIN/1.73 M 2
GLOBULIN SER CALC-MCNC: 3.3 G/DL (ref 1.9–3.5)
GLUCOSE SERPL-MCNC: 112 MG/DL (ref 65–99)
GLUCOSE UR STRIP.AUTO-MCNC: NEGATIVE MG/DL
HCT VFR BLD AUTO: 43.9 % (ref 37–47)
HGB BLD-MCNC: 14.6 G/DL (ref 12–16)
IMM GRANULOCYTES # BLD AUTO: 0.04 K/UL (ref 0–0.11)
IMM GRANULOCYTES NFR BLD AUTO: 0.4 % (ref 0–0.9)
KETONES UR STRIP.AUTO-MCNC: ABNORMAL MG/DL
LACTATE SERPL-SCNC: 1.2 MMOL/L (ref 0.5–2)
LEUKOCYTE ESTERASE UR QL STRIP.AUTO: ABNORMAL
LYMPHOCYTES # BLD AUTO: 0.65 K/UL (ref 1–4.8)
LYMPHOCYTES NFR BLD: 5.9 % (ref 22–41)
MCH RBC QN AUTO: 31.1 PG (ref 27–33)
MCHC RBC AUTO-ENTMCNC: 33.3 G/DL (ref 32.2–35.5)
MCV RBC AUTO: 93.4 FL (ref 81.4–97.8)
MICRO URNS: ABNORMAL
MONOCYTES # BLD AUTO: 0.26 K/UL (ref 0–0.85)
MONOCYTES NFR BLD AUTO: 2.4 % (ref 0–13.4)
NEUTROPHILS # BLD AUTO: 9.88 K/UL (ref 1.82–7.42)
NEUTROPHILS NFR BLD: 90 % (ref 44–72)
NITRITE UR QL STRIP.AUTO: POSITIVE
NRBC # BLD AUTO: 0 K/UL
NRBC BLD-RTO: 0 /100 WBC (ref 0–0.2)
PH UR STRIP.AUTO: 5 [PH] (ref 5–8)
PLATELET # BLD AUTO: 193 K/UL (ref 164–446)
PMV BLD AUTO: 9.2 FL (ref 9–12.9)
POTASSIUM SERPL-SCNC: 4.2 MMOL/L (ref 3.6–5.5)
PROT SERPL-MCNC: 7.1 G/DL (ref 6–8.2)
PROT UR QL STRIP: 100 MG/DL
RBC # BLD AUTO: 4.7 M/UL (ref 4.2–5.4)
RBC # URNS HPF: ABNORMAL /HPF (ref 0–2)
RBC UR QL AUTO: ABNORMAL
RSV RNA SPEC QL NAA+PROBE: NEGATIVE
SARS-COV-2 RNA RESP QL NAA+PROBE: NOTDETECTED
SODIUM SERPL-SCNC: 137 MMOL/L (ref 135–145)
SP GR UR STRIP.AUTO: 1.02
UROBILINOGEN UR STRIP.AUTO-MCNC: 1 EU/DL
WBC # BLD AUTO: 11 K/UL (ref 4.8–10.8)
WBC #/AREA URNS HPF: ABNORMAL /HPF

## 2024-12-07 PROCEDURE — 93005 ELECTROCARDIOGRAM TRACING: CPT | Mod: TC | Performed by: INTERNAL MEDICINE

## 2024-12-07 PROCEDURE — 0241U HCHG SARS-COV-2 COVID-19 NFCT DS RESP RNA 4 TRGT ED POC: CPT

## 2024-12-07 PROCEDURE — 84100 ASSAY OF PHOSPHORUS: CPT

## 2024-12-07 PROCEDURE — 87077 CULTURE AEROBIC IDENTIFY: CPT

## 2024-12-07 PROCEDURE — 82550 ASSAY OF CK (CPK): CPT

## 2024-12-07 PROCEDURE — 81001 URINALYSIS AUTO W/SCOPE: CPT

## 2024-12-07 PROCEDURE — 36415 COLL VENOUS BLD VENIPUNCTURE: CPT

## 2024-12-07 PROCEDURE — 700105 HCHG RX REV CODE 258: Mod: UD | Performed by: INTERNAL MEDICINE

## 2024-12-07 PROCEDURE — 93010 ELECTROCARDIOGRAM REPORT: CPT | Performed by: INTERNAL MEDICINE

## 2024-12-07 PROCEDURE — 99222 1ST HOSP IP/OBS MODERATE 55: CPT | Performed by: INTERNAL MEDICINE

## 2024-12-07 PROCEDURE — 83735 ASSAY OF MAGNESIUM: CPT

## 2024-12-07 PROCEDURE — 99285 EMERGENCY DEPT VISIT HI MDM: CPT

## 2024-12-07 PROCEDURE — 84443 ASSAY THYROID STIM HORMONE: CPT

## 2024-12-07 PROCEDURE — 700111 HCHG RX REV CODE 636 W/ 250 OVERRIDE (IP): Mod: UD | Performed by: INTERNAL MEDICINE

## 2024-12-07 PROCEDURE — G0378 HOSPITAL OBSERVATION PER HR: HCPCS

## 2024-12-07 PROCEDURE — 85025 COMPLETE CBC W/AUTO DIFF WBC: CPT

## 2024-12-07 PROCEDURE — 700102 HCHG RX REV CODE 250 W/ 637 OVERRIDE(OP): Mod: UD | Performed by: INTERNAL MEDICINE

## 2024-12-07 PROCEDURE — 96372 THER/PROPH/DIAG INJ SC/IM: CPT

## 2024-12-07 PROCEDURE — 87186 SC STD MICRODIL/AGAR DIL: CPT

## 2024-12-07 PROCEDURE — 83605 ASSAY OF LACTIC ACID: CPT

## 2024-12-07 PROCEDURE — 82607 VITAMIN B-12: CPT

## 2024-12-07 PROCEDURE — A9270 NON-COVERED ITEM OR SERVICE: HCPCS | Mod: UD | Performed by: INTERNAL MEDICINE

## 2024-12-07 PROCEDURE — 80053 COMPREHEN METABOLIC PANEL: CPT

## 2024-12-07 PROCEDURE — 87086 URINE CULTURE/COLONY COUNT: CPT

## 2024-12-07 PROCEDURE — 71045 X-RAY EXAM CHEST 1 VIEW: CPT

## 2024-12-07 RX ORDER — OXYCODONE HYDROCHLORIDE 5 MG/1
5 TABLET ORAL
Status: DISCONTINUED | OUTPATIENT
Start: 2024-12-07 | End: 2024-12-08 | Stop reason: HOSPADM

## 2024-12-07 RX ORDER — HYDRALAZINE HYDROCHLORIDE 20 MG/ML
10 INJECTION INTRAMUSCULAR; INTRAVENOUS EVERY 4 HOURS PRN
Status: DISCONTINUED | OUTPATIENT
Start: 2024-12-07 | End: 2024-12-08 | Stop reason: HOSPADM

## 2024-12-07 RX ORDER — ONDANSETRON 4 MG/1
4 TABLET, ORALLY DISINTEGRATING ORAL EVERY 4 HOURS PRN
Status: DISCONTINUED | OUTPATIENT
Start: 2024-12-07 | End: 2024-12-08 | Stop reason: HOSPADM

## 2024-12-07 RX ORDER — PHENOL 1.4 %
10 AEROSOL, SPRAY (ML) MUCOUS MEMBRANE
COMMUNITY

## 2024-12-07 RX ORDER — ATORVASTATIN CALCIUM 40 MG/1
40 TABLET, FILM COATED ORAL
Status: DISCONTINUED | OUTPATIENT
Start: 2024-12-07 | End: 2024-12-08 | Stop reason: HOSPADM

## 2024-12-07 RX ORDER — POLYETHYLENE GLYCOL 3350 17 G/17G
1 POWDER, FOR SOLUTION ORAL
Status: DISCONTINUED | OUTPATIENT
Start: 2024-12-07 | End: 2024-12-08 | Stop reason: HOSPADM

## 2024-12-07 RX ORDER — LISINOPRIL 20 MG/1
40 TABLET ORAL DAILY
Status: DISCONTINUED | OUTPATIENT
Start: 2024-12-08 | End: 2024-12-08 | Stop reason: HOSPADM

## 2024-12-07 RX ORDER — SODIUM CHLORIDE 9 MG/ML
500 INJECTION, SOLUTION INTRAVENOUS ONCE
Status: COMPLETED | OUTPATIENT
Start: 2024-12-07 | End: 2024-12-08

## 2024-12-07 RX ORDER — ONDANSETRON 2 MG/ML
4 INJECTION INTRAMUSCULAR; INTRAVENOUS EVERY 4 HOURS PRN
Status: DISCONTINUED | OUTPATIENT
Start: 2024-12-07 | End: 2024-12-08 | Stop reason: HOSPADM

## 2024-12-07 RX ORDER — AMOXICILLIN 250 MG
2 CAPSULE ORAL EVERY EVENING
Status: DISCONTINUED | OUTPATIENT
Start: 2024-12-07 | End: 2024-12-08 | Stop reason: HOSPADM

## 2024-12-07 RX ORDER — OXYCODONE HYDROCHLORIDE 5 MG/1
2.5 TABLET ORAL
Status: DISCONTINUED | OUTPATIENT
Start: 2024-12-07 | End: 2024-12-08 | Stop reason: HOSPADM

## 2024-12-07 RX ORDER — HYDROMORPHONE HYDROCHLORIDE 1 MG/ML
0.25 INJECTION, SOLUTION INTRAMUSCULAR; INTRAVENOUS; SUBCUTANEOUS
Status: DISCONTINUED | OUTPATIENT
Start: 2024-12-07 | End: 2024-12-08 | Stop reason: HOSPADM

## 2024-12-07 RX ORDER — ENOXAPARIN SODIUM 100 MG/ML
30 INJECTION SUBCUTANEOUS DAILY
Status: DISCONTINUED | OUTPATIENT
Start: 2024-12-07 | End: 2024-12-08 | Stop reason: HOSPADM

## 2024-12-07 RX ORDER — CEFAZOLIN 2 G/1
2 INJECTION, POWDER, FOR SOLUTION INTRAMUSCULAR; INTRAVENOUS ONCE
Status: DISCONTINUED | OUTPATIENT
Start: 2024-12-07 | End: 2024-12-07

## 2024-12-07 RX ORDER — ACETAMINOPHEN 325 MG/1
650 TABLET ORAL EVERY 6 HOURS PRN
Status: DISCONTINUED | OUTPATIENT
Start: 2024-12-07 | End: 2024-12-08 | Stop reason: HOSPADM

## 2024-12-07 RX ORDER — CARVEDILOL 6.25 MG/1
6.25 TABLET ORAL 2 TIMES DAILY WITH MEALS
Status: DISCONTINUED | OUTPATIENT
Start: 2024-12-08 | End: 2024-12-08 | Stop reason: HOSPADM

## 2024-12-07 RX ORDER — CEFAZOLIN SODIUM 1 G/50ML
1 INJECTION, SOLUTION INTRAVENOUS EVERY 12 HOURS
Status: DISCONTINUED | OUTPATIENT
Start: 2024-12-08 | End: 2024-12-08 | Stop reason: HOSPADM

## 2024-12-07 RX ORDER — AMLODIPINE BESYLATE 5 MG/1
10 TABLET ORAL DAILY
Status: DISCONTINUED | OUTPATIENT
Start: 2024-12-08 | End: 2024-12-08 | Stop reason: HOSPADM

## 2024-12-07 RX ORDER — CEFAZOLIN SODIUM 1 G/3ML
1 INJECTION, POWDER, FOR SOLUTION INTRAMUSCULAR; INTRAVENOUS ONCE
Status: DISCONTINUED | OUTPATIENT
Start: 2024-12-07 | End: 2024-12-08

## 2024-12-07 RX ORDER — ASPIRIN 81 MG/1
81 TABLET ORAL DAILY
Status: DISCONTINUED | OUTPATIENT
Start: 2024-12-08 | End: 2024-12-08 | Stop reason: HOSPADM

## 2024-12-07 RX ADMIN — ATORVASTATIN CALCIUM 40 MG: 40 TABLET, FILM COATED ORAL at 23:24

## 2024-12-07 RX ADMIN — ENOXAPARIN SODIUM 30 MG: 100 INJECTION SUBCUTANEOUS at 23:24

## 2024-12-07 RX ADMIN — SODIUM CHLORIDE 500 ML: 9 INJECTION, SOLUTION INTRAVENOUS at 23:55

## 2024-12-07 ASSESSMENT — LIFESTYLE VARIABLES
HAVE YOU EVER FELT YOU SHOULD CUT DOWN ON YOUR DRINKING: NO
ON A TYPICAL DAY WHEN YOU DRINK ALCOHOL HOW MANY DRINKS DO YOU HAVE: 0
TOTAL SCORE: 0
CONSUMPTION TOTAL: NEGATIVE
HOW MANY TIMES IN THE PAST YEAR HAVE YOU HAD 5 OR MORE DRINKS IN A DAY: 0
TOTAL SCORE: 0
SUBSTANCE_ABUSE: 0
ALCOHOL_USE: NO
AVERAGE NUMBER OF DAYS PER WEEK YOU HAVE A DRINK CONTAINING ALCOHOL: 0
EVER FELT BAD OR GUILTY ABOUT YOUR DRINKING: NO
TOTAL SCORE: 0
HAVE PEOPLE ANNOYED YOU BY CRITICIZING YOUR DRINKING: NO
EVER HAD A DRINK FIRST THING IN THE MORNING TO STEADY YOUR NERVES TO GET RID OF A HANGOVER: NO
DOES PATIENT WANT TO STOP DRINKING: NO

## 2024-12-07 ASSESSMENT — ENCOUNTER SYMPTOMS
HALLUCINATIONS: 0
BACK PAIN: 0
HEADACHES: 0
DOUBLE VISION: 0
PALPITATIONS: 0
BLURRED VISION: 0
PHOTOPHOBIA: 0
TREMORS: 0
HEMOPTYSIS: 0
HEARTBURN: 0
NERVOUS/ANXIOUS: 0
FLANK PAIN: 0
SPEECH CHANGE: 0
WEIGHT LOSS: 0
VOMITING: 0
POLYDIPSIA: 0
NECK PAIN: 0
COUGH: 0
ORTHOPNEA: 0
WEAKNESS: 1
BRUISES/BLEEDS EASILY: 0
CHILLS: 0
SPUTUM PRODUCTION: 0
NAUSEA: 0
FOCAL WEAKNESS: 0
FEVER: 0

## 2024-12-07 ASSESSMENT — PATIENT HEALTH QUESTIONNAIRE - PHQ9
2. FEELING DOWN, DEPRESSED, IRRITABLE, OR HOPELESS: NOT AT ALL
SUM OF ALL RESPONSES TO PHQ9 QUESTIONS 1 AND 2: 0
1. LITTLE INTEREST OR PLEASURE IN DOING THINGS: NOT AT ALL

## 2024-12-07 ASSESSMENT — SOCIAL DETERMINANTS OF HEALTH (SDOH)
WITHIN THE LAST YEAR, HAVE YOU BEEN AFRAID OF YOUR PARTNER OR EX-PARTNER?: PATIENT DECLINED
WITHIN THE LAST YEAR, HAVE YOU BEEN KICKED, HIT, SLAPPED, OR OTHERWISE PHYSICALLY HURT BY YOUR PARTNER OR EX-PARTNER?: PATIENT DECLINED
WITHIN THE LAST YEAR, HAVE TO BEEN RAPED OR FORCED TO HAVE ANY KIND OF SEXUAL ACTIVITY BY YOUR PARTNER OR EX-PARTNER?: PATIENT DECLINED
WITHIN THE LAST YEAR, HAVE YOU BEEN HUMILIATED OR EMOTIONALLY ABUSED IN OTHER WAYS BY YOUR PARTNER OR EX-PARTNER?: PATIENT DECLINED

## 2024-12-07 ASSESSMENT — FIBROSIS 4 INDEX
FIB4 SCORE: 4.37
FIB4 SCORE: 3.58

## 2024-12-07 ASSESSMENT — PAIN DESCRIPTION - PAIN TYPE: TYPE: ACUTE PAIN

## 2024-12-08 ENCOUNTER — PHARMACY VISIT (OUTPATIENT)
Dept: PHARMACY | Facility: MEDICAL CENTER | Age: 89
End: 2024-12-08
Payer: COMMERCIAL

## 2024-12-08 VITALS
WEIGHT: 107.36 LBS | OXYGEN SATURATION: 93 % | BODY MASS INDEX: 21.08 KG/M2 | RESPIRATION RATE: 16 BRPM | HEIGHT: 60 IN | TEMPERATURE: 99.3 F | SYSTOLIC BLOOD PRESSURE: 151 MMHG | DIASTOLIC BLOOD PRESSURE: 67 MMHG | HEART RATE: 67 BPM

## 2024-12-08 PROBLEM — R53.1 WEAKNESS: Status: RESOLVED | Noted: 2024-08-11 | Resolved: 2024-12-08

## 2024-12-08 PROBLEM — N30.00 ACUTE CYSTITIS WITHOUT HEMATURIA: Status: RESOLVED | Noted: 2020-08-12 | Resolved: 2024-12-08

## 2024-12-08 LAB
ALBUMIN SERPL BCP-MCNC: 3.6 G/DL (ref 3.2–4.9)
ALBUMIN/GLOB SERPL: 1.2 G/DL
ALP SERPL-CCNC: 88 U/L (ref 30–99)
ALT SERPL-CCNC: 19 U/L (ref 2–50)
ANION GAP SERPL CALC-SCNC: 12 MMOL/L (ref 7–16)
AST SERPL-CCNC: 32 U/L (ref 12–45)
BASOPHILS # BLD AUTO: 0.3 % (ref 0–1.8)
BASOPHILS # BLD: 0.03 K/UL (ref 0–0.12)
BILIRUB SERPL-MCNC: 0.4 MG/DL (ref 0.1–1.5)
BUN SERPL-MCNC: 23 MG/DL (ref 8–22)
CALCIUM ALBUM COR SERPL-MCNC: 8.8 MG/DL (ref 8.5–10.5)
CALCIUM SERPL-MCNC: 8.5 MG/DL (ref 8.5–10.5)
CHLORIDE SERPL-SCNC: 107 MMOL/L (ref 96–112)
CK SERPL-CCNC: 67 U/L (ref 0–154)
CO2 SERPL-SCNC: 18 MMOL/L (ref 20–33)
CREAT SERPL-MCNC: 1.15 MG/DL (ref 0.5–1.4)
EOSINOPHIL # BLD AUTO: 0.08 K/UL (ref 0–0.51)
EOSINOPHIL NFR BLD: 0.9 % (ref 0–6.9)
ERYTHROCYTE [DISTWIDTH] IN BLOOD BY AUTOMATED COUNT: 43.5 FL (ref 35.9–50)
GFR SERPLBLD CREATININE-BSD FMLA CKD-EPI: 44 ML/MIN/1.73 M 2
GLOBULIN SER CALC-MCNC: 3 G/DL (ref 1.9–3.5)
GLUCOSE SERPL-MCNC: 105 MG/DL (ref 65–99)
HCT VFR BLD AUTO: 41.3 % (ref 37–47)
HGB BLD-MCNC: 13.8 G/DL (ref 12–16)
IMM GRANULOCYTES # BLD AUTO: 0.04 K/UL (ref 0–0.11)
IMM GRANULOCYTES NFR BLD AUTO: 0.4 % (ref 0–0.9)
LYMPHOCYTES # BLD AUTO: 0.74 K/UL (ref 1–4.8)
LYMPHOCYTES NFR BLD: 8.2 % (ref 22–41)
MAGNESIUM SERPL-MCNC: 2 MG/DL (ref 1.5–2.5)
MCH RBC QN AUTO: 30.9 PG (ref 27–33)
MCHC RBC AUTO-ENTMCNC: 33.4 G/DL (ref 32.2–35.5)
MCV RBC AUTO: 92.6 FL (ref 81.4–97.8)
MONOCYTES # BLD AUTO: 0.35 K/UL (ref 0–0.85)
MONOCYTES NFR BLD AUTO: 3.9 % (ref 0–13.4)
NEUTROPHILS # BLD AUTO: 7.74 K/UL (ref 1.82–7.42)
NEUTROPHILS NFR BLD: 86.3 % (ref 44–72)
NRBC # BLD AUTO: 0 K/UL
NRBC BLD-RTO: 0 /100 WBC (ref 0–0.2)
PHOSPHATE SERPL-MCNC: 3 MG/DL (ref 2.5–4.5)
PLATELET # BLD AUTO: 187 K/UL (ref 164–446)
PMV BLD AUTO: 9.7 FL (ref 9–12.9)
POTASSIUM SERPL-SCNC: 4.1 MMOL/L (ref 3.6–5.5)
PROT SERPL-MCNC: 6.6 G/DL (ref 6–8.2)
RBC # BLD AUTO: 4.46 M/UL (ref 4.2–5.4)
SODIUM SERPL-SCNC: 137 MMOL/L (ref 135–145)
TSH SERPL DL<=0.005 MIU/L-ACNC: 0.93 UIU/ML (ref 0.38–5.33)
VIT B12 SERPL-MCNC: 643 PG/ML (ref 211–911)
WBC # BLD AUTO: 9 K/UL (ref 4.8–10.8)

## 2024-12-08 PROCEDURE — 700111 HCHG RX REV CODE 636 W/ 250 OVERRIDE (IP): Mod: JZ,JG | Performed by: EMERGENCY MEDICINE

## 2024-12-08 PROCEDURE — RXMED WILLOW AMBULATORY MEDICATION CHARGE: Performed by: NURSE PRACTITIONER

## 2024-12-08 PROCEDURE — 36415 COLL VENOUS BLD VENIPUNCTURE: CPT

## 2024-12-08 PROCEDURE — 700111 HCHG RX REV CODE 636 W/ 250 OVERRIDE (IP): Mod: JZ,JG | Performed by: INTERNAL MEDICINE

## 2024-12-08 PROCEDURE — A9270 NON-COVERED ITEM OR SERVICE: HCPCS | Mod: UD | Performed by: INTERNAL MEDICINE

## 2024-12-08 PROCEDURE — 97162 PT EVAL MOD COMPLEX 30 MIN: CPT

## 2024-12-08 PROCEDURE — 80053 COMPREHEN METABOLIC PANEL: CPT

## 2024-12-08 PROCEDURE — 85025 COMPLETE CBC W/AUTO DIFF WBC: CPT

## 2024-12-08 PROCEDURE — 96365 THER/PROPH/DIAG IV INF INIT: CPT

## 2024-12-08 PROCEDURE — 51798 US URINE CAPACITY MEASURE: CPT

## 2024-12-08 PROCEDURE — G0378 HOSPITAL OBSERVATION PER HR: HCPCS

## 2024-12-08 PROCEDURE — 99239 HOSP IP/OBS DSCHRG MGMT >30: CPT | Performed by: INTERNAL MEDICINE

## 2024-12-08 PROCEDURE — 700102 HCHG RX REV CODE 250 W/ 637 OVERRIDE(OP): Mod: UD | Performed by: INTERNAL MEDICINE

## 2024-12-08 RX ORDER — CEFAZOLIN SODIUM 1 G/50ML
1 INJECTION, SOLUTION INTRAVENOUS ONCE
Status: COMPLETED | OUTPATIENT
Start: 2024-12-08 | End: 2024-12-08

## 2024-12-08 RX ADMIN — ASPIRIN 81 MG: 81 TABLET, COATED ORAL at 05:54

## 2024-12-08 RX ADMIN — AMLODIPINE BESYLATE 10 MG: 5 TABLET ORAL at 05:53

## 2024-12-08 RX ADMIN — CARVEDILOL 6.25 MG: 6.25 TABLET, FILM COATED ORAL at 08:41

## 2024-12-08 RX ADMIN — CEFAZOLIN SODIUM 1 G: 1 INJECTION, SOLUTION INTRAVENOUS at 08:41

## 2024-12-08 RX ADMIN — LISINOPRIL 40 MG: 20 TABLET ORAL at 05:54

## 2024-12-08 RX ADMIN — CEFAZOLIN SODIUM 1 G: 1 INJECTION, SOLUTION INTRAVENOUS at 00:50

## 2024-12-08 ASSESSMENT — PAIN DESCRIPTION - PAIN TYPE
TYPE: ACUTE PAIN
TYPE: ACUTE PAIN

## 2024-12-08 ASSESSMENT — COGNITIVE AND FUNCTIONAL STATUS - GENERAL
WALKING IN HOSPITAL ROOM: A LITTLE
CLIMB 3 TO 5 STEPS WITH RAILING: A LITTLE
SUGGESTED CMS G CODE MODIFIER MOBILITY: CJ
MOVING TO AND FROM BED TO CHAIR: A LITTLE
STANDING UP FROM CHAIR USING ARMS: A LITTLE
MOBILITY SCORE: 20

## 2024-12-08 ASSESSMENT — GAIT ASSESSMENTS
DISTANCE (FEET): 50
ASSISTIVE DEVICE: FRONT WHEEL WALKER
DEVIATION: DECREASED BASE OF SUPPORT;BRADYKINETIC;SHUFFLED GAIT
GAIT LEVEL OF ASSIST: CONTACT GUARD ASSIST

## 2024-12-08 NOTE — ASSESSMENT & PLAN NOTE
Patient presented with generalized weakness that may or may not related to UTI or perhaps dehydration.  Plan to check CPK, TSH, vitamin B12 level and treat for UTI.  We will give 500 cc bolus  PTOT evaluation

## 2024-12-08 NOTE — ED PROVIDER NOTES
CHIEF COMPLAINT  Chief Complaint   Patient presents with    Weakness     Generalized weakness starting this AM approx 11:00 per pt, no unilateral deficits noted,     Dizziness     Starting this AM, denies any SOB or CP, denies falls, hx vertigo       LIMITATION TO HISTORY   Select: none    HPI    Sabrina Lobo is a 89 y.o. female who presents to the Emergency Department via EMS for acute weakness and dizziness onset this morning. The patient notes that she has been unable to walk, noting that her dizziness feels like room spinning, adding that she might pass out. She also notes that she has a headache that began this morning. At bedside, the patient notes that she is slightly dizzy. Per RN, the patient's daughter had called the ambulance due to concerns for the patient's weakness, which was noted to be more of a gradual onset. She is noted to live with family. Denies any fever or chest pain. Denies hitting her head recently. There were no alleviating or exacerbating factors reported. The patient has an allergy to beef products.    OUTSIDE HISTORIAN(S):  Select: None    EXTERNAL RECORDS REVIEWED  Select: The patient was admitted 8/11/24 for weakness and dizziness. Her blood pressure was elevated at 224/88 and was treated with IV hydralazine.     PAST MEDICAL HISTORY  Past Medical History:   Diagnosis Date    H/O bladder repair surgery     not sure per patient daughter     H/O: hysterectomy     Hypertension     Vertigo      SURGICAL HISTORY  Past Surgical History:   Procedure Laterality Date    HYSTERECTOMY RADICAL       FAMILY HISTORY  Family History   Problem Relation Age of Onset    Heart Disease Neg Hx     Hypertension Neg Hx     Hyperlipidemia Neg Hx     Psychiatric Illness Neg Hx     Diabetes Neg Hx       SOCIAL HISTORY  Social History     Tobacco Use    Smoking status: Never    Smokeless tobacco: Never   Vaping Use    Vaping status: Never Used   Substance Use Topics    Alcohol use: No    Drug use:  No     CURRENT MEDICATIONS  Current Outpatient Medications Prior to Encounter   Medication Sig Dispense Refill    lisinopril (PRINIVIL) 40 MG tablet Take 1 Tablet by mouth every day for 120 days. 30 Tablet 3    aspirin 81 MG EC tablet Take 1 Tablet by mouth every day. 100 Tablet 0    carvedilol (COREG) 6.25 MG Tab Take 1 Tablet by mouth 2 times a day with meals. 60 Tablet 3    multivitamin Tab Take 1 Tablet by mouth every day.      VITAMIN D PO Take 1 Tablet by mouth every day.      atorvastatin (LIPITOR) 40 MG Tab Take 1 Tablet by mouth at bedtime. 30 Tablet 0    amLODIPine (NORVASC) 10 MG Tab Take 1 Tablet by mouth every day. 30 Tablet 0     ALLERGIES  Allergies   Allergen Reactions    Beef-Derived Products Nausea     PHYSICAL EXAM  VITAL SIGNS:/64   Pulse 81   Temp 37.1 °C (98.8 °F) (Temporal)   Resp 20   Ht 1.524 m (5')   Wt 49.9 kg (110 lb)   SpO2 92%   BMI 21.48 kg/m²       Constitutional: Elderly in appearance HENT: Normocephalic, Atraumatic, No focal tenderness, Bilateral external ears normal.  Eyes:  conjunctiva are normal.   Neck: Supple. Non tender midline, No lymphadenopathy,  Cardiovascular: Regular rate and rhythm without murmurs gallops or rubs.   Thorax & Lungs: No respiratory distress. Breathing comfortably. Lungs are clear to auscultation bilaterally, there are no wheezes no rales. Chest wall is non tender.  Abdomen: Soft, non distended, non tender   Skin: Warm, Dry, No erythema,   Back: No tenderness, No CVA tenderness.  Musculoskeletal: No lower extremity edema, no calf tenderness, No clubbing or cyanosis, good range of motion   Neurologic: Alert & oriented x 3, Baseline hearing deficit, cranial nerves II-XII grossly intact, strength intact, 2+ patellar reflex, no clonus, normal sensation, moving all extremities, appears normal   Psychiatric: Affect normal, Judgment normal, Mood normal.    DIAGNOSTIC STUDIES / PROCEDURES  LABS  Results for orders placed or performed during the  hospital encounter of 12/07/24   Lactic Acid    Collection Time: 12/07/24  7:21 PM   Result Value Ref Range    Lactic Acid 1.2 0.5 - 2.0 mmol/L   CBC with Differential    Collection Time: 12/07/24  7:21 PM   Result Value Ref Range    WBC 11.0 (H) 4.8 - 10.8 K/uL    RBC 4.70 4.20 - 5.40 M/uL    Hemoglobin 14.6 12.0 - 16.0 g/dL    Hematocrit 43.9 37.0 - 47.0 %    MCV 93.4 81.4 - 97.8 fL    MCH 31.1 27.0 - 33.0 pg    MCHC 33.3 32.2 - 35.5 g/dL    RDW 43.9 35.9 - 50.0 fL    Platelet Count 193 164 - 446 K/uL    MPV 9.2 9.0 - 12.9 fL    Neutrophils-Polys 90.00 (H) 44.00 - 72.00 %    Lymphocytes 5.90 (L) 22.00 - 41.00 %    Monocytes 2.40 0.00 - 13.40 %    Eosinophils 1.00 0.00 - 6.90 %    Basophils 0.30 0.00 - 1.80 %    Immature Granulocytes 0.40 0.00 - 0.90 %    Nucleated RBC 0.00 0.00 - 0.20 /100 WBC    Neutrophils (Absolute) 9.88 (H) 1.82 - 7.42 K/uL    Lymphs (Absolute) 0.65 (L) 1.00 - 4.80 K/uL    Monos (Absolute) 0.26 0.00 - 0.85 K/uL    Eos (Absolute) 0.11 0.00 - 0.51 K/uL    Baso (Absolute) 0.03 0.00 - 0.12 K/uL    Immature Granulocytes (abs) 0.04 0.00 - 0.11 K/uL    NRBC (Absolute) 0.00 K/uL   Complete Metabolic Panel    Collection Time: 12/07/24  7:21 PM   Result Value Ref Range    Sodium 137 135 - 145 mmol/L    Potassium 4.2 3.6 - 5.5 mmol/L    Chloride 107 96 - 112 mmol/L    Co2 19 (L) 20 - 33 mmol/L    Anion Gap 11.0 7.0 - 16.0    Glucose 112 (H) 65 - 99 mg/dL    Bun 24 (H) 8 - 22 mg/dL    Creatinine 1.03 0.50 - 1.40 mg/dL    Calcium 9.0 8.5 - 10.5 mg/dL    Correct Calcium 9.2 8.5 - 10.5 mg/dL    AST(SGOT) 30 12 - 45 U/L    ALT(SGPT) 17 2 - 50 U/L    Alkaline Phosphatase 94 30 - 99 U/L    Total Bilirubin 0.5 0.1 - 1.5 mg/dL    Albumin 3.8 3.2 - 4.9 g/dL    Total Protein 7.1 6.0 - 8.2 g/dL    Globulin 3.3 1.9 - 3.5 g/dL    A-G Ratio 1.2 g/dL   ESTIMATED GFR    Collection Time: 12/07/24  7:21 PM   Result Value Ref Range    GFR (CKD-EPI) 50 (A) >60 mL/min/1.73 m 2   POC CoV-2, FLU A/B, RSV by PCR    Collection  Time: 12/07/24  7:44 PM   Result Value Ref Range    POC Influenza A RNA, PCR Negative Negative    POC Influenza B RNA, PCR Negative Negative    POC RSV, by PCR Negative Negative    POC SARS-CoV-2, PCR NotDetected NotDetected   Urinalysis    Collection Time: 12/07/24  8:28 PM    Specimen: Urine   Result Value Ref Range    Color Dark Yellow     Character Clear     Specific Gravity 1.022 <1.035    Ph 5.0 5.0 - 8.0    Glucose Negative Negative mg/dL    Ketones Trace (A) Negative mg/dL    Protein 100 (A) Negative mg/dL    Bilirubin Negative Negative    Urobilinogen, Urine 1.0 <=1.0 EU/dL    Nitrite Positive (A) Negative    Leukocyte Esterase Trace (A) Negative    Occult Blood Moderate (A) Negative    Micro Urine Req Microscopic    URINE MICROSCOPIC (W/UA)    Collection Time: 12/07/24  8:28 PM   Result Value Ref Range    WBC 6-10 (A) /hpf    RBC 3-5 (A) 0 - 2 /hpf    Bacteria Many (A) None /hpf    Epithelial Cells 0-2 0 - 5 /hpf    Urine Casts 0-2 0 - 2 /lpf     RADIOLOGY  I have independently interpreted the diagnostic imaging associated with this visit and am waiting the final reading from the radiologist.   My preliminary interpretation is as follows: Patient does have mild increased interstitial edema noted on chest x-ray    Radiologist interpretation:   DX-CHEST-PORTABLE (1 VIEW)   Final Result      Diffuse interstitial prominence may represent edema superimposed on chronic interstitial disease.           COURSE & MEDICAL DECISION MAKING    ED COURSE:    ED Observation Status? No, The patient does not qualify for observation status     INTERVENTIONS BY ME:  Medications   ceFAZolin (Ancef) injection 1 g (has no administration in time range)       6:41 PM - Patient seen and examined at bedside. Discussed plan of care, including ordering imaging and labs to further evaluate. Patient agrees to the plan of care. Ordered for DX-chest, SARS-CoV-2, Flu A/B, and RSV, lactic acid, CBC with diff, CMP, UA, Urine culture to  evaluate her symptoms.     9:49 PM - Patient was reevaluated at bedside. Discussed lab and radiology results with the patient and informed them that she has a UTI. The patient states that she would like to be admitted. Discussed the plan for admission for antibiotics. Patient verbalizes understanding and agreement to this plan of care. The patient will be medicated with Ancef 2 g. Ordered urine culture to further evaluate.     10:03 PM - I discussed the patient's case and the above findings with Dr. Melgoza (Hospitalist) who agrees to evaluate the patient for admission.      INITIAL ASSESSMENT, COURSE AND PLAN  Care Narrative: Patient presents emerged part for evaluation.  Clinically patient is describing about dizziness and just not feeling well.  Septic workup was obtained.  Viral panel was negative.  Urinalysis does show 6-10 white blood cells with many bacteria that is nitrate positive.  CMP shows a slightly low bicarb of 19 elevated BUN a of 24.  White blood cell count slightly elevated 11.  Patient was given Ancef because of what appears to be urinary tract infection and she started with oral hydration was able to tolerate oral fluids.  Patient was reevaluated she just stated that she was having just continued weakness and does not feel comfortable going back home so we will admit the patient for urinary tract infection and increasing weakness have spoken to the hospitalist for this admission.      Sepsis: Infection was suspected 7:05 PM (Time). Sepsis pathway was initiated. Fluids not needed (no hypotension or lactate greater than or equal to 4). Antibiotics were given per protocol.      ADDITIONAL PROBLEM LIST  Advanced age    DISPOSITION AND DISCUSSIONS  I have discussed management of the patient with the following physicians/ CIPRIANO's/ ancillary staff:  Respiratory, Dr. Melgoza (Hospitalist)    DISPOSITION:  Patient will be hospitalized by Dr. Melgoza in guarded condition.     FINAL DIAGNOSIS  1.  Weakness    2. Urinary tract infection without hematuria, site unspecified    3. Dehydration         IAilyn (Scribjuan j), am scribing for, and in the presence of, Amaury Palomino M.D..    Electronically signed by: Ailyn Palomino (Trudy), 12/7/2024    Amaury HILL M.D. personally performed the services described in this documentation, as scribed by Ailyn Palomino in my presence, and it is both accurate and complete.     Electronically signed by: Amaury Palomino M.D.,10:05 PM 12/07/24

## 2024-12-08 NOTE — THERAPY
Physical Therapy   Initial Evaluation     Patient Name: Sabrina Lobo  Age:  89 y.o., Sex:  female  Medical Record #: [id number]  Today's Date: 12/8/2024     Precautions  Precautions: Fall Risk    Assessment  Patient is 89 y.o. female presenting for acute onset of weakness and dizziness, found to have acute cystitis w/ a PMH of dementia, HTN, TIA's and vertigo.  She is currently a poor historian, however RN reports she spoke to the pt's dtr who owns a group home in which the pt resides (see flowsheet for home set-up and PLOF).      Currently, the pt displays gross BLE weakness, poor balance and gait stability, and poor endurance limiting her independence w/ functional mobility tasks.  The pt demo'd supine<>sit EOB SBA w/ HOB flat and no rails, along w/ STS EOB w/ FWW CGA.  She was able to demo gait 50' using FWW CGA via narrow RAYMOND, slow jey and shuffled gait pattern, no LOB observed and distance limited by fatigue.  Recommend pt DC to home environment (so long as 24/7 SPV is provided at ) w/ HH for home safety assessment and further PT needs given extent of mobility demo'd at this time.  Will follow for acute PT needs.       Plan    Physical Therapy Initial Treatment Plan   Treatment Plan : Bed Mobility, Equipment, Gait Training, Neuro Re-Education / Balance, Orthotics Training , Self Care / Home Evaluation, Stair Training, Therapeutic Activities, Therapeutic Exercise  Treatment Frequency: 3 Times per Week  Duration: Until Therapy Goals Met    DC Equipment Recommendations: Front-Wheel Walker  Discharge Recommendations: Recommend home health for continued physical therapy services (so long as pt resides at a group home w/ 24/7 SPV)       Subjective/Objective       12/08/24 0978   Prior Living Situation   Prior Services Continuous (24 Hour) Care Giving Per Service   Housing / Facility Group Home   Steps Into Home 0   Steps In Home 0   Equipment Owned Single Point Cane   Lives with - Patient's Self Care  Capacity Attendant / Paid Care Giver   Comments Pt is an unreliable historian, however RN reports she spoke to pt's dtr who owns the group home in which the pt resides   Prior Level of Functional Mobility   Bed Mobility Independent   Transfer Status Independent   Ambulation Independent   Ambulation Distance Household distances   Assistive Devices Used Single Point Cane   Stairs Required Assist   History of Falls   History of Falls No   Date of Last Fall   (pt denies any recent falls)   Cognition    Cognition / Consciousness X   Level of Consciousness Alert   Comments Pt is an unreliable historian w/ dementia, however pleasant and cooperative throughout evaluation   Passive ROM Lower Body   Passive ROM Lower Body WDL   Active ROM Lower Body    Active ROM Lower Body  WDL   Comments observed during functional mobility   Strength Lower Body   Lower Body Strength  X   Comments Gross BLE weakness, functional for mobility tasks   Sensation Lower Body   Lower Extremity Sensation   WDL   Comments sensation intact to LT/DP BLE   Coordination Lower Body    Coordination Lower Body  WDL   Balance Assessment   Sitting Balance (Static) Fair +   Sitting Balance (Dynamic) Fair +   Standing Balance (Static) Fair -   Standing Balance (Dynamic) Fair -   Weight Shift Sitting Good   Weight Shift Standing Fair   Comments stand w/ FWW   Bed Mobility    Supine to Sit Standby Assist   Sit to Supine Standby Assist   Scooting Standby Assist   Comments HOB flat, no rails   Gait Analysis   Gait Level Of Assist Contact Guard Assist   Assistive Device Front Wheel Walker   Distance (Feet) 50   # of Times Distance was Traveled 1   Deviation Decreased Base Of Support;Bradykinetic;Shuffled Gait   Weight Bearing Status no restrictions   Vision Deficits Impacting Mobility pt denies   Comments distance limited by fatigue   Functional Mobility   Sit to Stand Contact Guard Assist   Bed, Chair, Wheelchair Transfer Contact Guard Assist   Transfer Method  Stand Step   Mobility STS EOB w/ FWW; EOB<>hallway w/ FWW   Activity Tolerance   Sitting Edge of Bed 8min   Standing 10min   Edema / Skin Assessment   Edema / Skin  Not Assessed   Short Term Goals    Short Term Goal # 1 Pt will demo STS EOB w/ FWW SPV in 6 visits to prepare for OOB mobility tasks.   Short Term Goal # 2 Pt will demo gait >150' using FWW SPV in a moving environment in 6 visits for household ambulation.   Education Group   Education Provided Role of Physical Therapist   Role of Physical Therapist Patient Response Patient;Acceptance;Explanation;Demonstration;Action Demonstration   Additional Comments pt receptive of edu provided   Problem List    Problems Impaired Bed Mobility;Impaired Transfers;Impaired Ambulation;Functional Strength Deficit;Impaired Balance;Decreased Activity Tolerance;Safety Awareness Deficits / Cognition   Interdisciplinary Plan of Care Collaboration   IDT Collaboration with  Nursing   Patient Position at End of Therapy In Bed;Bed Alarm On;Call Light within Reach;Tray Table within Reach;Phone within Reach   Collaboration Comments regarding outcome of tx session   Session Information   Date / Session Number  12/8- 1 (1/3, 12/14)

## 2024-12-08 NOTE — DISCHARGE SUMMARY
Discharge Summary    CHIEF COMPLAINT ON ADMISSION  Chief Complaint   Patient presents with    Weakness     Generalized weakness starting this AM approx 11:00 per pt, no unilateral deficits noted,     Dizziness     Starting this AM, denies any SOB or CP, denies falls, hx vertigo       Reason for Admission  ems     Admission Date  12/7/2024    CODE STATUS  DNAR/DNI    HPI & HOSPITAL COURSE    Ms. Sabrina Lobo is a 89 y.o. female with history of hypertension, TIAs, vertigo, mild memory loss, who presented 12/7/2024 with complaints of generalized weakness, dizziness.      Weakness reportedly started in the morning.  Upon questioning patient stated that she had an episode of vertigo last night, but denies any dizziness or vertigo today.    Upon evaluation in ER she was diagnosed with UTI based on bacteria, pyuria leukocyte esterase and nitrates and urinalysis and was given IV cefazolin.  Blood test abnormalities include WBC 11.0. Chest x-ray showed interstitial prominence.EKG: Sinus rhythm heart rate 80, LBBB.  No significant changes.  Patient admitted to hospital medicine for management of care.    During this hospitalization, patient was initiated on IV antibiotics with significant improvement.  Patient seen and examined prior to being discharged.  Discussed with patient plan of care.  Patient at this time back to her baseline and will transition patient to oral antibiotics.  Patient to follow-up with her PCP for management of care and start taking Augmentin 875/125 mg twice daily for 7 days.  Notified family in regards to post discharge plan of care.  All questions and concerns answered prior to being discharged.  Patient discharged home.    Therefore, she is discharged in good and stable condition to home with close outpatient follow-up.    The patient recovered much more quickly than anticipated on admission.    Discharge Date  12/08/24      FOLLOW UP ITEMS POST DISCHARGE  Please call [phone] to  schedule PCP appointment for patient.    Required specialty appointments include:       Discharge Instructions per SUKHDEV Cortez    Follow-up with PCP s/p hospitalization  Start taking Augmentin 875/125 mg twice daily for 7 days for management of cystitis  Continue all home medications  Keep yourself hydrated with at least 1000 mL of water daily    DIET: As tolerated    ACTIVITY: As tolerated    DIAGNOSIS: Cystitis without complications    Return to ER if symptoms persist, chest pain, palpitations, shortness of breath, numbness, tingling, weakness, and high fevers.      DISCHARGE DIAGNOSES  Principal Problem (Resolved):    Weakness (POA: Yes)  Active Problems:    Essential hypertension (POA: Yes)    Stage 3a chronic kidney disease (POA: Yes)  Resolved Problems:    Acute cystitis without hematuria (POA: Yes)      FOLLOW UP    Ash Whiting M.D.  [address] [place] [address]  [phone]    Schedule an appointment as soon as possible for a visit in 1 week(s)      LUCERO CortezRLYNNETTE  [address] [address]  [place] [id number] [phone]    Schedule an appointment as soon as possible for a visit in 1 week(s)        MEDICATIONS ON DISCHARGE     Medication List        START taking these medications        Instructions   amoxicillin-clavulanate 875-125 MG Tabs  Commonly known as: Augmentin   Take 1 Tablet by mouth 2 times a day for 7 days.  Dose: 1 Tablet            CONTINUE taking these medications        Instructions   amLODIPine 10 MG Tabs  Commonly known as: Norvasc   Take 1 Tablet by mouth every day.  Dose: 10 mg     aspirin 81 MG EC tablet   Take 1 Tablet by mouth every day.  Dose: 81 mg     atorvastatin 40 MG Tabs  Commonly known as: Lipitor   Take 1 Tablet by mouth at bedtime.  Dose: 40 mg     carvedilol 6.25 MG Tabs  Commonly known as: Coreg   Take 1 Tablet by mouth 2 times a day with meals.  Dose: 6.25 mg     lisinopril 40 MG tablet  Commonly known as: Prinivil    Take 1 Tablet by mouth every day for 120 days.  Dose: 40 mg     Melatonin 10 MG Tabs   Take 10 mg by mouth at bedtime as needed.  Dose: 10 mg              Allergies  Allergies   Allergen Reactions    Beef-Derived Products Nausea       DIET  Orders Placed This Encounter   Procedures    Diet Order Diet: Regular     Standing Status:   Standing     Number of Occurrences:   1     Order Specific Question:   Diet:     Answer:   Regular [1]       ACTIVITY  As tolerated.  Weight bearing as tolerated    CONSULTATIONS  NONE    PROCEDURES  NONE    IMAGING  DX-CHEST-PORTABLE (1 VIEW)   Final Result      Diffuse interstitial prominence may represent edema superimposed on chronic interstitial disease.            LABORATORY  Lab Results   Component Value Date    SODIUM 137 12/08/2024    POTASSIUM 4.1 12/08/2024    CHLORIDE 107 12/08/2024    CO2 18 (L) 12/08/2024    GLUCOSE 105 (H) 12/08/2024    BUN 23 (H) 12/08/2024    CREATININE 1.15 12/08/2024    CREATININE 0.9 01/15/2009        Lab Results   Component Value Date    WBC 9.0 12/08/2024    HEMOGLOBIN 13.8 12/08/2024    HEMATOCRIT 41.3 12/08/2024    PLATELETCT 187 12/08/2024

## 2024-12-08 NOTE — DISCHARGE PLANNING
HTH/SCP TCN chart review completed. Collaborated with OPAL Parada prior to meeting with the pt. The most current review of medical record, knowledge of pt's PLOF and social support, LACE+ score of 58 was considered.  No 6 clicks scores.      Pt seen at bedside. Introduced TCN program. Provided education regarding post acute levels of care. Education provided regarding case management policy for blanket SNF referrals. Discussed HTH/SCP plan benefits. Pt verbalizes understanding.     Patient seen at bedside with daughter Bernadette on mobile phone.  Patient lives with her son in a one story home and was independent/supervised with ADL's, shared IADL's and mobility at her baseline level of function.  Family provides 24/7 supervision.  Her daughter or son provide transportation to appointments as needed.  She reports that she ambulated with a cane at her baseline level of function.  She reports that she is not at her baseline level of function and is agreeable with Home Health if recommended by therapy.       Choice proactively obtained for HH (1. [facility] & 2. [facility]) & DME ([facility]) if indicated, faxed to [facility] and given to CM.  In collaboration with CM, current discharge considerations are noted to be for home with possible home health and close outpatient f/u's when medically cleared.      TCN will continue to follow and collaborate with discharge planning team as additional post acute needs arise. Thank you.     Completed today:  PT/OT orders in chart.    Choice obtained: HH (1. [facility] & 2. [facility]) & DME ([facility]) if indicated.   Pt aware of [facility] blanket referral policy  SCP with a Non-[facility] PCP.  Patient will schedule her own PCP f/u appointment.      Addendum:  1432- Per chart review, PT recommends HH and a FWW on 12/8/24.  This TCN Notified CM & LUCERO CortezRLYNNETTE of PT recommendation for HH and FWW.  Per CM, patient now states she already has a FWW but HH will be ordered.  Melo SOLANO  BLESSING Balbuena. states she will order Home Health via Voalte message.

## 2024-12-08 NOTE — HOSPITAL COURSE
Ms. Sabrina Lobo is a 89 y.o. female with history of hypertension, TIAs, vertigo, mild memory loss, who presented 12/7/2024 with complaints of generalized weakness, dizziness.      Weakness reportedly started in the morning.  Upon questioning patient stated that she had an episode of vertigo last night, but denies any dizziness or vertigo today.    Upon evaluation in ER she was diagnosed with UTI based on bacteria, pyuria leukocyte esterase and nitrates and urinalysis and was given IV cefazolin.  Blood test abnormalities include WBC 11.0. Chest x-ray showed interstitial prominence.EKG: Sinus rhythm heart rate 80, LBBB.  No significant changes.  Patient admitted to hospital medicine for management of care.    During this hospitalization, patient was initiated on IV antibiotics with significant improvement.  Patient seen and examined prior to being discharged.  Discussed with patient plan of care.  Patient at this time back to her baseline and will transition patient to oral antibiotics.  Patient to follow-up with her PCP for management of care and start taking Augmentin 875/125 mg twice daily for 7 days.  Notified family in regards to post discharge plan of care.  All questions and concerns answered prior to being discharged.  Patient discharged home.

## 2024-12-08 NOTE — ED NOTES
Medication history reviewed with patient's daughter over phone.  Med rec is complete.  Allergies reviewed.   Daughter denies any outpatient antibiotics in the last 30 days.   Anticoagulants taken in the last 14 days? None  Patient on Aspirin 81 mg daily    Jenifer Hamilton

## 2024-12-08 NOTE — ASSESSMENT & PLAN NOTE
Urinalysis: Positive for bacteriuria, pyuria, leukocyte esterase, nitrites  Plan: IV cefazolin, follow urine culture

## 2024-12-08 NOTE — ED TRIAGE NOTES
Chief Complaint   Patient presents with    Weakness     Generalized weakness starting this AM approx 11:00 per pt, no unilateral deficits noted,     Dizziness     Starting this AM, denies any SOB or CP, denies falls, hx vertigo     Pt BIB [facility] for above complaints, VSS on RA, GCS 14 baseline dementia, NAD.    Pt placed on bed alarm.     /64   Pulse 81   Temp 37.1 °C (98.8 °F) (Temporal)   Resp 20   Ht 1.524 m (5')   Wt 49.9 kg (110 lb)   SpO2 92%   BMI 21.48 kg/m²

## 2024-12-08 NOTE — PROGRESS NOTES
Pt arrived to CDU via ED gurney with transporter. Patient transferred to bed with personal belongings

## 2024-12-08 NOTE — PROGRESS NOTES
Housekeeping found patients ring in the sheets when cleaning room. This RN spoke with daughter Bernadette, she is aware we have the ring, she will come pick it up tomorrow 12/9/2024 on Monday. [facility]

## 2024-12-08 NOTE — H&P
Hospital Medicine History & Physical Note    Date of Service  12/7/2024    Primary Care Physician  Pcp Pt States None      Code Status  DNAR/DNI    Chief Complaint  Chief Complaint   Patient presents with    Weakness     Generalized weakness starting this AM approx 11:00 per pt, no unilateral deficits noted,     Dizziness     Starting this AM, denies any SOB or CP, denies falls, hx vertigo       History of Presenting Illness  Sabrina Lobo is a 89 y.o. female with history of hypertension, TIAs, vertigo who presented 12/7/2024 with complaints of generalized weakness, dizziness.  Weakness reportedly started in the morning.  Upon questioning patient stated that she had an episode of vertigo last night, but denies any dizziness or vertigo today.  Upon evaluation in ER she was diagnosed with UTI based on bacteria, pyuria leukocyte esterase and nitrates and urinalysis and was given IV cefazolin.  Blood test abnormalities include WBC 11.0.  Chest x-ray showed interstitial prominence.  EKG: Sinus rhythm heart rate 80, LBBB.  No significant changes.  I discussed the plan of care with patient and ERP .    Review of Systems  Review of Systems   Constitutional:  Negative for chills, fever and weight loss.   HENT:  Negative for ear pain, hearing loss and tinnitus.    Eyes:  Negative for blurred vision, double vision and photophobia.   Respiratory:  Negative for cough, hemoptysis and sputum production.    Cardiovascular:  Negative for chest pain, palpitations and orthopnea.   Gastrointestinal:  Negative for heartburn, nausea and vomiting.   Genitourinary:  Negative for dysuria, flank pain, frequency and hematuria.        Suprapubic pain   Musculoskeletal:  Negative for back pain, joint pain and neck pain.   Skin:  Negative for itching and rash.   Neurological:  Positive for weakness. Negative for tremors, speech change, focal weakness and headaches.   Endo/Heme/Allergies:  Negative for environmental allergies and  polydipsia. Does not bruise/bleed easily.   Psychiatric/Behavioral:  Negative for hallucinations and substance abuse. The patient is not nervous/anxious.        Past Medical History   has a past medical history of H/O bladder repair surgery, H/O: hysterectomy, Hypertension, and Vertigo.    Surgical History   has a past surgical history that includes hysterectomy radical.     Family History  family history is not on file.   Family history reviewed with patient. There is no family history that is pertinent to the chief complaint.     Social History   reports that she has never smoked. She has never used smokeless tobacco. She reports that she does not drink alcohol and does not use drugs.    Allergies  Allergies   Allergen Reactions    Beef-Derived Products Nausea       Medications  Prior to Admission Medications   Prescriptions Last Dose Informant Patient Reported? Taking?   VITAMIN D PO  Patient Yes No   Sig: Take 1 Tablet by mouth every day.   amLODIPine (NORVASC) 10 MG Tab  Patient No No   Sig: Take 1 Tablet by mouth every day.   aspirin 81 MG EC tablet   No No   Sig: Take 1 Tablet by mouth every day.   atorvastatin (LIPITOR) 40 MG Tab  Patient No No   Sig: Take 1 Tablet by mouth at bedtime.   carvedilol (COREG) 6.25 MG Tab   No No   Sig: Take 1 Tablet by mouth 2 times a day with meals.   lisinopril (PRINIVIL) 40 MG tablet   No No   Sig: Take 1 Tablet by mouth every day for 120 days.   multivitamin Tab  Patient Yes No   Sig: Take 1 Tablet by mouth every day.      Facility-Administered Medications: None       Physical Exam  Temp:  [37.1 °C (98.8 °F)] 37.1 °C (98.8 °F)  Pulse:  [68-81] 68  Resp:  [19-20] 19  BP: (138-184)/(64-79) 167/70  SpO2:  [91 %-93 %] 93 %  Blood Pressure : (!) 167/70   Temperature: 37.1 °C (98.8 °F)   Pulse: 68   Respiration: 19   Pulse Oximetry: 93 %       Physical Exam  Vitals and nursing note reviewed.   Constitutional:       General: She is not in acute distress.     Appearance: Normal  "appearance. She is ill-appearing.   HENT:      Head: Normocephalic and atraumatic.      Nose: Nose normal.      Mouth/Throat:      Mouth: Mucous membranes are moist.   Eyes:      Extraocular Movements: Extraocular movements intact.      Pupils: Pupils are equal, round, and reactive to light.   Cardiovascular:      Rate and Rhythm: Normal rate and regular rhythm.   Pulmonary:      Effort: Pulmonary effort is normal.      Breath sounds: Normal breath sounds.   Abdominal:      General: Abdomen is flat. There is no distension.      Tenderness: There is abdominal tenderness in the suprapubic area. There is no guarding or rebound.      Comments: Suprapubic tenderness   Musculoskeletal:         General: No swelling or deformity. Normal range of motion.      Cervical back: Normal range of motion and neck supple.   Skin:     General: Skin is warm and dry.   Neurological:      General: No focal deficit present.      Mental Status: She is alert.      Comments: Generally weak without localizing deficit.   Psychiatric:         Mood and Affect: Mood normal.         Behavior: Behavior normal.         Laboratory:  Recent Labs     12/07/24 1921   WBC 11.0*   RBC 4.70   HEMOGLOBIN 14.6   HEMATOCRIT 43.9   MCV 93.4   MCH 31.1   MCHC 33.3   RDW 43.9   PLATELETCT 193   MPV 9.2     Recent Labs     12/07/24 1921   SODIUM 137   POTASSIUM 4.2   CHLORIDE 107   CO2 19*   GLUCOSE 112*   BUN 24*   CREATININE 1.03   CALCIUM 9.0     Recent Labs     12/07/24 1921   ALTSGPT 17   ASTSGOT 30   ALKPHOSPHAT 94   TBILIRUBIN 0.5   GLUCOSE 112*         No results for input(s): \"NTPROBNP\" in the last 72 hours.      No results for input(s): \"TROPONINT\" in the last 72 hours.    Imaging:  DX-CHEST-PORTABLE (1 VIEW)   Final Result      Diffuse interstitial prominence may represent edema superimposed on chronic interstitial disease.          X-Ray:  I have personally reviewed the images and compared with prior images.    Assessment/Plan:  Justification for " Admission Status  I anticipate this patient is appropriate for observation status at this time because cystitis    Patient will need a Med/Surg bed on MEDICAL service .  The need is secondary to cystitis.    * Weakness- (present on admission)  Assessment & Plan  Patient presented with generalized weakness that may or may not related to UTI or perhaps dehydration.  Plan to check CPK, TSH, vitamin B12 level and treat for UTI.  We will give 500 cc bolus  PTOT evaluation    Acute cystitis without hematuria- (present on admission)  Assessment & Plan  Urinalysis: Positive for bacteriuria, pyuria, leukocyte esterase, nitrites  Plan: IV cefazolin, follow urine culture    Stage 3a chronic kidney disease- (present on admission)  Assessment & Plan  Avoid nephrotoxins    Essential hypertension- (present on admission)  Assessment & Plan  We will resume amlodipine, carvedilol, losartan  Monitor blood pressure        VTE prophylaxis: enoxaparin ppx

## 2024-12-08 NOTE — PROGRESS NOTES
4 Eyes Skin Assessment Completed by Selene RN and Little EMT-B.    Head WDL  Ears WDL  Nose WDL  Mouth WDL  Neck WDL  Breast/Chest WDL  Shoulder Blades WDL  Spine WDL  (R) Arm/Elbow/Hand WDL  (L) Arm/Elbow/Hand WDL  Abdomen WDL  Groin Right side redness   Scrotum/Coccyx/Buttocks Redness, Non-Blanching, and Discoloration  (R) Leg WDL  (L) Leg WDL  (R) Heel/Foot/Toe WDL  (L) Heel/Foot/Toe WDL          Devices In Places Blood Pressure Cuff and Pulse Ox, Purewick      Interventions In Place Pillows and Barrier Cream    Possible Skin Injury No    Pictures Uploaded Into Epic Yes  Wound Consult Placed N/A  RN Wound Prevention Protocol Ordered No

## 2024-12-08 NOTE — DISCHARGE INSTRUCTIONS
FOLLOW UP ITEMS POST DISCHARGE  Please call [phone] to schedule PCP appointment for patient.    Required specialty appointments include:       Discharge Instructions per MALORIE CortezPMauraRMauraN.    Follow-up with PCP s/p hospitalization  Start taking Augmentin 875/125 mg twice daily for 7 days for management of cystitis  Continue all home medications  Keep yourself hydrated with at least 1000 mL of water daily    DIET: As tolerated    ACTIVITY: As tolerated    DIAGNOSIS: Cystitis without complications    Return to ER if symptoms persist, chest pain, palpitations, shortness of breath, numbness, tingling, weakness, and high fevers.

## 2024-12-08 NOTE — PROGRESS NOTES
Bedside report received from Selene ALLISON at this time. Patient sitting up in bed, eating breakfast, smiling and speaking in full sentences. No distress observed, patient is pleasantly confused, thinks she is 70 years old. On room air, VSS. Bed alarm on, fall precautions in place

## 2024-12-08 NOTE — CARE PLAN
The patient is Stable - Low risk of patient condition declining or worsening    Shift Goals  Clinical Goals: IV ABX, NS Bolus, Tele Monitor, Safety  Patient Goals: Feel better  Family Goals: TRANG    Progress made toward(s) clinical / shift goals:    Problem: Knowledge Deficit - Standard  Goal: Patient and family/care givers will demonstrate understanding of plan of care, disease process/condition, diagnostic tests and medications  Description: Target End Date:  1-3 days or as soon as patient condition allows    Document in Patient Education    1.  Patient and family/caregiver oriented to unit, equipment, visitation policy and means for communicating concern  2.  Complete/review Learning Assessment  3.  Assess knowledge level of disease process/condition, treatment plan, diagnostic tests and medications  4.  Explain disease process/condition, treatment plan, diagnostic tests and medications  Outcome: Progressing     Problem: Fall Risk  Goal: Patient will remain free from falls  Description: Target End Date:  Prior to discharge or change in level of care    Document interventions on the [facility] Fall Risk Assessment    1.  Assess for fall risk factors  2.  Implement fall precautions  Outcome: Progressing

## 2024-12-08 NOTE — PROGRESS NOTES
DISCHARGE NOTE    Patient cleared for discharge home no services, her daughter is here to drive her home. IVs removed. I gave the augmentin prescription to her daughter, her daughter signed her discharge paper work as patient is confused and does not sign for herself. Left at this time with all personal belongings via wheelchair, wheeled downstairs and assisted into daughters car

## 2025-02-17 ENCOUNTER — PATIENT MESSAGE (OUTPATIENT)
Dept: HEALTH INFORMATION MANAGEMENT | Facility: OTHER | Age: OVER 89
End: 2025-02-17

## 2025-04-30 ENCOUNTER — TELEPHONE (OUTPATIENT)
Dept: FAMILY PLANNING/WOMEN'S HEALTH CLINIC | Facility: PHYSICIAN GROUP | Age: OVER 89
End: 2025-04-30
Payer: MEDICARE

## 2025-04-30 NOTE — TELEPHONE ENCOUNTER
Message: Called and left message for patient to schedule annual Comprehensive Health Assessment visit with the [facility]. Left phone number for patient to call [facility] at [phone] to schedule.